# Patient Record
Sex: FEMALE | Race: WHITE | NOT HISPANIC OR LATINO | Employment: OTHER | URBAN - METROPOLITAN AREA
[De-identification: names, ages, dates, MRNs, and addresses within clinical notes are randomized per-mention and may not be internally consistent; named-entity substitution may affect disease eponyms.]

---

## 2017-06-30 ENCOUNTER — APPOINTMENT (OUTPATIENT)
Dept: WOMENS IMAGING | Facility: HOSPITAL | Age: 67
End: 2017-06-30

## 2017-06-30 PROCEDURE — 77063 BREAST TOMOSYNTHESIS BI: CPT | Performed by: RADIOLOGY

## 2017-06-30 PROCEDURE — G0202 SCR MAMMO BI INCL CAD: HCPCS | Performed by: RADIOLOGY

## 2018-08-08 ENCOUNTER — OFFICE VISIT CONVERTED (OUTPATIENT)
Dept: ORTHOPEDIC SURGERY | Facility: CLINIC | Age: 68
End: 2018-08-08
Attending: PHYSICIAN ASSISTANT

## 2018-09-05 ENCOUNTER — CONVERSION ENCOUNTER (OUTPATIENT)
Dept: ORTHOPEDIC SURGERY | Facility: CLINIC | Age: 68
End: 2018-09-05

## 2018-09-05 ENCOUNTER — OFFICE VISIT CONVERTED (OUTPATIENT)
Dept: ORTHOPEDIC SURGERY | Facility: CLINIC | Age: 68
End: 2018-09-05
Attending: PHYSICIAN ASSISTANT

## 2018-09-25 ENCOUNTER — CONVERSION ENCOUNTER (OUTPATIENT)
Dept: INTERNAL MEDICINE | Facility: CLINIC | Age: 68
End: 2018-09-25

## 2018-09-25 ENCOUNTER — OFFICE VISIT CONVERTED (OUTPATIENT)
Dept: INTERNAL MEDICINE | Facility: CLINIC | Age: 68
End: 2018-09-25
Attending: INTERNAL MEDICINE

## 2019-01-08 ENCOUNTER — OFFICE VISIT CONVERTED (OUTPATIENT)
Dept: INTERNAL MEDICINE | Facility: CLINIC | Age: 69
End: 2019-01-08
Attending: INTERNAL MEDICINE

## 2019-01-09 ENCOUNTER — APPOINTMENT (OUTPATIENT)
Dept: WOMENS IMAGING | Facility: HOSPITAL | Age: 69
End: 2019-01-09

## 2019-01-09 PROCEDURE — 77067 SCR MAMMO BI INCL CAD: CPT | Performed by: RADIOLOGY

## 2019-01-09 PROCEDURE — 77063 BREAST TOMOSYNTHESIS BI: CPT | Performed by: RADIOLOGY

## 2019-07-17 ENCOUNTER — OFFICE VISIT CONVERTED (OUTPATIENT)
Dept: INTERNAL MEDICINE | Facility: CLINIC | Age: 69
End: 2019-07-17
Attending: INTERNAL MEDICINE

## 2019-07-17 ENCOUNTER — HOSPITAL ENCOUNTER (OUTPATIENT)
Dept: OTHER | Facility: HOSPITAL | Age: 69
Discharge: HOME | End: 2019-07-17
Attending: INTERNAL MEDICINE

## 2019-07-17 LAB
25(OH)D3 SERPL-MCNC: 35.5 NG/ML (ref 30–100)
ALBUMIN SERPL-MCNC: 4.3 G/DL (ref 3.5–5)
ALBUMIN/GLOB SERPL: 1.2 {RATIO} (ref 1.4–2.6)
ALP SERPL-CCNC: 81 U/L (ref 43–160)
ALT SERPL-CCNC: 19 U/L (ref 10–40)
ANION GAP SERPL CALC-SCNC: 21 MMOL/L (ref 8–19)
AST SERPL-CCNC: 23 U/L (ref 15–50)
BASOPHILS # BLD AUTO: 0.02 10*3/UL (ref 0–0.2)
BASOPHILS NFR BLD AUTO: 0.3 % (ref 0–3)
BILIRUB SERPL-MCNC: 0.48 MG/DL (ref 0.2–1.3)
BUN SERPL-MCNC: 12 MG/DL (ref 5–25)
BUN/CREAT SERPL: 21 {RATIO} (ref 6–20)
CALCIUM SERPL-MCNC: 9.6 MG/DL (ref 8.7–10.4)
CHLORIDE SERPL-SCNC: 101 MMOL/L (ref 99–111)
CHOLEST SERPL-MCNC: 183 MG/DL (ref 107–200)
CHOLEST/HDLC SERPL: 4.1 {RATIO} (ref 3–6)
CONV ABS IMM GRAN: 0.02 10*3/UL (ref 0–0.2)
CONV CO2: 25 MMOL/L (ref 22–32)
CONV IMMATURE GRAN: 0.3 % (ref 0–1.8)
CONV TOTAL PROTEIN: 7.9 G/DL (ref 6.3–8.2)
CREAT UR-MCNC: 0.58 MG/DL (ref 0.5–0.9)
DEPRECATED RDW RBC AUTO: 43.9 FL (ref 36.4–46.3)
EOSINOPHIL # BLD AUTO: 0.27 10*3/UL (ref 0–0.7)
EOSINOPHIL # BLD AUTO: 4.5 % (ref 0–7)
ERYTHROCYTE [DISTWIDTH] IN BLOOD BY AUTOMATED COUNT: 13.6 % (ref 11.7–14.4)
GFR SERPLBLD BASED ON 1.73 SQ M-ARVRAT: >60 ML/MIN/{1.73_M2}
GLOBULIN UR ELPH-MCNC: 3.6 G/DL (ref 2–3.5)
GLUCOSE SERPL-MCNC: 102 MG/DL (ref 65–99)
HBA1C MFR BLD: 12.1 G/DL (ref 12–16)
HCT VFR BLD AUTO: 36.9 % (ref 37–47)
HDLC SERPL-MCNC: 45 MG/DL (ref 40–60)
LDLC SERPL CALC-MCNC: 98 MG/DL (ref 70–100)
LYMPHOCYTES # BLD AUTO: 1.97 10*3/UL (ref 1–5)
MCH RBC QN AUTO: 29 PG (ref 27–31)
MCHC RBC AUTO-ENTMCNC: 32.8 G/DL (ref 33–37)
MCV RBC AUTO: 88.5 FL (ref 81–99)
MONOCYTES # BLD AUTO: 0.42 10*3/UL (ref 0.2–1.2)
MONOCYTES NFR BLD AUTO: 7 % (ref 3–10)
NEUTROPHILS # BLD AUTO: 3.29 10*3/UL (ref 2–8)
NEUTROPHILS NFR BLD AUTO: 55 % (ref 30–85)
NRBC CBCN: 0 % (ref 0–0.7)
OSMOLALITY SERPL CALC.SUM OF ELEC: 296 MOSM/KG (ref 273–304)
PLATELET # BLD AUTO: 210 10*3/UL (ref 130–400)
PMV BLD AUTO: 10.2 FL (ref 9.4–12.3)
POTASSIUM SERPL-SCNC: 4.4 MMOL/L (ref 3.5–5.3)
RBC # BLD AUTO: 4.17 10*6/UL (ref 4.2–5.4)
SODIUM SERPL-SCNC: 143 MMOL/L (ref 135–147)
TRIGL SERPL-MCNC: 200 MG/DL (ref 40–150)
TSH SERPL-ACNC: 1.31 M[IU]/L (ref 0.27–4.2)
VARIANT LYMPHS NFR BLD MANUAL: 32.9 % (ref 20–45)
VLDLC SERPL-MCNC: 40 MG/DL (ref 5–37)
WBC # BLD AUTO: 5.99 10*3/UL (ref 4.8–10.8)

## 2019-07-18 LAB
CONV HEPATITIS C AB WITH REFLEX TO CONFIRMATION: <0.1 S/CO RATIO (ref 0–0.9)
CONV HEPATITIS COMMENT: NORMAL

## 2019-08-02 ENCOUNTER — CONVERSION ENCOUNTER (OUTPATIENT)
Dept: GASTROENTEROLOGY | Facility: CLINIC | Age: 69
End: 2019-08-02
Attending: INTERNAL MEDICINE

## 2019-12-20 ENCOUNTER — OFFICE VISIT CONVERTED (OUTPATIENT)
Dept: INTERNAL MEDICINE | Facility: CLINIC | Age: 69
End: 2019-12-20
Attending: INTERNAL MEDICINE

## 2020-01-17 ENCOUNTER — CONVERSION ENCOUNTER (OUTPATIENT)
Dept: INTERNAL MEDICINE | Facility: CLINIC | Age: 70
End: 2020-01-17

## 2020-01-17 ENCOUNTER — OFFICE VISIT CONVERTED (OUTPATIENT)
Dept: INTERNAL MEDICINE | Facility: CLINIC | Age: 70
End: 2020-01-17
Attending: INTERNAL MEDICINE

## 2020-02-13 ENCOUNTER — HOSPITAL ENCOUNTER (OUTPATIENT)
Dept: SLEEP MEDICINE | Facility: HOSPITAL | Age: 70
Discharge: HOME | End: 2020-02-13
Attending: INTERNAL MEDICINE

## 2020-04-21 ENCOUNTER — TELEMEDICINE CONVERTED (OUTPATIENT)
Dept: INTERNAL MEDICINE | Facility: CLINIC | Age: 70
End: 2020-04-21
Attending: INTERNAL MEDICINE

## 2020-06-02 ENCOUNTER — OFFICE VISIT CONVERTED (OUTPATIENT)
Dept: CARDIOLOGY | Facility: CLINIC | Age: 70
End: 2020-06-02
Attending: INTERNAL MEDICINE

## 2020-06-03 ENCOUNTER — APPOINTMENT (OUTPATIENT)
Dept: WOMENS IMAGING | Facility: HOSPITAL | Age: 70
End: 2020-06-03

## 2020-06-03 PROCEDURE — 77063 BREAST TOMOSYNTHESIS BI: CPT | Performed by: RADIOLOGY

## 2020-06-03 PROCEDURE — 77067 SCR MAMMO BI INCL CAD: CPT | Performed by: RADIOLOGY

## 2020-06-22 ENCOUNTER — CONVERSION ENCOUNTER (OUTPATIENT)
Dept: CARDIOLOGY | Facility: CLINIC | Age: 70
End: 2020-06-22
Attending: INTERNAL MEDICINE

## 2020-07-21 ENCOUNTER — OFFICE VISIT CONVERTED (OUTPATIENT)
Dept: INTERNAL MEDICINE | Facility: CLINIC | Age: 70
End: 2020-07-21
Attending: INTERNAL MEDICINE

## 2021-01-25 ENCOUNTER — OFFICE VISIT CONVERTED (OUTPATIENT)
Dept: CARDIOLOGY | Facility: CLINIC | Age: 71
End: 2021-01-25
Attending: INTERNAL MEDICINE

## 2021-02-02 ENCOUNTER — HOSPITAL ENCOUNTER (OUTPATIENT)
Dept: OTHER | Facility: HOSPITAL | Age: 71
Discharge: HOME | End: 2021-02-02
Attending: INTERNAL MEDICINE

## 2021-02-02 ENCOUNTER — OFFICE VISIT CONVERTED (OUTPATIENT)
Dept: INTERNAL MEDICINE | Facility: CLINIC | Age: 71
End: 2021-02-02
Attending: INTERNAL MEDICINE

## 2021-02-02 ENCOUNTER — CONVERSION ENCOUNTER (OUTPATIENT)
Dept: INTERNAL MEDICINE | Facility: CLINIC | Age: 71
End: 2021-02-02

## 2021-02-02 LAB
ALBUMIN SERPL-MCNC: 4.3 G/DL (ref 3.5–5)
ALBUMIN/GLOB SERPL: 1.2 {RATIO} (ref 1.4–2.6)
ALP SERPL-CCNC: 81 U/L (ref 43–160)
ALT SERPL-CCNC: 20 U/L (ref 10–40)
ANION GAP SERPL CALC-SCNC: 13 MMOL/L (ref 8–19)
AST SERPL-CCNC: 24 U/L (ref 15–50)
BASOPHILS # BLD AUTO: 0.03 10*3/UL (ref 0–0.2)
BASOPHILS NFR BLD AUTO: 0.4 % (ref 0–3)
BILIRUB SERPL-MCNC: 0.24 MG/DL (ref 0.2–1.3)
BUN SERPL-MCNC: 10 MG/DL (ref 5–25)
BUN/CREAT SERPL: 17 {RATIO} (ref 6–20)
CALCIUM SERPL-MCNC: 9.5 MG/DL (ref 8.7–10.4)
CHLORIDE SERPL-SCNC: 100 MMOL/L (ref 99–111)
CHOLEST SERPL-MCNC: 199 MG/DL (ref 107–200)
CHOLEST/HDLC SERPL: 4.6 {RATIO} (ref 3–6)
CONV ABS IMM GRAN: 0.02 10*3/UL (ref 0–0.2)
CONV CO2: 30 MMOL/L (ref 22–32)
CONV IMMATURE GRAN: 0.3 % (ref 0–1.8)
CONV TOTAL PROTEIN: 7.9 G/DL (ref 6.3–8.2)
CREAT UR-MCNC: 0.58 MG/DL (ref 0.5–0.9)
DEPRECATED RDW RBC AUTO: 44.2 FL (ref 36.4–46.3)
EOSINOPHIL # BLD AUTO: 0.19 10*3/UL (ref 0–0.7)
EOSINOPHIL # BLD AUTO: 2.8 % (ref 0–7)
ERYTHROCYTE [DISTWIDTH] IN BLOOD BY AUTOMATED COUNT: 13.7 % (ref 11.7–14.4)
GFR SERPLBLD BASED ON 1.73 SQ M-ARVRAT: >60 ML/MIN/{1.73_M2}
GLOBULIN UR ELPH-MCNC: 3.6 G/DL (ref 2–3.5)
GLUCOSE SERPL-MCNC: 79 MG/DL (ref 65–99)
HCT VFR BLD AUTO: 38.2 % (ref 37–47)
HDLC SERPL-MCNC: 43 MG/DL (ref 40–60)
HGB BLD-MCNC: 12.1 G/DL (ref 12–16)
LDLC SERPL CALC-MCNC: 111 MG/DL (ref 70–100)
LYMPHOCYTES # BLD AUTO: 2.12 10*3/UL (ref 1–5)
LYMPHOCYTES NFR BLD AUTO: 30.9 % (ref 20–45)
MCH RBC QN AUTO: 27.9 PG (ref 27–31)
MCHC RBC AUTO-ENTMCNC: 31.7 G/DL (ref 33–37)
MCV RBC AUTO: 88.2 FL (ref 81–99)
MONOCYTES # BLD AUTO: 0.54 10*3/UL (ref 0.2–1.2)
MONOCYTES NFR BLD AUTO: 7.9 % (ref 3–10)
NEUTROPHILS # BLD AUTO: 3.96 10*3/UL (ref 2–8)
NEUTROPHILS NFR BLD AUTO: 57.7 % (ref 30–85)
NRBC CBCN: 0 % (ref 0–0.7)
OSMOLALITY SERPL CALC.SUM OF ELEC: 286 MOSM/KG (ref 273–304)
PLATELET # BLD AUTO: 213 10*3/UL (ref 130–400)
PMV BLD AUTO: 10.5 FL (ref 9.4–12.3)
POTASSIUM SERPL-SCNC: 4.2 MMOL/L (ref 3.5–5.3)
RBC # BLD AUTO: 4.33 10*6/UL (ref 4.2–5.4)
SODIUM SERPL-SCNC: 139 MMOL/L (ref 135–147)
TRIGL SERPL-MCNC: 223 MG/DL (ref 40–150)
TSH SERPL-ACNC: 1.28 M[IU]/L (ref 0.27–4.2)
VLDLC SERPL-MCNC: 45 MG/DL (ref 5–37)
WBC # BLD AUTO: 6.86 10*3/UL (ref 4.8–10.8)

## 2021-03-02 ENCOUNTER — OFFICE VISIT CONVERTED (OUTPATIENT)
Dept: INTERNAL MEDICINE | Facility: CLINIC | Age: 71
End: 2021-03-02
Attending: INTERNAL MEDICINE

## 2021-03-02 ENCOUNTER — CONVERSION ENCOUNTER (OUTPATIENT)
Dept: INTERNAL MEDICINE | Facility: CLINIC | Age: 71
End: 2021-03-02

## 2021-03-18 ENCOUNTER — HOSPITAL ENCOUNTER (OUTPATIENT)
Dept: SLEEP MEDICINE | Facility: HOSPITAL | Age: 71
Discharge: HOME | End: 2021-03-18
Attending: INTERNAL MEDICINE

## 2021-04-15 ENCOUNTER — OFFICE VISIT CONVERTED (OUTPATIENT)
Dept: INTERNAL MEDICINE | Facility: CLINIC | Age: 71
End: 2021-04-15
Attending: INTERNAL MEDICINE

## 2021-05-05 ENCOUNTER — HOSPITAL ENCOUNTER (OUTPATIENT)
Dept: GASTROENTEROLOGY | Facility: HOSPITAL | Age: 71
Setting detail: HOSPITAL OUTPATIENT SURGERY
Discharge: HOME | End: 2021-05-05
Attending: INTERNAL MEDICINE

## 2021-05-10 NOTE — PROCEDURES
"   Procedure Note      Patient Name: Kimberly Dennis   Patient ID: [id number]   Sex: Female   YOB: 1950    Primary Care Provider: Milan Coppola MD   Referring Provider: Milan Coppola MD    Visit Date: June 22, 2020    Provider: Chip Mendoza MD   Location: [facility]   Location Address: [address] [place] [address], [address]   [place]  [id number]   Location Phone: [phone]          FINAL REPORT   TRANSTHORACIC ECHOCARDIOGRAM REPORT    Diagnosis: Atrial Fibrillation   Height: 5'6\" Weight: 268 B/P: 158/64 BSA: 2.3   Tech: BNS   MEASUREMENTS:  RVID (Diastole) : RVID. (NORMAL: 0.7 to 2.4 cm max)   LVID (Systole): 2.4 cm (Diastole): 4.9 cm . (NORMAL: 3.7 - 5.4 cm)   Posterior Wall Thickness (Diastole): 1.3 cm. (NORMAL: 0.8 - 1.1 cm)   Septal Thickness (Diastole): 1.4 cm. (NORMAL: 0.7 - 1.2 cm)   LAID (Systole): 4.8 cm. (NORMAL: 1.9 - 3.8 cm)   Aortic Root Diameter (Diastole): 3.3 cm. (NORMAL: 2.0 - 3.7 cm)   COMMENTS:  The patient underwent 2-D, M-Mode, and Doppler examination, including pulse-wave, continuous-wave, and color-flow Doppler analysis; the study is technically adequate. The following findings were noted:   FINDINGS:  MITRAL VALVE: Normal in appearance, opens well. No evidence of mitral valve prolapse. No mitral stenosis. Mild mitral regurgitation.   AORTIC VALVE: Normal trileaflet aortic valve, opens well. No evidence of aortic stenosis. There is mild aortic insufficiency.   TRICUSPID VALVE: Normal in appearance, opens well. Trace tricuspid regurgitation. Pulmonary artery systolic pressure within normal limits.   PULMONIC VALVE: Grossly normal. There is mild pulmonic insufficiency noted.   AORTIC ROOT: Normal in size with adequate motion.   LEFT ATRIUM: Mild left atrial enlargement. No intracavitary masses or clots seen. LA volume index is 40 mL/m2.   LEFT VENTRICLE: The left ventricular chamber size is normal. There is mild concentric left ventricular " hypertrophy. Left ventricular systolic function is normal. Estimated LV ejection fraction is 55%. There are no regional wall motion abnormalities. There is diastolic dysfunction of the left ventricle.   RIGHT VENTRICLE: Normal size and function.   RIGHT ATRIUM: Normal in size.   PERICARDIUM: No evidence of pericardial effusion.   INFERIOR VENA CAVA: Measures 1.4 cm in diameter and there is more than 50% collapse during inspiration.   DOPPLER: E/A ratio is 1.2. DT= 227 msec. IVRT is 90 msec. E/E' is 16.   Faxed: 06/29/2020      CONCLUSION:  1.  Preserved left ventricular systolic function with estimated LV ejection fraction of 55%.   2.  Mild concentric left ventricular hypertrophy.   3.  Mild left atrial enlargement.   4.  Mild mitral regurgitation.   5.  Diastolic dysfunction of the left ventricle.   6.  Mild aortic insufficiency.       MD MARLA Bryant/shahriar    This note was transcribed by Vale Contreras.  shahriar/marla  The above service was transcribed by Vale Contreras, and I attest to the accuracy of the note.  MARLA                 Electronically Signed by: Kaylan Contreras-, Other -Author on June 29, 2020 08:24:14 AM  Electronically Co-signed by: Chip Mendoza MD -Reviewer on June 29, 2020 08:45:26 AM

## 2021-05-10 NOTE — H&P
History and Physical      Patient Name: Kimberly Dennis   Patient ID: [id number]   Sex: Female   YOB: 1950    Primary Care Provider: Milan Coppola MD   Referring Provider: Milan Coppola MD    Visit Date: June 2, 2020    Provider: Chip Mendoza MD   Location: [facility]   Location Address: [address] [place] [address], [facility]   [place], [place]  [id number]   Location Phone: [phone]          Chief Complaint  · REASON FOR CONSULTATION: Atrial fibrillation       History Of Present Illness  Consult requested by: Milan Coppola MD   Kimberly Dennis is a 70-year-old female with hypertension and hypothyroidism, who was referred because of paroxysmal atrial fibrillation. She had a visit to [facility] emergency room on 12/20/2019 because of palpitations and fast heartbeat. She also had some dizziness. Initially seen in Dr. Coppola's office. An EKG was done, which showed atrial fibrillation with RVR and, hence, sent to the emergency room. In the emergency room, initial workup was unremarkable, and EKG again showed atrial fibrillation with RVR. Per ER records, patient underwent electrical cardioversion and was discharged home on the same day. She was never admitted. It was unclear why anti-coagulants were not prescribed after anti-coagulation. She had no symptoms in the past 6 months. She denies having any shortness of breath, dizziness or chest pain. She reports that she had another episode of atrial fibrillation 15 years back as well.   PAST MEDICAL HISTORY: (1) Paroxysmal atrial fibrillation, recent cardioversion in the emergency room. (2) Hypertension. (3) Hypothyroidism.   PSYCHOSOCIAL HISTORY: Denies tobacco or alcohol use. No recreational drug usage. No mood changes or depression.   FAMILY HISTORY: was reviewed. Negative for premature coronary artery disease.   CURRENT MEDICATIONS: include Metoprolol ER 50 mg daily; HCTZ 12.5 mg daily; aspirin 81 mg daily; Levothyroxine 75  "mcg daily; Vitamin D3; Cetirizine 10 mg daily. The dosage and frequency of the medications were reviewed with the patient.   ALLERGIES: Hydrocodone.       Review of Systems  · Constitutional  o Admits  o : fatigue  o Denies  o : good general health lately, recent weight changes   · Eyes  o Denies  o : double vision  · HENT  o Admits  o : chronic sinus problem  o Denies  o : hearing loss or ringing, swollen glands in neck  · Cardiovascular  o Admits  o : palpitations (fast, fluttering, or skipping beats), shortness of breath while walking or lying flat  o Denies  o : chest pain, swelling (feet, ankles, hands)  · Respiratory  o Admits  o : asthma or wheezing  o Denies  o : chronic or frequent cough, COPD  · Gastrointestinal  o Denies  o : ulcers, nausea or vomiting  · Neurologic  o Admits  o : headaches  o Denies  o : lightheaded or dizzy, stroke  · Musculoskeletal  o Admits  o : joint pain, back pain  · Endocrine  o Admits  o : thyroid disease  o Denies  o : diabetes, heat or cold intolerance, excessive thirst or urination  · Heme-Lymph  o Denies  o : bleeding or bruising tendency, anemia      Vitals  Date Time BP Position Site L\R Cuff Size HR RR TEMP (F) WT  HT  BMI kg/m2 BSA m2 O2 Sat HC       06/02/2020 10:17 /64 Sitting    68 - R   268lbs 0oz 5'  6\" 43.26 2.38     06/02/2020 10:17 /64 Sitting                     Physical Examination  · Constitutional  o Appearance  o : Awake, alert, in no acute distress.  · Head and Face  o HEENT  o : No pallor, anicteric. Eyes normal. Moist mucous membranes.  · Neck  o Inspection/Palpation  o : Supple. No hepatosplenomegaly.  o Jugular Veins  o : No JVD. No carotid bruits.  · Respiratory  o Auscultation of Lungs  o : Clear to auscultation bilaterally. No crackles or wheezing.  · Cardiovascular  o Heart  o : S1, S2 is normally heard. No S3. No murmur, rubs, or gallops.  · Gastrointestinal  o Abdominal Examination  o : Soft, non-distended. No palpable " hepatosplenomegaly. Bowel sounds heard in all four quadrants.  · Musculoskeletal  o General  o : Normal muscle tone and strength.  · Skin and Subcutaneous Tissue  o General Inspection  o : No skin rashes.  · Extremities  o Extremities  o : Warm and well perfused. Distal pulses present. No pitting pedal edema.     EKG was performed in the office today.  Indication:       atrial fibrillation.  Results:          normal sinus rhythm, normal axis, no significant ST changes.  Normal EKG.  Comparison:   When compared to previous EKG on December 20, 2019, the rhythm has changed to atrial                       fibrillation.                 Assessment     ASSESSMENT AND PLAN:    1.  Paroxysmal atrial fibrillation:  Had a cardioversion in the emergency room.  No symptoms since then.  Her       JOSIAS-VASc score is 2.  Will need anti-coagulation.  Explained the risks, benefits and alternatives in detail.       Will start Eliquis 5 mg po twice daily.  Continue Metoprolol.  Will check an echocardiogram to rule out any       structural cardiac abnormalities, specifically valvular disorders.  2.  Hypertension:  Systolic blood pressure mildly elevated.  Continue same medicines.  She will keep a log of        her home blood pressure.  3.  Follow up with echocardiogram report.    MD MARLA Bryant/lilliam           This note was transcribed by Delores Starkey.  lilliam/MARLA  The above service was transcribed by Delores Starkey, and I attest to the accuracy of the note.  MARLA               Electronically Signed by: Delores Starkey-, -Author on Rhianna 10, 2020 04:32:40 AM  Electronically Co-signed by: Chip Mendoza MD -Reviewer on June 18, 2020 03:23:19 PM

## 2021-05-12 NOTE — PROGRESS NOTES
Progress Note      Patient Name: Kimberly Dennis   Patient ID: [id number]   Sex: Female   YOB: 1950    Primary Care Provider: Milan Coppola MD   Referring Provider: Milan Coppola MD    Visit Date: April 21, 2020    Provider: Milan Coppola MD   Location: [facility]   Location Address: [address] [place] [address]  [place], [place]  [id number]   Location Phone: [phone]          Chief Complaint  · concern for low BP      History Of Present Illness  Video Conferencing Visit  Kimberly Dennis is a 70 year old /White female who is presenting for evaluation via video conferencing with Zoom. Verbal consent obtained before beginning visit.   The following staff were present during this visit: Dr. Coppola and pt   Kimberly Dennis is a 70 year old /White female who presents for evaluation and treatment of:      pt thinks BP may be low because of slower times right now. pt denies further palpitations since starting metoprolol. pt reports feeling lightheaded. pt reports home readings of BPs 90/50s with HR 40s-50s.       Review of Systems  · Constitutional  o Denies  o : fever, fatigue, weight loss, weight gain  · HENT  o Admits  o : lightheadedness  o Denies  o : headaches  · Cardiovascular  o Denies  o : lower extremity edema, chest pressure, palpitations  · Respiratory  o Denies  o : shortness of breath, wheezing, frequent cough, dyspnea on exertion  · Gastrointestinal  o Denies  o : nausea, vomiting, diarrhea, constipation, abdominal pain      Physical Examination     Gen: well-nourished, no acute distress  HENT: atraumatic, normocephalic  Eyes: extraocular movements intact, no scleral icterus  Lung: breathing comfortably, no cough  Neuro: grossly oriented to person, place, and time. no facial droop   Psych: normal mood and affect             Assessment  · Hypertension     401.9/I10  concern for low BPs given current symptoms. reduce metoprolol to 50mg daily  and monitor.     Problems Reconciled  Plan  · Orders  o ACO-39: Current medications updated and reviewed () - - 04/21/2020  · Medications  o metoprolol succinate 50 mg oral tablet extended release 24 hr   SIG: take 1 tablet (50 mg) by oral route once daily for 90 days   DISP: (90) tablets with 3 refills  Adjusted on 04/21/2020     o Medications have been Reconciled  o Transition of Care or Provider Policy  · Instructions  o Patient was educated/instructed on their diagnosis, treatment and medications prior to discharge from the clinic today.  · Disposition  o Call or Return if symptoms worsen or persist.            Electronically Signed by: Milan Coppola MD -Author on April 21, 2020 02:10:16 PM

## 2021-05-13 NOTE — PROGRESS NOTES
"   Progress Note      Patient Name: Kimberly Dennis   Patient ID: [id number]   Sex: Female   YOB: 1950    Primary Care Provider: Milan Coppola MD   Referring Provider: Milan Coppola MD    Visit Date: July 21, 2020    Provider: Milan Coppola MD   Location: [facility]   Location Address: [address] [place] [address] [address]  [place], [place]  [id number]   Location Phone: [phone]          Chief Complaint  · HTN  · \"I have been retaining fluid little over a week now\"  · \"I have a sinus problem going on\"      History Of Present Illness  Kimberly Dennis is a 70 year old /White female who presents for evaluation and treatment of:      HTN- pt reports inc swelling in BLE edema. pt home readings avg 140s/80s.   pt reports inc season allergies  mammo completed  pt is due for colonoscopy       Past Medical History  Disease Name Date Onset Notes   Aftercare following left knee arthroscopy 10/12/2017 --    Anemia, Unspecified --  --    Arthritis --  --    Bladder Disorder --  --    History of total knee arthroplasty, right 09/08/2016 --    Hypertension --  --    Left medial meniscus tear 09/12/2017 --    Limb Swelling --  --    Mitral valve prolapse --  --    Primary osteoarthritis of left knee 09/12/2017 --    Reflux --  --    Seasonal allergies --  --    Shortness of Breath --  --    Thoracic outlet syndrome --  --    Thyroid disorder --  --          Past Surgical History  Procedure Name Date Notes   Artificial Joints/Limbs --  --    Colonoscopy 2014 [place]   Eye Implant --  yes   Gallbladder --  --    Hysterectomy --  --    Joint Surgery --  --          Medication List  Name Date Started Instructions   Aspir-81 81 mg oral tablet,delayed release (DR/EC)  take 1 tablet (81 mg) by oral route once daily   cetirizine 10 mg oral tablet 01/17/2020 take 1 tablet (10 mg) by oral route once daily for 90 days   Eliquis 5 mg oral tablet  take 1 tablet (5 mg) by oral route 2 times per day "   hydrochlorothiazide 25 mg oral tablet 07/22/2020 take 1 tablet (25 mg) by oral route once daily for 90 days   levothyroxine 75 mcg oral tablet 05/05/2020 TAKE 1 TABLET BY MOUTH ONCE DAILY   metoprolol succinate 50 mg oral tablet extended release 24 hr 04/21/2020 take 1 tablet (50 mg) by oral route once daily for 90 days   Vitamin D3 5,000 unit oral tablet  take 1 tablet by oral route daily         Allergy List  Allergen Name Date Reaction Notes   iodine --  --  --    Latex Exam Gloves --  --  --    Norco --  --  --        Allergies Reconciled  Family Medical History  Disease Name Relative/Age Notes   Heart Disease Brother/  Mother/   Mother; Brother  Mother   Diabetes, unspecified type Brother/   Brother   Family history of certain chronic disabling diseases; arthritis Mother/   Mother  Sister   No family history of colorectal cancer  --          Social History  Finding Status Start/Stop Quantity Notes   Alcohol Use Never --/-- --  does not drink   Homemaker. --  --/-- --  --    lives with spouse --  --/-- --  --    . --  --/-- --  --    Pregnant Unknown --/-- --  yes   Recreational Drug Use Never --/-- --  no   Tobacco Never --/-- --  never smoker  never smoker  08/07/2017 - never smoker         Immunizations  NameDate Admin Mfg Trade Name Lot Number Route Inj VIS Given VIS Publication   Nrpvhpnwu25/22/2019 [facility] FLUZONE-HIGH DOSE [id number]  RD 10/22/2019    Comments: pt tolerated well   Otyroxjrd26/25/2018 [facility] FLUZONE-HIGH DOSE [id number]  RD 09/25/2018 08/19/2014   Comments: Pt tolerated well left office in stable condition. LEONARDO RN   Tzzorwyje4708/16/2020 [facility] PNEUMOVAX 23 [id number] IM RD 03/16/2020    Comments: Pt tolerated well, left office in stable condition   Prevnar 1309/25/2018 [facility] PREVNAR 13 [id number] IM LD 09/25/2018 11/05/2015   Comments: Pt tolerated well left office in stable condition. LEONARDO RN         Review of Systems  · Constitutional  o Denies  o : fever, fatigue, weight loss, weight  "gain  · Cardiovascular  o Admits  o : lower extremity edema  o Denies  o : chest pressure, palpitations  · Respiratory  o Denies  o : shortness of breath, wheezing, cough, dyspnea on exertion  · Gastrointestinal  o Denies  o : nausea, vomiting, diarrhea, constipation, abdominal pain      Vitals  Date Time BP Position Site L\R Cuff Size HR RR TEMP (F) WT  HT  BMI kg/m2 BSA m2 O2 Sat HC       06/02/2020 10:17 /64 Sitting    70 - R   268lbs 0oz 5'  6\" 43.26 2.38     06/02/2020 10:17 /64 Sitting               07/21/2020 10:23 /82 Sitting    63 - R 16 95.6 274lbs 6oz 5'  6\" 44.28 2.41 96 %          Physical Examination  · Constitutional  o Appearance  o : no acute distress, well-nourished  · Head and Face  o Head  o :   § Inspection  § : atraumatic, normocephalic  · Eyes  o Eyes  o : extraocular movements intact, no scleral icterus, no conjunctival injection  · Ears, Nose, Mouth and Throat  o Ears  o :   § External Ears  § : normal  o Nose  o :   § Intranasal Exam  § : nares patent  o Oral Cavity  o :   § Oral Mucosa  § : moist mucous membranes  · Respiratory  o Respiratory Effort  o : breathing comfortably, symmetric chest rise  o Auscultation of Lungs  o : clear to asculatation bilaterally, no wheezes, rales, or rhonchii  · Cardiovascular  o Heart  o :   § Auscultation of Heart  § : regular rate and rhythm, no murmurs, rubs, or gallops  · Neurologic  o Mental Status Examination  o :   § Orientation  § : grossly oriented to person, place and time  o Gait and Station  o :   § Gait Screening  § : normal gait  · Psychiatric  o General  o : normal mood and affect          Assessment  · Hypertension     401.9/I10  given edema, will inc HCTZ to 25mg daily. cont monitoring edema and BPs.     Problems Reconciled  Plan  · Orders  o ACO-39: Current medications updated and reviewed () - - 07/21/2020  · Medications  o hydrochlorothiazide 25 mg oral tablet   SIG: take 1 tablet (25 mg) by oral route once daily " for 90 days   DISP: (90) tablets with 3 refills  Adjusted on 07/21/2020     o Medications have been Reconciled  o Transition of Care or Provider Policy  · Instructions  o Patient was educated/instructed on their diagnosis, treatment and medications prior to discharge from the clinic today.  · Disposition  o f/u in 3 months            Electronically Signed by: Milan Coppola MD -Author on July 31, 2020 01:46:31 PM

## 2021-05-13 NOTE — PROGRESS NOTES
Progress Note      Patient Name: Kimberly Dennis   Patient ID: [id number]   Sex: Female   YOB: 1950    Primary Care Provider: Milan Coppola MD   Referring Provider: Milan Coppola MD    Visit Date: July 21, 2020    Provider: Milan Coppola MD   Location: [facility]   Location Address: [address] [place] [address]  [id number]   Location Phone: [phone]          Chief Complaint  · Annual Wellness Exam      History Of Present Illness  The patient is a 70 year old /White female who has come to this office for her Annual Wellness Visit.   Her Primary Care Provider is Milan Coppola MD. Her comprehensive Care Team list, including suppliers, has been updated on the Facesheet. Her medical/family history, height, weight, BMI, and blood pressure have been reviewed and are in the chart. The Health Risk Assessment has been completed and scanned in the chart.   Medications are listed in the medication list.   The active problem list includes: Arthritis, Hypertension, Left medial meniscus tear, Mitral valve prolapse, Seasonal allergies, and Thyroid disorder   The patient does not have a history of substance use.   Patient reports her diet is adequate.   The Mini-Cog has been administered and is scanned in chart. The results are negative. Her cognitive function is without limitation.   A hearing loss screen was completed today and the result is negative.   Patient does not have any risk factors for depression. Patient completed the PHQ-9 today and it has been scanned in the chart. The total score is 0.   The Timed Up and Go screen was administered today and the result is negative.   The Shell Index of [place] in ADLs indicated full function (score of 6).   A Falls Risk Assessment has been completed, including a review of home fall hazards and medication review.   Overall, the patient's functional ability is noted by this provider to be within normal  limits. Her level of safety is noted to be within normal limits. Her balance/gait is within normal limits. There have been no falls in the past year. Patient-specific home safety recommendations have been reviewed and a copy has been given to patient.   She denies issues with leaking urine.   There are no additional risk factors identified.   Living Will/Advanced Directive previously executed but not in chart.   Personalized health advice was given to the patient and a written health screening schedule was established; see Plan for details.       Past Medical History  Disease Name Date Onset Notes   Aftercare following left knee arthroscopy 10/12/2017 --    Anemia, Unspecified --  --    Arthritis --  --    Bladder Disorder --  --    History of total knee arthroplasty, right 09/08/2016 --    Hypertension --  --    Left medial meniscus tear 09/12/2017 --    Limb Swelling --  --    Mitral valve prolapse --  --    Primary osteoarthritis of left knee 09/12/2017 --    Reflux --  --    Seasonal allergies --  --    Shortness of Breath --  --    Thoracic outlet syndrome --  --    Thyroid disorder --  --          Past Surgical History  Procedure Name Date Notes   Artificial Joints/Limbs --  --    Colonoscopy 2014 [place]   Eye Implant --  yes   Gallbladder --  --    Hysterectomy --  --    Joint Surgery --  --          Medication List  Name Date Started Instructions   Aspir-81 81 mg oral tablet,delayed release (/EC)  take 1 tablet (81 mg) by oral route once daily   cetirizine 10 mg oral tablet 01/17/2020 take 1 tablet (10 mg) by oral route once daily for 90 days   Eliquis 5 mg oral tablet  take 1 tablet (5 mg) by oral route 2 times per day   hydrochlorothiazide 25 mg oral tablet 07/22/2020 take 1 tablet (25 mg) by oral route once daily for 90 days   levothyroxine 75 mcg oral tablet 05/05/2020 TAKE 1 TABLET BY MOUTH ONCE DAILY   metoprolol succinate 50 mg oral tablet extended release 24 hr 04/21/2020 take 1 tablet (50 mg) by  "oral route once daily for 90 days   Vitamin D3 5,000 unit oral tablet  take 1 tablet by oral route daily         Allergy List  Allergen Name Date Reaction Notes   iodine --  --  --    Latex Exam Gloves --  --  --    Norco --  --  --          Family Medical History  Disease Name Relative/Age Notes   Heart Disease Brother/  Mother/   Mother; Brother  Mother   Diabetes, unspecified type Brother/   Brother   Family history of certain chronic disabling diseases; arthritis Mother/   Mother  Sister   No family history of colorectal cancer  --          Social History  Finding Status Start/Stop Quantity Notes   Alcohol Use Never --/-- --  does not drink   Homemaker. --  --/-- --  --    lives with spouse --  --/-- --  --    . --  --/-- --  --    Pregnant Unknown --/-- --  yes   Recreational Drug Use Never --/-- --  no   Tobacco Never --/-- --  never smoker  never smoker  08/07/2017 - never smoker         Immunizations  NameDate Admin Mfg Trade Name Lot Number Route Inj VIS Given VIS Publication   Wwblnjdlw39/22/2019 [facility] FLUZONE-HIGH DOSE [id number] IM RD 10/22/2019    Comments: pt tolerated well   Ubsgxxeto65/25/2018 [facility] FLUZONE-HIGH DOSE [id number] IM RD 09/25/2018 08/19/2014   Comments: Pt tolerated well left office in stable condition. LEONARDO RN   Ceoxribhv5309/16/2020 [facility] PNEUMOVAX 23 [id number] IM RD 03/16/2020    Comments: Pt tolerated well, left office in stable condition   Prevnar 1309/25/2018 [facility] PREVNAR 13 [id number]  LD 09/25/2018 11/05/2015   Comments: Pt tolerated well left office in stable condition. LEONARDO RN         Vitals  Date Time BP Position Site L\R Cuff Size HR RR TEMP (F) WT  HT  BMI kg/m2 BSA m2 O2 Sat        07/21/2020 10:23 /82 Sitting    63 - R 16 95.6 274lbs 6oz 5'  6\" 44.28 2.41 96 %              Assessment  · Arthritis     716.90/M19.90  · Hypertension     401.9/I10  · Seasonal allergies     477.9/J30.2  · Thyroid disorder     246.9/E07.9  · Mitral valve prolapse     424.0/I34.1  · Encounter for " Medicare annual wellness exam     V70.0/Z00.00  · Screening for depression     V79.0/Z13.89  · Screening for alcoholism     V79.1/Z13.39  · Advanced care planning/counseling discussion     V65.49/Z71.89    Problems Reconciled  Plan  · Orders  o Falls Risk Assessment Completed (3288F) - V70.0/Z00.00 - 07/21/2020  o Brief hearing screening (written) [facility] () - V70.0/Z00.00 - 07/21/2020  o Annual Wellness Visit-includes a Personalized Prevention Plan of Service (PPS), SUBSEQUENT VISIT (Medicare) () - V70.0/Z00.00 - 07/21/2020  o ACO-13: Fall Risk Screening with no falls in past year or only one fall without injury in the past year (1101F) - V70.0/Z00.00 - 07/21/2020  o Presence or absence of urinary incontinence assessed (EDER) (1090F) - V70.0/Z00.00 - 07/21/2020  o ACO-18: Negative screen for clinical depression using a standardized tool () - V79.0/Z13.89 - 07/21/2020  o Negative alcohol screening () - V79.1/Z13.39 - 07/21/2020  o ACO - Advance Care Plan or Surrogate Decision Maker documented in EMR (1123F) - V65.49/Z71.89 - 07/21/2020  o ACO-39: Current medications updated and reviewed () - - 07/21/2020  o ACO-19: Colorectal cancer screening results documented and reviewed (3017F) - - 07/31/2020  o Influenza immunization was ordered or administered () - - 07/21/2020  o ACO-15: Pneumococcal Vaccine Administered or Previously Received (4040F) - - 07/21/2020  o ACO-16: BMI above normal range and follow-up plan is documented () - - 07/21/2020  o ACO-17: Current tobacco non-user (1036F) - - 07/21/2020  · Medications  o Medications have been Reconciled  o Transition of Care or Provider Policy  · Instructions  o Health Risk Assessment has been reviewed with the patient.  o Written health screening schedule for next 5-10 years was established with patient; information scanned in chart and given/mailed to patient.  o Fall prevention methods discussed and a copy of recommendations given/mailed  to patient.  o Today's PHQ-9 score is: _0__            Electronically Signed by: Milan Coppola MD -Author on July 31, 2020 01:43:12 PM

## 2021-05-14 VITALS
BODY MASS INDEX: 41.62 KG/M2 | SYSTOLIC BLOOD PRESSURE: 150 MMHG | HEIGHT: 66 IN | DIASTOLIC BLOOD PRESSURE: 60 MMHG | WEIGHT: 259 LBS | HEART RATE: 64 BPM

## 2021-05-14 VITALS
DIASTOLIC BLOOD PRESSURE: 70 MMHG | HEART RATE: 63 BPM | WEIGHT: 241 LBS | OXYGEN SATURATION: 98 % | TEMPERATURE: 97.8 F | BODY MASS INDEX: 38.73 KG/M2 | HEIGHT: 66 IN | SYSTOLIC BLOOD PRESSURE: 128 MMHG

## 2021-05-14 VITALS
WEIGHT: 263 LBS | TEMPERATURE: 97.8 F | HEART RATE: 70 BPM | OXYGEN SATURATION: 94 % | DIASTOLIC BLOOD PRESSURE: 88 MMHG | SYSTOLIC BLOOD PRESSURE: 152 MMHG

## 2021-05-14 VITALS
OXYGEN SATURATION: 98 % | DIASTOLIC BLOOD PRESSURE: 70 MMHG | BODY MASS INDEX: 42.27 KG/M2 | WEIGHT: 263 LBS | TEMPERATURE: 96.6 F | HEART RATE: 73 BPM | HEIGHT: 66 IN | SYSTOLIC BLOOD PRESSURE: 154 MMHG

## 2021-05-14 NOTE — PROGRESS NOTES
"   Progress Note      Patient Name: Kimberly Dennis   Patient ID: [id number]   Sex: Female   YOB: 1950    Primary Care Provider: Milan Coppola MD   Referring Provider: Milan Coppola MD    Visit Date: April 15, 2021    Provider: Milan Coppola MD   Location: [facility] [place]   Location Address: [address] [place] [address]; [address] [place] [address]  [id number]   Location Phone: [phone]          Chief Complaint  · HTN      History Of Present Illness  Kimberly Dennis is a 71 year old /White female who presents for evaluation and treatment of:      HTN- pt reports more cramps than usual with higher dose of Aldactone.   fatigue- pt is on CPAP at night with sleep follow-up within last 1 month and setting ok. pt does report feeling tired. pt thinks could be related to stress or allergies.   Cscope on 5/5  and mammo on 6/8         Allergy List    Allergies Reconciled  Vitals  Date Time BP Position Site L\R Cuff Size HR RR TEMP (F) WT  HT  BMI kg/m2 BSA m2 O2 Sat FR L/min FiO2 HC       03/02/2021 02:26 /88 Sitting    70 - R  97.8 263lbs 0oz    94 %  21%    04/15/2021 01:09 /70 Sitting    63 - R  97.8 241lbs 0oz 5'  6\" 38.9 2.26 98 %  21%          Physical Examination  · Constitutional  o Appearance  o : no acute distress, well-nourished  · Head and Face  o Head  o :   § Inspection  § : atraumatic, normocephalic  · Eyes  o Eyes  o : extraocular movements intact, no scleral icterus, no conjunctival injection  · Ears, Nose, Mouth and Throat  o Ears  o :   § External Ears  § : normal  o Nose  o :   § Intranasal Exam  § : nares patent  o Oral Cavity  o :   § Oral Mucosa  § : moist mucous membranes  · Respiratory  o Respiratory Effort  o : breathing comfortably, symmetric chest rise  · Cardiovascular  o Heart  o :   § Auscultation of Heart  § : regular rate  o Peripheral Vascular System  o :   § Extremities  § : no edema  · Neurologic  o Mental Status " Examination  o :   § Orientation  § : grossly oriented to person, place and time  o Gait and Station  o :   § Gait Screening  § : normal gait  · Psychiatric  o General  o : normal mood and affect          Assessment  · Essential hypertension     401.9/I10  given cramps with Aldactone, will switch to losartan as pt tolerated previously. f/u in 4-6 weeks for repeat eval.     Problems Reconciled  Plan  · Orders  o ACO-39: Current medications updated and reviewed (, 1159F) - - 04/15/2021  · Medications  o losartan 25 mg oral tablet   SIG: take 1 tablet (25 mg) by oral route once daily for 90 days   DISP: (90) Tablet with 1 refills  Adjusted on 04/15/2021     o Medications have been Reconciled  o Transition of Care or Provider Policy  · Instructions  o Patient was educated/instructed on their diagnosis, treatment and medications prior to discharge from the clinic today.  · Disposition  o f/u in 1 month            Electronically Signed by: Milan Coppola MD -Author on April 15, 2021 03:08:41 PM

## 2021-05-14 NOTE — PROGRESS NOTES
Progress Note      Patient Name: Kimberly Dennis   Patient ID: [id number]   Sex: Female   YOB: 1950    Primary Care Provider: Milan Coppola MD   Referring Provider: Milan Coppola MD    Visit Date: January 25, 2021    Provider: Chip Mendoza MD   Location: [facility]   Location Address: [address] [place] [address], [facility]   [place], [place]  [id number]   Location Phone: [phone]          Chief Complaint     Followup visit for atrial fibrillation.       History Of Present Illness  REFERRING CARE PROVIDER: Milan Coppola MD   Kimberly Dennis is a 70 year old /White female with hypertension and hypothyroidism that was previously seen and evaluated for paroxysmal atrial fibrillation. She had electrical cardioversion in the ER in December 2019 for atrial fibrillation with RVR. Since then she has been on anticoagulation, which she is tolerating well. Currently denies any major palpitations but occasionally gets some fluttering, lasting for a few seconds. Denies any chest pain. No major shortness of breath. No dizziness. No bleeding manifestations. She is taking all the medicines as prescribed.   PAST MEDICAL HISTORY: (1) Paroxysmal atrial fibrillation, recent cardioversion in the emergency room. (2) Hypertension. (3) Hypothyroidism.   PSYCHOSOCIAL HISTORY: Denies alcohol use. Denies tobacco use.   CURRENT MEDICATIONS: Medications have been reviewed and are as stated.      ALLERGIES: Hydrocodone; Iodine; Latex Exam Gloves; Norco.       Review of Systems  · Cardiovascular  o Admits  o : palpitations (fast, fluttering, or skipping beats), swelling (feet, ankles, hands), shortness of breath while walking or lying flat  o Denies  o : chest pain or angina pectoris   · Respiratory  o Denies  o : chronic or frequent cough      Vitals  Date Time BP Position Site L\R Cuff Size HR RR TEMP (F) WT  HT  BMI kg/m2 BSA m2 O2 Sat FR L/min FiO2 HC       01/25/2021 11:29 /60 Sitting    64 - R    "259lbs 0oz 5'  6\" 41.8 2.34       01/25/2021 11:29 /64 Sitting                       Physical Examination  · Respiratory  o Auscultation of Lungs  o : Clear to auscultation bilaterally. No crackles or rhonchi.  · Cardiovascular  o Heart  o : S1, S2 is normally heard. No S3. No murmur, rubs, or gallops.  · Gastrointestinal  o Abdominal Examination  o : Soft, nontender, nondistended. No free fluid. Bowel sounds heard in all four quadrants.  · Extremities  o Extremities  o : Warm and well perfused. No pitting pedal edema. Distal pulses present.          Assessment     ASSESSMENT & PLAN:     1.  Paroxysmal atrial fibrillation, currently in sinus rhythm, no major symptoms.  Continue metoprolol along        with Eliquis for anticoagulation.   2.  Hypertension.  Blood pressure again noted to be on the high side in the office today; however, she had a        home blood pressure log done in mid 2020 and hydrochlorothiazide was increased at that time.  Blood        pressure readings at home are reasonably well controlled at this time.  Continue the same medicines        including metoprolol and hydrochlorothiazide.   3.  Follow up in 9 months or earlier if needed.    Chip Mendoza MD   JV/rt                Electronically Signed by: Sulema Yoo-, Other -Author on February 15, 2021 01:50:30 PM  Electronically Co-signed by: Chip Mendoza MD -Reviewer on February 17, 2021 09:16:34 AM  "

## 2021-05-14 NOTE — PROGRESS NOTES
"   Progress Note      Patient Name: Kimberly Dennis   Patient ID: [id number]   Sex: Female   YOB: 1950    Primary Care Provider: Milan Coppola MD   Referring Provider: Milan Coppola MD    Visit Date: March 2, 2021    Provider: Milan Coppola MD   Location: [facility] [place]   Location Address: [address] [place] [address]; [address] [place] [address]  [id number]   Location Phone: [phone]          Chief Complaint  · follow up for fluid retention      History Of Present Illness  Kimberly Dennis is a 70 year old /White female who presents for evaluation and treatment of:      HTN- pt has noted that BP is slightly elevated. pt reports mom recently broke humerus and is stressed.  pt denies HAs, dizziness, CP.   pt also noticed hyperkeratotic, itchy patch on her left wrist. pt has not put anything on it. pt reports frequent hand washing.   Cscope in 5/2021  mammo in 6/2021       Allergy List    Allergies Reconciled  Vitals  Date Time BP Position Site L\R Cuff Size HR RR TEMP (F) WT  HT  BMI kg/m2 BSA m2 O2 Sat FR L/min FiO2 HC       01/25/2021 11:29 /60 Sitting    64 - R   259lbs 0oz 5'  6\" 41.8 2.34       02/02/2021 01:07 /70 Sitting    73 - R  96.6 263lbs 0oz 5'  6\" 42.45 2.36 98 %      03/02/2021 02:26 /88 Sitting    70 - R  97.8 263lbs 0oz    94 %  21%          Physical Examination  · Constitutional  o Appearance  o : no acute distress, well-nourished  · Head and Face  o Head  o :   § Inspection  § : atraumatic, normocephalic  · Eyes  o Eyes  o : extraocular movements intact, no scleral icterus, no conjunctival injection  · Ears, Nose, Mouth and Throat  o Ears  o :   § External Ears  § : normal  o Nose  o :   § Intranasal Exam  § : nares patent  o Oral Cavity  o :   § Oral Mucosa  § : moist mucous membranes  · Respiratory  o Respiratory Effort  o : breathing comfortably, symmetric chest rise  · Cardiovascular  o Heart  o : "   § Auscultation of Heart  § : regular rate  o Peripheral Vascular System  o :   § Extremities  § : no edema  · Neurologic  o Mental Status Examination  o :   § Orientation  § : grossly oriented to person, place and time  o Gait and Station  o :   § Gait Screening  § : normal gait  · Psychiatric  o General  o : normal mood and affect          Assessment  · Hypertension     401.9/I10  inc Aldactone to 50mg daily. cont HCTZ. f/u in approx. 1 month for repeat eval.   · Dyshidrotic eczema     705.81/L30.1  will Rx triamcinolone ointment. call or RTC with any concerns.     Problems Reconciled  Plan  · Orders  o ACO-20: Screening Mammography documented and reviewed [facility] () - - 03/02/2021  o ACO-19: Colorectal cancer screening results documented and reviewed (3017F) - - 03/02/2021  o ACO-39: Current medications updated and reviewed (1159F, ) - - 03/02/2021  · Medications  o triamcinolone acetonide 0.5 % topical ointment   SIG: apply a thin layer to the affected area(s) by topical route 2 times per day   DISP: (1) Tube with 0 refills  Prescribed on 03/02/2021     o spironolactone 50 mg oral tablet   SIG: take 1 tablet (50 mg) by oral route once daily for 30 days   DISP: (30) Tablet with 1 refills  Adjusted on 03/02/2021     o Medications have been Reconciled  o Transition of Care or Provider Policy  · Instructions  o Patient was educated/instructed on their diagnosis, treatment and medications prior to discharge from the clinic today.  · Disposition  o f/u in 1 month            Electronically Signed by: Milan Coppola MD -Author on March 2, 2021 04:49:08 PM

## 2021-05-14 NOTE — PROGRESS NOTES
"   Progress Note      Patient Name: Kimberly Dennis   Patient ID: [id number]   Sex: Female   YOB: 1950    Primary Care Provider: Milan Coppola MD   Referring Provider: Milan Coppola MD    Visit Date: February 2, 2021    Provider: Milan Coppola MD   Location: [facility]   Location Address: [address] [place] [address], [address]  [place], [place]  [id number]   Location Phone: [phone]          Chief Complaint  · Follow Up HTN  · \" need to be set up for colonoscopy \"      History Of Present Illness  Kimberly Dennis is a 70 year old /White female who presents for evaluation and treatment of:      Mammo- is scheduled  Cscope- due    HTN- pt is checking BPs at home more recently. pt has been compliant with HCTZ. pt reports some stress over 's medical issues.   afib- doing well on Eliquis without bleeding episodes.   depression- pt reports intermittent lack of motivation. pt is sleeping well. pt reports good appetite.       Allergy List    Allergies Reconciled  Vitals  Date Time BP Position Site L\R Cuff Size HR RR TEMP (F) WT  HT  BMI kg/m2 BSA m2 O2 Sat FR L/min FiO2 HC       01/25/2021 11:29 /60 Sitting    64 - R   259lbs 0oz 5'  6\" 41.8 2.34       01/25/2021 11:29 /64 Sitting                 02/02/2021 01:07 /70 Sitting    73 - R  96.6 263lbs 0oz 5'  6\" 42.45 2.36 98 %            Physical Examination  · Constitutional  o Appearance  o : no acute distress, well-nourished  · Head and Face  o Head  o :   § Inspection  § : atraumatic, normocephalic  · Eyes  o Eyes  o : extraocular movements intact, no scleral icterus, no conjunctival injection  · Ears, Nose, Mouth and Throat  o Ears  o :   § External Ears  § : normal  o Nose  o :   § Intranasal Exam  § : nares patent  o Oral Cavity  o :   § Oral Mucosa  § : moist mucous membranes  · Respiratory  o Respiratory Effort  o : breathing comfortably, symmetric chest rise  o Auscultation of Lungs  o : clear to " asculatation bilaterally, no wheezes, rales, or rhonchii  · Cardiovascular  o Heart  o :   § Auscultation of Heart  § : regular rate and rhythm, no murmurs, rubs, or gallops  o Peripheral Vascular System  o :   § Extremities  § : no edema  · Neurologic  o Mental Status Examination  o :   § Orientation  § : grossly oriented to person, place and time  o Gait and Station  o :   § Gait Screening  § : normal gait  · Psychiatric  o General  o : normal mood and affect          Assessment  · Hypertension     401.9/I10  elevated today. encouraged home BP monitoring. pt understands goal <130/80. will add Aldactone to HCTZ.   · Thyroid disorder     246.9/E07.9  check thyroid profile.   · Screening for colon cancer     V76.51/Z12.11  · Atrial fibrillation     427.31/I48.91  rate controlled. cont Eliquis.   · Depressive episode     311/F32.9  doing well functionally. cont off meds for now. pt to call or RTC with any concerns.     Problems Reconciled  Plan  · Orders  o COLONOSCOPY REFERRAL (COLON) - V76.51/Z12.11 - 02/02/2021  o Physical, Primary Care Panel (CBC, CMP, Lipid, TSH) [facility] (02248, 37206, 08145, 42089) - 401.9/I10, 246.9/E07.9, 427.31/I48.91 - 02/02/2021  o ACO-14: Influenza immunization administered or previously received [facility] () - - 02/02/2021  o ACO-20: Screening Mammography documented and reviewed [facility] () - - 02/02/2021  o ACO-19: Colorectal cancer screening results documented and reviewed (3017F) - - 02/02/2021  o ACO-39: Current medications updated and reviewed (, 1159F) - - 02/02/2021  · Medications  o spironolacton-hydrochlorothiaz 25-25 mg oral tablet   SIG: take 1 tablet by oral route once daily for 90 days   DISP: (90) Tablet with 3 refills  Adjusted on 02/02/2021     o levothyroxine 75 mcg oral tablet   SIG: TAKE 1 TABLET BY MOUTH ONCE DAILY   DISP: (90) Tablet with 1 refills  Refilled on 02/02/2021     o Medications have been Reconciled  o Transition of Care or Provider  Policy  · Instructions  o Patient was educated/instructed on their diagnosis, treatment and medications prior to discharge from the clinic today.  · Disposition  o f/u in 1 month  o labs done in clinic  o Care Transition  o EVANGELINA Sent            Electronically Signed by: Milan Coppola MD -Author on February 2, 2021 09:39:08 PM

## 2021-05-15 VITALS
TEMPERATURE: 97.2 F | HEART RATE: 57 BPM | DIASTOLIC BLOOD PRESSURE: 98 MMHG | WEIGHT: 262.25 LBS | HEIGHT: 66 IN | OXYGEN SATURATION: 98 % | BODY MASS INDEX: 42.14 KG/M2 | SYSTOLIC BLOOD PRESSURE: 144 MMHG | RESPIRATION RATE: 16 BRPM

## 2021-05-15 VITALS
SYSTOLIC BLOOD PRESSURE: 134 MMHG | DIASTOLIC BLOOD PRESSURE: 82 MMHG | HEIGHT: 66 IN | OXYGEN SATURATION: 97 % | HEART RATE: 126 BPM | RESPIRATION RATE: 14 BRPM

## 2021-05-15 VITALS
TEMPERATURE: 97 F | WEIGHT: 269.37 LBS | DIASTOLIC BLOOD PRESSURE: 78 MMHG | BODY MASS INDEX: 43.29 KG/M2 | SYSTOLIC BLOOD PRESSURE: 136 MMHG | HEART RATE: 70 BPM | HEIGHT: 66 IN | OXYGEN SATURATION: 95 %

## 2021-05-15 VITALS
RESPIRATION RATE: 16 BRPM | HEIGHT: 66 IN | TEMPERATURE: 95.6 F | BODY MASS INDEX: 44.09 KG/M2 | WEIGHT: 274.37 LBS | HEART RATE: 63 BPM | SYSTOLIC BLOOD PRESSURE: 126 MMHG | DIASTOLIC BLOOD PRESSURE: 82 MMHG | OXYGEN SATURATION: 96 %

## 2021-05-15 VITALS
WEIGHT: 268 LBS | HEART RATE: 68 BPM | DIASTOLIC BLOOD PRESSURE: 64 MMHG | SYSTOLIC BLOOD PRESSURE: 158 MMHG | BODY MASS INDEX: 43.07 KG/M2 | HEIGHT: 66 IN

## 2021-05-16 VITALS — HEIGHT: 66 IN | HEART RATE: 77 BPM | WEIGHT: 270 LBS | BODY MASS INDEX: 43.39 KG/M2 | OXYGEN SATURATION: 99 %

## 2021-05-16 VITALS
DIASTOLIC BLOOD PRESSURE: 54 MMHG | HEIGHT: 66 IN | BODY MASS INDEX: 42.59 KG/M2 | TEMPERATURE: 97.2 F | SYSTOLIC BLOOD PRESSURE: 138 MMHG | OXYGEN SATURATION: 95 % | WEIGHT: 265 LBS | HEART RATE: 81 BPM

## 2021-05-16 VITALS — BODY MASS INDEX: 43.87 KG/M2 | OXYGEN SATURATION: 96 % | HEART RATE: 77 BPM | HEIGHT: 66 IN | WEIGHT: 273 LBS

## 2021-05-16 VITALS
HEIGHT: 66 IN | DIASTOLIC BLOOD PRESSURE: 70 MMHG | SYSTOLIC BLOOD PRESSURE: 138 MMHG | TEMPERATURE: 98.6 F | HEART RATE: 76 BPM | OXYGEN SATURATION: 95 % | WEIGHT: 267 LBS | BODY MASS INDEX: 42.91 KG/M2 | RESPIRATION RATE: 12 BRPM

## 2021-05-18 ENCOUNTER — OFFICE VISIT CONVERTED (OUTPATIENT)
Dept: INTERNAL MEDICINE | Facility: CLINIC | Age: 71
End: 2021-05-18
Attending: INTERNAL MEDICINE

## 2021-05-18 ENCOUNTER — CONVERSION ENCOUNTER (OUTPATIENT)
Dept: INTERNAL MEDICINE | Facility: CLINIC | Age: 71
End: 2021-05-18

## 2021-06-05 NOTE — PROGRESS NOTES
Progress Note      Patient Name: Kimberly Dennis   Patient ID: [id number]   Sex: Female   YOB: 1950    Primary Care Provider: Milan Coppola MD   Referring Provider: Milan Coppola MD    Visit Date: May 18, 2021    Provider: Milan Coppola MD   Location: [facility]   Location Address: [address] [place] [address]; [address] [place] [address]  [id number]   Location Phone: [phone]          Chief Complaint  · Follow up   · Hypertension   · discuss bp medication   · weakness, lightheaded  · Follow up office visit within 7 calendar days of discharge from inpatient status (high complexity).      History Of Present Illness  Kimberly Dennis is a 71 year old /White female who presents for evaluation and treatment of:   FOLLOW UP OFFICE VISIT WITHIN 7 CALENDAR DAYS OF INPATIENT STATUS (SEVERE COMPLEXITY)  Kimberly Dennis presents to office for follow up post discharge from inpatient status within 7 calendar days. Patient was contacted within 2 business days via phone conversation. Documentation of that phone contact is present in the patient's electronic chart. Patient was admitted to an inpatient faciliity on 05/12/2021 and discharged on 05/13/2021 due to: gi bleed   Admitting MD: hector de la vega   Her discharge summary has been reviewed and placed in the patient's electronic chart.   Patient's problem list is: Arthritis, Hypertension, Left medial meniscus tear, Mitral valve prolapse, Seasonal allergies, and Thyroid disorder   Patient's outpatient medication list has been reconciled with the medication list from the discharge summary and has been reviewed with the patient. Current medication list is: cetirizine 10 mg oral tablet, Eliquis 5 mg oral tablet, hydrochlorothiazide 25 mg oral tablet, levothyroxine 75 mcg oral tablet, losartan 25 mg oral tablet, metoprolol succinate 50 mg oral tablet extended release 24 hr, simethicone 80 mg oral tablet,chewable,  and Vitamin D3 5,000 unit oral tablet      pt had Cscope approx. 1 week prior to admission. pt admitted for GIB and found to have several bleeding ulcers thought to be secondary polypectomy during Cscope. pt had Cscope during admission requiring clips to stabilize bleeding. pt reports continued weakness and lightheadedness. pt has restarted Eliquis 2 days ago. pt did notice more palpitations with anemia.   HTN- pt with home readings showing some SBP 140s-150s.       Past Medical History  Disease Name Date Onset Notes   Aftercare following left knee arthroscopy 10/12/2017 --    Anemia, Unspecified --  --    Arthritis --  --    Atrial Fibrillation --  --    Bladder disorder --  --    History of total knee arthroplasty, right 09/08/2016 --    Hypertension --  --    Left medial meniscus tear 09/12/2017 --    Limb Swelling --  --    Mitral valve prolapse --  --    Primary osteoarthritis of left knee 09/12/2017 --    Reflux --  --    Seasonal allergies --  --    Shortness of Breath --  --    Thoracic outlet syndrome --  --    Thyroid disorder --  --          Past Surgical History  Procedure Name Date Notes   Artificial Joints/Limbs --  --    Bladder Repair --  --    Cholecystectomy --  --    Colonoscopy 2014 [place]   Eye Implant --  both   Hysterectomy --  --    Joint Surgery --  --          Medication List  Name Date Started Instructions   cetirizine 10 mg oral tablet 01/17/2020 take 1 tablet (10 mg) by oral route once daily for 90 days   Eliquis 5 mg oral tablet 12/18/2020 Take 1 tablet by mouth twice daily   hydrochlorothiazide 25 mg oral tablet 02/04/2021 take 1 tablet (25 mg) by oral route once daily   levothyroxine 75 mcg oral tablet 02/02/2021 TAKE 1 TABLET BY MOUTH ONCE DAILY   losartan 25 mg oral tablet 04/15/2021 take 1 tablet (25 mg) by oral route once daily for 90 days   metoprolol succinate 50 mg oral tablet extended release 24 hr 04/21/2020 take 1 tablet (50 mg) by oral route once daily for 90 days    simethicone 80 mg oral tablet,chewable  chew 1 tablet by oral route Q4H as needed for gas   Vitamin D3 5,000 unit oral tablet  take 1 tablet by oral route daily         Allergy List  Allergen Name Date Reaction Notes   iodine --  --  --    Latex Exam Gloves --  --  --    Norco --  --  --    Shellfish allergy --  --  --        Allergies Reconciled  Family Medical History  Disease Name Relative/Age Notes   Heart Disease Brother/  Mother/   Mother; Brother  Mother   Diabetes, unspecified type Brother/   Brother   Family history of certain chronic disabling diseases; arthritis Mother/   Mother  Sister   No family history of colorectal cancer  --          Social History  Finding Status Start/Stop Quantity Notes   Alcohol Use Never --/-- --  does not drink   Homemaker. --  --/-- --  --    lives with spouse --  --/-- --  --    . --  --/-- --  --    Pregnant Unknown --/-- --  yes   Recreational Drug Use Never --/-- --  no   Tobacco Never --/-- --  never smoker  never smoker  08/07/2017 - never smoker         Immunizations  NameDate Admin Mfg Trade Name Lot Number Route Inj VIS Given VIS Publication   Qrfjrsayi51/05/2020 PMC FLUZONE-HIGH DOSE [id number] IM RD 10/05/2020 08/15/2019   Comments: Patient tolerated well left office in stable condition. CH RN   Jqnprasmr9024/16/2020 MSD PNEUMOVAX 23 [id number]  RD 03/16/2020    Comments: Pt tolerated well, left office in stable condition   Prevnar 1309/25/2018 WAL PREVNAR 13 [id number] IM LD 09/25/2018 11/05/2015   Comments: Pt tolerated well left office in stable condition. LEONARDO RN         Review of Systems  · Constitutional  o Denies  o : fever, fatigue, weight loss, weight gain  · Cardiovascular  o Admits  o : palpitations  o Denies  o : lower extremity edema, chest pressure  · Respiratory  o Denies  o : shortness of breath, wheezing, cough, dyspnea on exertion  · Gastrointestinal  o Denies  o : nausea, vomiting, diarrhea, constipation, abdominal pain      Vitals  Date Time  "BP Position Site L\R Cuff Size HR RR TEMP (F) WT  HT  BMI kg/m2 BSA m2 O2 Sat FR L/min FiO2 HC       05/18/2021 11:50 /65 Sitting    75 - R 18 98.7 263lbs 0oz 5'  6\" 42.45 2.36 96 %  21%          Physical Examination  · Constitutional  o Appearance  o : no acute distress, well-nourished  · Head and Face  o Head  o :   § Inspection  § : atraumatic, normocephalic  · Eyes  o Eyes  o : extraocular movements intact, no scleral icterus, no conjunctival injection  · Ears, Nose, Mouth and Throat  o Ears  o :   § External Ears  § : normal  o Nose  o :   § Intranasal Exam  § : nares patent  o Oral Cavity  o :   § Oral Mucosa  § : moist mucous membranes  · Respiratory  o Respiratory Effort  o : breathing comfortably, symmetric chest rise  · Cardiovascular  o Heart  o :   § Auscultation of Heart  § : regular rate  o Peripheral Vascular System  o :   § Extremities  § : no edema  · Neurologic  o Mental Status Examination  o :   § Orientation  § : grossly oriented to person, place and time  o Gait and Station  o :   § Gait Screening  § : normal gait  · Psychiatric  o General  o : normal mood and affect              Assessment  · Anemia     285.9/D64.9  due to acute blood loss. recheck CBC in 2-3 weeks. will start iron supplement to help recover.   · Hypertension     401.9/I10  relax BP goals for now given recent acute blood loss episode. f/u in approx. 3 weeks for repeat eval. cont home monitoring of BP.   · Dizziness     780.4/R42  · Lightheadedness     780.4/R42  likely related to anemia. baseline Hgb=12 and Hgb on DC was 10. will Rx meclizine to help.   · Weak     780.79/R53.1  related to anemia. pt instructed to rest and take things slowly while healing.     Problems Reconciled  Plan  · Orders  o Discharge medications reconciled with the current medication list (1111F) - - 05/18/2021  o CBC with Auto Diff [facility] (81202) - 780.4/R42, 780.79/R53.1 - 06/04/2021  o ACO-13: Fall Risk Screening with no falls in past year or only " one fall without injury in the past year (1101F) - - 05/18/2021  o ACO-39: Current medications updated and reviewed (, 1159F) - - 05/18/2021  · Medications  o ferrous sulfate 325 mg (65 mg iron) oral tablet   SIG: take 1 tablet (325 mg) by oral route 2 times per day for 60 days   DISP: (120) Tablet with 0 refills  Prescribed on 05/18/2021     o meclizine 25 mg oral tablet   SIG: take 1 tablet by oral route 4 times a day prn dizziness   DISP: (60) Tablet with 0 refills  Prescribed on 05/18/2021     o Medications have been Reconciled  o Transition of Care or Provider Policy  · Instructions  o Patient was educated/instructed on their diagnosis, treatment and medications prior to discharge from the clinic today.  o Patient discharge summary has been reviewed and placed in patient's electronic medical record.  o Patient received a phone call from my office within 2 business days of discharge from inpatient status, and documented within the patient's chart.  o Also patient was seen (face to face) for follow up evaluation within 7 calendar days of discharge from inpatient status for a high complexity issue.  o Patient was educated on their diagnosis, treatment and any medication changes while being evaluated today.  · Disposition  o f/u 2 weeks  o labs/x-ray ordered, but not done in clinic            Electronically Signed by: Milan Coppola MD -Author on May 18, 2021 01:05:57 PM

## 2021-06-08 ENCOUNTER — APPOINTMENT (OUTPATIENT)
Dept: WOMENS IMAGING | Facility: HOSPITAL | Age: 71
End: 2021-06-08

## 2021-06-08 PROCEDURE — 77063 BREAST TOMOSYNTHESIS BI: CPT | Performed by: RADIOLOGY

## 2021-06-08 PROCEDURE — 77067 SCR MAMMO BI INCL CAD: CPT | Performed by: RADIOLOGY

## 2021-06-09 ENCOUNTER — TRANSCRIBE ORDERS (OUTPATIENT)
Dept: INTERNAL MEDICINE | Facility: CLINIC | Age: 71
End: 2021-06-09

## 2021-06-09 ENCOUNTER — LAB (OUTPATIENT)
Dept: LAB | Facility: HOSPITAL | Age: 71
End: 2021-06-09

## 2021-06-09 DIAGNOSIS — R53.1 WEAK: ICD-10-CM

## 2021-06-09 DIAGNOSIS — R42 DIZZINESS: Primary | ICD-10-CM

## 2021-06-09 DIAGNOSIS — R42 DIZZINESS: ICD-10-CM

## 2021-06-09 LAB
BASOPHILS # BLD AUTO: 0.04 10*3/MM3 (ref 0–0.2)
BASOPHILS NFR BLD AUTO: 0.6 % (ref 0–1.5)
DEPRECATED RDW RBC AUTO: 47.2 FL (ref 37–54)
EOSINOPHIL # BLD AUTO: 0.27 10*3/MM3 (ref 0–0.4)
EOSINOPHIL NFR BLD AUTO: 4.4 % (ref 0.3–6.2)
ERYTHROCYTE [DISTWIDTH] IN BLOOD BY AUTOMATED COUNT: 13.9 % (ref 12.3–15.4)
HCT VFR BLD AUTO: 36.7 % (ref 34–46.6)
HGB BLD-MCNC: 11.8 G/DL (ref 12–15.9)
IMM GRANULOCYTES # BLD AUTO: 0.02 10*3/MM3 (ref 0–0.05)
IMM GRANULOCYTES NFR BLD AUTO: 0.3 % (ref 0–0.5)
LYMPHOCYTES # BLD AUTO: 2.18 10*3/MM3 (ref 0.7–3.1)
LYMPHOCYTES NFR BLD AUTO: 35.4 % (ref 19.6–45.3)
MCH RBC QN AUTO: 29.4 PG (ref 26.6–33)
MCHC RBC AUTO-ENTMCNC: 32.2 G/DL (ref 31.5–35.7)
MCV RBC AUTO: 91.3 FL (ref 79–97)
MONOCYTES # BLD AUTO: 0.42 10*3/MM3 (ref 0.1–0.9)
MONOCYTES NFR BLD AUTO: 6.8 % (ref 5–12)
NEUTROPHILS NFR BLD AUTO: 3.23 10*3/MM3 (ref 1.7–7)
NEUTROPHILS NFR BLD AUTO: 52.5 % (ref 42.7–76)
NRBC BLD AUTO-RTO: 0 /100 WBC (ref 0–0.2)
PLATELET # BLD AUTO: 185 10*3/MM3 (ref 140–450)
PMV BLD AUTO: 10.4 FL (ref 6–12)
RBC # BLD AUTO: 4.02 10*6/MM3 (ref 3.77–5.28)
WBC # BLD AUTO: 6.16 10*3/MM3 (ref 3.4–10.8)

## 2021-06-09 PROCEDURE — 36415 COLL VENOUS BLD VENIPUNCTURE: CPT

## 2021-06-09 PROCEDURE — 85025 COMPLETE CBC W/AUTO DIFF WBC: CPT

## 2021-06-18 RX ORDER — METOPROLOL SUCCINATE 50 MG/1
50 TABLET, EXTENDED RELEASE ORAL DAILY
COMMUNITY
End: 2021-06-18 | Stop reason: SDUPTHER

## 2021-06-18 RX ORDER — METOPROLOL SUCCINATE 50 MG/1
50 TABLET, EXTENDED RELEASE ORAL DAILY
Qty: 30 TABLET | Refills: 3 | Status: SHIPPED | OUTPATIENT
Start: 2021-06-18 | End: 2021-07-01 | Stop reason: SDUPTHER

## 2021-06-18 NOTE — TELEPHONE ENCOUNTER
Caller: Kimberly Dennis    Relationship: Self    Best call back number: [phone]    Medication needed:   Requested Prescriptions      No prescriptions requested or ordered in this encounter       When do you need the refill by: ASAP    What additional details did the patient provide when requesting the medication: PATIENT IS NEEDING HER METOPROLOL 50MG    Does the patient have less than a 3 day supply:  [x] Yes  [] No    What is the patient's preferred pharmacy:        [address] [place]  [phone]

## 2021-07-01 ENCOUNTER — OFFICE VISIT (OUTPATIENT)
Dept: INTERNAL MEDICINE | Facility: CLINIC | Age: 71
End: 2021-07-01

## 2021-07-01 VITALS
SYSTOLIC BLOOD PRESSURE: 131 MMHG | TEMPERATURE: 98.8 F | HEIGHT: 65 IN | WEIGHT: 262.4 LBS | HEART RATE: 59 BPM | BODY MASS INDEX: 43.72 KG/M2 | OXYGEN SATURATION: 96 % | DIASTOLIC BLOOD PRESSURE: 62 MMHG | RESPIRATION RATE: 18 BRPM

## 2021-07-01 DIAGNOSIS — I10 ESSENTIAL HYPERTENSION: ICD-10-CM

## 2021-07-01 DIAGNOSIS — J30.2 SEASONAL ALLERGIC RHINITIS, UNSPECIFIED TRIGGER: Primary | ICD-10-CM

## 2021-07-01 PROBLEM — D64.9 ANEMIA: Status: ACTIVE | Noted: 2021-07-01

## 2021-07-01 PROBLEM — I48.91 ATRIAL FIBRILLATION (HCC): Status: ACTIVE | Noted: 2021-07-01

## 2021-07-01 PROBLEM — IMO0002 TEAR OF MEDIAL CARTILAGE OR MENISCUS OF KNEE, CURRENT: Status: ACTIVE | Noted: 2017-09-12

## 2021-07-01 PROBLEM — M19.90 ARTHRITIS: Status: ACTIVE | Noted: 2021-07-01

## 2021-07-01 PROBLEM — G54.0 THORACIC OUTLET SYNDROME: Status: ACTIVE | Noted: 2021-07-01

## 2021-07-01 PROBLEM — I34.1 MITRAL VALVE PROLAPSE: Status: ACTIVE | Noted: 2021-07-01

## 2021-07-01 PROCEDURE — 99213 OFFICE O/P EST LOW 20 MIN: CPT | Performed by: INTERNAL MEDICINE

## 2021-07-01 RX ORDER — LOSARTAN POTASSIUM 25 MG/1
TABLET ORAL
COMMUNITY
Start: 2021-04-15 | End: 2021-10-04

## 2021-07-01 RX ORDER — CETIRIZINE HYDROCHLORIDE 10 MG/1
10 TABLET ORAL DAILY
COMMUNITY

## 2021-07-01 RX ORDER — FERROUS SULFATE TAB EC 324 MG (65 MG FE EQUIVALENT) 324 (65 FE) MG
65 TABLET DELAYED RESPONSE ORAL DAILY
COMMUNITY
End: 2021-07-01

## 2021-07-01 RX ORDER — METOPROLOL SUCCINATE 50 MG/1
50 TABLET, EXTENDED RELEASE ORAL DAILY
Qty: 90 TABLET | Refills: 3 | Status: SHIPPED | OUTPATIENT
Start: 2021-07-01 | End: 2022-09-26

## 2021-07-01 RX ORDER — HYDROCHLOROTHIAZIDE 25 MG/1
25 TABLET ORAL DAILY
COMMUNITY
Start: 2021-04-25 | End: 2021-07-27 | Stop reason: SDUPTHER

## 2021-07-01 RX ORDER — SPIRONOLACTONE 50 MG/1
50 TABLET, FILM COATED ORAL DAILY
COMMUNITY
Start: 2021-04-01 | End: 2021-07-01

## 2021-07-01 RX ORDER — LORATADINE 10 MG/1
TABLET ORAL
COMMUNITY
End: 2021-07-01

## 2021-07-01 RX ORDER — ASPIRIN 81 MG/1
81 TABLET ORAL DAILY
COMMUNITY
End: 2021-07-01

## 2021-07-01 RX ORDER — ERGOCALCIFEROL 1.25 MG/1
CAPSULE ORAL
COMMUNITY
End: 2021-07-01

## 2021-07-01 RX ORDER — MONTELUKAST SODIUM 10 MG/1
10 TABLET ORAL NIGHTLY
Qty: 90 TABLET | Refills: 3 | Status: SHIPPED | OUTPATIENT
Start: 2021-07-01 | End: 2022-06-28

## 2021-07-01 RX ORDER — LEVOTHYROXINE SODIUM 0.07 MG/1
75 TABLET ORAL DAILY
COMMUNITY
Start: 2021-04-25 | End: 2021-07-27 | Stop reason: SDUPTHER

## 2021-07-01 RX ORDER — FLUTICASONE PROPIONATE 50 MCG
2 SPRAY, SUSPENSION (ML) NASAL DAILY
Qty: 16 G | Refills: 3 | Status: SHIPPED | OUTPATIENT
Start: 2021-07-01 | End: 2022-02-15

## 2021-07-01 NOTE — PROGRESS NOTES
"Chief Complaint  Hypertension and Follow-up (follow up on labs/[facility] D/C)    Subjective          Kimberly Dennis presents to [facility]  History of Present Illness  Allergies worsening recently. Pt has been taking antihistamine. Pt has tried flonase before and intermittently. Pt has not previously been on singulair.   HTN- doing well with home BP readings 130s/70s. Pt denies Has, dizziness, CP    Objective   Vital Signs:   /62 (BP Location: Right arm, Patient Position: Sitting, Cuff Size: Adult)   Pulse 59   Temp 98.8 °F (37.1 °C) (Oral)   Resp 18   Ht 165.1 cm (65\")   Wt 119 kg (262 lb 6.4 oz)   SpO2 96% Comment: room air  BMI 43.67 kg/m²     Physical Exam   Appearance: No acute distress, well-nourished  Head: normocephalic, atraumatic  Eyes: extraocular movements intact, no scleral icterus, no conjunctival injection  Ears, Nose, and Throat: external ears normal, nares patent, moist mucous membranes  Cardiovascular: regular rate and rhythm. no murmurs, rales, or rhonchi. no edema  Respiratory: breathing comfortably, symmetric chest rise, clear to auscultation bilaterally. No wheezes, rales, or rhonchi.  Neuro: alert and oriented to time, place, and person. Normal gait  Psych: normal mood and affect     Result Review :   The following data was reviewed by: Milan Coppola Jr, MD on 07/01/2021:  Common labs    Common Labsle 5/12/21 5/12/21 5/12/21 5/13/21 5/13/21 6/9/21    0739 0739 1713 0543 0543    Glucose  106 (A)   101 (A)    BUN  16   8    Creatinine  0.54   0.62    Sodium  140   142    Potassium  3.9   3.9    Chloride  101   103    Calcium  8.6 (A)   8.6 (A)    WBC 6.91   4.95  6.16   Hemoglobin 11.3 (A)  10.1 (A) 10.1 (A)  11.8 (A)   Hematocrit 33.3 (A)  30.7 (A) 32.2 (A)  36.7   Platelets 169   155  185   (A) Abnormal value            Assessment and Plan    Diagnoses and all orders for this visit:    1. Seasonal allergic rhinitis, unspecified " trigger (Primary)  -     montelukast (Singulair) 10 MG tablet; Take 1 tablet by mouth Every Night.  Dispense: 90 tablet; Refill: 3  -     fluticasone (Flonase) 50 MCG/ACT nasal spray; 2 sprays into the nostril(s) as directed by provider Daily.  Dispense: 16 g; Refill: 3    2. Essential hypertension  -     metoprolol succinate XL (TOPROL-XL) 50 MG 24 hr tablet; Take 1 tablet by mouth Daily.  Dispense: 90 tablet; Refill: 3        Follow Up   Return in about 3 months (around 10/1/2021).  Patient was given instructions and counseling regarding her condition or for health maintenance advice. Please see specific information pulled into the AVS if appropriate.

## 2021-07-15 VITALS
DIASTOLIC BLOOD PRESSURE: 65 MMHG | OXYGEN SATURATION: 96 % | HEART RATE: 75 BPM | TEMPERATURE: 98.7 F | HEIGHT: 66 IN | SYSTOLIC BLOOD PRESSURE: 153 MMHG | WEIGHT: 263 LBS | RESPIRATION RATE: 18 BRPM | BODY MASS INDEX: 42.27 KG/M2

## 2021-07-27 NOTE — TELEPHONE ENCOUNTER
Caller: Kimberly Dennis    Relationship: Self    Best call back number: [phone]    Medication needed:   Requested Prescriptions     Pending Prescriptions Disp Refills   • hydroCHLOROthiazide (HYDRODIURIL) 25 MG tablet       Sig: Take 1 tablet by mouth Daily.   levothyroxine (SYNTHROID, LEVOTHROID) 75 MCG tablet    When do you need the refill by: ASAP    What additional details did the patient provide when requesting the medication: PATIENT STATES THAT SHE CALLED THE PHARMACY AND PHARMACY TOLD HER THAT THEY ARE STILL WAITING TO HEAR BACK FROM THE DOCTORS OFFICES.     Does the patient have less than a 3 day supply:  [] Yes  [x] No    What is the patient's preferred pharmacy: [address] [facility] [phone] [facility] [phone] FX

## 2021-07-28 RX ORDER — LEVOTHYROXINE SODIUM 0.07 MG/1
75 TABLET ORAL DAILY
Qty: 90 TABLET | Refills: 2 | Status: SHIPPED | OUTPATIENT
Start: 2021-07-28 | End: 2022-04-25

## 2021-07-28 RX ORDER — HYDROCHLOROTHIAZIDE 25 MG/1
25 TABLET ORAL DAILY
Qty: 90 TABLET | Refills: 2 | Status: SHIPPED | OUTPATIENT
Start: 2021-07-28 | End: 2022-04-25

## 2021-10-04 RX ORDER — LOSARTAN POTASSIUM 25 MG/1
TABLET ORAL
Qty: 90 TABLET | Refills: 2 | Status: SHIPPED | OUTPATIENT
Start: 2021-10-04 | End: 2022-06-28

## 2021-10-15 ENCOUNTER — OFFICE VISIT (OUTPATIENT)
Dept: INTERNAL MEDICINE | Facility: CLINIC | Age: 71
End: 2021-10-15

## 2021-10-15 VITALS
DIASTOLIC BLOOD PRESSURE: 77 MMHG | HEART RATE: 76 BPM | BODY MASS INDEX: 42.91 KG/M2 | WEIGHT: 267 LBS | HEIGHT: 66 IN | OXYGEN SATURATION: 94 % | SYSTOLIC BLOOD PRESSURE: 130 MMHG | TEMPERATURE: 97.7 F

## 2021-10-15 DIAGNOSIS — Z78.0 POSTMENOPAUSAL: ICD-10-CM

## 2021-10-15 DIAGNOSIS — M79.10 MYALGIA: ICD-10-CM

## 2021-10-15 DIAGNOSIS — Z23 NEED FOR INFLUENZA VACCINATION: ICD-10-CM

## 2021-10-15 DIAGNOSIS — L91.8 ACROCHORDON: ICD-10-CM

## 2021-10-15 DIAGNOSIS — L98.9 SKIN LESION: ICD-10-CM

## 2021-10-15 DIAGNOSIS — I48.91 ATRIAL FIBRILLATION, UNSPECIFIED TYPE (HCC): Primary | ICD-10-CM

## 2021-10-15 DIAGNOSIS — I10 PRIMARY HYPERTENSION: ICD-10-CM

## 2021-10-15 DIAGNOSIS — E78.5 HYPERLIPIDEMIA, UNSPECIFIED HYPERLIPIDEMIA TYPE: ICD-10-CM

## 2021-10-15 PROCEDURE — 99214 OFFICE O/P EST MOD 30 MIN: CPT | Performed by: INTERNAL MEDICINE

## 2021-10-15 PROCEDURE — G0008 ADMIN INFLUENZA VIRUS VAC: HCPCS | Performed by: INTERNAL MEDICINE

## 2021-10-15 PROCEDURE — 11200 RMVL SKIN TAGS UP TO&INC 15: CPT | Performed by: INTERNAL MEDICINE

## 2021-10-15 PROCEDURE — 90686 IIV4 VACC NO PRSV 0.5 ML IM: CPT | Performed by: INTERNAL MEDICINE

## 2021-10-18 ENCOUNTER — TELEPHONE (OUTPATIENT)
Dept: INTERNAL MEDICINE | Facility: CLINIC | Age: 71
End: 2021-10-18

## 2021-10-18 NOTE — TELEPHONE ENCOUNTER
Caller: Kimberly Dennis    Relationship to patient: Self    Best call back number: [phone]     Patient is needing: PATIENT IS NEEDING A CALL BACK FROM  OR HIS NURSE. SHE IS WANTING TO KNOW WHERE DO SHE GO FOR  THE BONE DENSITY TEST.       PATIENT IS ALSO WANT TO KNOW ABOUT THE  DERMATOLOGY REFERRAL.      PLEASE CALL AND ADVISE.

## 2021-10-19 ENCOUNTER — LAB (OUTPATIENT)
Dept: LAB | Facility: HOSPITAL | Age: 71
End: 2021-10-19

## 2021-10-19 LAB
ALBUMIN SERPL-MCNC: 4.6 G/DL (ref 3.5–5.2)
ALBUMIN/GLOB SERPL: 1.5 G/DL
ALP SERPL-CCNC: 73 U/L (ref 39–117)
ALT SERPL W P-5'-P-CCNC: 18 U/L (ref 1–33)
ANION GAP SERPL CALCULATED.3IONS-SCNC: 12.3 MMOL/L (ref 5–15)
AST SERPL-CCNC: 20 U/L (ref 1–32)
BASOPHILS # BLD AUTO: 0.03 10*3/MM3 (ref 0–0.2)
BASOPHILS NFR BLD AUTO: 0.5 % (ref 0–1.5)
BILIRUB SERPL-MCNC: 0.3 MG/DL (ref 0–1.2)
BUN SERPL-MCNC: 12 MG/DL (ref 8–23)
BUN/CREAT SERPL: 21.1 (ref 7–25)
CALCIUM SPEC-SCNC: 9.3 MG/DL (ref 8.6–10.5)
CHLORIDE SERPL-SCNC: 102 MMOL/L (ref 98–107)
CHOLEST SERPL-MCNC: 192 MG/DL (ref 0–200)
CO2 SERPL-SCNC: 26.7 MMOL/L (ref 22–29)
CREAT SERPL-MCNC: 0.57 MG/DL (ref 0.57–1)
DEPRECATED RDW RBC AUTO: 46.7 FL (ref 37–54)
EOSINOPHIL # BLD AUTO: 0.21 10*3/MM3 (ref 0–0.4)
EOSINOPHIL NFR BLD AUTO: 3.2 % (ref 0.3–6.2)
ERYTHROCYTE [DISTWIDTH] IN BLOOD BY AUTOMATED COUNT: 14.5 % (ref 12.3–15.4)
GFR SERPL CREATININE-BSD FRML MDRD: 105 ML/MIN/1.73
GLOBULIN UR ELPH-MCNC: 3 GM/DL
GLUCOSE SERPL-MCNC: 97 MG/DL (ref 65–99)
HCT VFR BLD AUTO: 37.2 % (ref 34–46.6)
HDLC SERPL-MCNC: 49 MG/DL (ref 40–60)
HGB BLD-MCNC: 11.8 G/DL (ref 12–15.9)
IMM GRANULOCYTES # BLD AUTO: 0.02 10*3/MM3 (ref 0–0.05)
IMM GRANULOCYTES NFR BLD AUTO: 0.3 % (ref 0–0.5)
LDLC SERPL CALC-MCNC: 113 MG/DL (ref 0–100)
LDLC/HDLC SERPL: 2.22 {RATIO}
LYMPHOCYTES # BLD AUTO: 2 10*3/MM3 (ref 0.7–3.1)
LYMPHOCYTES NFR BLD AUTO: 30.4 % (ref 19.6–45.3)
MCH RBC QN AUTO: 28.1 PG (ref 26.6–33)
MCHC RBC AUTO-ENTMCNC: 31.7 G/DL (ref 31.5–35.7)
MCV RBC AUTO: 88.6 FL (ref 79–97)
MONOCYTES # BLD AUTO: 0.44 10*3/MM3 (ref 0.1–0.9)
MONOCYTES NFR BLD AUTO: 6.7 % (ref 5–12)
NEUTROPHILS NFR BLD AUTO: 3.87 10*3/MM3 (ref 1.7–7)
NEUTROPHILS NFR BLD AUTO: 58.9 % (ref 42.7–76)
NRBC BLD AUTO-RTO: 0 /100 WBC (ref 0–0.2)
PLATELET # BLD AUTO: 198 10*3/MM3 (ref 140–450)
PMV BLD AUTO: 10.4 FL (ref 6–12)
POTASSIUM SERPL-SCNC: 4 MMOL/L (ref 3.5–5.2)
PROT SERPL-MCNC: 7.6 G/DL (ref 6–8.5)
RBC # BLD AUTO: 4.2 10*6/MM3 (ref 3.77–5.28)
SODIUM SERPL-SCNC: 141 MMOL/L (ref 136–145)
TRIGL SERPL-MCNC: 172 MG/DL (ref 0–150)
TSH SERPL DL<=0.05 MIU/L-ACNC: 1.2 UIU/ML (ref 0.27–4.2)
VLDLC SERPL-MCNC: 30 MG/DL (ref 5–40)
WBC # BLD AUTO: 6.57 10*3/MM3 (ref 3.4–10.8)

## 2021-10-19 PROCEDURE — 80053 COMPREHEN METABOLIC PANEL: CPT | Performed by: INTERNAL MEDICINE

## 2021-10-19 PROCEDURE — 84443 ASSAY THYROID STIM HORMONE: CPT | Performed by: INTERNAL MEDICINE

## 2021-10-19 PROCEDURE — 85025 COMPLETE CBC W/AUTO DIFF WBC: CPT | Performed by: INTERNAL MEDICINE

## 2021-10-19 PROCEDURE — 80061 LIPID PANEL: CPT | Performed by: INTERNAL MEDICINE

## 2021-10-21 ENCOUNTER — TELEPHONE (OUTPATIENT)
Dept: INTERNAL MEDICINE | Facility: CLINIC | Age: 71
End: 2021-10-21

## 2021-10-21 NOTE — TELEPHONE ENCOUNTER
MS. MENJIVAR IS REQUESTING FOR A REFERRAL TO DERMATOLOGY . HER CONTACT # [phone].  MANY THANKS

## 2021-10-21 NOTE — TELEPHONE ENCOUNTER
----- Message from Milan Coppola Jr., MD sent at 10/21/2021  7:15 AM EDT -----  LDL is elevated - this has been associated with increased risk of cardiovascular disease including heart attacks and strokes. Would recommend low cholesterol diet to reduce.     Elevated triglycerides, which are the storage form of sugar - recommend lower carbohydrate/sugar intake.

## 2021-10-21 NOTE — TELEPHONE ENCOUNTER
PLEASE REFER TO THE FOLLOWING LATER ENCOUNTERS   Telephone with Milan Coppola Jr., MD (10/21/2021)  Telephone with Milan Coppola Jr., MD (10/21/2021)

## 2021-10-25 ENCOUNTER — OFFICE VISIT (OUTPATIENT)
Dept: CARDIOLOGY | Facility: CLINIC | Age: 71
End: 2021-10-25

## 2021-10-25 VITALS
SYSTOLIC BLOOD PRESSURE: 137 MMHG | HEIGHT: 66 IN | BODY MASS INDEX: 41.78 KG/M2 | HEART RATE: 71 BPM | WEIGHT: 260 LBS | DIASTOLIC BLOOD PRESSURE: 75 MMHG

## 2021-10-25 DIAGNOSIS — I10 ESSENTIAL HYPERTENSION: ICD-10-CM

## 2021-10-25 DIAGNOSIS — I48.0 PAROXYSMAL ATRIAL FIBRILLATION (HCC): Primary | ICD-10-CM

## 2021-10-25 PROCEDURE — 99214 OFFICE O/P EST MOD 30 MIN: CPT | Performed by: INTERNAL MEDICINE

## 2021-10-25 NOTE — ASSESSMENT & PLAN NOTE
Previous cardioversion 2019, since then she remains in sinus rhythm.  LV function is preserved.  She has mitral valve prolapse but no hemodynamically significant valvular abnormalities.  Continue Eliquis for anticoagulation.  Her hemoglobin is stable and is slowly rising since GI bleed in May of this year.   20

## 2021-10-25 NOTE — PROGRESS NOTES
CARDIOLOGY FOLLOW-UP PROGRESS NOTE        Chief Complaint  Follow-up (9 month), Mitral Valve Prolapse, and Atrial Fibrillation    Subjective            Kimberly Dennis presents to [facility] CARDIOLOGY  History of Present Illness     This is a 71-year-old female with hypertension, hypothyroidism, paroxysmal atrial fibrillation who is here for follow-up visit.  She had electrical cardioversion in the ER in December 2019 for A. fib with RVR.  Since then she has been anticoagulation.  Last seen in the office in January 2021.  She had a colonoscopy in May of this year.  Next day she had acute GI bleed requiring hospitalization and blood transfusions.  Eliquis was held for another 3 days after which she started taking it again.  Hemoglobin is stable but lower than her baseline.  It is slowly improving.  She denies having any palpitations.  She does have some shortness of breath on moderate exertion.  No dizziness or syncopal episodes.  Denies having any chest pain.       Past History:    (1) Paroxysmal atrial fibrillation, recent cardioversion in the emergency room. (2) Hypertension. (3) Hypothyroidism.     Medical History:  Past Medical History:   Diagnosis Date   • Acid reflux    • Anemia    • Arthritis    • Atrial fibrillation (HCC)    • Bladder disorder    • History of total knee arthroplasty, right 09/08/2016   • HTN (hypertension)    • Limb swelling    • Medial meniscus tear 09/12/2017    LEFT   • Mitral valve prolapse    • Primary osteoarthritis of left knee 09/12/2017   • Seasonal allergies    • SOB (shortness of breath)    • Thoracic outlet syndrome    • Thyroid disorder        Surgical History: has a past surgical history that includes Other surgical history; Bladder repair; Cholecystectomy; Colonoscopy (2014); Intraocular lens insertion; Hysterectomy; and Other surgical history.     Family History: family history includes Arthritis in her mother and sister; Diabetes in her brother; Heart  "disease in her brother and mother.     Social History: reports that she has never smoked. She has never used smokeless tobacco. She reports that she does not drink alcohol and does not use drugs.    Allergies: Amoxicillin-pot clavulanate, Hydrocodone-acetaminophen, Hydrocodone, Iodine, Latex, and Shellfish allergy    Current Outpatient Medications on File Prior to Visit   Medication Sig   • apixaban (Eliquis) 5 MG tablet tablet Take 1 tablet by mouth 2 (two) times a day.   • cetirizine (zyrTEC) 10 MG tablet Take 10 mg by mouth Daily.   • Cholecalciferol 125 MCG (5000 UT) tablet Vitamin D3 5,000 unit oral tablet take 1 tablet by oral route daily   Active   • diphenhydrAMINE-APAP, sleep, (TYLENOL PM EXTRA STRENGTH PO) Take  by mouth As Needed.   • fluticasone (Flonase) 50 MCG/ACT nasal spray 2 sprays into the nostril(s) as directed by provider Daily.   • hydroCHLOROthiazide (HYDRODIURIL) 25 MG tablet Take 1 tablet by mouth Daily.   • levothyroxine (SYNTHROID, LEVOTHROID) 75 MCG tablet Take 1 tablet by mouth Daily.   • losartan (COZAAR) 25 MG tablet Take 1 tablet by mouth once daily for 90 days   • metoprolol succinate XL (TOPROL-XL) 50 MG 24 hr tablet Take 1 tablet by mouth Daily.   • montelukast (Singulair) 10 MG tablet Take 1 tablet by mouth Every Night.     No current facility-administered medications on file prior to visit.          Review of Systems   Respiratory: Positive for shortness of breath. Negative for cough and wheezing.    Cardiovascular: Negative for chest pain, palpitations and leg swelling.   Gastrointestinal: Negative for nausea and vomiting.   Neurological: Negative for dizziness and syncope.        Objective     /75   Pulse 71   Ht 166.4 cm (65.5\")   Wt 118 kg (260 lb)   BMI 42.61 kg/m²       Physical Exam    General : Alert, awake, no acute distress  Neck : Supple, no carotid bruit, no jugular venous distention  CVS : Regular rate and rhythm, no murmur, rubs or gallops  Lungs: Clear to " auscultation bilaterally, no crackles or rhonchi  Abdomen: Soft, nontender, bowel sounds heard in all 4 quadrants  Extremities: Warm, well-perfused, no pedal edema    Result Review :     The following data was reviewed by: Chip Mendoza MD on 10/25/2021:    CMP    CMP 5/12/21 5/13/21 10/19/21   Glucose   97   Glucose 106 (A) 101 (A)    BUN 16 8 12   Creatinine 0.54 0.62 0.57   eGFR Non African Am   105   Sodium 140 142 141   Potassium 3.9 3.9 4.0   Chloride 101 103 102   Calcium 8.6 (A) 8.6 (A) 9.3   Albumin   4.60   Total Bilirubin   0.3   Alkaline Phosphatase   73   AST (SGOT)   20   ALT (SGPT)   18   (A) Abnormal value            CBC    CBC 5/13/21 6/9/21 10/19/21   WBC 4.95 6.16 6.57   RBC 3.42 (A) 4.02 4.20   Hemoglobin 10.1 (A) 11.8 (A) 11.8 (A)   Hematocrit 32.2 (A) 36.7 37.2   MCV 94.2 91.3 88.6   MCH 29.5 29.4 28.1   MCHC 31.4 (A) 32.2 31.7   RDW 14.1 13.9 14.5   Platelets 155 185 198   (A) Abnormal value            TSH    TSH 2/2/21 10/19/21   TSH 1.280 1.200           Lipid Panel    Lipid Panel 2/2/21 10/19/21   Total Cholesterol  192   Total Cholesterol 199    Triglycerides 223 (A) 172 (A)   HDL Cholesterol 43 49   VLDL Cholesterol 45 (A) 30   LDL Cholesterol  111 (A) 113 (A)   LDL/HDL Ratio  2.22   (A) Abnormal value       Comments are available for some flowsheets but are not being displayed.                Data reviewed: Cardiology studies          Echocardiogram done on 6/22/2020 showed    1.  Preserved left ventricular systolic function with estimated LV ejection fraction of 55%.   2.  Mild concentric left ventricular hypertrophy.   3.  Mild left atrial enlargement.   4.  Mild mitral regurgitation.   5.  Diastolic dysfunction of the left ventricle.   6.  Mild aortic insufficiency.                Assessment and Plan        Diagnoses and all orders for this visit:    1. Paroxysmal atrial fibrillation (HCC) (Primary)  Assessment & Plan:  Previous cardioversion 2019, since then she remains in sinus  rhythm.  LV function is preserved.  She has mitral valve prolapse but no hemodynamically significant valvular abnormalities.  Continue Eliquis for anticoagulation.  Her hemoglobin is stable and is slowly rising since GI bleed in May of this year.    Orders:  -     apixaban (ELIQUIS) 5 MG tablet tablet; Take 1 tablet by mouth 2 (Two) Times a Day. [id number] X 3  Dispense: 42 tablet; Refill: 0    2. Essential hypertension  Assessment & Plan:  Reasonably well controlled.  Continue metoprolol and losartan at the current dose.              Follow Up     Return in about 9 years (around 10/25/2030) for Recheck, Next scheduled follow up.    Patient was given instructions and counseling regarding her condition or for health maintenance advice. Please see specific information pulled into the AVS if appropriate.

## 2021-11-12 ENCOUNTER — TELEPHONE (OUTPATIENT)
Dept: INTERNAL MEDICINE | Facility: CLINIC | Age: 71
End: 2021-11-12

## 2021-11-16 ENCOUNTER — TELEPHONE (OUTPATIENT)
Dept: CARDIOLOGY | Facility: CLINIC | Age: 71
End: 2021-11-16

## 2021-11-16 NOTE — TELEPHONE ENCOUNTER
PATIENT CALLED FOR ELIQUIS SAMPLES. INFORMED THE PATIENT THAT WE COULD NOT SUPPLY SAMPLES LONG TERM SINCE SHE WAS IN THE DONUT HOLE. SHE VOICED UNDERSTANDING.

## 2022-01-06 ENCOUNTER — TELEPHONE (OUTPATIENT)
Dept: INTERNAL MEDICINE | Facility: CLINIC | Age: 72
End: 2022-01-06

## 2022-01-06 NOTE — TELEPHONE ENCOUNTER
Caller: Kimberly Dennis    Relationship: Self    Best call back number: [phone]    What is the best time to reach you: ANY TIME AFTER 9:00 AM     Who are you requesting to speak with (clinical staff, provider,  specific staff member): CLINICAL         What was the call regarding: PATIENT RECEIVED A LETTER IN THE MAIL REGARDING OVERDUE LABS TODAY 01/06/2022 THAT WAS DATED 12/20/2021 PATIENT STATES THAT SHE HAS NO IDEA WHAT THAT IS PERTAINING TO. THEREFORE, SHE IS REQUESTING FOR SOMEONE TO CALL YOU ABOUT THE LETTER.  PATIENT HAS ALL LABS DONE AT [facility].     Do you require a callback:YES

## 2022-01-11 NOTE — TELEPHONE ENCOUNTER
PT(PATIENT) HAS AN OVERDUE ORDER FOR  DEXA Bone Density Axial (11/17/2021 11:52)    PT(PATIENT) HAS AN UPCOMING APPT FOR  Appointment (02/02/2022)

## 2022-02-02 ENCOUNTER — HOSPITAL ENCOUNTER (OUTPATIENT)
Dept: BONE DENSITY | Facility: HOSPITAL | Age: 72
Discharge: HOME | End: 2022-02-02
Admitting: INTERNAL MEDICINE

## 2022-02-02 DIAGNOSIS — Z78.0 POSTMENOPAUSAL: ICD-10-CM

## 2022-02-02 PROCEDURE — 77080 DXA BONE DENSITY AXIAL: CPT

## 2022-02-15 ENCOUNTER — OFFICE VISIT (OUTPATIENT)
Dept: INTERNAL MEDICINE | Facility: CLINIC | Age: 72
End: 2022-02-15

## 2022-02-15 VITALS
OXYGEN SATURATION: 96 % | HEIGHT: 65 IN | BODY MASS INDEX: 43.58 KG/M2 | SYSTOLIC BLOOD PRESSURE: 131 MMHG | WEIGHT: 261.6 LBS | TEMPERATURE: 97.6 F | DIASTOLIC BLOOD PRESSURE: 76 MMHG | HEART RATE: 61 BPM

## 2022-02-15 DIAGNOSIS — M25.562 CHRONIC PAIN OF LEFT KNEE: ICD-10-CM

## 2022-02-15 DIAGNOSIS — I10 ESSENTIAL HYPERTENSION: ICD-10-CM

## 2022-02-15 DIAGNOSIS — G89.29 CHRONIC PAIN OF LEFT KNEE: ICD-10-CM

## 2022-02-15 DIAGNOSIS — I48.0 PAROXYSMAL ATRIAL FIBRILLATION: ICD-10-CM

## 2022-02-15 DIAGNOSIS — Z00.00 ANNUAL PHYSICAL EXAM: Primary | ICD-10-CM

## 2022-02-15 PROCEDURE — 1170F FXNL STATUS ASSESSED: CPT | Performed by: INTERNAL MEDICINE

## 2022-02-15 PROCEDURE — 1159F MED LIST DOCD IN RCRD: CPT | Performed by: INTERNAL MEDICINE

## 2022-02-15 PROCEDURE — G0439 PPPS, SUBSEQ VISIT: HCPCS | Performed by: INTERNAL MEDICINE

## 2022-03-02 NOTE — TELEPHONE ENCOUNTER
PT VERIFIED     CONTACTED PT PER PROVIDER'S INSTRUCTIONS    PT REQUESTED LETTER WITH MOST RECENT LAB RESULTS    LETTER PRINTED AND SENT PER PT REQUEST   Patient scheduled for 3 mri's wife asking if something could be called in to pharmacy to help with anxiety during mri's. He is claustrophobic.

## 2022-03-29 RX ORDER — APIXABAN 5 MG/1
TABLET, FILM COATED ORAL
Qty: 180 TABLET | Refills: 3 | Status: SHIPPED | OUTPATIENT
Start: 2022-03-29 | End: 2022-09-26

## 2022-04-11 ENCOUNTER — OFFICE VISIT (OUTPATIENT)
Dept: ORTHOPEDIC SURGERY | Facility: CLINIC | Age: 72
End: 2022-04-11

## 2022-04-11 VITALS — WEIGHT: 267.2 LBS | BODY MASS INDEX: 42.94 KG/M2 | HEIGHT: 66 IN | HEART RATE: 77 BPM | OXYGEN SATURATION: 97 %

## 2022-04-11 DIAGNOSIS — M17.12 PRIMARY OSTEOARTHRITIS OF LEFT KNEE: Primary | ICD-10-CM

## 2022-04-11 DIAGNOSIS — M25.562 LEFT KNEE PAIN, UNSPECIFIED CHRONICITY: ICD-10-CM

## 2022-04-11 PROCEDURE — 99214 OFFICE O/P EST MOD 30 MIN: CPT | Performed by: ORTHOPAEDIC SURGERY

## 2022-04-11 RX ORDER — CHLORHEXIDINE GLUCONATE 500 MG/1
CLOTH TOPICAL 2 TIMES DAILY
Status: CANCELLED | OUTPATIENT
Start: 2022-04-11

## 2022-04-11 NOTE — PROGRESS NOTES
Chief Complaint  Follow-up of the Left Knee     Subjective      Kimberly Dennis presents to [facility] for a follow-up of left knee. Patient last seen in 2017 for the left knee, at that time she was diagnosed with a left knee MMT. She had a previous injection in the knee that has given her relief for 1 ½ years. She states she wanted to come in sooner, but she had to care for her . She states pain progressively worsened. She has no new injury to the knee. She gets swelling and pain on the medial joint line.     Allergies   Allergen Reactions   • Amoxicillin-Pot Clavulanate Itching   • Hydrocodone-Acetaminophen Itching   • Hydrocodone Itching and Rash   • Iodine Rash   • Latex Rash   • Shellfish Allergy Rash        Social History     Socioeconomic History   • Marital status:    Tobacco Use   • Smoking status: Never Smoker   • Smokeless tobacco: Never Used   Vaping Use   • Vaping Use: Never used   Substance and Sexual Activity   • Alcohol use: Never   • Drug use: Never   • Sexual activity: Defer        Review of Systems     Objective   Vital Signs:   There were no vitals taken for this visit.      Physical Exam  Constitutional:       Appearance: Normal appearance. Patient is well-developed and normal weight.   HENT:      Head: Normocephalic.      Right Ear: Hearing and external ear normal.      Left Ear: Hearing and external ear normal.      Nose: Nose normal.   Eyes:      Conjunctiva/sclera: Conjunctivae normal.   Cardiovascular:      Rate and Rhythm: Normal rate.   Pulmonary:      Effort: Pulmonary effort is normal.      Breath sounds: No wheezing or rales.   Abdominal:      Palpations: Abdomen is soft.      Tenderness: There is no abdominal tenderness.   Musculoskeletal:      Cervical back: Normal range of motion.   Skin:     Findings: No rash.   Neurological:      Mental Status: Patient  is alert and oriented to person, place, and time.   Psychiatric:         Mood and  Affect: Mood and affect normal.         Judgment: Judgment normal.       Ortho Exam      LEFT KNEE: Crepitus with motion. Tender medial and lateral joint line. Calf supple, non-tender, no signs of DVT. Dorsal Pedal Pulse 2+, posterior tibialis pulse 2+. Sensation grossly intact. Neurovascular intact.  Limping gait. Sensation grossly intact. Neurovascular intact.  Skin intact. Mild swelling. Stable to varus/valgus stress.       Procedures        Imaging Results (Most Recent)     None           Result Review :     X-Ray Report:  Left knee(s) X-Ray  Indication: Evaluation of left knee pain   AP, Lateral and Standing view(s)  Findings: Joint space narrowing of the medial compartment and patellofemoral compartment, consistent with osteoarthritis. No fracture or dislocation.   Prior studies available for comparison: no          Assessment and Plan     DX: Left knee osteoarthritis     She is a candidate of a left total knee replacement. Risks, benefits of left total knee arthroplasty. She understands and wishes to proceed.     Discussed surgery., Risks/benefits discussed with patient including, but not limited to: infection, bleeding, neurovascular damage, malunion, nonunion, aesthetic deformity, need for further surgery, and death., Discussed with patient the implant type being used during surgery and patient understands and desires to proceed. and Surgery pamphlet given.    Follow Up     Post-operatively.       Patient was given instructions and counseling regarding her condition or for health maintenance advice. Please see specific information pulled into the AVS if appropriate.     Scribed for Abdoulaye Paz MD by Lexie Wright.  04/11/22   12:55 EDT    I have personally performed the services described in this document as scribed by the above individual and it is both accurate and complete. Abdoulaye Paz MD 04/11/22

## 2022-04-14 ENCOUNTER — TELEPHONE (OUTPATIENT)
Dept: CARDIOLOGY | Facility: CLINIC | Age: 72
End: 2022-04-14

## 2022-04-14 NOTE — TELEPHONE ENCOUNTER
Ms. Dennis may proceed with knee replacement as planned.  No further preoperative cardiac work-up required.  She will be at moderate risk for perioperative cardiac events.    Eliquis may be held for 2 days before the procedure.      Electronically signed by Chip Mendoza MD, 04/14/22, 4:21 PM EDT.

## 2022-04-14 NOTE — TELEPHONE ENCOUNTER
Procedure: (L) Total Knee Replacement    Med Directive: Eliquis    PMH: Afib; HTN    Last Seen: 10/25/2021    Labs (10/19/2021): cr 0.57

## 2022-04-15 ENCOUNTER — TELEPHONE (OUTPATIENT)
Dept: ORTHOPEDIC SURGERY | Facility: CLINIC | Age: 72
End: 2022-04-15

## 2022-04-15 NOTE — TELEPHONE ENCOUNTER
CALLED AND TALKED WITH PATIENT AND ADVISED HER PER DR SANCHEZ SHE COULD HOLD/STOP HER ELIQIUS 2 DAYS PRIOR TO SURGERY.  PATIENT VOICES UNDERSTANDING.

## 2022-04-25 RX ORDER — LEVOTHYROXINE SODIUM 0.07 MG/1
TABLET ORAL
Qty: 90 TABLET | Refills: 0 | Status: SHIPPED | OUTPATIENT
Start: 2022-04-25 | End: 2022-07-26

## 2022-04-25 RX ORDER — HYDROCHLOROTHIAZIDE 25 MG/1
TABLET ORAL
Qty: 90 TABLET | Refills: 0 | Status: SHIPPED | OUTPATIENT
Start: 2022-04-25 | End: 2022-07-26

## 2022-04-25 NOTE — TELEPHONE ENCOUNTER
Thyroid Hormones Protocol Passed 04/25/2022 10:45 AM   Protocol Details  Normal TSH in past 12 months    No active pregnancy on record    No positive pregnancy test in past year    Recent or future visit with authorizing provider        Diuretics Protocol Passed 04/25/2022 10:45 AM   Protocol Details  No active pregnancy on record    Normal serum potassium in past 12 months    Normal serum sodium in past 12 months    No positive pregnancy test in past 12 months    Recent or future visit with authorizing provider    Blood pressure on record    GFR> 30 ml/min in past year

## 2022-04-28 ENCOUNTER — OFFICE VISIT (OUTPATIENT)
Dept: SLEEP MEDICINE | Facility: HOSPITAL | Age: 72
End: 2022-04-28

## 2022-04-28 VITALS
TEMPERATURE: 95.7 F | DIASTOLIC BLOOD PRESSURE: 69 MMHG | HEIGHT: 65 IN | WEIGHT: 230 LBS | OXYGEN SATURATION: 97 % | SYSTOLIC BLOOD PRESSURE: 167 MMHG | HEART RATE: 67 BPM | BODY MASS INDEX: 38.32 KG/M2

## 2022-04-28 DIAGNOSIS — G47.33 OSA (OBSTRUCTIVE SLEEP APNEA): Primary | ICD-10-CM

## 2022-04-28 PROCEDURE — G0463 HOSPITAL OUTPT CLINIC VISIT: HCPCS | Performed by: INTERNAL MEDICINE

## 2022-04-28 NOTE — PROGRESS NOTES
"[facility]         Dr Marce Knight  [unfilled]  Patient Care Team:  Milan Coppola Jr., MD as PCP - General (Internal Medicine)  Mae Layne MD as Consulting Physician (Obstetrics and Gynecology)  Mae Layne MD as Consulting Physician (Obstetrics and Gynecology)  Mae Layne MD as Consulting Physician (Obstetrics and Gynecology)    Chief Complaint: RUBY with A. fib obesity    Interval History: Patient here for annual compliance visit accompanied by her .  She is currently set up of CPAP 10 cm.  Compliance today is 100% average daily use 8 hours 22 minutes.  AHI and leak look excellent.  Goes to bed 1030 gets up 730 gets about 7+ hours of sleep and feels rested.  No tobacco no alcohol no caffeine abuse.  Currently has a nasal pillow that fits well.  Supplies been adequate.    REVIEW OF SYSTEMS:   CARDIOVASCULAR: No chest pain, chest pressure or chest discomfort. No palpitations or edema.   RESPIRATORY: Shortness of breath and wheezing  GASTROINTESTINAL: No anorexia, nausea, vomiting or diarrhea. No abdominal pain or blood.   HEMATOLOGIC: No bleeding or bruising.  Positive shortness of breath and wheezing  Wallace 4 out of 24 within normal limits    Ventilator/Non-Invasive Ventilation Settings (From admission, onward)            None            Vital Signs  Temp:  [95.7 °F (35.4 °C)] 95.7 °F (35.4 °C)  Heart Rate:  [67] 67  BP: (167)/(69) 167/69  [unfilled]  Flowsheet Rows    Flowsheet Row First Filed Value   Admission Height 165.1 cm (65\") Documented at 04/28/2022 0900   Admission Weight 104 kg (230 lb) Documented at 04/28/2022 0900          Physical Exam:  Patient is examined using the personal protective equipment as per guidelines from infection control for this particular patient as enacted.  Hand hygiene was performed before and after patient interaction.   General Appearance:    Alert, cooperative, in no acute distress.  Following simple commands  ENT Mallampati " between 3 and 4 no nasal congestion  Neck midline trachea, no thyromegaly   Lungs:     Clear to auscultation, respirations regular, even and                  unlabored    Heart:    Regular rhythm and normal rate, normal S1 and S2, no            murmur, no gallop, no rub, no click   Chest Wall:    No abnormalities observed   Abdomen:     Normal bowel sounds, no masses, no organomegaly, soft        nontender, nondistended, no guarding, no rebound                tenderness   Extremities:   Moves all extremities well, no edema, no cyanosis, no             redness  CNS no focal neurological deficits normal sensory exam  Skin no rashes no nodules  Musculoskeletal no cyanosis no clubbing normal range of motion     Results Review:                                          I reviewed the patient's new clinical results.  I personally viewed and interpreted the patient's chest x-ray.        Medication Review:       No current facility-administered medications for this visit.      ASSESSMENT:   1.  Obstructive sleep apnea (RUBY).    2.  Obesity.    3.  Hypothyroidism.    4.  Atrial fibrillation.      PLAN:  Reviewed compliance download with patient  Excellent compliance AHI and leak  Average daily use of 8 hours 22 minutes  Sleep hygiene measures  Weight loss encouraged  Treatment of underlying comorbidities  Follow-up in 1 year      Marce Knight MD  04/28/22  10:47 EDT

## 2022-05-16 DIAGNOSIS — Z96.652 AFTERCARE FOLLOWING LEFT KNEE JOINT REPLACEMENT SURGERY: Primary | ICD-10-CM

## 2022-05-16 DIAGNOSIS — M17.12 PRIMARY OSTEOARTHRITIS OF LEFT KNEE: Primary | ICD-10-CM

## 2022-05-16 DIAGNOSIS — Z47.1 AFTERCARE FOLLOWING LEFT KNEE JOINT REPLACEMENT SURGERY: Primary | ICD-10-CM

## 2022-05-23 ENCOUNTER — PRE-ADMISSION TESTING (OUTPATIENT)
Dept: PREADMISSION TESTING | Facility: HOSPITAL | Age: 72
End: 2022-05-23

## 2022-05-23 VITALS
SYSTOLIC BLOOD PRESSURE: 128 MMHG | HEART RATE: 55 BPM | BODY MASS INDEX: 42.38 KG/M2 | WEIGHT: 263.67 LBS | HEIGHT: 66 IN | DIASTOLIC BLOOD PRESSURE: 60 MMHG | OXYGEN SATURATION: 97 % | TEMPERATURE: 97.4 F

## 2022-05-23 DIAGNOSIS — M17.12 PRIMARY OSTEOARTHRITIS OF LEFT KNEE: ICD-10-CM

## 2022-05-23 LAB
ALBUMIN SERPL-MCNC: 4.4 G/DL (ref 3.5–5.2)
ALBUMIN/GLOB SERPL: 1.3 G/DL
ALP SERPL-CCNC: 84 U/L (ref 39–117)
ALT SERPL W P-5'-P-CCNC: 15 U/L (ref 1–33)
ANION GAP SERPL CALCULATED.3IONS-SCNC: 9.8 MMOL/L (ref 5–15)
AST SERPL-CCNC: 18 U/L (ref 1–32)
BASOPHILS # BLD AUTO: 0.03 10*3/MM3 (ref 0–0.2)
BASOPHILS NFR BLD AUTO: 0.4 % (ref 0–1.5)
BILIRUB SERPL-MCNC: 0.4 MG/DL (ref 0–1.2)
BUN SERPL-MCNC: 13 MG/DL (ref 8–23)
BUN/CREAT SERPL: 21.7 (ref 7–25)
CALCIUM SPEC-SCNC: 9.9 MG/DL (ref 8.6–10.5)
CHLORIDE SERPL-SCNC: 100 MMOL/L (ref 98–107)
CO2 SERPL-SCNC: 28.2 MMOL/L (ref 22–29)
CREAT SERPL-MCNC: 0.6 MG/DL (ref 0.57–1)
DEPRECATED RDW RBC AUTO: 43.7 FL (ref 37–54)
EGFRCR SERPLBLD CKD-EPI 2021: 96.7 ML/MIN/1.73
EOSINOPHIL # BLD AUTO: 0.21 10*3/MM3 (ref 0–0.4)
EOSINOPHIL NFR BLD AUTO: 3.1 % (ref 0.3–6.2)
ERYTHROCYTE [DISTWIDTH] IN BLOOD BY AUTOMATED COUNT: 13.5 % (ref 12.3–15.4)
GLOBULIN UR ELPH-MCNC: 3.4 GM/DL
GLUCOSE SERPL-MCNC: 99 MG/DL (ref 65–99)
HBA1C MFR BLD: 5.3 % (ref 4.8–5.6)
HCT VFR BLD AUTO: 37.2 % (ref 34–46.6)
HGB BLD-MCNC: 12.2 G/DL (ref 12–15.9)
IMM GRANULOCYTES # BLD AUTO: 0.02 10*3/MM3 (ref 0–0.05)
IMM GRANULOCYTES NFR BLD AUTO: 0.3 % (ref 0–0.5)
INR PPP: 1.23 (ref 0.86–1.15)
LYMPHOCYTES # BLD AUTO: 1.75 10*3/MM3 (ref 0.7–3.1)
LYMPHOCYTES NFR BLD AUTO: 26 % (ref 19.6–45.3)
MCH RBC QN AUTO: 29 PG (ref 26.6–33)
MCHC RBC AUTO-ENTMCNC: 32.8 G/DL (ref 31.5–35.7)
MCV RBC AUTO: 88.6 FL (ref 79–97)
MONOCYTES # BLD AUTO: 0.5 10*3/MM3 (ref 0.1–0.9)
MONOCYTES NFR BLD AUTO: 7.4 % (ref 5–12)
NEUTROPHILS NFR BLD AUTO: 4.22 10*3/MM3 (ref 1.7–7)
NEUTROPHILS NFR BLD AUTO: 62.8 % (ref 42.7–76)
NRBC BLD AUTO-RTO: 0 /100 WBC (ref 0–0.2)
PLATELET # BLD AUTO: 199 10*3/MM3 (ref 140–450)
PMV BLD AUTO: 9.7 FL (ref 6–12)
POTASSIUM SERPL-SCNC: 4.1 MMOL/L (ref 3.5–5.2)
PROT SERPL-MCNC: 7.8 G/DL (ref 6–8.5)
PROTHROMBIN TIME: 15.7 SECONDS (ref 11.8–14.9)
RBC # BLD AUTO: 4.2 10*6/MM3 (ref 3.77–5.28)
SODIUM SERPL-SCNC: 138 MMOL/L (ref 136–145)
WBC NRBC COR # BLD: 6.73 10*3/MM3 (ref 3.4–10.8)

## 2022-05-23 PROCEDURE — 83036 HEMOGLOBIN GLYCOSYLATED A1C: CPT

## 2022-05-23 PROCEDURE — 85025 COMPLETE CBC W/AUTO DIFF WBC: CPT

## 2022-05-23 PROCEDURE — 80053 COMPREHEN METABOLIC PANEL: CPT

## 2022-05-23 PROCEDURE — 93005 ELECTROCARDIOGRAM TRACING: CPT

## 2022-05-23 PROCEDURE — 36415 COLL VENOUS BLD VENIPUNCTURE: CPT

## 2022-05-23 PROCEDURE — 85610 PROTHROMBIN TIME: CPT

## 2022-05-23 RX ORDER — ACETAMINOPHEN 500 MG
500 TABLET ORAL EVERY 6 HOURS PRN
COMMUNITY
End: 2022-06-04 | Stop reason: HOSPADM

## 2022-05-23 ASSESSMENT — KOOS JR
KOOS JR SCORE: 44.905
KOOS JR SCORE: 17

## 2022-05-24 ENCOUNTER — OFFICE VISIT (OUTPATIENT)
Dept: INTERNAL MEDICINE | Facility: CLINIC | Age: 72
End: 2022-05-24

## 2022-05-24 VITALS
BODY MASS INDEX: 42.19 KG/M2 | HEIGHT: 66 IN | SYSTOLIC BLOOD PRESSURE: 136 MMHG | HEART RATE: 70 BPM | DIASTOLIC BLOOD PRESSURE: 74 MMHG | OXYGEN SATURATION: 97 % | WEIGHT: 262.5 LBS | TEMPERATURE: 97.5 F

## 2022-05-24 DIAGNOSIS — H66.002 NON-RECURRENT ACUTE SUPPURATIVE OTITIS MEDIA OF LEFT EAR WITHOUT SPONTANEOUS RUPTURE OF TYMPANIC MEMBRANE: Primary | ICD-10-CM

## 2022-05-24 LAB
EXPIRATION DATE: NORMAL
EXPIRATION DATE: NORMAL
FLUAV AG NPH QL: NEGATIVE
FLUBV AG NPH QL: NEGATIVE
INTERNAL CONTROL: NORMAL
INTERNAL CONTROL: NORMAL
Lab: NORMAL
Lab: NORMAL
SARS-COV-2 AG UPPER RESP QL IA.RAPID: NOT DETECTED

## 2022-05-24 PROCEDURE — 99214 OFFICE O/P EST MOD 30 MIN: CPT | Performed by: NURSE PRACTITIONER

## 2022-05-24 PROCEDURE — 96372 THER/PROPH/DIAG INJ SC/IM: CPT | Performed by: NURSE PRACTITIONER

## 2022-05-24 PROCEDURE — 87426 SARSCOV CORONAVIRUS AG IA: CPT | Performed by: NURSE PRACTITIONER

## 2022-05-24 PROCEDURE — 87804 INFLUENZA ASSAY W/OPTIC: CPT | Performed by: NURSE PRACTITIONER

## 2022-05-24 RX ORDER — CEFTRIAXONE 1 G/1
1 INJECTION, POWDER, FOR SOLUTION INTRAMUSCULAR; INTRAVENOUS ONCE
Status: COMPLETED | OUTPATIENT
Start: 2022-05-24 | End: 2022-05-24

## 2022-05-24 RX ORDER — DOXYCYCLINE 100 MG/1
100 CAPSULE ORAL 2 TIMES DAILY
Qty: 20 CAPSULE | Refills: 0 | Status: ON HOLD | OUTPATIENT
Start: 2022-05-24 | End: 2022-06-03

## 2022-05-24 RX ORDER — FLUTICASONE PROPIONATE 50 MCG
2 SPRAY, SUSPENSION (ML) NASAL DAILY
Qty: 16 G | Refills: 0 | Status: SHIPPED | OUTPATIENT
Start: 2022-05-24 | End: 2023-02-13 | Stop reason: ALTCHOICE

## 2022-05-24 RX ADMIN — CEFTRIAXONE 1 G: 1 INJECTION, POWDER, FOR SOLUTION INTRAMUSCULAR; INTRAVENOUS at 14:48

## 2022-05-24 NOTE — PROGRESS NOTES
Chief Complaint  Earache     Subjective          Kimberly Dennis presents to [facility]  Earache   There is pain in the left ear. This is a new problem. The current episode started in the past 7 days. The problem occurs constantly. The problem has been gradually worsening. There has been no fever. Associated symptoms include ear discharge. Pertinent negatives include no abdominal pain, coughing, diarrhea, headaches, hearing loss, neck pain, rash, rhinorrhea, sore throat or vomiting. Associated symptoms comments: Radiating to eustachian tube . She has tried nothing for the symptoms. The treatment provided no relief.     Patient is concerned that she has knee surgery in 10 days.     Current Outpatient Medications   Medication Instructions   • acetaminophen (TYLENOL) 500 mg, Oral, Every 6 Hours PRN   • cetirizine (ZYRTEC) 10 mg, Oral, Daily   • Cholecalciferol 125 MCG (5000 UT) tablet Vitamin D3 5,000 unit oral tablet take 1 tablet by oral route daily   Active   • diphenhydrAMINE-APAP, sleep, (TYLENOL PM EXTRA STRENGTH PO) Oral, Nightly PRN   • doxycycline (MONODOX) 100 mg, Oral, 2 Times Daily   • Eliquis 5 MG tablet tablet Take 1 tablet by mouth twice daily   • fluticasone (Flonase) 50 MCG/ACT nasal spray 2 sprays, Nasal, Daily   • hydroCHLOROthiazide (HYDRODIURIL) 25 MG tablet Take 1 tablet by mouth once daily   • levothyroxine (SYNTHROID, LEVOTHROID) 75 MCG tablet Take 1 tablet by mouth once daily   • losartan (COZAAR) 25 MG tablet Take 1 tablet by mouth once daily for 90 days   • metoprolol succinate XL (TOPROL-XL) 50 mg, Oral, Daily   • montelukast (SINGULAIR) 10 mg, Oral, Nightly       The following portions of the patient's history were reviewed and updated as appropriate: allergies, current medications, past family history, past medical history, past social history, past surgical history, and problem list.    Objective   Vital Signs:   /74   Pulse 70   Temp  "97.5 °F (36.4 °C) (Temporal)   Ht 167.6 cm (66\")   Wt 119 kg (262 lb 8 oz)   SpO2 97%   BMI 42.37 kg/m²     Wt Readings from Last 3 Encounters:   05/24/22 119 kg (262 lb 8 oz)   05/23/22 120 kg (263 lb 10.7 oz)   04/28/22 104 kg (230 lb)     BP Readings from Last 3 Encounters:   05/24/22 136/74   05/23/22 128/60   04/28/22 167/69     Physical Exam  Vitals and nursing note reviewed.   Constitutional:       General: She is not in acute distress.     Appearance: Normal appearance. She is not ill-appearing.   HENT:      Right Ear: Tympanic membrane, ear canal and external ear normal.      Left Ear: Ear canal and external ear normal.      Ears:      Comments: Erythematous left TM      Nose: Nose normal.      Mouth/Throat:      Mouth: Mucous membranes are moist.   Eyes:      Extraocular Movements: Extraocular movements intact.      Conjunctiva/sclera: Conjunctivae normal.   Cardiovascular:      Rate and Rhythm: Normal rate.   Pulmonary:      Effort: Pulmonary effort is normal.      Breath sounds: Normal breath sounds.   Skin:     General: Skin is warm and dry.      Capillary Refill: Capillary refill takes less than 2 seconds.   Neurological:      General: No focal deficit present.      Mental Status: She is alert and oriented to person, place, and time. Mental status is at baseline.   Psychiatric:         Mood and Affect: Mood normal.         Behavior: Behavior normal.         Thought Content: Thought content normal.         Judgment: Judgment normal.              Result Review :   The following data was reviewed by: ROME Fulton on 05/24/2022:  Common labs    Common Labsle 6/9/21 10/19/21 10/19/21 10/19/21 5/23/22 5/23/22 5/23/22     1111 1111 1111 1122 1122 1122   Glucose    97  99    BUN    12  13    Creatinine    0.57  0.60    eGFR Non African Am    105      Sodium    141  138    Potassium    4.0  4.1    Chloride    102  100    Calcium    9.3  9.9    Albumin    4.60  4.40    Total Bilirubin    0.3  " 0.4    Alkaline Phosphatase    73  84    AST (SGOT)    20  18    ALT (SGPT)    18  15    WBC 6.16 6.57   6.73     Hemoglobin 11.8 (A) 11.8 (A)   12.2     Hematocrit 36.7 37.2   37.2     Platelets 185 198   199     Total Cholesterol   192       Triglycerides   172 (A)       HDL Cholesterol   49       LDL Cholesterol    113 (A)       Hemoglobin A1C       5.30   (A) Abnormal value                   Lab Results   Component Value Date    SARSANTIGEN Not Detected 05/24/2022    RAPFLUA Negative 05/24/2022    RAPFLUB Negative 05/24/2022    INR 1.23 (H) 05/23/2022       Procedures        Assessment and Plan    Diagnoses and all orders for this visit:    1. Non-recurrent acute suppurative otitis media of left ear without spontaneous rupture of tympanic membrane (Primary)  -     doxycycline (MONODOX) 100 MG capsule; Take 1 capsule by mouth 2 (Two) Times a Day for 10 days.  Dispense: 20 capsule; Refill: 0  -     cefTRIAXone (ROCEPHIN) injection 1 g  -     fluticasone (Flonase) 50 MCG/ACT nasal spray; 2 sprays into the nostril(s) as directed by provider Daily.  Dispense: 16 g; Refill: 0  -     POCT SARS-CoV-2 Antigen NARCISO  -     POC Influenza A / B    - pt requesting IM abx along with PO abx to help with healing, discussed poor efficacy of one time dose of rocephin for AOM. Pt verbalized understanding but would like to try  - tylenol/motrin if not contraindicated for pain control       There are no discontinued medications.       Follow Up   Return if symptoms worsen or fail to improve.  Patient was given instructions and counseling regarding her condition or for health maintenance advice. Please see specific information pulled into the AVS if appropriate.       Olivia Brasher, ROME  05/25/22  08:26 EDT

## 2022-05-25 ENCOUNTER — ANESTHESIA EVENT (OUTPATIENT)
Dept: PERIOP | Facility: HOSPITAL | Age: 72
End: 2022-05-25

## 2022-05-31 LAB — QT INTERVAL: 412 MS

## 2022-06-03 ENCOUNTER — ANESTHESIA (OUTPATIENT)
Dept: PERIOP | Facility: HOSPITAL | Age: 72
End: 2022-06-03

## 2022-06-03 ENCOUNTER — APPOINTMENT (OUTPATIENT)
Dept: GENERAL RADIOLOGY | Facility: HOSPITAL | Age: 72
End: 2022-06-03

## 2022-06-03 ENCOUNTER — HOSPITAL ENCOUNTER (OUTPATIENT)
Facility: HOSPITAL | Age: 72
Discharge: HOME | End: 2022-06-04
Attending: ORTHOPAEDIC SURGERY | Admitting: ORTHOPAEDIC SURGERY

## 2022-06-03 DIAGNOSIS — R26.2 DIFFICULTY IN WALKING: ICD-10-CM

## 2022-06-03 DIAGNOSIS — Z78.9 DECREASED ACTIVITIES OF DAILY LIVING (ADL): ICD-10-CM

## 2022-06-03 DIAGNOSIS — M17.12 PRIMARY OSTEOARTHRITIS OF LEFT KNEE: Primary | ICD-10-CM

## 2022-06-03 PROCEDURE — C1776 JOINT DEVICE (IMPLANTABLE): HCPCS | Performed by: ORTHOPAEDIC SURGERY

## 2022-06-03 PROCEDURE — 63710000001 ACETAMINOPHEN 500 MG TABLET: Performed by: ORTHOPAEDIC SURGERY

## 2022-06-03 PROCEDURE — 25010000002 PROPOFOL 10 MG/ML EMULSION: Performed by: NURSE ANESTHETIST, CERTIFIED REGISTERED

## 2022-06-03 PROCEDURE — A9270 NON-COVERED ITEM OR SERVICE: HCPCS | Performed by: NURSE ANESTHETIST, CERTIFIED REGISTERED

## 2022-06-03 PROCEDURE — 63710000001 CELECOXIB 100 MG CAPSULE: Performed by: STUDENT IN AN ORGANIZED HEALTH CARE EDUCATION/TRAINING PROGRAM

## 2022-06-03 PROCEDURE — 73560 X-RAY EXAM OF KNEE 1 OR 2: CPT

## 2022-06-03 PROCEDURE — 94799 UNLISTED PULMONARY SVC/PX: CPT

## 2022-06-03 PROCEDURE — 99214 OFFICE O/P EST MOD 30 MIN: CPT | Performed by: INTERNAL MEDICINE

## 2022-06-03 PROCEDURE — A9270 NON-COVERED ITEM OR SERVICE: HCPCS | Performed by: STUDENT IN AN ORGANIZED HEALTH CARE EDUCATION/TRAINING PROGRAM

## 2022-06-03 PROCEDURE — 63710000001 OXYCODONE 5 MG TABLET: Performed by: NURSE ANESTHETIST, CERTIFIED REGISTERED

## 2022-06-03 PROCEDURE — 63710000001 MONTELUKAST 10 MG TABLET: Performed by: PHYSICIAN ASSISTANT

## 2022-06-03 PROCEDURE — 25010000002 DEXAMETHASONE PER 1 MG: Performed by: NURSE ANESTHETIST, CERTIFIED REGISTERED

## 2022-06-03 PROCEDURE — A9270 NON-COVERED ITEM OR SERVICE: HCPCS | Performed by: ORTHOPAEDIC SURGERY

## 2022-06-03 PROCEDURE — 25010000002 FENTANYL CITRATE (PF) 50 MCG/ML SOLUTION: Performed by: NURSE ANESTHETIST, CERTIFIED REGISTERED

## 2022-06-03 PROCEDURE — 25010000002 KETOROLAC TROMETHAMINE PER 15 MG: Performed by: ORTHOPAEDIC SURGERY

## 2022-06-03 PROCEDURE — A9270 NON-COVERED ITEM OR SERVICE: HCPCS | Performed by: PHYSICIAN ASSISTANT

## 2022-06-03 PROCEDURE — 20985 CPTR-ASST DIR MS PX: CPT | Performed by: ORTHOPAEDIC SURGERY

## 2022-06-03 PROCEDURE — 25010000002 MIDAZOLAM PER 1 MG: Performed by: STUDENT IN AN ORGANIZED HEALTH CARE EDUCATION/TRAINING PROGRAM

## 2022-06-03 PROCEDURE — 25010000002 HYDROMORPHONE 1 MG/ML SOLUTION: Performed by: NURSE ANESTHETIST, CERTIFIED REGISTERED

## 2022-06-03 PROCEDURE — 27447 TOTAL KNEE ARTHROPLASTY: CPT | Performed by: ORTHOPAEDIC SURGERY

## 2022-06-03 PROCEDURE — 25010000002 ROPIVACAINE PER 1 MG: Performed by: ORTHOPAEDIC SURGERY

## 2022-06-03 PROCEDURE — 25010000002 EPINEPHRINE 1 MG/ML SOLUTION: Performed by: ORTHOPAEDIC SURGERY

## 2022-06-03 PROCEDURE — C1713 ANCHOR/SCREW BN/BN,TIS/BN: HCPCS | Performed by: ORTHOPAEDIC SURGERY

## 2022-06-03 PROCEDURE — 25010000002 KETOROLAC TROMETHAMINE PER 15 MG: Performed by: NURSE ANESTHETIST, CERTIFIED REGISTERED

## 2022-06-03 PROCEDURE — 25010000002 CEFAZOLIN PER 500 MG: Performed by: ORTHOPAEDIC SURGERY

## 2022-06-03 PROCEDURE — 76942 ECHO GUIDE FOR BIOPSY: CPT | Performed by: ORTHOPAEDIC SURGERY

## 2022-06-03 PROCEDURE — 63710000001 ACETAMINOPHEN 500 MG TABLET: Performed by: STUDENT IN AN ORGANIZED HEALTH CARE EDUCATION/TRAINING PROGRAM

## 2022-06-03 PROCEDURE — 63710000001 FAMOTIDINE 20 MG TABLET: Performed by: PHYSICIAN ASSISTANT

## 2022-06-03 PROCEDURE — 25010000002 ONDANSETRON PER 1 MG: Performed by: NURSE ANESTHETIST, CERTIFIED REGISTERED

## 2022-06-03 PROCEDURE — 25010000002 CEFAZOLIN IN DEXTROSE 2-4 GM/100ML-% SOLUTION: Performed by: ORTHOPAEDIC SURGERY

## 2022-06-03 PROCEDURE — 25010000002 MORPHINE (PF) 10 MG/ML SOLUTION: Performed by: ORTHOPAEDIC SURGERY

## 2022-06-03 DEVICE — LEGION CRUCIATE RETAINING OXINIUM                                    FEMORAL SIZE 5 LEFT
Type: IMPLANTABLE DEVICE | Site: KNEE | Status: FUNCTIONAL
Brand: LEGION

## 2022-06-03 DEVICE — GENESIS II RESURFACING PATELLAR                                    PROSTHESIS  29MM
Type: IMPLANTABLE DEVICE | Site: KNEE | Status: FUNCTIONAL
Brand: GENESIS II

## 2022-06-03 DEVICE — CMT BONE PALACOS R HI/VISC 1X40: Type: IMPLANTABLE DEVICE | Site: KNEE | Status: FUNCTIONAL

## 2022-06-03 DEVICE — IMPLANTABLE DEVICE: Type: IMPLANTABLE DEVICE | Site: KNEE | Status: FUNCTIONAL

## 2022-06-03 DEVICE — LEGION HIGHLY CROSS LINKED                                    POLYETHYLENE DISHED INSERT SIZE 3-4 11MM
Type: IMPLANTABLE DEVICE | Site: KNEE | Status: FUNCTIONAL
Brand: LEGION

## 2022-06-03 DEVICE — GENESIS II NON-POROUS TIBIAL                                    BASEPLATE SIZE 4 LEFT
Type: IMPLANTABLE DEVICE | Site: KNEE | Status: FUNCTIONAL
Brand: GENESIS II

## 2022-06-03 RX ORDER — CHLORHEXIDINE GLUCONATE 500 MG/1
CLOTH TOPICAL 2 TIMES DAILY
Status: DISCONTINUED | OUTPATIENT
Start: 2022-06-03 | End: 2022-06-03 | Stop reason: HOSPADM

## 2022-06-03 RX ORDER — KETAMINE HYDROCHLORIDE 50 MG/ML
INJECTION, SOLUTION, CONCENTRATE INTRAMUSCULAR; INTRAVENOUS AS NEEDED
Status: DISCONTINUED | OUTPATIENT
Start: 2022-06-03 | End: 2022-06-03 | Stop reason: SURG

## 2022-06-03 RX ORDER — FENTANYL CITRATE 50 UG/ML
INJECTION, SOLUTION INTRAMUSCULAR; INTRAVENOUS AS NEEDED
Status: DISCONTINUED | OUTPATIENT
Start: 2022-06-03 | End: 2022-06-03 | Stop reason: SURG

## 2022-06-03 RX ORDER — SODIUM CHLORIDE 0.9 % (FLUSH) 0.9 %
10 SYRINGE (ML) INJECTION EVERY 12 HOURS SCHEDULED
Status: DISCONTINUED | OUTPATIENT
Start: 2022-06-03 | End: 2022-06-04 | Stop reason: HOSPADM

## 2022-06-03 RX ORDER — ALUMINA, MAGNESIA, AND SIMETHICONE 2400; 2400; 240 MG/30ML; MG/30ML; MG/30ML
15 SUSPENSION ORAL EVERY 6 HOURS PRN
Status: DISCONTINUED | OUTPATIENT
Start: 2022-06-03 | End: 2022-06-04 | Stop reason: HOSPADM

## 2022-06-03 RX ORDER — HYDROCODONE BITARTRATE AND ACETAMINOPHEN 7.5; 325 MG/1; MG/1
1 TABLET ORAL EVERY 4 HOURS PRN
Status: DISCONTINUED | OUTPATIENT
Start: 2022-06-03 | End: 2022-06-03

## 2022-06-03 RX ORDER — MONTELUKAST SODIUM 10 MG/1
10 TABLET ORAL NIGHTLY
Status: DISCONTINUED | OUTPATIENT
Start: 2022-06-03 | End: 2022-06-04 | Stop reason: HOSPADM

## 2022-06-03 RX ORDER — CELECOXIB 100 MG/1
200 CAPSULE ORAL ONCE
Status: COMPLETED | OUTPATIENT
Start: 2022-06-03 | End: 2022-06-03

## 2022-06-03 RX ORDER — OXYCODONE AND ACETAMINOPHEN 7.5; 325 MG/1; MG/1
2 TABLET ORAL ONCE AS NEEDED
Status: DISCONTINUED | OUTPATIENT
Start: 2022-06-03 | End: 2022-06-04 | Stop reason: HOSPADM

## 2022-06-03 RX ORDER — NALOXONE HCL 0.4 MG/ML
0.4 VIAL (ML) INJECTION
Status: DISCONTINUED | OUTPATIENT
Start: 2022-06-03 | End: 2022-06-04 | Stop reason: HOSPADM

## 2022-06-03 RX ORDER — KETOROLAC TROMETHAMINE 30 MG/ML
INJECTION, SOLUTION INTRAMUSCULAR; INTRAVENOUS AS NEEDED
Status: DISCONTINUED | OUTPATIENT
Start: 2022-06-03 | End: 2022-06-03 | Stop reason: SURG

## 2022-06-03 RX ORDER — ACETAMINOPHEN 500 MG
1000 TABLET ORAL ONCE
Status: COMPLETED | OUTPATIENT
Start: 2022-06-03 | End: 2022-06-03

## 2022-06-03 RX ORDER — ONDANSETRON 2 MG/ML
INJECTION INTRAMUSCULAR; INTRAVENOUS AS NEEDED
Status: DISCONTINUED | OUTPATIENT
Start: 2022-06-03 | End: 2022-06-03 | Stop reason: SURG

## 2022-06-03 RX ORDER — BISACODYL 5 MG/1
10 TABLET, DELAYED RELEASE ORAL DAILY PRN
Status: DISCONTINUED | OUTPATIENT
Start: 2022-06-03 | End: 2022-06-04 | Stop reason: HOSPADM

## 2022-06-03 RX ORDER — CETIRIZINE HYDROCHLORIDE 10 MG/1
10 TABLET ORAL DAILY
Status: DISCONTINUED | OUTPATIENT
Start: 2022-06-03 | End: 2022-06-04 | Stop reason: HOSPADM

## 2022-06-03 RX ORDER — PROMETHAZINE HYDROCHLORIDE 12.5 MG/1
25 TABLET ORAL ONCE AS NEEDED
Status: DISCONTINUED | OUTPATIENT
Start: 2022-06-03 | End: 2022-06-03 | Stop reason: HOSPADM

## 2022-06-03 RX ORDER — METOPROLOL SUCCINATE 50 MG/1
50 TABLET, EXTENDED RELEASE ORAL DAILY
Status: DISCONTINUED | OUTPATIENT
Start: 2022-06-03 | End: 2022-06-04 | Stop reason: HOSPADM

## 2022-06-03 RX ORDER — SODIUM CHLORIDE, SODIUM LACTATE, POTASSIUM CHLORIDE, CALCIUM CHLORIDE 600; 310; 30; 20 MG/100ML; MG/100ML; MG/100ML; MG/100ML
80 INJECTION, SOLUTION INTRAVENOUS CONTINUOUS
Status: DISCONTINUED | OUTPATIENT
Start: 2022-06-03 | End: 2022-06-04 | Stop reason: HOSPADM

## 2022-06-03 RX ORDER — FAMOTIDINE 20 MG/1
20 TABLET, FILM COATED ORAL NIGHTLY
Status: DISCONTINUED | OUTPATIENT
Start: 2022-06-03 | End: 2022-06-04 | Stop reason: HOSPADM

## 2022-06-03 RX ORDER — SODIUM CHLORIDE 0.9 % (FLUSH) 0.9 %
10 SYRINGE (ML) INJECTION AS NEEDED
Status: DISCONTINUED | OUTPATIENT
Start: 2022-06-03 | End: 2022-06-03 | Stop reason: HOSPADM

## 2022-06-03 RX ORDER — ONDANSETRON 2 MG/ML
4 INJECTION INTRAMUSCULAR; INTRAVENOUS ONCE AS NEEDED
Status: DISCONTINUED | OUTPATIENT
Start: 2022-06-03 | End: 2022-06-03 | Stop reason: HOSPADM

## 2022-06-03 RX ORDER — OXYCODONE HYDROCHLORIDE 5 MG/1
5 TABLET ORAL
Status: DISCONTINUED | OUTPATIENT
Start: 2022-06-03 | End: 2022-06-03 | Stop reason: HOSPADM

## 2022-06-03 RX ORDER — HYDROCODONE BITARTRATE AND ACETAMINOPHEN 7.5; 325 MG/1; MG/1
2 TABLET ORAL EVERY 4 HOURS PRN
Status: DISCONTINUED | OUTPATIENT
Start: 2022-06-03 | End: 2022-06-03

## 2022-06-03 RX ORDER — AMOXICILLIN 250 MG
2 CAPSULE ORAL 2 TIMES DAILY PRN
Status: DISCONTINUED | OUTPATIENT
Start: 2022-06-03 | End: 2022-06-04 | Stop reason: HOSPADM

## 2022-06-03 RX ORDER — KETOROLAC TROMETHAMINE 15 MG/ML
15 INJECTION, SOLUTION INTRAMUSCULAR; INTRAVENOUS EVERY 6 HOURS
Status: COMPLETED | OUTPATIENT
Start: 2022-06-03 | End: 2022-06-04

## 2022-06-03 RX ORDER — CEFAZOLIN SODIUM 2 G/100ML
2 INJECTION, SOLUTION INTRAVENOUS EVERY 8 HOURS
Status: COMPLETED | OUTPATIENT
Start: 2022-06-03 | End: 2022-06-03

## 2022-06-03 RX ORDER — BUPIVACAINE HYDROCHLORIDE AND EPINEPHRINE 5; 5 MG/ML; UG/ML
INJECTION, SOLUTION EPIDURAL; INTRACAUDAL; PERINEURAL
Status: COMPLETED | OUTPATIENT
Start: 2022-06-03 | End: 2022-06-03

## 2022-06-03 RX ORDER — SODIUM CHLORIDE, SODIUM LACTATE, POTASSIUM CHLORIDE, CALCIUM CHLORIDE 600; 310; 30; 20 MG/100ML; MG/100ML; MG/100ML; MG/100ML
9 INJECTION, SOLUTION INTRAVENOUS CONTINUOUS PRN
Status: DISCONTINUED | OUTPATIENT
Start: 2022-06-03 | End: 2022-06-04 | Stop reason: HOSPADM

## 2022-06-03 RX ORDER — BISMUTH SUBSALICYLATE 262 MG/1
524 TABLET, CHEWABLE ORAL EVERY 6 HOURS PRN
Status: DISCONTINUED | OUTPATIENT
Start: 2022-06-03 | End: 2022-06-04 | Stop reason: HOSPADM

## 2022-06-03 RX ORDER — DEXAMETHASONE SODIUM PHOSPHATE 4 MG/ML
INJECTION, SOLUTION INTRA-ARTICULAR; INTRALESIONAL; INTRAMUSCULAR; INTRAVENOUS; SOFT TISSUE AS NEEDED
Status: DISCONTINUED | OUTPATIENT
Start: 2022-06-03 | End: 2022-06-03 | Stop reason: SURG

## 2022-06-03 RX ORDER — LIDOCAINE HYDROCHLORIDE 20 MG/ML
INJECTION, SOLUTION EPIDURAL; INFILTRATION; INTRACAUDAL; PERINEURAL AS NEEDED
Status: DISCONTINUED | OUTPATIENT
Start: 2022-06-03 | End: 2022-06-03 | Stop reason: SURG

## 2022-06-03 RX ORDER — MAGNESIUM HYDROXIDE 1200 MG/15ML
LIQUID ORAL AS NEEDED
Status: DISCONTINUED | OUTPATIENT
Start: 2022-06-03 | End: 2022-06-03 | Stop reason: HOSPADM

## 2022-06-03 RX ORDER — CEFAZOLIN SODIUM IN 0.9 % NACL 3 G/100 ML
3 INTRAVENOUS SOLUTION, PIGGYBACK (ML) INTRAVENOUS ONCE
Status: COMPLETED | OUTPATIENT
Start: 2022-06-03 | End: 2022-06-03

## 2022-06-03 RX ORDER — ONDANSETRON 2 MG/ML
4 INJECTION INTRAMUSCULAR; INTRAVENOUS EVERY 6 HOURS PRN
Status: DISCONTINUED | OUTPATIENT
Start: 2022-06-03 | End: 2022-06-04 | Stop reason: HOSPADM

## 2022-06-03 RX ORDER — PROPOFOL 10 MG/ML
VIAL (ML) INTRAVENOUS AS NEEDED
Status: DISCONTINUED | OUTPATIENT
Start: 2022-06-03 | End: 2022-06-03 | Stop reason: SURG

## 2022-06-03 RX ORDER — OXYCODONE AND ACETAMINOPHEN 7.5; 325 MG/1; MG/1
1 TABLET ORAL EVERY 4 HOURS PRN
Status: DISCONTINUED | OUTPATIENT
Start: 2022-06-03 | End: 2022-06-04 | Stop reason: HOSPADM

## 2022-06-03 RX ORDER — PROMETHAZINE HYDROCHLORIDE 25 MG/1
25 SUPPOSITORY RECTAL ONCE AS NEEDED
Status: DISCONTINUED | OUTPATIENT
Start: 2022-06-03 | End: 2022-06-03 | Stop reason: HOSPADM

## 2022-06-03 RX ORDER — SODIUM CHLORIDE 0.9 % (FLUSH) 0.9 %
10 SYRINGE (ML) INJECTION AS NEEDED
Status: DISCONTINUED | OUTPATIENT
Start: 2022-06-03 | End: 2022-06-04 | Stop reason: HOSPADM

## 2022-06-03 RX ORDER — ONDANSETRON 4 MG/1
4 TABLET, FILM COATED ORAL EVERY 6 HOURS PRN
Status: DISCONTINUED | OUTPATIENT
Start: 2022-06-03 | End: 2022-06-04 | Stop reason: HOSPADM

## 2022-06-03 RX ORDER — ACETAMINOPHEN 500 MG
1000 TABLET ORAL EVERY 6 HOURS
Status: DISCONTINUED | OUTPATIENT
Start: 2022-06-03 | End: 2022-06-04 | Stop reason: HOSPADM

## 2022-06-03 RX ORDER — CALCIUM CARBONATE 200(500)MG
2 TABLET,CHEWABLE ORAL 3 TIMES DAILY PRN
Status: DISCONTINUED | OUTPATIENT
Start: 2022-06-03 | End: 2022-06-04 | Stop reason: HOSPADM

## 2022-06-03 RX ORDER — CEFAZOLIN SODIUM 2 G/100ML
2 INJECTION, SOLUTION INTRAVENOUS ONCE
Status: DISCONTINUED | OUTPATIENT
Start: 2022-06-03 | End: 2022-06-03

## 2022-06-03 RX ORDER — BISACODYL 10 MG
10 SUPPOSITORY, RECTAL RECTAL DAILY PRN
Status: DISCONTINUED | OUTPATIENT
Start: 2022-06-03 | End: 2022-06-04 | Stop reason: HOSPADM

## 2022-06-03 RX ORDER — MIDAZOLAM HYDROCHLORIDE 1 MG/ML
2 INJECTION INTRAMUSCULAR; INTRAVENOUS ONCE
Status: COMPLETED | OUTPATIENT
Start: 2022-06-03 | End: 2022-06-03

## 2022-06-03 RX ORDER — ROCURONIUM BROMIDE 10 MG/ML
INJECTION, SOLUTION INTRAVENOUS AS NEEDED
Status: DISCONTINUED | OUTPATIENT
Start: 2022-06-03 | End: 2022-06-03 | Stop reason: SURG

## 2022-06-03 RX ORDER — LEVOTHYROXINE SODIUM 0.07 MG/1
75 TABLET ORAL
Status: DISCONTINUED | OUTPATIENT
Start: 2022-06-04 | End: 2022-06-04 | Stop reason: HOSPADM

## 2022-06-03 RX ORDER — ASPIRIN 325 MG
325 TABLET, DELAYED RELEASE (ENTERIC COATED) ORAL DAILY
Status: DISCONTINUED | OUTPATIENT
Start: 2022-06-04 | End: 2022-06-04 | Stop reason: HOSPADM

## 2022-06-03 RX ADMIN — ACETAMINOPHEN 1000 MG: 500 TABLET ORAL at 12:55

## 2022-06-03 RX ADMIN — DEXAMETHASONE SODIUM PHOSPHATE 4 MG: 4 INJECTION, SOLUTION INTRA-ARTICULAR; INTRALESIONAL; INTRAMUSCULAR; INTRAVENOUS; SOFT TISSUE at 07:31

## 2022-06-03 RX ADMIN — HYDROMORPHONE HYDROCHLORIDE 0.5 MG: 1 INJECTION, SOLUTION INTRAMUSCULAR; INTRAVENOUS; SUBCUTANEOUS at 09:00

## 2022-06-03 RX ADMIN — CEFAZOLIN SODIUM 2 G: 2 INJECTION, SOLUTION INTRAVENOUS at 14:44

## 2022-06-03 RX ADMIN — ACETAMINOPHEN 1000 MG: 500 TABLET ORAL at 06:40

## 2022-06-03 RX ADMIN — OXYCODONE HYDROCHLORIDE 5 MG: 5 TABLET ORAL at 08:47

## 2022-06-03 RX ADMIN — SODIUM CHLORIDE, POTASSIUM CHLORIDE, SODIUM LACTATE AND CALCIUM CHLORIDE 9 ML/HR: 600; 310; 30; 20 INJECTION, SOLUTION INTRAVENOUS at 06:36

## 2022-06-03 RX ADMIN — ONDANSETRON 4 MG: 2 INJECTION INTRAMUSCULAR; INTRAVENOUS at 07:31

## 2022-06-03 RX ADMIN — BUPIVACAINE HYDROCHLORIDE AND EPINEPHRINE BITARTRATE 30 ML: 5; .005 INJECTION, SOLUTION EPIDURAL; INTRACAUDAL; PERINEURAL at 06:54

## 2022-06-03 RX ADMIN — SODIUM CHLORIDE, POTASSIUM CHLORIDE, SODIUM LACTATE AND CALCIUM CHLORIDE 80 ML/HR: 600; 310; 30; 20 INJECTION, SOLUTION INTRAVENOUS at 12:17

## 2022-06-03 RX ADMIN — LIDOCAINE HYDROCHLORIDE 100 MG: 20 INJECTION, SOLUTION EPIDURAL; INFILTRATION; INTRACAUDAL; PERINEURAL at 07:08

## 2022-06-03 RX ADMIN — SUGAMMADEX 200 MG: 100 INJECTION, SOLUTION INTRAVENOUS at 08:22

## 2022-06-03 RX ADMIN — ACETAMINOPHEN 1000 MG: 500 TABLET ORAL at 18:44

## 2022-06-03 RX ADMIN — FENTANYL CITRATE 50 MCG: 50 INJECTION, SOLUTION INTRAMUSCULAR; INTRAVENOUS at 07:27

## 2022-06-03 RX ADMIN — Medication 3 G: at 07:03

## 2022-06-03 RX ADMIN — ROCURONIUM BROMIDE 50 MG: 10 INJECTION INTRAVENOUS at 07:09

## 2022-06-03 RX ADMIN — CEFAZOLIN SODIUM 2 G: 2 INJECTION, SOLUTION INTRAVENOUS at 22:42

## 2022-06-03 RX ADMIN — MONTELUKAST 10 MG: 10 TABLET, FILM COATED ORAL at 20:51

## 2022-06-03 RX ADMIN — PROPOFOL 120 MG: 10 INJECTION, EMULSION INTRAVENOUS at 07:08

## 2022-06-03 RX ADMIN — FENTANYL CITRATE 50 MCG: 50 INJECTION, SOLUTION INTRAMUSCULAR; INTRAVENOUS at 07:08

## 2022-06-03 RX ADMIN — KETAMINE HYDROCHLORIDE 25 MG: 50 INJECTION, SOLUTION INTRAMUSCULAR; INTRAVENOUS at 07:27

## 2022-06-03 RX ADMIN — KETOROLAC TROMETHAMINE 30 MG: 30 INJECTION, SOLUTION INTRAMUSCULAR; INTRAVENOUS at 07:26

## 2022-06-03 RX ADMIN — KETOROLAC TROMETHAMINE 15 MG: 15 INJECTION, SOLUTION INTRAMUSCULAR; INTRAVENOUS at 14:44

## 2022-06-03 RX ADMIN — MIDAZOLAM HYDROCHLORIDE 2 MG: 1 INJECTION, SOLUTION INTRAMUSCULAR; INTRAVENOUS at 06:40

## 2022-06-03 RX ADMIN — FAMOTIDINE 20 MG: 20 TABLET ORAL at 14:53

## 2022-06-03 RX ADMIN — CELECOXIB 200 MG: 100 CAPSULE ORAL at 06:40

## 2022-06-03 RX ADMIN — KETOROLAC TROMETHAMINE 15 MG: 15 INJECTION, SOLUTION INTRAMUSCULAR; INTRAVENOUS at 20:51

## 2022-06-03 RX ADMIN — KETAMINE HYDROCHLORIDE 25 MG: 50 INJECTION, SOLUTION INTRAMUSCULAR; INTRAVENOUS at 07:08

## 2022-06-03 RX ADMIN — SODIUM CHLORIDE, POTASSIUM CHLORIDE, SODIUM LACTATE AND CALCIUM CHLORIDE: 600; 310; 30; 20 INJECTION, SOLUTION INTRAVENOUS at 08:16

## 2022-06-03 NOTE — H&P
[facility]   HOSPITALIST HISTORY AND PHYSICAL  Date: 6/3/2022   Patient Name: Kimberly Dennis  : 1950  MRN: [id number]  Primary Care Physician:  Milan Coppola Jr., MD  Date of admission: 6/3/2022    Subjective   Subjective   Chief Complaint: Medical management    HPI:   Kimberly Dennis is a 72 y.o. female seen today by hospitalist for review/medical management of chronic illnesses in the setting of post op state after her L knee arthroplasty earlier today.  She has a history of HTN, RUBY, PAF, Chronic antiocoagulation, Obesity, OA,.    Currently, she is awake and answering all questions after surgery earlier today.  Daughter present today..  Discussed using home CPAP machine tonight and resuming home meds post operatively.  She is having some recent GERD symptoms.  May be related to recent doxycycline Rx which is finished.  Will give famotidine and prn antiemetics/antiacids.   Her L knee pain is adequately controlled.  She has already ambulated down the hallway with steady gait.  No obvious post op issues.  Her Eliquis has been on hold for several days.  Pre op labs and ECG reviewed.  She has outpatient physical therapy set up.  Plan to resume anticoagulation on POD#1 after discussion with orthopedic surgeon.    Pertinent History   Past Medical History:    Active Ambulatory Problems     Diagnosis Date Noted   • Anemia 2021   • Arthritis 2021   • Paroxysmal atrial fibrillation (HCC) 2021   • History of total knee arthroplasty, right 2016   • Essential hypertension 2021   • Mitral valve prolapse 2021   • Seasonal allergic rhinitis 2021   • Tear of medial cartilage or meniscus of knee, current 2017   • Thoracic outlet syndrome 2021   • Primary osteoarthritis of left knee 2022     Resolved Ambulatory Problems     Diagnosis Date Noted   • No Resolved Ambulatory Problems     Past Medical History:   Diagnosis Date   • Acid reflux    •  Anesthesia complication    • Atrial fibrillation (HCC)    • Bladder disorder    • Colon polyp    • GI bleed    • HTN (hypertension)    • Limb swelling    • Medial meniscus tear 09/12/2017   • Seasonal allergies    • Sleep apnea    • SOB (shortness of breath)    • Thyroid disorder        Past Surgical History:    Past Surgical History:   Procedure Laterality Date   • BLADDER REPAIR     • CHOLECYSTECTOMY     • COLONOSCOPY  2014 MOORE   • COLONOSCOPY      DR. EDGE   • HYSTERECTOMY     • INTRAOCULAR LENS INSERTION      BOTH   • JOINT REPLACEMENT Right     TKR   • KNEE ARTHROSCOPY Left        Social History:     Social History     Socioeconomic History   • Marital status:    Tobacco Use   • Smoking status: Never Smoker   • Smokeless tobacco: Never Used   Vaping Use   • Vaping Use: Never used   Substance and Sexual Activity   • Alcohol use: Never   • Drug use: Never   • Sexual activity: Defer       Family History:     Family History   Problem Relation Age of Onset   • Heart disease Mother    • Arthritis Mother    • Arthritis Sister    • Heart disease Brother    • Diabetes Brother    • Malig Hyperthermia Neg Hx        Home Medications (reported)  Current Outpatient Medications   Medication Instructions   • acetaminophen (TYLENOL) 500 mg, Oral, Every 6 Hours PRN   • cetirizine (ZYRTEC) 10 mg, Oral, Daily   • Cholecalciferol 125 MCG (5000 UT) tablet Vitamin D3 5,000 unit oral tablet take 1 tablet by oral route daily   Active   • diphenhydrAMINE-APAP, sleep, (TYLENOL PM EXTRA STRENGTH PO) Oral, Nightly PRN   • doxycycline (MONODOX) 100 mg, Oral, 2 Times Daily   • Eliquis 5 MG tablet tablet Take 1 tablet by mouth twice daily   • fluticasone (Flonase) 50 MCG/ACT nasal spray 2 sprays, Nasal, Daily   • hydroCHLOROthiazide (HYDRODIURIL) 25 MG tablet Take 1 tablet by mouth once daily   • levothyroxine (SYNTHROID, LEVOTHROID) 75 MCG tablet Take 1 tablet by mouth once daily   • losartan (COZAAR) 25 MG tablet Take 1  tablet by mouth once daily for 90 days   • metoprolol succinate XL (TOPROL-XL) 50 mg, Oral, Daily   • montelukast (SINGULAIR) 10 mg, Oral, Nightly       Allergies:  Allergies   Allergen Reactions   • Amoxicillin-Pot Clavulanate Itching   • Hydrocodone-Acetaminophen Itching   • Hydrocodone Itching and Rash   • Iodine Rash   • Latex Rash   • Metal Rash     YELLOW GOLD CAUSES RASH    • Shellfish Allergy Rash       REVIEW OF SYSTEMS: All other systems reviewed and are negative.   • GEN: No fevers. No chills. No weight gain. No weight loss.     • HEENT:   No dysphagia/odynophagia. No visual disturbance.    • GI:    No N/V.  No abd pain. No diarrhea. No constipation.  No bloody/black tarry stools. No hematemesis.   • CV: No chest discomfort.  No palps. No lightheadedness. No syncope. No orthopnea/PND. No edema.   • RESP:    No cough. No wheeze.  No increased sputum. No hemoptysis. No MARR.  • :   No dysuria or suprapubic discomfort. No frequency.No urgency. No hesitancy. No incontinence. No hematuria. No flank pain.    • MS:   + L joint stiffness or arthralgias. No myalgias. No muscle weakness.    • SKIN:   No painful or pruritic rashes.  No skin discoloration.  • NEURO:  No focal numbness or weakness.  No headaches.  No ataxia. No slurred speech. No receptive/expressive aphasia.      • PSYCH:   No anxiety. No depression.  • ENDO:  No tremor, hair loss, heat or cold intolerance.    Objective   Objective   Vitals:   Temp:  [96.6 °F (35.9 °C)-97.8 °F (36.6 °C)] 97.5 °F (36.4 °C)  Heart Rate:  [62-83] 64  Resp:  [12-20] 15  BP: (133-199)/(52-79) 135/63  Flow (L/min):  [2-4] 2  PHYSICAL EXAM   • CON: WN. WD. NAD.   • EYES:  Sclera anicteric. EOMI. Normal conjunctiva.   • ENT:  Oral mucosa normal without ulcers or thrush.    • NECK:  No thyromegaly. No stridor. Trachea midline.  • RESP:  CTA. No wheezes. No crackles.  No work of breathing or tachypnea.   • CV:  Rhythm regular. Rate WNL. No murmur noted.  No  edema.  • GI:  Soft and nontender. Nondistended.  Bowel sounds present.   • EXT: L knee dressing intact.  Distally, lower ext intact neurovascularly.  • LYMPH:  No lymphedema noted.  No cervical lymphadenopathy.  • PSYCH:  Alert. Oriented. Normal affect and mood.  • NEURO:  CNII-XII grossly intact. No dysarthria or aphasia. No unilateral weakness or paresthesia.  • SKIN: No chronic venous stasis changes or varicosities.  No cellulitis    Result Review    Result Review:  I have personally reviewed the results from the time of this admission to 6/3/2022 13:33 EDT and agree with these findings:  []  Laboratory  []  Microbiology  []  Radiology  []  EKG/Telemetry   []  Cardiology/Vascular   []  Pathology  []  Old records  []  Other:    Assessment & Plan   Assessment / Plan   Assessment:    • Paroxysmal Atrial Fibrillation (Valayam; on Eliquis)  • OA L knee with L knee pain and joint instability  • S/P L TKR 6/3/22 by Dr. Paz  • Hypothyroidism  • RUBY  • Obesity with BMI > 42  • Recent L otitis media infection  • HTN  • MV prolapse  • Seasonal allergic rhinitis  • Thoracic outlet syndrome  • GERD     Plan:  • Resume home Eliquis when OK with surgeon  • Continue home B blocker / metoprolol  • Continue levothyroxine  • Hold home HCTZ/Losartan today ... resume in the a.m.  • OK to use home CPAP  • Preop ECG noted:  SB rate 58.  NSIVCD.  LAE suggested.  • Bowel regimen  • Pepcid and prn antiacids/antiemetics  • Oral and IV analgesics  • PT and OT  • Discussed plan with RN    DVT prophylaxis:  Medical and mechanical DVT prophylaxis orders are present.  CODE STATUS:         Electronically signed by CAITLIN Ames, 06/03/22, 1:33 PM EDT.       Patient independently seen and evaluated, agree with assessment and plan, above documentation reflects plan put forth during bedside rounds.  More than 51% of the time of this patient encounter was performed by me.    Patient is a 72-year-old female past medical history significant for  osteoarthritis who presents for scheduled left total knee arthroplasty. Patient states that prior to the procedure the pain had gradually been worsening over the past several years. The patient reports pain and functional impairment including activities of daily living, they have moderate to severe resting pain, chronic inflammation, and swelling. The patient states that this pain is no longer relieved with conservative therapies including NSAIDs, injections, and physical therapy. The pain is dull and achy in nature, nonradiating, worse with activity. Because of the above the patient is admitted for postoperative pain control, symptom management and rehab evaluation.    ROS:  Denies nausea vomiting or diarrhea  Chest pain or palpitations or shortness of breath  No fever no chills    Exam:  General appearance: NAD, conversant   Eyes: anicteric sclerae, moist conjunctivae; no lid-lag; PERRLA  HENT: Atraumatic; oropharynx clear with moist mucous membranes and no mucosal ulcerations; normal hard and soft palate  Neck: Trachea midline; FROM, supple, no thyromegaly or lymphadenopathy  Lungs: CTA, with normal respiratory effort and no intercostal retractions  CV: Regular Rate and Rhythm, no Murmurs, Rubs, or Gallops   Abdomen: Soft, non-tender; no masses or Heptosplenomegally  Extremities: No peripheral edema or extremity lymphadenopathy  Skin: Normal temperature, turgor and texture; no rash, ulcers or subcutaneous nodules, blood on bandage, from surgical site  Psych: Appropriate affect, alert and oriented to person, place and time  Neuro: CN II - XII intact no motor deficits, no sensory defecits    Plan:  Agree with assessment and plan as above  Continue patient's home Synthroid  Continue beta-blocker  Continue to hold Eliquis  Continue Pepcid  PT/OT  Clinical course will dictate further management    Reviewed patients labs and imaging, and discussed with patient and nurse at bedside.      Electronically signed by Abdoulaye  ALISTAIR Gonzalez MD, 06/03/22, 4:03 PM EDT.

## 2022-06-03 NOTE — PLAN OF CARE
Goal Outcome Evaluation:  Plan of Care Reviewed With: patient, daughter           Outcome Evaluation: Pt presents with tightness in LLE due to surgery. Pt has noted deficits in stride length and gait speed. Pt will benefit from skilled physical therapy in order to address impairments. Recommend pt attend outpatient physical therapy.

## 2022-06-03 NOTE — H&P
h and p      Chief Complaint  Follow-up of the Left Knee        Subjective          Kimberly Dennis presents to [facility] for a follow-up of left knee. Patient last seen in 2017 for the left knee, at that time she was diagnosed with a left knee MMT. She had a previous injection in the knee that has given her relief for 1 ½ years. She states she wanted to come in sooner, but she had to care for her . She states pain progressively worsened. She has no new injury to the knee. She gets swelling and pain on the medial joint line.           Allergies   Allergen Reactions   • Amoxicillin-Pot Clavulanate Itching   • Hydrocodone-Acetaminophen Itching   • Hydrocodone Itching and Rash   • Iodine Rash   • Latex Rash   • Shellfish Allergy Rash         Social History           Socioeconomic History   • Marital status:    Tobacco Use   • Smoking status: Never Smoker   • Smokeless tobacco: Never Used   Vaping Use   • Vaping Use: Never used   Substance and Sexual Activity   • Alcohol use: Never   • Drug use: Never   • Sexual activity: Defer         Review of Systems            Objective      Vital Signs:   There were no vitals taken for this visit.       Physical Exam  Constitutional:       Appearance: Normal appearance. Patient is well-developed and normal weight.   HENT:      Head: Normocephalic.      Right Ear: Hearing and external ear normal.      Left Ear: Hearing and external ear normal.      Nose: Nose normal.   Eyes:      Conjunctiva/sclera: Conjunctivae normal.   Cardiovascular:      Rate and Rhythm: Normal rate.   Pulmonary:      Effort: Pulmonary effort is normal.      Breath sounds: No wheezing or rales.   Abdominal:      Palpations: Abdomen is soft.      Tenderness: There is no abdominal tenderness.   Musculoskeletal:      Cervical back: Normal range of motion.   Skin:     Findings: No rash.   Neurological:      Mental Status: Patient  is alert and oriented to person, place,  and time.   Psychiatric:         Mood and Affect: Mood and affect normal.         Judgment: Judgment normal.         Ortho Exam       LEFT KNEE: Crepitus with motion. Tender medial and lateral joint line. Calf supple, non-tender, no signs of DVT. Dorsal Pedal Pulse 2+, posterior tibialis pulse 2+. Sensation grossly intact. Neurovascular intact.  Limping gait. Sensation grossly intact. Neurovascular intact.  Skin intact. Mild swelling. Stable to varus/valgus stress.         Procedures               Imaging Results (Most Recent)      None                   Result Review    :      X-Ray Report:  Left knee(s) X-Ray  Indication: Evaluation of left knee pain   AP, Lateral and Standing view(s)  Findings: Joint space narrowing of the medial compartment and patellofemoral compartment, consistent with osteoarthritis. No fracture or dislocation.   Prior studies available for comparison: no               Assessment      Assessment and Plan      DX: Left knee osteoarthritis      She is a candidate of a left total knee replacement. Risks, benefits of left total knee arthroplasty. She understands and wishes to proceed.      Discussed surgery., Risks/benefits discussed with patient including, but not limited to: infection, bleeding, neurovascular damage, malunion, nonunion, aesthetic deformity, need for further surgery, and death., Discussed with patient the implant type being used during surgery and patient understands and desires to proceed. and Surgery pamphlet given.     Follow Up      Post-operatively.      Abdoulaye Paz MD  06/03/22

## 2022-06-03 NOTE — SIGNIFICANT NOTE
06/03/22 1509   Plan   Final Discharge Disposition Code 01 - home or self-care   Final Note Home with outpatient therapy at [facility] in [place]. Appt 6/6/22 at 11am

## 2022-06-03 NOTE — OP NOTE
TOTAL KNEE ARTHROPLASTY WITH DONAL NAVIGATION  Procedure Report    Patient Name:  Kimberly Dennis  YOB: 1950    Date of Surgery:  6/3/2022       Pre-op Diagnosis:   Primary osteoarthritis of left knee [M17.12]       Post-Op Diagnosis Codes:     * Primary osteoarthritis of left knee [M17.12]    Procedure/CPT® Codes:      Procedure(s):  LEFT TOTAL KNEE ARTHROPLASTY WITH COMPUTER NAVIGATION    Staff:  Surgeon(s):  Abdoulaye Paz MD    Assistant: Alireza Kearns RN; Inessa Monzon    Anesthesia: General with Block    Estimated Blood Loss: 50ml    Implants:    Implant Name Type Inv. Item Serial No.  Lot No. LRB No. Used Action   CMT BONE PALACOS R HI/VISC 1X40 - [id number] Implant CMT BONE PALACOS R HI/VISC 1X40  [facility] [id number] Left 2 Implanted   BASE TIB/KN GEN2 NONPOR TI SZ4 LT - [id number] Implant BASE TIB/KN GEN2 NONPOR TI SZ4 LT  [employer] [id number] Left 1 Implanted   COMP FEM LEGION OXINIUM CR SZ5 LT - [id number] Implant COMP FEM LEGION OXINIUM CR SZ5 LT  [employer] [id number] Left 1 Implanted   INSRT ART LEGION CR DEEP DISH XLPE SZ3TO4 11MM - [id number] Implant INSRT ART LEGION CR DEEP DISH XLPE SZ3TO4 11MM  [employer] [id number] Left 1 Implanted   PAT GEN2 RESRF 29MM - [id number] Implant PAT GEN2 RESRF 29MM  [employer] [id number] Left 1 Implanted       Specimen:          None      Complications: None    Description of Procedure: See H&P for risks and benefits.The patient was taken to the operating room and placed supine on the operating table after adductor canal block was done in preoperative holding. After general endotracheal anesthesia was established, the left knee was examined.  The patient had full range of motion and no instability.  The left lower extremity was prepped and draped in the standard usual fashion using alcohol and ChloraPrep.  A standard incision was made one to two fingerbreadths superior to the superior pole of the  patella down to the medial aspect of the tibial tubercle with a knife.  Dissection was carried down raising full thickness skin flaps laterally and medially.  A medial rectus parapatellar approach was undertaken for a.  Appropriate soft tissue release was done in a standard fashion with a knife and a curved osteotome.  The patient had significant signs of osteoarthritis and the knee was brought up into flexion.  The collateral ligaments were protected with retractors and the tracking device for the computer navigation software was placed in the distal femur and distal femoral points were mapped out according to the computer navigation software and the distal femoral cut was made.  The distal femoral cut was in 0 degrees of varus and valgus and 2 degrees of flexion.  It was accepted.  The posterior condylar referencing guide was then pinned.  The femur was sized.  The cutting block was placed in the distal femur and all of the femoral cuts were made, anterior posterior, anterior chamfer, and posterior chamfer cuts.  The bone, from the cuts, was removed.  The PCL retractor was placed and attention was focused on the tibia.  The tracking device was mounted on the medial tibial plateau in standard fashion.  All the femoral points were mapped out according to the computer navigation software and the proximal tibial cut was made.  The resulting cut was in 0 degrees of varus and valgus with a 5 degree posterior slope.  It was accepted.  The posterior condylar spurs were removed, along with the medial and lateral meniscus.  Trials were placed and the extramedullary guide and the trials were pointing to the base of the second metatarsal in the center of the ankle.  The patella was calipered and cut to accommodate a medialized patellar button trial.  The knee was taken through range of motion, had equal flexion-extension gaps, was stable to varus and valgus stress and had full passive motion.  The trials were removed and the  tibia was punched in the standard fashion and the bone ends were copiously irrigated with Bacitracin Simpulse irrigation as the cement was mixed. The tibial implant was cemented in position, followed by the size the femur.  They were malleted and cemented in position and the trial poly was placed.  The knee was held in extension and the patella was cemented into place and held with a clamp.  The knee was held in extension until the cement had hardened.  Excess cement was then removed from the implant edges.  Trials were undertaken and the correct polyethylene insert was chosen and was implanted.  The knee had the same range of motion and stability as the trials.  The wound was copiously irrigated with Bacitracin Simpulse irrigation and the arthrotomy was closed with Ethibond.  More copious irrigation followed and the deep fat was closed with 0 Vicryl.  The subcutaneous tissue was closed with 2-0 Vicryl and the skin was closed with staples.  The closure was done in 45 degrees of knee flexion.  The incision was washed and dried and Aquacel dressing and long TITO hose was applied to the lower extremity. The patient tolerated the procedure well, was extubated, and taken to the recovery room.       Abdoulaye Paz MD     Date: 6/3/2022  Time: 08:33 EDT

## 2022-06-03 NOTE — ANESTHESIA PREPROCEDURE EVALUATION
Anesthesia Evaluation     Patient summary reviewed and Nursing notes reviewed   history of anesthetic complications: prolonged sedation  NPO Solid Status: > 8 hours  NPO Liquid Status: > 2 hours           Airway   Mallampati: III  TM distance: >3 FB  Possible difficult intubation  Dental    (+) poor dentition    Comment: Multiple missing and damaged teeth      Pulmonary - normal exam   (+) shortness of breath, sleep apnea,   Cardiovascular - normal exam  Exercise tolerance: good (4-7 METS)    ECG reviewed    (+) hypertension, valvular problems/murmurs MVP, dysrhythmias Atrial Fib, MARR,     ROS comment: Thoracic outlet syndrome     Neuro/Psych- negative ROS  GI/Hepatic/Renal/Endo    (+) obesity, morbid obesity, GERD, GI bleeding , thyroid problem     Musculoskeletal     Abdominal   (+) obese,    Substance History - negative use     OB/GYN negative ob/gyn ROS         Other   arthritis, blood dyscrasia anemia,     ROS/Med Hx Other:                               Anesthesia Plan    ASA 3     general and regional   (Patient understands anesthesia not responsible for dental damage. Regional anesthesia options discussed with patient. Pt accepts regional block.)  intravenous induction     Anesthetic plan, all risks, benefits, and alternatives have been provided, discussed and informed consent has been obtained with: patient.    Plan discussed with CRNA.        CODE STATUS:

## 2022-06-03 NOTE — ANESTHESIA POSTPROCEDURE EVALUATION
Patient: Kimberly Dennis    Procedure Summary     Date: 06/03/22 Room / Location: [facility] OR 03 / [facility] MAIN OR    Anesthesia Start: 0703 Anesthesia Stop: 0837    Procedure: LEFT TOTAL KNEE ARTHROPLASTY WITH COMPUTER NAVIGATION (Left Knee) Diagnosis:       Primary osteoarthritis of left knee      (Primary osteoarthritis of left knee [M17.12])    Surgeons: Abdoulaye Paz MD Provider: Erickson Jimenez MD    Anesthesia Type: general, regional ASA Status: 3          Anesthesia Type: general, regional    Vitals  Vitals Value Taken Time   /69 06/03/22 0837   Temp     Pulse 77 06/03/22 0838   Resp     SpO2 93 % 06/03/22 0838   Vitals shown include unvalidated device data.        Post Anesthesia Care and Evaluation    Patient location during evaluation: bedside  Patient participation: complete - patient participated  Level of consciousness: awake  Pain management: adequate  Airway patency: patent  Anesthetic complications: No anesthetic complications  PONV Status: none  Cardiovascular status: acceptable and stable  Respiratory status: acceptable  Hydration status: acceptable    Comments: An Anesthesiologist personally participated in the most demanding procedures (including induction and emergence if applicable) in the anesthesia plan, monitored the course of anesthesia administration at frequent intervals and remained physically present and available for immediate diagnosis and treatment of emergencies.

## 2022-06-03 NOTE — ANESTHESIA PROCEDURE NOTES
Peripheral Block      Patient reassessed immediately prior to procedure    Patient location during procedure: pre-op  Start time: 6/3/2022 6:50 AM  Stop time: 6/3/2022 6:53 AM  Reason for block: at surgeon's request and post-op pain management  Performed by  Anesthesiologist: Erickson Jimenez MD  Preanesthetic Checklist  Completed: patient identified, IV checked, site marked, risks and benefits discussed, surgical consent, monitors and equipment checked, pre-op evaluation and timeout performed  Prep:  Pt Position: supine  Sterile barriers:alcohol skin prep, partial drape, cap, washed/disinfected hands, mask and gloves  Prep: ChloraPrep  Patient monitoring: blood pressure monitoring, continuous pulse oximetry and EKG  Procedure    Sedation: yes  Performed under: local infiltration  Guidance:ultrasound guided    ULTRASOUND INTERPRETATION. Using ultrasound guidance a 20 G gauge needle was placed in close proximity to the nerve, at which point, under ultrasound guidance anesthetic was injected in the area of the nerve and spread of the anesthesia was seen on ultrasound in close proximity thereto.  There were no abnormalities seen on ultrasound; a digital image was taken; and the patient tolerated the procedure with no complications. Images:still images obtained, printed/placed on chart    Laterality:left  Block Type:adductor canal block  Injection Technique:single-shot  Needle Type:echogenic  Needle Gauge:20 G (4in)  Resistance on Injection: none    Medications Used: bupivacaine-EPINEPHrine PF (MARCAINE w/EPI) 0.5% -[id number] injection, 30 mL      Post Assessment  Injection Assessment: negative aspiration for heme, no paresthesia on injection and incremental injection  Patient Tolerance:comfortable throughout block  Complications:no  Additional Notes  The block or continuous infusion is requested by the referring physician for management of postoperative pain, or pain related to a procedure. Ultrasound guidance (deemed  medically necessary). Painless injection, pt was awake and conversant during the procedure without complications. Needle and surrounding structures visualized throughout procedure. No adverse reactions or complications seen during this period. Post-procedure image showed no signs of complication, and anatomy was consistent with an uncomplicated nerve blockade.

## 2022-06-03 NOTE — THERAPY EVALUATION
Acute Care - Physical Therapy Initial Evaluation  ZACH Jones     Patient Name: Kimberly Dennis  : 1950  MRN: [id number]  Today's Date: 6/3/2022   Admit date: 6/3/2022     Referring Physician: Abdoulaye Gonzalez MD     Surgery Date:6/3/2022   Procedure(s) (LRB):  LEFT TOTAL KNEE ARTHROPLASTY WITH COMPUTER NAVIGATION (Left)         Visit Dx:     ICD-10-CM ICD-9-CM   1. Primary osteoarthritis of left knee  M17.12 715.16   2. Difficulty in walking  R26.2 719.7     Patient Active Problem List   Diagnosis   • Anemia   • Arthritis   • Paroxysmal atrial fibrillation (HCC)   • History of total knee arthroplasty, right   • Essential hypertension   • Mitral valve prolapse   • Seasonal allergic rhinitis   • Tear of medial cartilage or meniscus of knee, current   • Thoracic outlet syndrome   • Primary osteoarthritis of left knee     Past Medical History:   Diagnosis Date   • Acid reflux    • Anemia    • Anesthesia complication     TROUBLE WAKING UP   • Arthritis    • Atrial fibrillation (HCC)     PAROXYSMAL   • Bladder disorder    • Colon polyp    • GI bleed    • History of total knee arthroplasty, right 2016   • HTN (hypertension)    • Limb swelling    • Medial meniscus tear 2017    LEFT   • Mitral valve prolapse    • Primary osteoarthritis of left knee 2017   • Seasonal allergies    • Sleep apnea    • SOB (shortness of breath)    • Thoracic outlet syndrome    • Thyroid disorder      Past Surgical History:   Procedure Laterality Date   • BLADDER REPAIR     • CHOLECYSTECTOMY     • COLONOSCOPY      CAUSEY   • COLONOSCOPY      DR. EDGE   • HYSTERECTOMY     • INTRAOCULAR LENS INSERTION      BOTH   • JOINT REPLACEMENT Right     TKR   • KNEE ARTHROSCOPY Left      PT Assessment (last 12 hours)     PT Evaluation and Treatment     Row Name 22 1505          Physical Therapy Time and Intention    Subjective Information no complaints  -ALEYDA     Document Type evaluation  -ALEYDA     Mode of Treatment  individual therapy;physical therapy  -ALEYDA     Patient Effort excellent  -ALEYDA     Symptoms Noted During/After Treatment none  -ALEYDA     Row Name 06/03/22 1505          General Information    Patient Profile Reviewed yes  -ALEYDA     Patient Observations alert;cooperative;agree to therapy  -ALEYDA     Prior Level of Function independent:;all household mobility;community mobility  -ALEYDA     Equipment Currently Used at Home none  -ALEYDA     Existing Precautions/Restrictions weight bearing  -ALEYDA     Barriers to Rehab none identified  -ALEYDA     Row Name 06/03/22 1505          Range of Motion (ROM)    Range of Motion left lower extremity  L knee PROM flexion 100 deg  -ALEYDA     Row Name 06/03/22 1505          Strength (Manual Muscle Testing)    Strength (Manual Muscle Testing) bilateral lower extremities  5/5  -ALEYDA     Row Name 06/03/22 1505          Mobility    Extremity Weight-bearing Status left lower extremity  -ALEYDA     Left Lower Extremity (Weight-bearing Status) weight-bearing as tolerated (WBAT)  -ALEYDA     Row Name 06/03/22 1505          Transfers    Transfers sit-stand transfer  -ALEYDA     Maintains Weight-bearing Status (Transfers) able to maintain  -ALEYDA     Sit-Stand [place] (Transfers) contact guard;verbal cues  -ALEYDA     Row Name 06/03/22 1505          Sit-Stand Transfer    Assistive Device (Sit-Stand Transfers) walker, front-wheeled  -ALEYDA     Row Name 06/03/22 1505          Gait/Stairs (Locomotion)    Gait/Stairs Locomotion gait/ambulation assistive device  -ALEYDA     [place] Level (Gait) contact guard;verbal cues  -ALEYDA     Assistive Device (Gait) walker, front-wheeled  -ALEYDA     Ambulated day of surgery or within 4 hours of PACU discharge yes  -ALEYDA     Distance in Feet (Gait) 130  -ALEYDA     Pattern (Gait) step-to  -ALEYDA     Deviations/Abnormal Patterns (Gait) stride length decreased;gait speed decreased  -ALEYDA     Negotiation (Stairs) stairs independence  -ALEYDA     [place] Level (Stairs) contact guard;verbal cues;nonverbal cues (demo/gesture)   -ALEYDA     Handrail Location (Stairs) both sides  -ALEYDA     Number of Steps (Stairs) 10  -ALEYDA     Ascending Technique (Stairs) step-to-step  -ALEYDA     Descending Technique (Stairs) step-to-step  -ALEYDA     Row Name 06/03/22 1505          Safety Issues, Functional Mobility    Impairments Affecting Function (Mobility) balance;coordination;endurance/activity tolerance;pain;strength  -ALEYDA     Row Name 06/03/22 1505          Balance    Balance Assessment standing dynamic balance  -ALEYDA     Dynamic Standing Balance contact guard;verbal cues;non-verbal cues (demo/gesture)  -ALEYDA     Position/Device Used, Standing Balance walker, front-wheeled  -ALEYDA     Row Name             Wound 06/03/22 0736 Left anterior knee Incision    Wound - Properties Group Placement Date: 06/03/22  -SC Placement Time: 0736  -SC Present on Hospital Admission: N  -SC Side: Left  -SC Orientation: anterior  -SC Location: knee  -SC Primary Wound Type: Incision  -SC     Retired Wound - Properties Group Placement Date: 06/03/22  -SC Placement Time: 0736  -SC Present on Hospital Admission: N  -SC Side: Left  -SC Orientation: anterior  -SC Location: knee  -SC Primary Wound Type: Incision  -SC     Retired Wound - Properties Group Date first assessed: 06/03/22  -SC Time first assessed: 0736  -SC Present on Hospital Admission: N  -SC Side: Left  -SC Location: knee  -SC Primary Wound Type: Incision  -SC     Row Name 06/03/22 1505          Plan of Care Review    Plan of Care Reviewed With patient;daughter  -ALEYDA     Outcome Evaluation Pt presents with tightness in LLE due to surgery. Pt has noted deficits in stride length and gait speed. Pt will benefit from skilled physical therapy in order to address impairments. Recommend pt attend outpatient physical therapy.  -ALEYDA     Row Name 06/03/22 1505          Positioning and Restraints    Pre-Treatment Position sitting in chair/recliner  -ALEYDA     Post Treatment Position chair  -LAEYDA     Row Name 06/03/22 1505          Therapy  Assessment/Plan (PT)    Patient/Family Therapy Goals Statement (PT) Return home without pain  -ALEYDA     Rehab Potential (PT) good, to achieve stated therapy goals  -ALEYDA     Criteria for Skilled Interventions Met (PT) yes;meets criteria;skilled treatment is necessary  -ALEYDA     Therapy Frequency (PT) daily  -ALEYDA     Predicted Duration of Therapy Intervention (PT) 10 days  -ALEYDA     Problem List (PT) problems related to;balance;coordination;mobility;range of motion (ROM);strength;pain  -ALEYDA     Activity Limitations Related to Problem List (PT) unable to ambulate safely;unable to transfer safely  -ALEYDA     Row Name 06/03/22 1536          Therapy Plan Review/Discharge Plan (PT)    Therapy Plan Review (PT) evaluation/treatment results reviewed;participants voiced agreement with care plan;patient;daughter  -ALEYDA     Row Name 06/03/22 1536          Physical Therapy Goals    Bed Mobility Goal Selection (PT) bed mobility, PT goal 1  -ALEYDA     Transfer Goal Selection (PT) transfer, PT goal 1  -ALEYDA     Gait Training Goal Selection (PT) gait training, PT goal 1  -ALEYDA     Row Name 06/03/22 1536          Bed Mobility Goal 1 (PT)    Activity/Assistive Device (Bed Mobility Goal 1, PT) bed mobility activities, all  -ALEYDA     [place] Level/Cues Needed (Bed Mobility Goal 1, PT) independent  -ALEYDA     Time Frame (Bed Mobility Goal 1, PT) long term goal (LTG);10 days  -ALEYDA     Row Name 06/03/22 1536          Transfer Goal 1 (PT)    Activity/Assistive Device (Transfer Goal 1, PT) transfers, all  -ALEYDA     [place] Level/Cues Needed (Transfer Goal 1, PT) independent  -ALEYDA     Time Frame (Transfer Goal 1, PT) long term goal (LTG);10 days  -ALEYDA     Row Name 06/03/22 1536          Gait Training Goal 1 (PT)    Activity/Assistive Device (Gait Training Goal 1, PT) gait (walking locomotion);assistive device use  -ALEYDA     [place] Level (Gait Training Goal 1, PT) modified independence  -ALEYDA     Distance (Gait Training Goal 1, PT) 300  -ALEYDA     Time Frame (Gait  Training Goal 1, PT) long term goal (LTG);10 days  -ALEYDA           User Key  (r) = Recorded By, (t) = Taken By, (c) = Cosigned By    Initials Name Provider Type    Manju Lopez, RN Registered Nurse    Clay Adame PT Physical Therapist                Physical Therapy Education                 Title: PT OT SLP Therapies (Done)     Topic: Physical Therapy (Done)     Point: Mobility training (Done)     Learning Progress Summary           Patient Eager, E, VU by ALEYDA at 6/3/2022 1538                   Point: Precautions (Done)     Learning Progress Summary           Patient Eager, E, VU by ALEYDA at 6/3/2022 1538                               User Key     Initials Effective Dates Name Provider Type Discipline    ALEYDA 06/03/21 -  Clay Gross PT Physical Therapist PT              PT Recommendation and Plan  Anticipated Discharge Disposition (PT): home with outpatient therapy services  Planned Therapy Interventions (PT): balance training, gait training, home exercise program, stair training, strengthening, transfer training  Therapy Frequency (PT): daily  Plan of Care Reviewed With: patient, daughter  Outcome Evaluation: Pt presents with tightness in LLE due to surgery. Pt has noted deficits in stride length and gait speed. Pt will benefit from skilled physical therapy in order to address impairments. Recommend pt attend outpatient physical therapy.       Time Calculation:         PT G-Codes  AM-PAC 6 Clicks Score (PT): 15    Clay Gross PT  6/3/2022

## 2022-06-03 NOTE — PLAN OF CARE
Goal Outcome Evaluation:               Pt S/P Left total knee replacement today. Tolerated procedure well. Pain has been well controlled. Pt voided this evening without difficulty. Remains on continuous pulse ox. Surgical site has some bleeding earlier today, MD notified and rounded on pt at bedside. Staples added and glue, new Aquacel placed. No other changes this shift.

## 2022-06-04 VITALS
BODY MASS INDEX: 42.45 KG/M2 | DIASTOLIC BLOOD PRESSURE: 54 MMHG | RESPIRATION RATE: 18 BRPM | TEMPERATURE: 98.6 F | HEIGHT: 66 IN | OXYGEN SATURATION: 96 % | HEART RATE: 71 BPM | WEIGHT: 264.11 LBS | SYSTOLIC BLOOD PRESSURE: 124 MMHG

## 2022-06-04 LAB
DEPRECATED RDW RBC AUTO: 44.6 FL (ref 37–54)
ERYTHROCYTE [DISTWIDTH] IN BLOOD BY AUTOMATED COUNT: 13.6 % (ref 12.3–15.4)
HCT VFR BLD AUTO: 27.5 % (ref 34–46.6)
HCT VFR BLD AUTO: 29.5 % (ref 34–46.6)
HGB BLD-MCNC: 9.1 G/DL (ref 12–15.9)
HGB BLD-MCNC: 9.5 G/DL (ref 12–15.9)
HOLD SPECIMEN: NORMAL
MCH RBC QN AUTO: 28.9 PG (ref 26.6–33)
MCHC RBC AUTO-ENTMCNC: 32.2 G/DL (ref 31.5–35.7)
MCV RBC AUTO: 89.7 FL (ref 79–97)
PLATELET # BLD AUTO: 145 10*3/MM3 (ref 140–450)
PMV BLD AUTO: 9.6 FL (ref 6–12)
RBC # BLD AUTO: 3.29 10*6/MM3 (ref 3.77–5.28)
WBC NRBC COR # BLD: 8.66 10*3/MM3 (ref 3.4–10.8)

## 2022-06-04 PROCEDURE — A9270 NON-COVERED ITEM OR SERVICE: HCPCS | Performed by: PHYSICIAN ASSISTANT

## 2022-06-04 PROCEDURE — A9270 NON-COVERED ITEM OR SERVICE: HCPCS | Performed by: ORTHOPAEDIC SURGERY

## 2022-06-04 PROCEDURE — 63710000001 CETIRIZINE 10 MG TABLET: Performed by: PHYSICIAN ASSISTANT

## 2022-06-04 PROCEDURE — 97535 SELF CARE MNGMENT TRAINING: CPT

## 2022-06-04 PROCEDURE — 85027 COMPLETE CBC AUTOMATED: CPT | Performed by: ORTHOPAEDIC SURGERY

## 2022-06-04 PROCEDURE — 97116 GAIT TRAINING THERAPY: CPT

## 2022-06-04 PROCEDURE — 63710000001 LEVOTHYROXINE 75 MCG TABLET: Performed by: PHYSICIAN ASSISTANT

## 2022-06-04 PROCEDURE — 94761 N-INVAS EAR/PLS OXIMETRY MLT: CPT

## 2022-06-04 PROCEDURE — 63710000001 METOPROLOL SUCCINATE XL 50 MG TABLET SUSTAINED-RELEASE 24 HOUR: Performed by: PHYSICIAN ASSISTANT

## 2022-06-04 PROCEDURE — 25010000002 KETOROLAC TROMETHAMINE PER 15 MG: Performed by: ORTHOPAEDIC SURGERY

## 2022-06-04 PROCEDURE — 94799 UNLISTED PULMONARY SVC/PX: CPT

## 2022-06-04 PROCEDURE — 97110 THERAPEUTIC EXERCISES: CPT

## 2022-06-04 PROCEDURE — 63710000001 OXYCODONE-ACETAMINOPHEN 7.5-325 MG TABLET: Performed by: ORTHOPAEDIC SURGERY

## 2022-06-04 PROCEDURE — 85014 HEMATOCRIT: CPT | Performed by: ORTHOPAEDIC SURGERY

## 2022-06-04 PROCEDURE — 97165 OT EVAL LOW COMPLEX 30 MIN: CPT

## 2022-06-04 PROCEDURE — 97530 THERAPEUTIC ACTIVITIES: CPT

## 2022-06-04 PROCEDURE — 85018 HEMOGLOBIN: CPT | Performed by: ORTHOPAEDIC SURGERY

## 2022-06-04 PROCEDURE — 63710000001 ASPIRIN EC 325 MG TABLET DELAYED-RELEASE: Performed by: ORTHOPAEDIC SURGERY

## 2022-06-04 RX ORDER — OXYCODONE AND ACETAMINOPHEN 7.5; 325 MG/1; MG/1
1 TABLET ORAL EVERY 4 HOURS PRN
Qty: 45 TABLET | Refills: 0 | Status: SHIPPED | OUTPATIENT
Start: 2022-06-04 | End: 2022-06-13 | Stop reason: SDUPTHER

## 2022-06-04 RX ADMIN — OXYCODONE HYDROCHLORIDE AND ACETAMINOPHEN 1 TABLET: 7.5; 325 TABLET ORAL at 07:51

## 2022-06-04 RX ADMIN — CETIRIZINE HYDROCHLORIDE 10 MG: 10 TABLET, FILM COATED ORAL at 09:37

## 2022-06-04 RX ADMIN — KETOROLAC TROMETHAMINE 15 MG: 15 INJECTION, SOLUTION INTRAMUSCULAR; INTRAVENOUS at 07:52

## 2022-06-04 RX ADMIN — METOPROLOL SUCCINATE 50 MG: 50 TABLET, EXTENDED RELEASE ORAL at 09:37

## 2022-06-04 RX ADMIN — Medication 10 ML: at 09:37

## 2022-06-04 RX ADMIN — KETOROLAC TROMETHAMINE 15 MG: 15 INJECTION, SOLUTION INTRAMUSCULAR; INTRAVENOUS at 01:21

## 2022-06-04 RX ADMIN — LEVOTHYROXINE SODIUM 75 MCG: 75 TABLET ORAL at 06:16

## 2022-06-04 RX ADMIN — ASPIRIN 325 MG: 325 TABLET, COATED ORAL at 09:37

## 2022-06-04 RX ADMIN — OXYCODONE HYDROCHLORIDE AND ACETAMINOPHEN 1 TABLET: 7.5; 325 TABLET ORAL at 12:01

## 2022-06-04 NOTE — DISCHARGE INSTRUCTIONS
Please see handout provided at discharge for additional information and dressing change instructions.

## 2022-06-04 NOTE — PROGRESS NOTES
[facility]   Hospitalist Progress Note       Patient Name: Kimberly Dennis  : 1950  MRN: [id number]  Primary Care Physician: Milan Coppola Jr., MD  Date of admission: 6/3/2022  Today's Date: 2022  Room / Bed:   235/1  Subjective   Chief Complaint: Medical management     HPI:   Kimberly Dennis is a 72 y.o. female seen today by hospitalist for review/medical management of chronic illnesses in the setting of post op state after her L knee arthroplasty earlier today.  She has a history of HTN, RUBY, PAF, Chronic antiocoagulation, Obesity, OA,.     • 2022  • Feels good  • L knee required additional staples placed last p.m.  • L knee pain well controlled  • Doing well with therapy  • Medically stable. No CP or palpitations.  • Eager to return home        REVIEW OF SYSTEMS: All other systems reviewed and are negative.   · GEN:   No fevers. No chills. No weight gain. No weight loss.     · HEENT:           No dysphagia/odynophagia. No visual disturbance.    · GI:                   No N/V.  No abd pain. No diarrhea. No constipation.  No bloody/black tarry stools. No hematemesis.   · CV:      No chest discomfort.  No palps. No lightheadedness. No syncope. No orthopnea/PND. No edema.   · RESP:             No cough. No wheeze.  No increased sputum. No hemoptysis. No MARR.  · :      No dysuria or suprapubic discomfort. No frequency.No urgency. No hesitancy. No incontinence. No hematuria. No flank pain.    · MS:      + L joint stiffness or arthralgias. No myalgias. No muscle weakness.    · SKIN:   No painful or pruritic rashes.  No skin discoloration.  · NEURO:          No focal numbness or weakness.  No headaches.  No ataxia. No slurred speech. No receptive/expressive aphasia.      · PSYCH:           No anxiety. No depression.  · ENDO:             No tremor, hair loss, heat or cold intolerance.     Objective   Temp:  [97.4 °F (36.3 °C)-98.5 °F (36.9 °C)] 97.8 °F (36.6 °C)  Heart Rate:  [71-75]  71  Resp:  [16-18] 18  BP: (123-161)/(49-61) 124/54  Flow (L/min):  [2] 2  · PHYSICAL EXAM  · CON:   WN. WD. NAD.   · EYES:  Sclera anicteric. EOMI. Normal conjunctiva.   · ENT:    Oral mucosa normal without ulcers or thrush.    · NECK:             No thyromegaly. No stridor. Trachea midline.  · RESP:             CTA. No wheezes. No crackles.  No work of breathing or tachypnea.   · CV:      Rhythm regular. Rate WNL. No murmur noted.  No edema.  · GI:                   Soft and nontender. Nondistended.  Bowel sounds present.   · EXT:    L knee dressing intact.  Distally, lower ext intact neurovascularly.  · LYMPH:           No lymphedema noted.  No cervical lymphadenopathy.  · PSYCH:           Alert. Oriented. Normal affect and mood.  · NEURO:          CNII-XII grossly intact. No dysarthria or aphasia. No unilateral weakness or paresthesia.  · SKIN:   No chronic venous stasis changes or varicosities.  No cellulitis  •      Results from last 7 days   Lab Units 06/04/22  0459 06/04/22  0006   WBC 10*3/mm3  --  8.66   HEMOGLOBIN g/dL 9.1* 9.5*   HEMATOCRIT % 27.5* 29.5*   PLATELETS 10*3/mm3  --  145               RESULTS REVIEWED:  I have personally reviewed the results from the time of this admission to 6/4/2022 13:50 EDT and agree with these findings:  []  Laboratory  []  Microbiology  []  Radiology  []  EKG/Telemetry   []  Cardiology/Vascular   []  Pathology  []  Old records  []  Other:  Assessment / Plan   Assessment:     · Paroxysmal Atrial Fibrillation (Valayam; on Eliquis)  · OA L knee with L knee pain and joint instability  · S/P L TKR 6/3/22 by Dr. Paz  · Hypothyroidism  · RUBY  · Obesity with BMI > 42  · Recent L otitis media infection  · HTN  · MV prolapse  · Seasonal allergic rhinitis  · Thoracic outlet syndrome  · GERD     Plan:  · Resume home Eliquis tonight .... as per surgeon  · Outpt therapy  · Ortho clinic in 2 weeks    Discussed plan with RN.  DVT prophylaxis:  Medical and mechanical DVT prophylaxis  orders are present.  CODE STATUS:            Electronically signed by CAITLIN Ames, 06/04/22, 1:50 PM EDT.

## 2022-06-04 NOTE — THERAPY EVALUATION
Patient Name: Kimberly Dennis  : 1950    MRN: [id number]                              Today's Date: 2022       Admit Date: 6/3/2022    Visit Dx:     ICD-10-CM ICD-9-CM   1. Primary osteoarthritis of left knee  M17.12 715.16   2. Difficulty in walking  R26.2 719.7   3. Decreased activities of daily living (ADL)  Z78.9 V49.89     Patient Active Problem List   Diagnosis   • Anemia   • Arthritis   • Paroxysmal atrial fibrillation (HCC)   • History of total knee arthroplasty, right   • Essential hypertension   • Mitral valve prolapse   • Seasonal allergic rhinitis   • Tear of medial cartilage or meniscus of knee, current   • Thoracic outlet syndrome   • Primary osteoarthritis of left knee     Past Medical History:   Diagnosis Date   • Acid reflux    • Anemia    • Anesthesia complication     TROUBLE WAKING UP   • Arthritis    • Atrial fibrillation (HCC)     PAROXYSMAL   • Bladder disorder    • Colon polyp    • GI bleed    • History of total knee arthroplasty, right 2016   • HTN (hypertension)    • Limb swelling    • Medial meniscus tear 2017    LEFT   • Mitral valve prolapse    • Primary osteoarthritis of left knee 2017   • Seasonal allergies    • Sleep apnea    • SOB (shortness of breath)    • Thoracic outlet syndrome    • Thyroid disorder      Past Surgical History:   Procedure Laterality Date   • BLADDER REPAIR     • CHOLECYSTECTOMY     • COLONOSCOPY      PADILLA   • COLONOSCOPY      DR. EDGE   • HYSTERECTOMY     • INTRAOCULAR LENS INSERTION      BOTH   • JOINT REPLACEMENT Right     TKR   • KNEE ARTHROSCOPY Left    • TOTAL KNEE ARTHROPLASTY Left 6/3/2022    Procedure: LEFT TOTAL KNEE ARTHROPLASTY WITH COMPUTER NAVIGATION;  Surgeon: Abdoulaye Paz MD;  Location: [facility];  Service: Orthopedics;  Laterality: Left;      General Information     Row Name 22 0914 22 0904       OT Time and Intention    Document Type therapy note (daily note)  -AC evaluation  -AC    Mode  of Treatment individual therapy;occupational therapy  -AC individual therapy;occupational therapy  -AC    Row Name 06/04/22 0914 06/04/22 0904       General Information    Patient Profile Reviewed yes  -AC yes  -AC    Prior Level of Function -- independent:  patient reports (I) with adls with use of shoe horn/reacher, patient used a cane, has shower bench in tub/shower, BSC.  -AC    Existing Precautions/Restrictions weight bearing;fall  -AC weight bearing;fall  -AC    Barriers to Rehab -- none identified  -AC    Row Name 06/04/22 0904          Occupational Profile    Reason for Services/Referral (Occupational Profile) Pt. is a 72year old female admitted for the above diagnosis status post left total knee replacement on 6/3/2022. Pt. referred to OT services to assess independence with ADLs and adl transfers/fx'l mobility. No previous OT services for current condition.  -AC     Row Name 06/04/22 0904          Living Environment    People in Home spouse  -AC     Row Name 06/04/22 0914 06/04/22 0904       Cognition    Orientation Status (Cognition) oriented x 3  -AC oriented x 3  -AC    Row Name 06/04/22 0914 06/04/22 0904       Safety Issues, Functional Mobility    Safety Issues Affecting Function (Mobility) --  none identified  -AC --  none identified  -AC    Impairments Affecting Function (Mobility) balance;endurance/activity tolerance;pain  -AC balance;endurance/activity tolerance;pain  -AC          User Key  (r) = Recorded By, (t) = Taken By, (c) = Cosigned By    Initials Name Provider Type    AC Camelia Kaur OT Occupational Therapist                 Mobility/ADL's     Row Name 06/04/22 0914 06/04/22 0907       Bed Mobility    Comment, (Bed Mobility) Patient in recliner upon OT arrival in the room.  -AC Patient in recliner upon OT arrival in the room.  -AC    Row Name 06/04/22 0914 06/04/22 0907       Transfers    Transfers sit-stand transfer  -AC sit-stand transfer  -AC    Comment, (Transfers) Patient performed  sit to stand x5 from recliner with contact-guard assist and use of rolling walker with cues for safety  -AC --    Sit-Stand [place] (Transfers) contact guard;verbal cues;1 person assist  -AC contact guard;verbal cues  -    Row Name 06/04/22 0914 06/04/22 0907       Sit-Stand Transfer    Assistive Device (Sit-Stand Transfers) walker, front-wheeled  -AC walker, front-wheeled  -AC    Row Name 06/04/22 0914 06/04/22 0907       Functional Mobility    Functional Mobility- Ind. Level contact guard assist;1 person;verbal cues required  -AC contact guard assist;1 person;verbal cues required  -AC    Functional Mobility- Device walker, front-wheeled  -AC walker, front-wheeled  -AC    Functional Mobility- Comment Patient was contact-guard assist for recliner to bathroom to sink to recliner with use of rolling walker and cues for safety  -AC --    Row Name 06/04/22 0914 06/04/22 0907       Activities of Daily Living    BADL Assessment/Intervention bathing;upper body dressing;lower body dressing;grooming;toileting  - --  Patient is set up for upper body bathing/dressing, set up for grooming, min assist for lower body dressing/bathing, contact-guard assist for toileting  -    Row Name 06/04/22 0914 06/04/22 0907       Mobility    Extremity Weight-bearing Status left lower extremity  - left lower extremity  -    Left Lower Extremity (Weight-bearing Status) weight-bearing as tolerated (WBAT)  - weight-bearing as tolerated (WBAT)  -    Row Name 06/04/22 0914          Bathing Assessment/Intervention    [place] Level (Bathing) bathing skills;lower body;upper body;minimum assist (75% patient effort)  -     Position (Bathing) unsupported sitting;supported standing  -     Row Name 06/04/22 0914          Upper Body Dressing Assessment/Training    [place] Level (Upper Body Dressing) upper body dressing skills;doff;don;bra/undergarment;pull-over garment;set up  -     Position (Upper Body Dressing)  unsupported sitting  -[facility] Name 06/04/22 0914          Lower Body Dressing Assessment/Training    [place] Level (Lower Body Dressing) lower body dressing skills;doff;pants/bottoms;don;shoes/slippers;socks;verbal cues;minimum assist (75% patient effort)  -     Position (Lower Body Dressing) supported standing;unsupported sitting  -     Row Name 06/04/22 0914          Grooming Assessment/Training    [place] Level (Grooming) grooming skills;wash face, hands;verbal cues;contact guard assist  -     Position (Grooming) supported standing  -[facility] Name 06/04/22 0914          Toileting Assessment/Training    [place] Level (Toileting) toileting skills;adjust/manage clothing;perform perineal hygiene;verbal cues;contact guard assist  -     Assistive Devices (Toileting) commode, 3-in-1;grab bar/safety frame  -     Position (Toileting) unsupported sitting;supported standing  -           User Key  (r) = Recorded By, (t) = Taken By, (c) = Cosigned By    Initials Name Provider Type     Camelia Kaur OT Occupational Therapist               Obj/Interventions     [facility] Name 06/04/22 0917 06/04/22 0908       Sensory Assessment (Somatosensory)    Sensory Assessment (Somatosensory) sensation intact  - sensation intact  -[facility] Name 06/04/22 0917 06/04/22 0908       Vision Assessment/Intervention    Visual Impairment/Limitations WFL;corrective lenses full-time  - WFL;corrective lenses full-time  -[facility] Name 06/04/22 0917 06/04/22 0908       Range of Motion Comprehensive    General Range of Motion no range of motion deficits identified  - no range of motion deficits identified  -[facility] Name 06/04/22 0917 06/04/22 0908       Strength Comprehensive (MMT)    General Manual Muscle Testing (MMT) Assessment no strength deficits identified  - no strength deficits identified  -[facility] Name 06/04/22 0917 06/04/22 0908       Motor Skills    Motor Skills coordination;functional endurance  -  coordination;functional endurance  -AC    Coordination WFL  -AC WFL  -AC    Functional Endurance fair  -AC fair  -AC    Row Name 06/04/22 0917 06/04/22 0908       Balance    Balance Assessment standing dynamic balance  -AC standing static balance  -AC    Static Standing Balance -- contact guard;verbal cues;1-person assist  -AC    Dynamic Standing Balance contact guard;verbal cues;1-person assist  -AC --    Position/Device Used, Standing Balance supported;walker, front-wheeled  -AC supported;walker, front-wheeled  -AC    Balance Interventions standing;sit to stand;supported;dynamic;minimal challenge;occupation based/functional task  -AC --          User Key  (r) = Recorded By, (t) = Taken By, (c) = Cosigned By    Initials Name Provider Type    AC Camelia Kaur OT Occupational Therapist               Goals/Plan     Row Name 06/04/22 0910          Transfer Goal 1 (OT)    Activity/Assistive Device (Transfer Goal 1, OT) transfers, all  -AC     [place] Level/Cues Needed (Transfer Goal 1, OT) modified independence  -AC     Time Frame (Transfer Goal 1, OT) long term goal (LTG);10 days  -     Row Name 06/04/22 0910          Bathing Goal 1 (OT)    Activity/Device (Bathing Goal 1, OT) bathing skills, all  -AC     [place] Level/Cues Needed (Bathing Goal 1, OT) modified independence  -AC     Time Frame (Bathing Goal 1, OT) long term goal (LTG);10 days  -     Row Name 06/04/22 0910          Dressing Goal 1 (OT)    Activity/Device (Dressing Goal 1, OT) dressing skills, all  -AC     [place]/Cues Needed (Dressing Goal 1, OT) modified independence  -AC     Time Frame (Dressing Goal 1, OT) long term goal (LTG);10 days  -     Row Name 06/04/22 0910          Toileting Goal 1 (OT)    Activity/Device (Toileting Goal 1, OT) toileting skills, all  -AC     [place] Level/Cues Needed (Toileting Goal 1, OT) modified independence  -AC     Time Frame (Toileting Goal 1, OT) long term goal (LTG);10 days  -     Row Name  06/04/22 0910          Grooming Goal 1 (OT)    Activity/Device (Grooming Goal 1, OT) grooming skills, all  -AC     [place] (Grooming Goal 1, OT) modified independence  -AC     Time Frame (Grooming Goal 1, OT) long term goal (LTG);10 days  -AC     Row Name 06/04/22 0910          Problem Specific Goal 1 (OT)    Problem Specific Goal 1 (OT) Patient will improve endurance to good for ADLs.  -AC     Time Frame (Problem Specific Goal 1, OT) long term goal (LTG);10 days  -     Row Name 06/04/22 0910          Therapy Assessment/Plan (OT)    Planned Therapy Interventions (OT) activity tolerance training;functional balance retraining;occupation/activity based interventions;patient/caregiver education/training;transfer/mobility retraining;ROM/therapeutic exercise;BADL retraining  -           User Key  (r) = Recorded By, (t) = Taken By, (c) = Cosigned By    Initials Name Provider Type    AC Camelia Kaur OT Occupational Therapist               Clinical Impression     Row Name 06/04/22 0909          Pain Assessment    Additional Documentation Pain Scale: FACES Pre/Post-Treatment (Group)  -     Row Name 06/04/22 0917 06/04/22 0909       Pain Scale: FACES Pre/Post-Treatment    Pain: FACES Scale, Pretreatment 4-->hurts little more  nursing notified  -AC 4-->hurts little more  nursing notified  -AC    Posttreatment Pain Rating 4-->hurts little more  -AC 4-->hurts little more  -AC    Pain Location - Side/Orientation Left  -AC Left  -AC    Pain Location lower  -AC lower  -AC    Pain Location - extremity  -AC extremity  -AC    Row Name 06/04/22 0917 06/04/22 0909       Plan of Care Review    Plan of Care Reviewed With patient  -AC patient  -AC    Progress improving  -AC no change  -AC    Outcome Evaluation Patient instructed in lower body adaptive dressing technique, weightbearing status, joint protection and safety with ADL transfers.  Patient to continue with therapy services in order to maximize independence with ADLs.   - Patient presents with limitations that impede his/her ability to perform ADLS. The skills of a therapist are necessary to maximize independence with ADLs.  -    Row Name 06/04/22 0909          Therapy Assessment/Plan (OT)    Patient/Family Therapy Goal Statement (OT) Patient would like to return home without pain.  -     Rehab Potential (OT) good, to achieve stated therapy goals  -     Criteria for Skilled Therapeutic Interventions Met (OT) yes;meets criteria;skilled treatment is necessary  -     Therapy Frequency (OT) 5 times/wk  -     Row Name 06/04/22 0909          Therapy Plan Review/Discharge Plan (OT)    Equipment Needs Upon Discharge (OT) walker, rolling;tub bench;commode chair  -AC     Anticipated Discharge Disposition (OT) home with assist;home with outpatient therapy services  -     Row Name 06/04/22 0909          Positioning and Restraints    Pre-Treatment Position sitting in chair/recliner  -     Post Treatment Position chair  -AC     In Chair sitting;call light within reach;encouraged to call for assist;exit alarm on;with other staff  -           User Key  (r) = Recorded By, (t) = Taken By, (c) = Cosigned By    Initials Name Provider Type    AC Camelia Kaur, JASEN Occupational Therapist               Outcome Measures     Row Name 06/04/22 0911          How much help from another is currently needed...    Putting on and taking off regular lower body clothing? 3  -AC     Bathing (including washing, rinsing, and drying) 3  -AC     Toileting (which includes using toilet bed pan or urinal) 3  -AC     Putting on and taking off regular upper body clothing 4  -AC     Taking care of personal grooming (such as brushing teeth) 4  -AC     Eating meals 4  -AC     AM-PAC 6 Clicks Score (OT) 21  -AC     Row Name 06/04/22 0742 06/03/22 0778       How much help from another person do you currently need...    Turning from your back to your side while in flat bed without using bedrails? 3  -[facility] 3  -HR     Moving from lying on back to sitting on the side of a flat bed without bedrails? 3  -[facility] 3  -HR    Moving to and from a bed to a chair (including a wheelchair)? 3  -[facility] 3  -HR    Standing up from a chair using your arms (e.g., wheelchair, bedside chair)? 3  -[facility] 2  -HR    Climbing 3-5 steps with a railing? 3  -[facility] 2  -HR    To walk in hospital room? 3  -[facility] 2  -HR    AM-PAC 6 Clicks Score (PT) 18  -[facility] 15  -HR    Highest level of mobility 6 --> Walked 10 steps or more  -[facility] 4 --> Transferred to chair/commode  -HR    Row Name 06/04/22 0911          Functional Assessment    Outcome Measure Options AM-PAC 6 Clicks Daily Activity (OT);Optimal Instrument  -AC     Row Name 06/04/22 0911          Optimal Instrument    Optimal Instrument Optimal - 3  -AC     Bending/Stooping 2  -AC     Standing 2  -AC     Reaching 1  -AC     From the list, choose the 3 activities you would most like to be able to do without any difficulty Bending/stooping;Standing;Reaching  -AC     Total Score Optimal - 3 5  -AC           User Key  (r) = Recorded By, (t) = Taken By, (c) = Cosigned By    Initials Name Provider Type    HR Elvia Warren, RN Registered Nurse    Ayesha Ortiz, RN Registered Nurse    Camelia Kaur OT Occupational Therapist                Occupational Therapy Education                 Title: PT OT SLP Therapies (Done)     Topic: Occupational Therapy (Done)     Point: ADL training (Done)     Description:   Instruct learner(s) on proper safety adaptation and remediation techniques during self care or transfers.   Instruct in proper use of assistive devices.              Learning Progress Summary           Patient Acceptance, E,TB,D, VU,DU by  at 6/4/2022 0912                   Point: Home exercise program (Done)     Description:   Instruct learner(s) on appropriate technique for monitoring, assisting and/or progressing therapeutic exercises/activities.              Learning Progress Summary           Patient Acceptance, E,TB,D,  VU,DU by  at 6/4/2022 0912                   Point: Precautions (Done)     Description:   Instruct learner(s) on prescribed precautions during self-care and functional transfers.              Learning Progress Summary           Patient Acceptance, E,TB,D, VU,DU by  at 6/4/2022 0912                   Point: Body mechanics (Done)     Description:   Instruct learner(s) on proper positioning and spine alignment during self-care, functional mobility activities and/or exercises.              Learning Progress Summary           Patient Acceptance, E,TB,D, VU,DU by  at 6/4/2022 0912                               User Key     Initials Effective Dates Name Provider Type Discipline     06/16/21 -  Camelia Kaur OT Occupational Therapist OT              OT Recommendation and Plan  Planned Therapy Interventions (OT): activity tolerance training, functional balance retraining, occupation/activity based interventions, patient/caregiver education/training, transfer/mobility retraining, ROM/therapeutic exercise, BADL retraining  Therapy Frequency (OT): 5 times/wk  Plan of Care Review  Plan of Care Reviewed With: patient  Progress: improving  Outcome Evaluation: Patient instructed in lower body adaptive dressing technique, weightbearing status, joint protection and safety with ADL transfers.  Patient to continue with therapy services in order to maximize independence with ADLs.     Time Calculation:    Time Calculation- OT     Row Name 06/04/22 0913             Time Calculation- OT    OT Received On 06/04/22  -      OT Goal Re-Cert Due Date 06/13/22  -AC              Timed Charges    89032 - OT Therapeutic Activity Minutes 10  -AC      73272 - OT Self Care/Mgmt Minutes 15  -AC              Untimed Charges    OT Eval/Re-eval Minutes 32  -AC              Total Minutes    Timed Charges Total Minutes 25  -AC      Untimed Charges Total Minutes 32  -AC       Total Minutes 57  -AC            User Key  (r) = Recorded By, (t) =  Taken By, (c) = Cosigned By    Initials Name Provider Type    AC Camelia Kaur OT Occupational Therapist              Therapy Charges for Today     Code Description Service Date Service Provider Modifiers Qty    57528855004 HC OT SELF CARE/MGMT/TRAIN EA 15 MIN 6/4/2022 Camelia Kaur OT GO 1    66829088009 HC OT THERAPEUTIC ACT EA 15 MIN 6/4/2022 Camelia Kaur OT GO 1    [id number]  OT EVAL LOW COMPLEXITY 3 6/4/2022 Camelia Kaur OT GO 1               Camelia Kaur OT  6/4/2022

## 2022-06-04 NOTE — DISCHARGE SUMMARY
[facility]  HOSPITALIST  DISCHARGE SUMMARY       Patient Name: Kimberly Dennis  : 1950  MRN: [id number]  Primary Care Physician: Milan Coppola Jr., MD    Date of Admission: 6/3/2022  Date of Discharge: 2022    Discharge Diagnoses     · Paroxysmal Atrial Fibrillation (Valayam; on Eliquis)  · OA L knee with L knee pain and joint instability  · S/P L TKR 6/3/22 by Dr. Paz  · Hypothyroidism  · RUBY  · Obesity with BMI > 42  · Recent L otitis media infection  · HTN  · MV prolapse  · Seasonal allergic rhinitis  · Thoracic outlet syndrome  · GERD    Hospital Course   Hospital Course:    Kimberly Dennis is a 72 y.o. female seen today by hospitalist for review/medical management of chronic illnesses in the setting of post op state after her L knee arthroplasty earlier today.  She has a history of HTN, RUBY, PAF, Chronic antiocoagulation, Obesity, OA,.     · 2022  · Feels good  · L knee required additional staples placed last p.m.  · L knee pain well controlled  · Doing well with therapy  · Medically stable. No CP or palpitations.  · Eager to return home       Discharge Follow Up / Recommendations (labs, diagnostics, meds, etc):   • Ortho clinic 2 weeks.  Keep reg sched PCP appt.  Future Appointments   Date Time Provider Department Center   2022 11:00 AM Becki Olvera PT [facility]   6/15/2022  9:15 AM Tila Rueda PA [facility] [place] [place] ORS RING [facility]   2022  2:00 PM Milan Coppola Jr., MD [facility] [place] [place] IMP ETWN [facility]   2022 11:30 AM Chip Mendoza MD [facility] [place] [place] CD ETOWN [facility]   •   Consultants     Consults     Date and Time Order Name Status Description    6/3/2022  9:31 AM Inpatient Hospitalist Consult        •   On Day of Discharge   VS: Temp:  [97.4 °F (36.3 °C)-98.5 °F (36.9 °C)] 97.8 °F (36.6 °C)  Heart Rate:  [71-75] 71  Resp:  [16-18] 18  BP: (123-161)/(49-61) 124/54  Flow (L/min):  [2] 2  EXAM:  (refer to progress note from 2022)     Discharge Medications      New  Medications      Instructions Start Date   oxyCODONE-acetaminophen 7.5-325 MG per tablet  Commonly known as: PERCOCET   1 tablet, Oral, Every 4 Hours PRN         Changes to Medications      Instructions Start Date   Eliquis 5 MG tablet tablet  Generic drug: apixaban  What changed:   · how much to take  · when to take this  · additional instructions   Take 1 tablet by mouth twice daily      losartan 25 MG tablet  Commonly known as: COZAAR  What changed: when to take this   Take 1 tablet by mouth once daily for 90 days         Continue These Medications      Instructions Start Date   cetirizine 10 MG tablet  Commonly known as: zyrTEC   10 mg, Oral, Daily      Cholecalciferol 125 MCG (5000 UT) tablet   1 tablet, Oral, Daily      fluticasone 50 MCG/ACT nasal spray  Commonly known as: Flonase   2 sprays, Nasal, Daily      hydroCHLOROthiazide 25 MG tablet  Commonly known as: HYDRODIURIL   Take 1 tablet by mouth once daily      levothyroxine 75 MCG tablet  Commonly known as: SYNTHROID, LEVOTHROID   Take 1 tablet by mouth once daily      metoprolol succinate XL 50 MG 24 hr tablet  Commonly known as: TOPROL-XL   50 mg, Oral, Daily      montelukast 10 MG tablet  Commonly known as: Singulair   10 mg, Oral, Nightly         Stop These Medications    acetaminophen 500 MG tablet  Commonly known as: TYLENOL          Procedures   L TKR  Imaging     XR Knee 1 or 2 View Left    Result Date: 6/3/2022  PROCEDURE: XR KNEE 1 OR 2 VW LEFT  [facility]  , XR KNEE 3 VW LEFT, 4/11/2022, 13:08.  INDICATIONS: Post-Op left Knee Arthoplasty  FINDINGS:  Patient is status post total knee arthroplasty with patellar resurfacing.  Orthopedic hardware appears intact.  Complex air and fluid collections within the joint space and subcutaneous tissues noted.  Overlying skin staples and wound VAC noted.       Expected postoperative changes from total knee arthroplasty with patellar resurfacing      ASHLEY LATHAM MD        Electronically Signed and Approved By: ASHLEY LATHAM MD on 6/03/2022 at 9:26             Peripheral Block    Result Date: 6/3/2022  Erickson Jimenez MD     6/3/2022  6:55 AM Peripheral Block Patient reassessed immediately prior to procedure Patient location during procedure: pre-op Start time: 6/3/2022 6:50 AM Stop time: 6/3/2022 6:53 AM Reason for block: at surgeon's request and post-op pain management Performed by Anesthesiologist: Erickson Jimenez MD Preanesthetic Checklist Completed: patient identified, IV checked, site marked, risks and benefits discussed, surgical consent, monitors and equipment checked, pre-op evaluation and timeout performed Prep: Pt Position: supine Sterile barriers:alcohol skin prep, partial drape, cap, washed/disinfected hands, mask and gloves Prep: ChloraPrep Patient monitoring: blood pressure monitoring, continuous pulse oximetry and EKG Procedure Sedation: yes Performed under: local infiltration Guidance:ultrasound guided ULTRASOUND INTERPRETATION. Using ultrasound guidance a 20 G gauge needle was placed in close proximity to the nerve, at which point, under ultrasound guidance anesthetic was injected in the area of the nerve and spread of the anesthesia was seen on ultrasound in close proximity thereto.  There were no abnormalities seen on ultrasound; a digital image was taken; and the patient tolerated the procedure with no complications. Images:still images obtained, printed/placed on chart Laterality:left Block Type:adductor canal block Injection Technique:single-shot Needle Type:echogenic Needle Gauge:20 G (4in) Resistance on Injection: none Medications Used: bupivacaine-EPINEPHrine PF (MARCAINE w/EPI) 0.5% -1:379341 injection, 30 mL Post Assessment Injection Assessment: negative aspiration for heme, no paresthesia on injection and incremental injection Patient Tolerance:comfortable throughout block Complications:no Additional Notes The block or continuous infusion is  requested by the referring physician for management of postoperative pain, or pain related to a procedure. Ultrasound guidance (deemed medically necessary). Painless injection, pt was awake and conversant during the procedure without complications. Needle and surrounding structures visualized throughout procedure. No adverse reactions or complications seen during this period. Post-procedure image showed no signs of complication, and anatomy was consistent with an uncomplicated nerve blockade.     Discharge Details   Hospital Diet:     Diet Order   Procedures   • Diet Regular     CODE STATUS:    There are no questions and answers to display.       Discharge Disposition: Home or Self Care  Pertinent  Labs   LAB RESULTS:      Lab 06/04/22  0459 06/04/22  0006   WBC  --  8.66   HEMOGLOBIN 9.1* 9.5*   HEMATOCRIT 27.5* 29.5*   PLATELETS  --  145   MCV  --  89.7                             Brief Urine Lab Results     None        Microbiology Results (last 10 days)     ** No results found for the last 240 hours. **        Labs Pending at Discharge:   Time spent on Discharge including face to face service: > 30 minutes  Electronically signed by CAITLIN Ames, 06/04/22, 1:53 PM EDT.

## 2022-06-04 NOTE — THERAPY TREATMENT NOTE
Acute Care - Physical Therapy Treatment Note   Robert     Patient Name: Kimberly Dennis  : 1950  MRN: [id number]  Today's Date: 2022      Visit Dx:     ICD-10-CM ICD-9-CM   1. Primary osteoarthritis of left knee  M17.12 715.16   2. Difficulty in walking  R26.2 719.7   3. Decreased activities of daily living (ADL)  Z78.9 V49.89     Patient Active Problem List   Diagnosis   • Anemia   • Arthritis   • Paroxysmal atrial fibrillation (HCC)   • History of total knee arthroplasty, right   • Essential hypertension   • Mitral valve prolapse   • Seasonal allergic rhinitis   • Tear of medial cartilage or meniscus of knee, current   • Thoracic outlet syndrome   • Primary osteoarthritis of left knee     Past Medical History:   Diagnosis Date   • Acid reflux    • Anemia    • Anesthesia complication     TROUBLE WAKING UP   • Arthritis    • Atrial fibrillation (HCC)     PAROXYSMAL   • Bladder disorder    • Colon polyp    • GI bleed    • History of total knee arthroplasty, right 2016   • HTN (hypertension)    • Limb swelling    • Medial meniscus tear 2017    LEFT   • Mitral valve prolapse    • Primary osteoarthritis of left knee 2017   • Seasonal allergies    • Sleep apnea    • SOB (shortness of breath)    • Thoracic outlet syndrome    • Thyroid disorder      Past Surgical History:   Procedure Laterality Date   • BLADDER REPAIR     • CHOLECYSTECTOMY     • COLONOSCOPY      CAUSEY   • COLONOSCOPY      DR. EDGE   • HYSTERECTOMY     • INTRAOCULAR LENS INSERTION      BOTH   • JOINT REPLACEMENT Right     TKR   • KNEE ARTHROSCOPY Left    • TOTAL KNEE ARTHROPLASTY Left 6/3/2022    Procedure: LEFT TOTAL KNEE ARTHROPLASTY WITH COMPUTER NAVIGATION;  Surgeon: Abdoulaye Paz MD;  Location: [facility] MAIN OR;  Service: Orthopedics;  Laterality: Left;     PT Assessment (last 12 hours)     PT Evaluation and Treatment     Row Name 22 1106          Physical Therapy Time and Intention    Subjective  Information complains of;pain  -TS     Document Type therapy note (daily note)  -TS     Mode of Treatment individual therapy;physical therapy  -TS     Row Name 06/04/22 1106          Pain    Pretreatment Pain Rating 2/10  -TS     Pain Location - Side/Orientation Left  -TS     Pain Location - knee  -TS     Row Name 06/04/22 1106          Cognition    Affect/Mental Status (Cognition) WFL  -TS     Row Name 06/04/22 1106          Mobility    Extremity Weight-bearing Status left lower extremity  -TS     Left Lower Extremity (Weight-bearing Status) weight-bearing as tolerated (WBAT)  -TS     Row Name 06/04/22 1106          Transfers    Transfers sit-stand transfer;stand-sit transfer  -TS     Sit-Stand [place] (Transfers) contact guard;verbal cues;1 person assist  -TS     Stand-Sit [place] (Transfers) standby assist;1 person assist;verbal cues  -TS     Row Name 06/04/22 1106          Sit-Stand Transfer    Assistive Device (Sit-Stand Transfers) walker, front-wheeled  -TS     Row Name 06/04/22 1106          Stand-Sit Transfer    Assistive Device (Stand-Sit Transfers) walker, front-wheeled  -TS     Row Name 06/04/22 1106          Gait/Stairs (Locomotion)    Gait/Stairs Locomotion gait/ambulation assistive device  -TS     [place] Level (Gait) contact guard;verbal cues  -TS     Assistive Device (Gait) walker, front-wheeled  -TS     Distance in Feet (Gait) 160  -TS     Pattern (Gait) step-to  -TS     Deviations/Abnormal Patterns (Gait) stride length decreased;gait speed decreased  -TS     Negotiation (Stairs) stairs independence  -TS     [place] Level (Stairs) contact guard;verbal cues  -TS     Handrail Location (Stairs) both sides  -TS     Number of Steps (Stairs) 8  -TS     Ascending Technique (Stairs) step-to-step  -TS     Descending Technique (Stairs) step-to-step  -TS     Stairs, Impairments ROM decreased;strength decreased;impaired balance;pain  -TS     Row Name 06/04/22 1106          Safety Issues,  Functional Mobility    Impairments Affecting Function (Mobility) balance;endurance/activity tolerance;pain  -TS     Row Name 06/04/22 1106          Motor Skills    Therapeutic Exercise hip;knee;ankle  AAROM x20 reps, recumbant position  -TS     Row Name             Wound 06/03/22 0736 Left anterior knee Incision    Wound - Properties Group Placement Date: 06/03/22  -SC Placement Time: 0736  -SC Present on Hospital Admission: N  -SC Side: Left  -SC Orientation: anterior  -SC Location: knee  -SC Primary Wound Type: Incision  -SC     Retired Wound - Properties Group Placement Date: 06/03/22  -SC Placement Time: 0736  -SC Present on Hospital Admission: N  -SC Side: Left  -SC Orientation: anterior  -SC Location: knee  -SC Primary Wound Type: Incision  -SC     Retired Wound - Properties Group Date first assessed: 06/03/22  -SC Time first assessed: 0736  -SC Present on Hospital Admission: N  -SC Side: Left  -SC Location: knee  -SC Primary Wound Type: Incision  -SC     Row Name 06/04/22 1106          Positioning and Restraints    Pre-Treatment Position sitting in chair/recliner  -TS     Post Treatment Position chair  -TS     In Chair reclined;call light within reach;exit alarm on;heels elevated  -TS     Row Name 06/04/22 1106          Progress Summary (PT)    Progress Toward Functional Goals (PT) progress toward functional goals is good  -TS           User Key  (r) = Recorded By, (t) = Taken By, (c) = Cosigned By    Initials Name Provider Type    Arnaldo Hood PTA Physical Therapist Assistant    Manju Lopez RN Registered Nurse            Bilateral Knee Ther-ex   Exercise  Reps  Sets    Long arc Quads   10 2   Short arc Quads  10 2   Heel Slides  10 2   Ankle Pumps  10 2   Quad sets  10 2   Glut sets  10 2   Straight leg raise  10 2            Physical Therapy Education                 Title: PT OT SLP Therapies (Done)     Topic: Physical Therapy (Done)     Point: Mobility training (Done)     Learning  Progress Summary           Patient Acceptance, E,TB,D, VU,DU by  at 6/4/2022 0912    Eager, E, VU by ALEYDA at 6/3/2022 1538                   Point: Precautions (Done)     Learning Progress Summary           Patient Acceptance, E,TB,D, VU,DU by  at 6/4/2022 0912    Eager, E, VU by ALEYDA at 6/3/2022 1538                               User Key     Initials Effective Dates Name Provider Type Discipline     06/16/21 -  Camelia Kaur OT Occupational Therapist OT    ALEYDA 06/03/21 -  Clay Gross PT Physical Therapist PT              PT Recommendation and Plan     Progress Summary (PT)  Progress Toward Functional Goals (PT): progress toward functional goals is good   Outcome Measures     Row Name 06/04/22 1110             How much help from another person do you currently need...    Turning from your back to your side while in flat bed without using bedrails? 3  -TS      Moving from lying on back to sitting on the side of a flat bed without bedrails? 3  -TS      Moving to and from a bed to a chair (including a wheelchair)? 3  -TS      Standing up from a chair using your arms (e.g., wheelchair, bedside chair)? 4  -TS      Climbing 3-5 steps with a railing? 3  -TS      To walk in hospital room? 4  -TS      AM-PAC 6 Clicks Score (PT) 20  -TS            User Key  (r) = Recorded By, (t) = Taken By, (c) = Cosigned By    Initials Name Provider Type    TS Arnaldo Flores, SANJEEV Physical Therapist Assistant                 Time Calculation:    PT Charges     Row Name 06/04/22 1104             Time Calculation    PT Received On 06/04/22  -TS      PT Goal Re-Cert Due Date 06/16/22  -TS              Timed Charges    62814 - PT Therapeutic Exercise Minutes 15  -TS      45896 - Gait Training Minutes  9  -TS      60429 - PT Therapeutic Activity Minutes 2  -TS              Total Minutes    Timed Charges Total Minutes 26  -TS       Total Minutes 26  -TS            User Key  (r) = Recorded By, (t) = Taken By, (c) = Cosigned By    Initials  Name Provider Type    TS Arnaldo Flores PTA Physical Therapist Assistant              Therapy Charges for Today     Code Description Service Date Service Provider Modifiers Qty    19546339491 HC PT THER PROC EA 15 MIN 6/4/2022 Arnaldo Flores, SANJEEV GP 1    23959541521 HC GAIT TRAINING EA 15 MIN 6/4/2022 Arnaldo Flores PTA GP 1          PT G-Codes  Outcome Measure Options: AM-PAC 6 Clicks Daily Activity (OT), Optimal Instrument  AM-PAC 6 Clicks Score (PT): 20  AM-PAC 6 Clicks Score (OT): 21    Arnaldo Flores PTA  6/4/2022

## 2022-06-04 NOTE — THERAPY TREATMENT NOTE
Acute Care - Physical Therapy Treatment Note   Robert     Patient Name: Kimberly Dennis  : 1950  MRN: [id number]  Today's Date: 2022      Visit Dx:     ICD-10-CM ICD-9-CM   1. Primary osteoarthritis of left knee  M17.12 715.16   2. Difficulty in walking  R26.2 719.7   3. Decreased activities of daily living (ADL)  Z78.9 V49.89     Patient Active Problem List   Diagnosis   • Anemia   • Arthritis   • Paroxysmal atrial fibrillation (HCC)   • History of total knee arthroplasty, right   • Essential hypertension   • Mitral valve prolapse   • Seasonal allergic rhinitis   • Tear of medial cartilage or meniscus of knee, current   • Thoracic outlet syndrome   • Primary osteoarthritis of left knee     Past Medical History:   Diagnosis Date   • Acid reflux    • Anemia    • Anesthesia complication     TROUBLE WAKING UP   • Arthritis    • Atrial fibrillation (HCC)     PAROXYSMAL   • Bladder disorder    • Colon polyp    • GI bleed    • History of total knee arthroplasty, right 2016   • HTN (hypertension)    • Limb swelling    • Medial meniscus tear 2017    LEFT   • Mitral valve prolapse    • Primary osteoarthritis of left knee 2017   • Seasonal allergies    • Sleep apnea    • SOB (shortness of breath)    • Thoracic outlet syndrome    • Thyroid disorder      Past Surgical History:   Procedure Laterality Date   • BLADDER REPAIR     • CHOLECYSTECTOMY     • COLONOSCOPY      CAUSEY   • COLONOSCOPY      DR. EDGE   • HYSTERECTOMY     • INTRAOCULAR LENS INSERTION      BOTH   • JOINT REPLACEMENT Right     TKR   • KNEE ARTHROSCOPY Left    • TOTAL KNEE ARTHROPLASTY Left 6/3/2022    Procedure: LEFT TOTAL KNEE ARTHROPLASTY WITH COMPUTER NAVIGATION;  Surgeon: Abdoulaye Paz MD;  Location: [facility] MAIN OR;  Service: Orthopedics;  Laterality: Left;     PT Assessment (last 12 hours)     PT Evaluation and Treatment     Row Name 22 1347 22 1106       Physical Therapy Time and Intention     Subjective Information complains of;pain  -TS complains of;pain  -TS    Document Type therapy note (daily note)  -TS therapy note (daily note)  -TS    Mode of Treatment individual therapy;physical therapy  -TS individual therapy;physical therapy  -TS    Row Name 06/04/22 1347 06/04/22 1106       Pain    Pretreatment Pain Rating 2/10  -TS 2/10  -TS    Pain Location - Side/Orientation Left  -TS Left  -TS    Pain Location - knee  -TS knee  -TS    Row Name 06/04/22 1347 06/04/22 1106       Cognition    Affect/Mental Status (Cognition) WFL  -TS WFL  -TS    Row Name 06/04/22 1347 06/04/22 1106       Mobility    Extremity Weight-bearing Status left lower extremity  -TS left lower extremity  -TS    Left Lower Extremity (Weight-bearing Status) weight-bearing as tolerated (WBAT)  -TS weight-bearing as tolerated (WBAT)  -TS    Row Name 06/04/22 1347 06/04/22 1106       Transfers    Transfers sit-stand transfer;stand-sit transfer  -TS sit-stand transfer;stand-sit transfer  -TS    Sit-Stand [place] (Transfers) standby assist;1 person assist;verbal cues  -TS contact guard;verbal cues;1 person assist  -TS    Stand-Sit [place] (Transfers) standby assist;1 person assist;verbal cues  -TS standby assist;1 person assist;verbal cues  -TS    Row Name 06/04/22 1347 06/04/22 1106       Sit-Stand Transfer    Assistive Device (Sit-Stand Transfers) walker, front-wheeled  -TS walker, front-wheeled  -TS    Row Name 06/04/22 1347 06/04/22 1106       Stand-Sit Transfer    Assistive Device (Stand-Sit Transfers) walker, front-wheeled  -TS walker, front-wheeled  -TS    Row Name 06/04/22 1347 06/04/22 1106       Gait/Stairs (Locomotion)    Gait/Stairs Locomotion gait/ambulation assistive device  -TS gait/ambulation assistive device  -TS    [place] Level (Gait) standby assist;contact guard;verbal cues;1 person assist  -TS contact guard;verbal cues  -TS    Assistive Device (Gait) walker, front-wheeled  -TS walker, front-wheeled  -TS     Distance in Feet (Gait) 180  -  -TS    Pattern (Gait) step-to  -TS step-to  -TS    Deviations/Abnormal Patterns (Gait) stride length decreased;gait speed decreased  -TS stride length decreased;gait speed decreased  -TS    Negotiation (Stairs) stairs independence  -TS stairs independence  -TS    [place] Level (Stairs) contact guard;verbal cues  -TS contact guard;verbal cues  -TS    Handrail Location (Stairs) both sides  -TS both sides  -TS    Number of Steps (Stairs) 4  -TS 8  -TS    Ascending Technique (Stairs) step-to-step  -TS step-to-step  -TS    Descending Technique (Stairs) step-to-step  -TS step-to-step  -TS    Stairs, Impairments ROM decreased;strength decreased;impaired balance;pain  -TS ROM decreased;strength decreased;impaired balance;pain  -TS    Row Name 06/04/22 1347 06/04/22 1106       Safety Issues, Functional Mobility    Impairments Affecting Function (Mobility) balance;endurance/activity tolerance;pain  -TS balance;endurance/activity tolerance;pain  -TS    Row Name 06/04/22 1347 06/04/22 1106       Motor Skills    Therapeutic Exercise hip;knee;ankle  AAROM x20 reps, recumbant position  -TS hip;knee;ankle  AAROM x20 reps, recumbant position  -TS    Row Name             Wound 06/03/22 0736 Left anterior knee Incision    Wound - Properties Group Placement Date: 06/03/22  -SC Placement Time: 0736  -SC Present on Hospital Admission: N  -SC Side: Left  -SC Orientation: anterior  -SC Location: knee  -SC Primary Wound Type: Incision  -SC     Retired Wound - Properties Group Placement Date: 06/03/22  -SC Placement Time: 0736  -SC Present on Hospital Admission: N  -SC Side: Left  -SC Orientation: anterior  -SC Location: knee  -SC Primary Wound Type: Incision  -SC     Retired Wound - Properties Group Date first assessed: 06/03/22  -SC Time first assessed: 0736  -SC Present on Hospital Admission: N  -SC Side: Left  -SC Location: knee  -SC Primary Wound Type: Incision  -SC     Row Name 06/04/22 1347  06/04/22 1106       Positioning and Restraints    Pre-Treatment Position sitting in chair/recliner  -TS sitting in chair/recliner  -TS    Post Treatment Position chair  -TS chair  -TS    In Chair reclined;call light within reach;with family/caregiver;heels elevated  -TS reclined;call light within reach;exit alarm on;heels elevated  -TS    Row Name 06/04/22 1347 06/04/22 1106       Progress Summary (PT)    Progress Toward Functional Goals (PT) progress toward functional goals is good  -TS progress toward functional goals is good  -TS          User Key  (r) = Recorded By, (t) = Taken By, (c) = Cosigned By    Initials Name Provider Type    TS Arnaldo Flores PTA Physical Therapist Assistant    Manju Lopez, RN Registered Nurse            Bilateral Knee Ther-ex   Exercise  Reps  Sets    Long arc Quads   10 2   Short arc Quads  10 2   Heel Slides  10 2   Ankle Pumps  10 2   Quad sets  10 2   Glut sets  10 2   Straight leg raise  10 2            Physical Therapy Education                 Title: PT OT SLP Therapies (Done)     Topic: Physical Therapy (Done)     Point: Mobility training (Done)     Learning Progress Summary           Patient Acceptance, E,TB,D, VU,DU by  at 6/4/2022 0912    Eager, E, VU by ALEYDA at 6/3/2022 1538                   Point: Precautions (Done)     Learning Progress Summary           Patient Acceptance, E,TB,D, VU,DU by  at 6/4/2022 0912    Eager, E, VU by ALEYDA at 6/3/2022 1538                               User Key     Initials Effective Dates Name Provider Type Discipline     06/16/21 -  Camelia Kaur OT Occupational Therapist OT    ALEYDA 06/03/21 -  Clay Gross, MAX Physical Therapist PT              PT Recommendation and Plan     Progress Summary (PT)  Progress Toward Functional Goals (PT): progress toward functional goals is good   Outcome Measures     Row Name 06/04/22 1110             How much help from another person do you currently need...    Turning from your back to your  side while in flat bed without using bedrails? 3  -TS      Moving from lying on back to sitting on the side of a flat bed without bedrails? 3  -TS      Moving to and from a bed to a chair (including a wheelchair)? 3  -TS      Standing up from a chair using your arms (e.g., wheelchair, bedside chair)? 4  -TS      Climbing 3-5 steps with a railing? 3  -TS      To walk in hospital room? 4  -TS      AM-PAC 6 Clicks Score (PT) 20  -TS            User Key  (r) = Recorded By, (t) = Taken By, (c) = Cosigned By    Initials Name Provider Type    RUTH Arnaldo Flores PTA Physical Therapist Assistant                 Time Calculation:    PT Charges     Row Name 06/04/22 1346 06/04/22 1104          Time Calculation    PT Received On -- 06/04/22  -TS     PT Goal Re-Cert Due Date -- 06/16/22  -TS            Timed Charges    17250 - PT Therapeutic Exercise Minutes 16  -TS 15  -TS     94313 - Gait Training Minutes  9  -TS 9  -TS     70983 - PT Therapeutic Activity Minutes 2  -TS 2  -TS            Total Minutes    Timed Charges Total Minutes 27  -TS 26  -TS      Total Minutes 27  -TS 26  -TS           User Key  (r) = Recorded By, (t) = Taken By, (c) = Cosigned By    Initials Name Provider Type    RUTH Arnaldo Flores PTA Physical Therapist Assistant              Therapy Charges for Today     Code Description Service Date Service Provider Modifiers Qty    05779544108 HC PT THER PROC EA 15 MIN 6/4/2022 Swords, Arnaldo, PTA GP 1    10233330980 HC GAIT TRAINING EA 15 MIN 6/4/2022 Swkamla Arnaldo, PTA GP 1    63566488824 HC PT THER PROC EA 15 MIN 6/4/2022 Swords, Arnaldo, PTA GP 1    76994885974 HC GAIT TRAINING EA 15 MIN 6/4/2022 Swkamla, Arnaldo, PTA GP 1          PT G-Codes  Outcome Measure Options: AM-PAC 6 Clicks Daily Activity (OT), Optimal Instrument  AM-PAC 6 Clicks Score (PT): 20  AM-PAC 6 Clicks Score (OT): 21    Arnaldo Flores PTA  6/4/2022

## 2022-06-04 NOTE — PLAN OF CARE
Goal Outcome Evaluation:      Pt stable throughout the shift, vitals have been WNL. Pt has been ambulating well with 1x assist and walker. Has worked with and been cleared by PT/OT. Voiding without difficulty. Pain has been well controlled with oral pain medications. Walker delivered to pt at bedside. Outpatient therapy set up at  PT in [place]. Appt 6/6/22 at 11am

## 2022-06-06 ENCOUNTER — TREATMENT (OUTPATIENT)
Dept: PHYSICAL THERAPY | Facility: CLINIC | Age: 72
End: 2022-06-06

## 2022-06-06 DIAGNOSIS — M25.562 LEFT KNEE PAIN, UNSPECIFIED CHRONICITY: ICD-10-CM

## 2022-06-06 DIAGNOSIS — R26.9 GAIT DISTURBANCE: ICD-10-CM

## 2022-06-06 DIAGNOSIS — Z96.652 AFTERCARE FOLLOWING LEFT KNEE JOINT REPLACEMENT SURGERY: Primary | ICD-10-CM

## 2022-06-06 DIAGNOSIS — Z47.1 AFTERCARE FOLLOWING LEFT KNEE JOINT REPLACEMENT SURGERY: Primary | ICD-10-CM

## 2022-06-06 PROCEDURE — 97161 PT EVAL LOW COMPLEX 20 MIN: CPT | Performed by: PHYSICAL THERAPIST

## 2022-06-06 PROCEDURE — 97110 THERAPEUTIC EXERCISES: CPT | Performed by: PHYSICAL THERAPIST

## 2022-06-06 NOTE — PROGRESS NOTES
Physical Therapy Initial Evaluation and Plan of Care      Patient: Kimberly Dennis   : 1950  Diagnosis/ICD-10 Code:  Aftercare following left knee joint replacement surgery [Z47.1, Z96.652]  Referring practitioner: Abdoulaye Paz MD  Date of Initial Visit: 2022  Today's Date: 2022  Patient seen for 1 sessions           Subjective Questionnaire: LEFS: =91% limitation      Subjective Evaluation    History of Present Illness  Mechanism of injury: Patient is 72 yr old female referred to physical therapy s/p L TKA on 6/3/22.  Patient reports had R knee replacement . Patient states doing well s/p surgery and pain controlled with medication/cold pack.     Pain  Current pain ratin  At worst pain ratin  Location: Left knee  Quality: dull ache  Relieving factors: medications, change in position, ice and rest    Patient Goals  Patient goals for therapy: decreased pain, increased strength, independence with ADLs/IADLs, improved balance and increased motion             Objective          Active Range of Motion   Left Knee   Flexion: 90 degrees   Extension: 8 degrees     Additional Active Range of Motion Details  AAROM  Patient unable to achieve neutral extension    Strength/Myotome Testing     Left Knee   Flexion: 3  Extension: 3  Quadriceps contraction: fair    Left Knee Flexibility Comments:   Noted left hamstring tightness    Ambulation     Comments   Patient ambulates with front wheeled walker. Noted decrease left heel strike/toe off.           Assessment & Plan     Assessment  Impairments: abnormal gait, abnormal or restricted ROM, activity intolerance, impaired balance, impaired physical strength, lacks appropriate home exercise program, pain with function and weight-bearing intolerance  Functional Limitations: walking, pushing, uncomfortable because of pain, moving in bed, sitting and standing  Assessment details: Pt presents with limitations, noted by evaluation that impede patient's  ability to ambulate/functional mobility.  The skills of a therapist will be required to safely and effectively implement the following treatment plan to restore maximal level of function.    Prognosis: good    Goals  Plan Goals: 1. The patient has limited ROM of the left knee.   LTG 1: 8 weeks:  The patient will demonstrate 0 to 120 degrees of ROM for the left knee in order to allow patient to normalize gait/negotiate stairs.   STATUS:  New   STG 1a: 4 weeks:  The patient will demonstrate 5 to 110 degrees of ROM for the left knee.   STATUS:  New      2. The patient has limited strength of the left knee.   LTG 2: 12 weeks: The patient will demonstrate 4/5 strength for left knee flexion and extension in order to allow patient improved joint stability.   STATUS:  New   STG 2a: 6 weeks: The patient will demonstrate 3+/5 strength for left knee flexion and extension   STATUS:  New       3. The patient has gait dysfunction.   LTG 3: 8 weeks:  The patient will ambulate without assistive device, independently, for community distances with minimal limp to the left lower extremity in order to improve mobility and allow patient to perform activities such as grocery shopping with greater ease.   STATUS:  New   STG 3a: The patient will be independent in HEP.   STATUS:  New      4. The patient complains of left knee pain.   LTG 4: 12 weeks:  The patient will report a pain rating of 2/10 or better in order to improve   tolerance to activities of daily living and improve sleep quality.   STATUS:  New   STG 4a: 6 weeks:  The patient will report a pain rating of 4/10 or better.   STATUS:  New      5. Mobility: Walking/Moving Around Functional Limitation     LTG 5: 12weeks:  The patient will demonstrate 37% limitation by achieving a score of 50/80 on the Lower Extremity Functional Scale.   STATUS:  New   STG 5a: 6 weeks:  The patient will demonstrate 50% limitation by achieving a score of 40/80 on the Lower Extremity Functional Scale.      STATUS:  New       Plan  Therapy options: will be seen for skilled therapy services  Planned modality interventions: cryotherapy and TENS  Planned therapy interventions: manual therapy, soft tissue mobilization, spinal/joint mobilization, strengthening, stretching, therapeutic activities, transfer training, home exercise program, gait training, functional ROM exercises and flexibility  Frequency: 3x week  Duration in weeks: 12  Treatment plan discussed with: patient  Plan details: 3xwk x4wks then 2xwk x8 wks      History # of Personal Factors and/or Comorbidities: LOW (0)  Examination of Body System(s): # of elements: LOW (1-2)  Clinical Presentation: STABLE   Clinical Decision Making: LOW       Visit Diagnoses:    ICD-10-CM ICD-9-CM   1. Aftercare following left knee joint replacement surgery  Z47.1 V54.81    Z96.652 V43.65   2. Gait disturbance  R26.9 781.2   3. Left knee pain, unspecified chronicity  M25.562 719.46       Timed:  Manual Therapy:         mins  13793;  Therapeutic Exercise:    10     mins  42200;     Neuromuscular Mona:        mins  21793;    Therapeutic Activity:          mins  69629;     Gait Training:           mins  75622;     Ultrasound:          mins  09478;    Electrical Stimulation:         mins  66861 ( );    Untimed:  Electrical Stimulation:         mins  61085 ( );  Mechanical Traction:         mins  52343;    PT evaluation     Low Eval                        30   Mins  00369  Mod Eval                             Mins  78831  High Eval                           Mins  82523    Timed Treatment:   10   mins   Total Treatment:     40   mins    PT SIGNATURE: Becki Olvera, PT     Electronically singed 6/6/2022    [place] PT license: [id number]        Initial Certification  Certification Period: 6/6/2022 thru 9/3/2022  I certify that the therapy services are furnished while this patient is under my care.  The services outlined above are required by this patient, and will be  reviewed every 90 days.    PHYSICIAN: Abdoulaye Paz MD  NPI: [id number]                                      DATE:     Please sign and return via fax to [phone]  Thank you, [facility].

## 2022-06-07 ENCOUNTER — APPOINTMENT (OUTPATIENT)
Dept: CARDIOLOGY | Facility: HOSPITAL | Age: 72
End: 2022-06-07

## 2022-06-07 ENCOUNTER — APPOINTMENT (OUTPATIENT)
Dept: GENERAL RADIOLOGY | Facility: HOSPITAL | Age: 72
End: 2022-06-07

## 2022-06-07 ENCOUNTER — HOSPITAL ENCOUNTER (EMERGENCY)
Facility: HOSPITAL | Age: 72
Discharge: HOME | End: 2022-06-07
Attending: STUDENT IN AN ORGANIZED HEALTH CARE EDUCATION/TRAINING PROGRAM | Admitting: STUDENT IN AN ORGANIZED HEALTH CARE EDUCATION/TRAINING PROGRAM

## 2022-06-07 VITALS
TEMPERATURE: 98.4 F | BODY MASS INDEX: 43.51 KG/M2 | HEART RATE: 68 BPM | HEIGHT: 66 IN | DIASTOLIC BLOOD PRESSURE: 64 MMHG | RESPIRATION RATE: 18 BRPM | WEIGHT: 270.73 LBS | SYSTOLIC BLOOD PRESSURE: 144 MMHG | OXYGEN SATURATION: 92 %

## 2022-06-07 DIAGNOSIS — I82.4Y2 ACUTE DEEP VEIN THROMBOSIS (DVT) OF PROXIMAL VEIN OF LEFT LOWER EXTREMITY: Primary | ICD-10-CM

## 2022-06-07 LAB
ALBUMIN SERPL-MCNC: 3.6 G/DL (ref 3.5–5.2)
ALBUMIN/GLOB SERPL: 1.1 G/DL
ALP SERPL-CCNC: 70 U/L (ref 39–117)
ALT SERPL W P-5'-P-CCNC: 11 U/L (ref 1–33)
ANION GAP SERPL CALCULATED.3IONS-SCNC: 10 MMOL/L (ref 5–15)
AST SERPL-CCNC: 15 U/L (ref 1–32)
BASOPHILS # BLD AUTO: 0.01 10*3/MM3 (ref 0–0.2)
BASOPHILS NFR BLD AUTO: 0.1 % (ref 0–1.5)
BILIRUB SERPL-MCNC: 0.6 MG/DL (ref 0–1.2)
BUN SERPL-MCNC: 11 MG/DL (ref 8–23)
BUN/CREAT SERPL: 20.8 (ref 7–25)
CALCIUM SPEC-SCNC: 9.3 MG/DL (ref 8.6–10.5)
CHLORIDE SERPL-SCNC: 98 MMOL/L (ref 98–107)
CO2 SERPL-SCNC: 29 MMOL/L (ref 22–29)
CREAT SERPL-MCNC: 0.53 MG/DL (ref 0.57–1)
DEPRECATED RDW RBC AUTO: 43.6 FL (ref 37–54)
EGFRCR SERPLBLD CKD-EPI 2021: 99.6 ML/MIN/1.73
EOSINOPHIL # BLD AUTO: 0.37 10*3/MM3 (ref 0–0.4)
EOSINOPHIL NFR BLD AUTO: 5.4 % (ref 0.3–6.2)
ERYTHROCYTE [DISTWIDTH] IN BLOOD BY AUTOMATED COUNT: 13.6 % (ref 12.3–15.4)
GLOBULIN UR ELPH-MCNC: 3.4 GM/DL
GLUCOSE SERPL-MCNC: 109 MG/DL (ref 65–99)
HCT VFR BLD AUTO: 27.4 % (ref 34–46.6)
HGB BLD-MCNC: 9.1 G/DL (ref 12–15.9)
IMM GRANULOCYTES # BLD AUTO: 0.04 10*3/MM3 (ref 0–0.05)
IMM GRANULOCYTES NFR BLD AUTO: 0.6 % (ref 0–0.5)
LYMPHOCYTES # BLD AUTO: 1.45 10*3/MM3 (ref 0.7–3.1)
LYMPHOCYTES NFR BLD AUTO: 21 % (ref 19.6–45.3)
MCH RBC QN AUTO: 29.3 PG (ref 26.6–33)
MCHC RBC AUTO-ENTMCNC: 33.2 G/DL (ref 31.5–35.7)
MCV RBC AUTO: 88.1 FL (ref 79–97)
MONOCYTES # BLD AUTO: 0.61 10*3/MM3 (ref 0.1–0.9)
MONOCYTES NFR BLD AUTO: 8.8 % (ref 5–12)
NEUTROPHILS NFR BLD AUTO: 4.42 10*3/MM3 (ref 1.7–7)
NEUTROPHILS NFR BLD AUTO: 64.1 % (ref 42.7–76)
NRBC BLD AUTO-RTO: 0 /100 WBC (ref 0–0.2)
PLATELET # BLD AUTO: 218 10*3/MM3 (ref 140–450)
PMV BLD AUTO: 9.8 FL (ref 6–12)
POTASSIUM SERPL-SCNC: 4 MMOL/L (ref 3.5–5.2)
PROT SERPL-MCNC: 7 G/DL (ref 6–8.5)
RBC # BLD AUTO: 3.11 10*6/MM3 (ref 3.77–5.28)
SODIUM SERPL-SCNC: 137 MMOL/L (ref 136–145)
WBC NRBC COR # BLD: 6.9 10*3/MM3 (ref 3.4–10.8)

## 2022-06-07 PROCEDURE — 73562 X-RAY EXAM OF KNEE 3: CPT

## 2022-06-07 PROCEDURE — 93971 EXTREMITY STUDY: CPT | Performed by: SURGERY

## 2022-06-07 PROCEDURE — 99283 EMERGENCY DEPT VISIT LOW MDM: CPT

## 2022-06-07 PROCEDURE — 93971 EXTREMITY STUDY: CPT

## 2022-06-07 PROCEDURE — 80053 COMPREHEN METABOLIC PANEL: CPT | Performed by: STUDENT IN AN ORGANIZED HEALTH CARE EDUCATION/TRAINING PROGRAM

## 2022-06-07 PROCEDURE — 85025 COMPLETE CBC W/AUTO DIFF WBC: CPT | Performed by: STUDENT IN AN ORGANIZED HEALTH CARE EDUCATION/TRAINING PROGRAM

## 2022-06-07 RX ORDER — OXYCODONE AND ACETAMINOPHEN 7.5; 325 MG/1; MG/1
1 TABLET ORAL ONCE
Status: COMPLETED | OUTPATIENT
Start: 2022-06-07 | End: 2022-06-07

## 2022-06-07 RX ADMIN — OXYCODONE HYDROCHLORIDE AND ACETAMINOPHEN 1 TABLET: 7.5; 325 TABLET ORAL at 19:32

## 2022-06-07 RX ADMIN — APIXABAN 10 MG: 5 TABLET, FILM COATED ORAL at 22:25

## 2022-06-07 NOTE — ED PROVIDER NOTES
Time: 7:03 PM EDT  Arrived by: private car  Chief Complaint: Post-op problem  History provided by: Pt  History is limited by: N/A     History of Present Illness:  Patient is a 72 y.o. female that presents to the emergency department with left knee pain and bruising without relief. The pain significantly worsened today. Dr. Paz did a total knee replacement on the left knee on 6/3/22. She reports taking her pain medications as prescribed. She denies fever, vomiting, SOA, CP. She has had a cough since being intubated.  Patient denies fever vomiting or chills.  She is also on Eliquis for Afib.   History provided by:  Patient   used: No        Similar Symptoms Previously: N/A  Recently seen: Yes      Patient Care Team  Primary Care Provider: Milan Coppola Jr., MD    Past Medical History:     Allergies   Allergen Reactions   • Amoxicillin-Pot Clavulanate Itching   • Hydrocodone-Acetaminophen Itching   • Hydrocodone Itching and Rash   • Iodine Rash   • Latex Rash   • Metal Rash     YELLOW GOLD CAUSES RASH    • Shellfish Allergy Rash     Past Medical History:   Diagnosis Date   • Acid reflux    • Anemia    • Anesthesia complication     TROUBLE WAKING UP   • Arthritis    • Atrial fibrillation (HCC)     PAROXYSMAL   • Bladder disorder    • Colon polyp    • GI bleed    • History of total knee arthroplasty, right 09/08/2016   • HTN (hypertension)    • Limb swelling    • Medial meniscus tear 09/12/2017    LEFT   • Mitral valve prolapse    • Primary osteoarthritis of left knee 09/12/2017   • Seasonal allergies    • Sleep apnea    • SOB (shortness of breath)    • Thoracic outlet syndrome    • Thyroid disorder      Past Surgical History:   Procedure Laterality Date   • BLADDER REPAIR     • CHOLECYSTECTOMY     • COLONOSCOPY  2014    [facility]   • COLONOSCOPY      DR. EDGE   • HYSTERECTOMY     • INTRAOCULAR LENS INSERTION      BOTH   • JOINT REPLACEMENT Right     TKR   • KNEE ARTHROSCOPY Left    • TOTAL  KNEE ARTHROPLASTY Left 6/3/2022    Procedure: LEFT TOTAL KNEE ARTHROPLASTY WITH COMPUTER NAVIGATION;  Surgeon: Abdoulaye Paz MD;  Location: [facility] MAIN OR;  Service: Orthopedics;  Laterality: Left;     Family History   Problem Relation Age of Onset   • Heart disease Mother    • Arthritis Mother    • Arthritis Sister    • Heart disease Brother    • Diabetes Brother    • Malig Hyperthermia Neg Hx        Home Medications:  Prior to Admission medications    Medication Sig Start Date End Date Taking? Authorizing Provider   cetirizine (zyrTEC) 10 MG tablet Take 10 mg by mouth Daily.    Provider, MD Kathleen   Cholecalciferol 125 MCG (5000 UT) tablet Take 1 tablet by mouth Daily.    ProviderKathleen MD   Eliquis 5 MG tablet tablet Take 1 tablet by mouth twice daily  Patient taking differently: Take 5 mg by mouth Every 12 (Twelve) Hours. LAST DOSE TO BE 05/31/22 PER DR. SANCHEZ 3/29/22   Cici Jon APRN   fluticasone (Flonase) 50 MCG/ACT nasal spray 2 sprays into the nostril(s) as directed by provider Daily. 5/24/22   Olivia Brasher APRN   hydroCHLOROthiazide (HYDRODIURIL) 25 MG tablet Take 1 tablet by mouth once daily 4/25/22   Milan Coppola Jr., MD   levothyroxine (SYNTHROID, LEVOTHROID) 75 MCG tablet Take 1 tablet by mouth once daily 4/25/22   Milan Coppola Jr., MD   losartan (COZAAR) 25 MG tablet Take 1 tablet by mouth once daily for 90 days  Patient taking differently: Take 25 mg by mouth Every Night. 10/4/21   Ian Rabago MD   metoprolol succinate XL (TOPROL-XL) 50 MG 24 hr tablet Take 1 tablet by mouth Daily. 7/1/21   Milan Coppola Jr., MD   montelukast (Singulair) 10 MG tablet Take 1 tablet by mouth Every Night. 7/1/21   Milan Coppola Jr., MD   oxyCODONE-acetaminophen (PERCOCET) 7.5-325 MG per tablet Take 1 tablet by mouth Every 4 (Four) Hours As Needed for Moderate Pain . 6/4/22   Abdoulaye Paz MD        Social History:   Social History  "    Tobacco Use   • Smoking status: Never Smoker   • Smokeless tobacco: Never Used   Vaping Use   • Vaping Use: Never used   Substance Use Topics   • Alcohol use: Never   • Drug use: Never     Recent travel: not applicable     Review of Systems:  Review of Systems   Constitutional: Negative for chills and fever.   HENT: Negative for congestion, rhinorrhea and sore throat.    Eyes: Negative for pain and visual disturbance.   Respiratory: Positive for cough. Negative for apnea, chest tightness and shortness of breath.    Cardiovascular: Negative for chest pain and palpitations.   Gastrointestinal: Negative for abdominal pain, diarrhea, nausea and vomiting.   Genitourinary: Negative for difficulty urinating and dysuria.   Musculoskeletal: Positive for myalgias (left knee). Negative for joint swelling.   Skin: Positive for color change (bruising of left knee).   Neurological: Negative for seizures and headaches.   Psychiatric/Behavioral: Negative.    All other systems reviewed and are negative.       Physical Exam:  /64   Pulse 68   Temp 98.4 °F (36.9 °C) (Oral)   Resp 18   Ht 167.6 cm (66\")   Wt 123 kg (270 lb 11.6 oz)   SpO2 92%   BMI 43.70 kg/m²     Physical Exam  Vitals and nursing note reviewed.   Constitutional:       General: She is not in acute distress.     Appearance: Normal appearance. She is not toxic-appearing.   HENT:      Head: Normocephalic and atraumatic.      Jaw: There is normal jaw occlusion.   Eyes:      General: Lids are normal.      Extraocular Movements: Extraocular movements intact.      Conjunctiva/sclera: Conjunctivae normal.      Pupils: Pupils are equal, round, and reactive to light.   Cardiovascular:      Rate and Rhythm: Normal rate and regular rhythm.      Pulses: Normal pulses.      Heart sounds: Normal heart sounds.   Pulmonary:      Effort: Pulmonary effort is normal. No respiratory distress.      Breath sounds: Normal breath sounds. No wheezing or rhonchi.   Abdominal:    "   General: Abdomen is flat.      Palpations: Abdomen is soft.      Tenderness: There is no abdominal tenderness. There is no guarding or rebound.   Musculoskeletal:         General: Normal range of motion.      Cervical back: Normal range of motion and neck supple.      Right lower leg: No edema.      Left lower leg: No edema.      Comments: There is a bangage over the anterior surface of the left knee. There is swelling and bruising medial aspect and interior portion. The pain is localized in the calf and up the thigh.   Skin:     General: Skin is warm and dry.      Comments: Incision appears clean dry and intact   Neurological:      Mental Status: She is alert and oriented to person, place, and time. Mental status is at baseline.   Psychiatric:         Mood and Affect: Mood normal.                Medications in the Emergency Department:  Medications   oxyCODONE-acetaminophen (PERCOCET) 7.5-325 MG per tablet 1 tablet (1 tablet Oral Given 6/7/22 1932)   apixaban (ELIQUIS) tablet 10 mg (10 mg Oral Given 6/7/22 2225)        Labs  Lab Results (last 24 hours)     Procedure Component Value Units Date/Time    Comprehensive Metabolic Panel [[id number]]  (Abnormal) Collected: 06/07/22 1933    Specimen: Blood Updated: 06/07/22 2003     Glucose 109 mg/dL      BUN 11 mg/dL      Creatinine 0.53 mg/dL      Sodium 137 mmol/L      Potassium 4.0 mmol/L      Chloride 98 mmol/L      CO2 29.0 mmol/L      Calcium 9.3 mg/dL      Total Protein 7.0 g/dL      Albumin 3.60 g/dL      ALT (SGPT) 11 U/L      AST (SGOT) 15 U/L      Alkaline Phosphatase 70 U/L      Total Bilirubin 0.6 mg/dL      Globulin 3.4 gm/dL      A/G Ratio 1.1 g/dL      BUN/Creatinine Ratio 20.8     Anion Gap 10.0 mmol/L      eGFR 99.6 mL/min/1.73      Comment: National Kidney Foundation and American Society of Nephrology (ASN) Task Force recommended calculation based on the Chronic Kidney Disease Epidemiology Collaboration (CKD-EPI) equation refit without adjustment for  race.       Narrative:      GFR Normal >60  Chronic Kidney Disease <60  Kidney Failure <15      CBC & Differential [[id number]]  (Abnormal) Collected: 06/07/22 1933    Specimen: Blood Updated: 06/07/22 1939    Narrative:      The following orders were created for panel order CBC & Differential.  Procedure                               Abnormality         Status                     ---------                               -----------         ------                     CBC Auto Differential[[id number]]        Abnormal            Final result                 Please view results for these tests on the individual orders.    CBC Auto Differential [[id number]]  (Abnormal) Collected: 06/07/22 1933    Specimen: Blood Updated: 06/07/22 1939     WBC 6.90 10*3/mm3      RBC 3.11 10*6/mm3      Hemoglobin 9.1 g/dL      Hematocrit 27.4 %      MCV 88.1 fL      MCH 29.3 pg      MCHC 33.2 g/dL      RDW 13.6 %      RDW-SD 43.6 fl      MPV 9.8 fL      Platelets 218 10*3/mm3      Neutrophil % 64.1 %      Lymphocyte % 21.0 %      Monocyte % 8.8 %      Eosinophil % 5.4 %      Basophil % 0.1 %      Immature Grans % 0.6 %      Neutrophils, Absolute 4.42 10*3/mm3      Lymphocytes, Absolute 1.45 10*3/mm3      Monocytes, Absolute 0.61 10*3/mm3      Eosinophils, Absolute 0.37 10*3/mm3      Basophils, Absolute 0.01 10*3/mm3      Immature Grans, Absolute 0.04 10*3/mm3      nRBC 0.0 /100 WBC            Imaging:  XR Knee 3 View Left    Result Date: 6/7/2022  PROCEDURE: XR KNEE 3 VW LEFT  COMPARISON: [facility], CR, XR KNEE 1 OR 2 VW LEFT, 6/03/2022, 8:36.  INDICATIONS: left knee pain; 4 days post op've  FINDINGS:  3 views were obtained.  No acute fracture or acute malalignment is identified.  Specifically, no periprosthetic fractures are seen.  There are postoperative changes, which are most suggestive of recent left knee total arthroplasty.  Cutaneous surgical clips remain in place anteriorly.  Soft tissue swelling is present, likely  related to the recent surgery.  However, an infectious cellulitis cannot be excluded.  A moderate-to-large suprapatellar recess joint effusion is identified, similar to slightly smaller than on the prior study.  No subcutaneous emphysema is identified on the current study.  The previously seen subcutaneous emphysema has resolved.  No unintended retained foreign body is appreciated.  If symptoms or clinical concerns persist, consider imaging follow-up.        No acute fracture or acute malalignment is identified.  Little interval change is appreciated radiographically since the prior exam from 6/3/2022.     COMMENT:  Part of this note is an electronic transcription of spoken language to printed text. The electronic translation/transcription may permit erroneous, or at times, nonsensical (or even sensical) words or phrases to be inadvertently transcribed or omitted; this  has reviewed the note for such errors (as well as additional errors); however, some may still exist.  GHASSAN SALAS JR, MD       Electronically Signed and Approved By: GHASSAN SALAS JR, MD on 6/07/2022 at 22:10                Procedures:  Procedures    Progress                            Medical Decision Making:  MDM  Number of Diagnoses or Management Options  Acute deep vein thrombosis (DVT) of proximal vein of left lower extremity (HCC)  Diagnosis management comments: Ddx: DVT, infection, septic joint, hematoma    Patient was found to have a DVT in the left lower extremity.  X-ray of the knee was unremarkable.  Patient has no signs of sepsis.  Patient's Eliquis is increased to 10 mg twice daily over the next week.  We discussed follow-up with the PCP and Dr. Pena.  I spoke with Dr. Camilo from orthopedics who is on-call and informed him of the DVT.  Noncompressibility noted to the left gastrocnemius vein.       Amount and/or Complexity of Data Reviewed  Discussion of test results with the performing providers: yes  Decide to  obtain previous medical records or to obtain history from someone other than the patient: yes  Obtain history from someone other than the patient: yes  Discuss the patient with other providers: yes         Final diagnoses:   Acute deep vein thrombosis (DVT) of proximal vein of left lower extremity (HCC)        Disposition:  ED Disposition     ED Disposition   Discharge    Condition   Stable    Comment   --             Documentation assistance provided by Tc Jose acting as scribe for Dr. Greta Herrera MD Information recorded by the scribe was done at my direction and has been verified and validated by me.          Tc Jose  06/07/22 1910       Greta Herrera MD  06/07/22 2271

## 2022-06-08 ENCOUNTER — TELEPHONE (OUTPATIENT)
Dept: ORTHOPEDIC SURGERY | Facility: CLINIC | Age: 72
End: 2022-06-08

## 2022-06-08 LAB
BH CV LOW VAS LEFT GASTRONEMIUS VESSEL: 1
BH CV LOWER VASCULAR LEFT COMMON FEMORAL AUGMENT: NORMAL
BH CV LOWER VASCULAR LEFT COMMON FEMORAL COMPRESS: NORMAL
BH CV LOWER VASCULAR LEFT COMMON FEMORAL PHASIC: NORMAL
BH CV LOWER VASCULAR LEFT COMMON FEMORAL SPONT: NORMAL
BH CV LOWER VASCULAR LEFT DISTAL FEMORAL AUGMENT: NORMAL
BH CV LOWER VASCULAR LEFT DISTAL FEMORAL COMPETENT: NORMAL
BH CV LOWER VASCULAR LEFT DISTAL FEMORAL COMPRESS: NORMAL
BH CV LOWER VASCULAR LEFT DISTAL FEMORAL PHASIC: NORMAL
BH CV LOWER VASCULAR LEFT DISTAL FEMORAL SPONT: NORMAL
BH CV LOWER VASCULAR LEFT GASTRONEMIUS COMPRESS: NORMAL
BH CV LOWER VASCULAR LEFT GASTRONEMIUS THROMBUS: NORMAL
BH CV LOWER VASCULAR LEFT GREATER SAPH AK COMPRESS: NORMAL
BH CV LOWER VASCULAR LEFT MID FEMORAL COMPRESS: NORMAL
BH CV LOWER VASCULAR LEFT PERONEAL COMPRESS: NORMAL
BH CV LOWER VASCULAR LEFT POPLITEAL AUGMENT: NORMAL
BH CV LOWER VASCULAR LEFT POPLITEAL COMPETENT: NORMAL
BH CV LOWER VASCULAR LEFT POPLITEAL COMPRESS: NORMAL
BH CV LOWER VASCULAR LEFT POPLITEAL PHASIC: NORMAL
BH CV LOWER VASCULAR LEFT POPLITEAL SPONT: NORMAL
BH CV LOWER VASCULAR LEFT POSTERIOR TIBIAL AUGMENT: NORMAL
BH CV LOWER VASCULAR LEFT POSTERIOR TIBIAL COMPRESS: NORMAL
BH CV LOWER VASCULAR LEFT PROXIMAL FEMORAL COMPRESS: NORMAL
BH CV LOWER VASCULAR RIGHT COMMON FEMORAL AUGMENT: NORMAL
BH CV LOWER VASCULAR RIGHT COMMON FEMORAL COMPRESS: NORMAL
BH CV LOWER VASCULAR RIGHT COMMON FEMORAL PHASIC: NORMAL
BH CV LOWER VASCULAR RIGHT COMMON FEMORAL SPONT: NORMAL
MAXIMAL PREDICTED HEART RATE: 150 BPM
STRESS TARGET HR: 128 BPM

## 2022-06-08 NOTE — DISCHARGE INSTRUCTIONS
Return to the emergency department if you have chest pain, difficulty breathing, worsening pain, or fever.

## 2022-06-08 NOTE — TELEPHONE ENCOUNTER
PATIENT CALLED  AND STATES HE DEVELOPED PAIN IN THE BACK OF HER KNEE LAST NIGHT AND WENT TO  THE ER AND THE ULTRASOUND SHOWS A DVT.  THE ER CALLED DR BACA, THE DOCTOR ON CALL AND ALSO PLACED HER ON ELIQUIS 10MG ONE BID FOR 10 DAYS AND ASKED HER TO FOLLOW UP WITH HER PCP.  PATIENT STATES SHE HAS AN APPOINTMENT ON 6-9-2022 WITH PCP.  PHYSICAL THERAPY WAS CANCELED FOR TODAY AND PATIENT WILL FOLLOW DR JOLLY'S DIRECTION ON WHEN SHE CAN START PHYSICAL THERAPY AGAIN.  PATIENT WAS ADVISED THAT SHE CAN WALK IN THE HOUSE BUT NO HOME EXERCISES.  SHE VOICES UNDERSTANDING TO CONTINUE TEDS, ICE AND ELEVATE AND TO THE LEG.  SHE WAS ADVISED IF SHE WERE TO HAVE AN INCREASE IN PAIN, SOA, OR CHEST PAIN TO CALL EMS AND GO TO [facility] ER.  PATIENT VOICES UNDERSTANDING.  DR COY, SURGEON WAS INFORMED.

## 2022-06-09 ENCOUNTER — TELEPHONE (OUTPATIENT)
Dept: ORTHOPEDIC SURGERY | Facility: CLINIC | Age: 72
End: 2022-06-09

## 2022-06-09 ENCOUNTER — OFFICE VISIT (OUTPATIENT)
Dept: INTERNAL MEDICINE | Facility: CLINIC | Age: 72
End: 2022-06-09

## 2022-06-09 VITALS
SYSTOLIC BLOOD PRESSURE: 126 MMHG | BODY MASS INDEX: 41.91 KG/M2 | OXYGEN SATURATION: 95 % | HEIGHT: 66 IN | WEIGHT: 260.8 LBS | DIASTOLIC BLOOD PRESSURE: 56 MMHG | TEMPERATURE: 98.6 F | HEART RATE: 75 BPM

## 2022-06-09 DIAGNOSIS — I82.462 ACUTE DEEP VEIN THROMBOSIS (DVT) OF CALF MUSCLE VEIN OF LEFT LOWER EXTREMITY: ICD-10-CM

## 2022-06-09 DIAGNOSIS — K59.03 DRUG-INDUCED CONSTIPATION: ICD-10-CM

## 2022-06-09 DIAGNOSIS — Z96.652 HISTORY OF TOTAL LEFT KNEE REPLACEMENT: Primary | ICD-10-CM

## 2022-06-09 DIAGNOSIS — Z79.01 CHRONIC ANTICOAGULATION: ICD-10-CM

## 2022-06-09 DIAGNOSIS — I48.91 ATRIAL FIBRILLATION, UNSPECIFIED TYPE: ICD-10-CM

## 2022-06-09 PROCEDURE — 99214 OFFICE O/P EST MOD 30 MIN: CPT | Performed by: STUDENT IN AN ORGANIZED HEALTH CARE EDUCATION/TRAINING PROGRAM

## 2022-06-09 RX ORDER — POLYETHYLENE GLYCOL 3350 17 G/17G
17 POWDER, FOR SOLUTION ORAL DAILY
Qty: 850 G | Refills: 1 | Status: SHIPPED | OUTPATIENT
Start: 2022-06-09 | End: 2023-01-12

## 2022-06-09 NOTE — TELEPHONE ENCOUNTER
PATIENT SAW DR JONES TODAY AND HE ADVISED THAT SHE HOLD OFF ON PT UNTIL SHE FOLLOWS UP WITH DR COY ON 06/15/22 DUE TO THE LOCATION OF THE BLOOD CLOT IN HER LOWER EXTREMITY. CALLED  PT [facility] TO LET THEM KNOW. PATIENTS NEXT PT APPT 06/16/22

## 2022-06-09 NOTE — PROGRESS NOTES
Chief Complaint  Knee Pain (Patient states she had left knee replaced Friday states she is having knee pain also states she had a blood clot but went to ER. Patient states she did have some constipation she would like to discuss)    Meghan          Kimberly Dennis presents to [facility]  History of Present Illness    Here with granddaughter.    History of A fib on apixaban as well as left knee arthroplasty on 6/3/22.  Presented to ED on 6/7/22 with left leg pain, and found to have clot in left gastrocnemius.  Increased apixaban to 10 mg Bid for 7 days, with plans to de-escalate to her usual 5 mg BID.  Seeing Dr. Paz next week.    Leg pain still present, but improved.  Taking percocet for pain, and having constipation.  Using colace and prunes, and tried miralax earlier today.  Gave self enema this morning.    Current Outpatient Medications   Medication Instructions   • apixaban (ELIQUIS) 10 mg, Oral, 2 Times Daily   • cetirizine (ZYRTEC) 10 mg, Oral, Daily   • Cholecalciferol 125 MCG (5000 UT) tablet 1 tablet, Oral, Daily   • Eliquis 5 MG tablet tablet Take 1 tablet by mouth twice daily   • fluticasone (Flonase) 50 MCG/ACT nasal spray 2 sprays, Nasal, Daily   • hydroCHLOROthiazide (HYDRODIURIL) 25 MG tablet Take 1 tablet by mouth once daily   • levothyroxine (SYNTHROID, LEVOTHROID) 75 MCG tablet Take 1 tablet by mouth once daily   • losartan (COZAAR) 25 MG tablet Take 1 tablet by mouth once daily for 90 days   • metoprolol succinate XL (TOPROL-XL) 50 mg, Oral, Daily   • montelukast (SINGULAIR) 10 mg, Oral, Nightly   • oxyCODONE-acetaminophen (PERCOCET) 7.5-325 MG per tablet 1 tablet, Oral, Every 4 Hours PRN   • polyethylene glycol (GLYCOLAX) 17 g, Oral, Daily       The following portions of the patient's history were reviewed and updated as appropriate: allergies, current medications, past family history, past medical history, past social history, past surgical  "history, and problem list.    Objective   Vital Signs:   /56   Pulse 75   Temp 98.6 °F (37 °C) (Temporal)   Ht 167.6 cm (66\")   Wt 118 kg (260 lb 12.8 oz)   SpO2 95%   BMI 42.09 kg/m²     Wt Readings from Last 3 Encounters:   06/09/22 118 kg (260 lb 12.8 oz)   06/07/22 123 kg (270 lb 11.6 oz)   06/03/22 120 kg (264 lb 1.8 oz)     BP Readings from Last 3 Encounters:   06/09/22 126/56   06/07/22 144/64   06/04/22 124/54     Physical Exam  Vitals reviewed.   Constitutional:       Appearance: Normal appearance.   HENT:      Head: Normocephalic and atraumatic.   Eyes:      Conjunctiva/sclera: Conjunctivae normal.   Cardiovascular:      Rate and Rhythm: Normal rate and regular rhythm.      Pulses: Normal pulses.      Heart sounds: Normal heart sounds.   Pulmonary:      Effort: Pulmonary effort is normal.      Breath sounds: Normal breath sounds. No wheezing.   Abdominal:      General: Abdomen is flat.      Palpations: There is no mass.      Tenderness: There is no abdominal tenderness.   Musculoskeletal:      Right lower leg: No edema.      Left lower leg: No edema.      Comments: Central area of left gastrocnemius is mildly TTP.  No palpable cord.  Dressing over left knee is clean, dry and intact.   Skin:     General: Skin is warm and dry.   Neurological:      General: No focal deficit present.      Mental Status: She is alert. Mental status is at baseline.   Psychiatric:         Mood and Affect: Mood normal.         Behavior: Behavior normal.         Thought Content: Thought content normal.         Judgment: Judgment normal.        [unfilled]    Result Review :   The following data was reviewed by: Ian Rabago MD on 06/09/2022:  Common labs    Common Labsle 5/23/22 5/23/22 5/23/22 6/4/22 6/4/22 6/7/22 6/7/22    1122 1122 1122 0006 [id number] 1933 1933   Glucose  99     109 (A)   BUN  13     11   Creatinine  0.60     0.53 (A)   Sodium  138     137   Potassium  4.1     4.0   Chloride  100     98   Calcium  9.9     " 9.3   Albumin  4.40     3.60   Total Bilirubin  0.4     0.6   Alkaline Phosphatase  84     70   AST (SGOT)  18     15   ALT (SGPT)  15     11   WBC 6.73   8.66  6.90    Hemoglobin 12.2   9.5 (A) 9.1 (A) 9.1 (A)    Hematocrit 37.2   29.5 (A) 27.5 (A) 27.4 (A)    Platelets 199   145  218    Hemoglobin A1C   5.30       (A) Abnormal value                   Lab Results   Component Value Date    SARSANTIGEN Not Detected 05/24/2022    RAPFLUA Negative 05/24/2022    RAPFLUB Negative 05/24/2022    INR 1.23 (H) 05/23/2022       Procedures        Assessment and Plan    Diagnoses and all orders for this visit:    1. History of total left knee replacement (Primary)    2. Acute deep vein thrombosis (DVT) of calf muscle vein of left lower extremity (HCC)  -     Duplex Venous Lower Extremity - Left CAR; Future    3. Chronic anticoagulation    4. Atrial fibrillation, unspecified type (HCC)    5. Drug-induced constipation  -     polyethylene glycol (GlycoLax) 17 GM/SCOOP powder; Take 17 g by mouth Daily.  Dispense: 850 g; Refill: 1      DVT:  -I asked Mrs. Dennis and her granddaugther to cancel Friday and Monday PT appointments, as will be following up with ortho next week  -if okay from ortho's standpoint, can re-start physical therapy        There are no discontinued medications.     I spent 30 minutes caring for Kimberly on this date of service. This time includes time spent by me in the following activities:preparing for the visit, reviewing tests, obtaining and/or reviewing a separately obtained history, performing a medically appropriate examination and/or evaluation , counseling and educating the patient/family/caregiver, ordering medications, tests, or procedures and documenting information in the medical record  Follow Up   Return if symptoms worsen or fail to improve.  Patient was given instructions and counseling regarding her condition or for health maintenance advice. Please see specific information pulled into the AVS  if appropriate.       Ian Rabago MD  06/09/22  17:37 EDT

## 2022-06-13 ENCOUNTER — TELEPHONE (OUTPATIENT)
Dept: ORTHOPEDIC SURGERY | Facility: CLINIC | Age: 72
End: 2022-06-13

## 2022-06-13 DIAGNOSIS — M17.12 PRIMARY OSTEOARTHRITIS OF LEFT KNEE: ICD-10-CM

## 2022-06-13 RX ORDER — OXYCODONE AND ACETAMINOPHEN 7.5; 325 MG/1; MG/1
1 TABLET ORAL EVERY 4 HOURS PRN
Qty: 42 TABLET | Refills: 0 | Status: SHIPPED | OUTPATIENT
Start: 2022-06-13 | End: 2022-06-20 | Stop reason: SDUPTHER

## 2022-06-13 NOTE — TELEPHONE ENCOUNTER
Hub staff attempted to follow warm transfer process and was unsuccessful     Caller: Kimberly Dennis    Relationship to patient: Self    Best call back number: [phone]    Patient is needing: PATIENT CALLED WANTING TO SPEAK WITH POLO OR LUCILLE REGARDING HER RECENT KNEE SURGERY 6.3.22 - UNABLE TO COMPLETE TRANSFER.     REGARDING PAIN MEDICATION ONLY HAS TWO PILLS LEFT     ____________________________________________    Caller: Kimberly Dennis    Relationship: Self    Best call back number: [phone]    Requested Prescriptions:   Requested Prescriptions     Pending Prescriptions Disp Refills   • oxyCODONE-acetaminophen (PERCOCET) 7.5-325 MG per tablet 45 tablet 0     Sig: Take 1 tablet by mouth Every 4 (Four) Hours As Needed for Moderate Pain .        Pharmacy where request should be sent:  [facility]     Additional details provided by patient: ONLY HAS TWO PILLS LEFT    Does the patient have less than a 3 day supply:  [x] Yes  [] No    Camelia Cardenas   06/13/22 08:13 EDT

## 2022-06-15 ENCOUNTER — OFFICE VISIT (OUTPATIENT)
Dept: ORTHOPEDIC SURGERY | Facility: CLINIC | Age: 72
End: 2022-06-15

## 2022-06-15 VITALS — HEIGHT: 66 IN | OXYGEN SATURATION: 95 % | HEART RATE: 72 BPM | BODY MASS INDEX: 41.3 KG/M2 | WEIGHT: 257 LBS

## 2022-06-15 DIAGNOSIS — Z47.1 AFTERCARE FOLLOWING LEFT KNEE JOINT REPLACEMENT SURGERY: ICD-10-CM

## 2022-06-15 DIAGNOSIS — M25.562 LEFT KNEE PAIN, UNSPECIFIED CHRONICITY: Primary | ICD-10-CM

## 2022-06-15 DIAGNOSIS — Z96.652 AFTERCARE FOLLOWING LEFT KNEE JOINT REPLACEMENT SURGERY: ICD-10-CM

## 2022-06-15 PROCEDURE — 99024 POSTOP FOLLOW-UP VISIT: CPT | Performed by: PHYSICIAN ASSISTANT

## 2022-06-15 NOTE — PATIENT INSTRUCTIONS
Staples removed - Keep the incision clean and dry. Leave open to air. Remove steri-strips after 7 to 10 days. Continue use of ice and elevation for associated swelling. Dr. Paz said it is fine for her to return to PT. Progress weightbearing status with PT. Recommend continuing with compression hose.  Call with any changes or concerns.        Follow up in 4 weeks. No imaging.

## 2022-06-15 NOTE — PROGRESS NOTES
"Chief Complaint  Pain and Follow-up of the Left Knee and Suture / Staple Removal    Subjective          Kimberly Dennis presents to [facility] for s/p left total knee arthroplasty with computer navigation performed on 06/03/22 by Dr. Paz.  Patient was diagnosed with a LLE DVT in the gastrocnemius while still in the hospital on 0607/22.  She states she has been unable to get back into PT since this has occurred.  She was using 10 mg Eliquis and begin back on the 5 mg Eliquis this morning.  She is here today using walker for ambulation assistance.  She states therapy was going very well prior to discovering she had DVT.  She states pain is been well managed with prescription narcotic.  She has no other additional complaints today.    Objective   Allergies   Allergen Reactions   • Amoxicillin-Pot Clavulanate Itching   • Hydrocodone-Acetaminophen Itching   • Hydrocodone Itching and Rash   • Iodine Rash   • Latex Rash   • Metal Rash     YELLOW GOLD CAUSES RASH    • Shellfish Allergy Rash       Vital Signs:   Pulse 72   Ht 167.6 cm (66\")   Wt 117 kg (257 lb)   SpO2 95%   BMI 41.48 kg/m²       Physical Exam  Constitutional:       Appearance: Normal appearance. Patient is well-developed and normal weight.   HENT:      Head: Normocephalic.      Right Ear: Hearing and external ear normal.      Left Ear: Hearing and external ear normal.      Nose: Nose normal.   Eyes:      Conjunctiva/sclera: Conjunctivae normal.   Cardiovascular:      Rate and Rhythm: Normal rate.   Pulmonary:      Effort: Pulmonary effort is normal.      Breath sounds: No wheezing or rales.   Abdominal:      Palpations: Abdomen is soft.      Tenderness: There is no abdominal tenderness.   Musculoskeletal:      Cervical back: Normal range of motion.   Skin:     Findings: No rash.   Neurological:      Mental Status: Patient is alert and oriented to person, place, and time.   Psychiatric:         Mood and Affect: Mood and " affect normal.         Judgment: Judgment normal.     Ortho Exam  Left knee: Staples removed-well-healing surgical incision without surrounding erythema, dehiscence or active drainage.  Moderate swelling.  No discoloration.  Near full passive extension, flexion to 90 degrees.  Stable to varus/valgus stress.  Good plantarflexion and dorsiflexion of the ankle. Neurovascular intact distally. Sensation to light touch intact. Patient ambulates with wheelchair.       Result Review :   The following data was reviewed by: CAITLIN Singer on 06/15/2022:         Imaging Results (Most Recent)     Procedure Component Value Units Date/Time    XR Knee 3 View Left [[id number]] Resulted: 06/15/22 1000     Updated: 06/15/22 1001    Narrative:      X-Ray Report:  Study: X-rays ordered, taken in the office, and reviewed today  Site: left knee Xray  Indication: TKA   View: AP, Lateral and Sunrise view(s)  Findings: Hardware of left total knee arthroplasty is intact, no evidence   of hardware failure or loosening.   Prior studies available for comparison: yes              XR Knee 1 or 2 View Left    Result Date: 6/3/2022  Narrative: PROCEDURE: XR KNEE 1 OR 2 VW LEFT  COMPARISON: [facility], XR KNEE 3 VW LEFT, 4/11/2022, 13:08.  INDICATIONS: Post-Op left Knee Arthoplasty  FINDINGS:  Patient is status post total knee arthroplasty with patellar resurfacing.  Orthopedic hardware appears intact.  Complex air and fluid collections within the joint space and subcutaneous tissues noted.  Overlying skin staples and wound VAC noted.      Impression:  Expected postoperative changes from total knee arthroplasty with patellar resurfacing      ASHLEY LATHAM MD       Electronically Signed and Approved By: ASHLEY LATHAM MD on 6/03/2022 at 9:26             XR Knee 3 View Left    Result Date: 6/15/2022  Narrative: X-Ray Report: Study: X-rays ordered, taken in the office, and reviewed today Site: left knee Xray Indication: TKA View:  AP, Lateral and Sunrise view(s) Findings: Hardware of left total knee arthroplasty is intact, no evidence of hardware failure or loosening. Prior studies available for comparison: yes     XR Knee 3 View Left    Result Date: 6/7/2022  Narrative: PROCEDURE: XR KNEE 3 VW LEFT  COMPARISON: [facility], [facility], XR KNEE 1 OR 2 VW LEFT, 6/03/2022, 8:36.  INDICATIONS: left knee pain; 4 days post op've  FINDINGS:  3 views were obtained.  No acute fracture or acute malalignment is identified.  Specifically, no periprosthetic fractures are seen.  There are postoperative changes, which are most suggestive of recent left knee total arthroplasty.  Cutaneous surgical clips remain in place anteriorly.  Soft tissue swelling is present, likely related to the recent surgery.  However, an infectious cellulitis cannot be excluded.  A moderate-to-large suprapatellar recess joint effusion is identified, similar to slightly smaller than on the prior study.  No subcutaneous emphysema is identified on the current study.  The previously seen subcutaneous emphysema has resolved.  No unintended retained foreign body is appreciated.  If symptoms or clinical concerns persist, consider imaging follow-up.      Impression:   No acute fracture or acute malalignment is identified.  Little interval change is appreciated radiographically since the prior exam from 6/3/2022.     COMMENT:  Part of this note is an electronic transcription of spoken language to printed text. The electronic translation/transcription may permit erroneous, or at times, nonsensical (or even sensical) words or phrases to be inadvertently transcribed or omitted; this  has reviewed the note for such errors (as well as additional errors); however, some may still exist.  GHASSAN SALAS JR, MD       Electronically Signed and Approved By: GHASSAN SALAS JR, MD on 6/07/2022 at 22:10              Duplex Venous Lower Extremity - Left CAR    Result Date:  6/8/2022  Narrative: · Acute left lower extremity deep vein thrombosis noted in the gastrocnemius. · All other left sided veins appeared normal.      Peripheral Block    Result Date: 6/3/2022  Narrative: Erickson Jimenez MD     6/3/2022  6:55 AM Peripheral Block Patient reassessed immediately prior to procedure Patient location during procedure: pre-op Start time: 6/3/2022 6:50 AM Stop time: 6/3/2022 6:53 AM Reason for block: at surgeon's request and post-op pain management Performed by Anesthesiologist: Erickson Jimenez MD Preanesthetic Checklist Completed: patient identified, IV checked, site marked, risks and benefits discussed, surgical consent, monitors and equipment checked, pre-op evaluation and timeout performed Prep: Pt Position: supine Sterile barriers:alcohol skin prep, partial drape, cap, washed/disinfected hands, mask and gloves Prep: ChloraPrep Patient monitoring: blood pressure monitoring, continuous pulse oximetry and EKG Procedure Sedation: yes Performed under: local infiltration Guidance:ultrasound guided ULTRASOUND INTERPRETATION. Using ultrasound guidance a 20 G gauge needle was placed in close proximity to the nerve, at which point, under ultrasound guidance anesthetic was injected in the area of the nerve and spread of the anesthesia was seen on ultrasound in close proximity thereto.  There were no abnormalities seen on ultrasound; a digital image was taken; and the patient tolerated the procedure with no complications. Images:still images obtained, printed/placed on chart Laterality:left Block Type:adductor canal block Injection Technique:single-shot Needle Type:echogenic Needle Gauge:20 G (4in) Resistance on Injection: none Medications Used: bupivacaine-EPINEPHrine PF (MARCAINE w/EPI) 0.5% -1:[id number] injection, 30 mL Post Assessment Injection Assessment: negative aspiration for heme, no paresthesia on injection and incremental injection Patient Tolerance:comfortable throughout block  Complications:no Additional Notes The block or continuous infusion is requested by the referring physician for management of postoperative pain, or pain related to a procedure. Ultrasound guidance (deemed medically necessary). Painless injection, pt was awake and conversant during the procedure without complications. Needle and surrounding structures visualized throughout procedure. No adverse reactions or complications seen during this period. Post-procedure image showed no signs of complication, and anatomy was consistent with an uncomplicated nerve blockade.          Assessment and Plan    Problem List Items Addressed This Visit        Musculoskeletal and Injuries    Aftercare following left knee joint replacement surgery      Other Visit Diagnoses     Left knee pain, unspecified chronicity    -  Primary    Relevant Orders    XR Knee 3 View Left (Completed)          Follow Up   Return in about 4 weeks (around 7/13/2022).  Patient Instructions   Staples removed - Keep the incision clean and dry. Leave open to air. Remove steri-strips after 7 to 10 days. Continue use of ice and elevation for associated swelling. Dr. Paz said it is fine for her to return to PT. Progress weightbearing status with PT. Recommend continuing with compression hose.  Call with any changes or concerns.        Follow up in 4 weeks. No imaging.       Patient was given instructions and counseling regarding her condition or for health maintenance advice. Please see specific information pulled into the AVS if appropriate.

## 2022-06-16 ENCOUNTER — TREATMENT (OUTPATIENT)
Dept: PHYSICAL THERAPY | Facility: CLINIC | Age: 72
End: 2022-06-16

## 2022-06-16 DIAGNOSIS — R26.9 GAIT DISTURBANCE: ICD-10-CM

## 2022-06-16 DIAGNOSIS — Z47.1 AFTERCARE FOLLOWING LEFT KNEE JOINT REPLACEMENT SURGERY: Primary | ICD-10-CM

## 2022-06-16 DIAGNOSIS — Z96.652 AFTERCARE FOLLOWING LEFT KNEE JOINT REPLACEMENT SURGERY: Primary | ICD-10-CM

## 2022-06-16 DIAGNOSIS — M25.562 LEFT KNEE PAIN, UNSPECIFIED CHRONICITY: ICD-10-CM

## 2022-06-16 PROCEDURE — 97110 THERAPEUTIC EXERCISES: CPT | Performed by: PHYSICAL THERAPIST

## 2022-06-16 PROCEDURE — 97140 MANUAL THERAPY 1/> REGIONS: CPT | Performed by: PHYSICAL THERAPIST

## 2022-06-16 NOTE — PROGRESS NOTES
Physical Therapy Daily Treatment Note      Patient: Kimberly Dennis   : 1950  Referring practitioner: Abdoulaye Paz MD  Date of Initial Visit: Type: THERAPY  Noted: 2022  Today's Date: 2022  Patient seen for 2 sessions           Subjective   Kimberly Dennis reports: pain in left knee 3/10.  Patient has been on hold for therapy secondary to DVT and was given ok to resume therapy yesterday.      Objective   See Exercise, Manual, and Modality Logs for complete treatment.       Assessment & Plan     Assessment  Prognosis details: Patient able to tolerate re-start of therapy today without increase symptoms or pain.  Patient using front wheeled walker for gait. Left Knee AAROM flexion to 90 in sitting and extension to 5 degrees (not at neutral) in supine.         Visit Diagnoses:    ICD-10-CM ICD-9-CM   1. Aftercare following left knee joint replacement surgery  Z47.1 V54.81    Z96.652 V43.65   2. Gait disturbance  R26.9 781.2   3. Left knee pain, unspecified chronicity  M25.562 719.46       Progress per Plan of Care           Timed:  Manual Therapy:    10    mins  67111;  Therapeutic Exercise:    28     mins  91852;     Neuromuscular Mona:        mins  60870;    Therapeutic Activity:          mins  86457;     Gait Training:           mins  71535;     Ultrasound:          mins  74430;    Electrical Stimulation:         mins  49493 ( );    Untimed:  Electrical Stimulation:         mins  48564 ( );  Mechanical Traction:         mins  94360;     Timed Treatment:   38   mins   Total Treatment:     38   mins  Becki Olvera PT    Electronically singed 2022      [place] PT license: [id number]  Physical Therapist

## 2022-06-17 ENCOUNTER — TREATMENT (OUTPATIENT)
Dept: PHYSICAL THERAPY | Facility: CLINIC | Age: 72
End: 2022-06-17

## 2022-06-17 DIAGNOSIS — R26.9 GAIT DISTURBANCE: ICD-10-CM

## 2022-06-17 DIAGNOSIS — Z96.652 AFTERCARE FOLLOWING LEFT KNEE JOINT REPLACEMENT SURGERY: Primary | ICD-10-CM

## 2022-06-17 DIAGNOSIS — M25.562 LEFT KNEE PAIN, UNSPECIFIED CHRONICITY: ICD-10-CM

## 2022-06-17 DIAGNOSIS — Z47.1 AFTERCARE FOLLOWING LEFT KNEE JOINT REPLACEMENT SURGERY: Primary | ICD-10-CM

## 2022-06-17 PROCEDURE — 97110 THERAPEUTIC EXERCISES: CPT | Performed by: PHYSICAL THERAPIST

## 2022-06-17 PROCEDURE — 97140 MANUAL THERAPY 1/> REGIONS: CPT | Performed by: PHYSICAL THERAPIST

## 2022-06-17 PROCEDURE — G0283 ELEC STIM OTHER THAN WOUND: HCPCS | Performed by: PHYSICAL THERAPIST

## 2022-06-17 NOTE — PROGRESS NOTES
"Physical Therapy Daily Progress Note        Patient: Kimberly Dennis   : 1950  Diagnosis/ICD-10 Code:  Aftercare following left knee joint replacement surgery [Z47.1, Z96.652]  Referring practitioner: Abdoulaye Paz MD  Date of Initial Visit: Type: THERAPY  Noted: 2022  Today's Date: 2022  Patient seen for 3 sessions             Subjective   Kimberly Dennis rates her left knee pain at 3/10.  She reports having general soreness and tightness after therapy session yesterday.    Objective     Active Range of Motion / Passive Range of Motion   Left Knee   Flexion: 88 degrees  /92 degrees  Extension: 10 degrees /  8 degrees (Lacking full extension)       See Exercise, Manual, and Modality Logs for complete treatment.       Assessment/Plan    Pt tolerated therapy session well - with progression of therapeutic exercises, CKC-Functional activities, and Manual therapy. She has improved, but continues to have deficits in her Left knee ROM,  Strength, and Stability; limiting function and ability to perform ADLs at this time.  She continues to exhibit moderate hip/Knee Muscular tightness with frequent muscle cramping and \"Spasms\" reported by pt during mat exercises and Manual stretching.  Pt responded well to trial of IFC/Ice - reporting decrease in pain and tightness pos session today.    Progress per Plan of Care           Timed:  Manual Therapy:    15     mins  78569;  Therapeutic Exercise:    23     mins  40624;     Neuromuscular Mona:    0    mins  17349;    Therapeutic Activity:     0     mins  02249;     Gait Trainin     mins  63155;     Ultrasound:     0     mins  51929;    Electrical Stimulation:    00     mins  04877;  Iontophoresis     0     mins  91763    Untimed:  Electrical Stimulation:    15     mins  16683 ( );  Mechanical Traction:    0     mins  08152;   Fluidotherapy     0     mins  72554      Timed Treatment:   38   mins   Total Treatment:     53   mins        Louise Caraballo" PTA  Physical Therapist Assistant

## 2022-06-20 ENCOUNTER — TREATMENT (OUTPATIENT)
Dept: PHYSICAL THERAPY | Facility: CLINIC | Age: 72
End: 2022-06-20

## 2022-06-20 DIAGNOSIS — Z47.1 AFTERCARE FOLLOWING LEFT KNEE JOINT REPLACEMENT SURGERY: Primary | ICD-10-CM

## 2022-06-20 DIAGNOSIS — R26.9 GAIT DISTURBANCE: ICD-10-CM

## 2022-06-20 DIAGNOSIS — Z96.652 AFTERCARE FOLLOWING LEFT KNEE JOINT REPLACEMENT SURGERY: Primary | ICD-10-CM

## 2022-06-20 DIAGNOSIS — M25.562 LEFT KNEE PAIN, UNSPECIFIED CHRONICITY: ICD-10-CM

## 2022-06-20 DIAGNOSIS — M17.12 PRIMARY OSTEOARTHRITIS OF LEFT KNEE: ICD-10-CM

## 2022-06-20 PROCEDURE — G0283 ELEC STIM OTHER THAN WOUND: HCPCS | Performed by: PHYSICAL THERAPIST

## 2022-06-20 PROCEDURE — 97140 MANUAL THERAPY 1/> REGIONS: CPT | Performed by: PHYSICAL THERAPIST

## 2022-06-20 PROCEDURE — 97110 THERAPEUTIC EXERCISES: CPT | Performed by: PHYSICAL THERAPIST

## 2022-06-20 RX ORDER — OXYCODONE AND ACETAMINOPHEN 7.5; 325 MG/1; MG/1
1 TABLET ORAL EVERY 4 HOURS PRN
Qty: 42 TABLET | Refills: 0 | Status: SHIPPED | OUTPATIENT
Start: 2022-06-20 | End: 2022-07-12 | Stop reason: ALTCHOICE

## 2022-06-20 NOTE — TELEPHONE ENCOUNTER
Caller: Kimberly Dennis    Relationship: Self    Best call back number: [phone]    Requested Prescriptions:   Requested Prescriptions     Pending Prescriptions Disp Refills   • oxyCODONE-acetaminophen (PERCOCET) 7.5-325 MG per tablet 42 tablet 0     Sig: Take 1 tablet by mouth Every 4 (Four) Hours As Needed for Moderate Pain .        Pharmacy where request should be sent:  [facility]    Does the patient have less than a 3 day supply:  [x] Yes  [] No    William Walters Rep   06/20/22 14:58 EDT

## 2022-06-20 NOTE — PROGRESS NOTES
Physical Therapy Daily Progress Note        Patient: Kimberly Dennis   : 1950  Diagnosis/ICD-10 Code:  Aftercare following left knee joint replacement surgery [Z47.1, Z96.652]  Referring practitioner: Abdoulaye Paz MD  Date of Initial Visit: Type: THERAPY  Noted: 2022  Today's Date: 2022  Patient seen for 4 sessions             Subjective   Kimberly Dennis reports having 3/10 pain in her left knee upon arrival today.    Objective     Active Range of Motion / Passive Range of Motion   Left Knee   Flexion: 92 degrees  /95 degrees  Extension: 8 degrees /  5 degrees (Lacking full extension)       See Exercise, Manual, and Modality Logs for complete treatment.       Assessment/Plan     Pt tolerated therapy session well - with progression of therapeutic exercises, CKC-Functional activities, and Manual therapy. She has improved, but continues to have deficits in her Left knee ROM,  Strength, and Stability; limiting function and ability to perform ADLs at this time.  She continues to exhibit moderate hip/Knee Muscular tightness with intermittent exacerbation of symptoms in back pain.  Pt able to make full revolution on bike backwards today. She responded well to trials of IFC/Ice - reporting decrease in pain and tightness pos session today.    Progress per Plan of Care           Timed:  Manual Therapy:    10     mins  48655;  Therapeutic Exercise:    28     mins  52572;     Neuromuscular Mona:    0    mins  40257;    Therapeutic Activity:     0     mins  71736;     Gait Trainin     mins  18915;     Ultrasound:     0     mins  57917;    Electrical Stimulation:    0     mins  70743;  Iontophoresis     0     mins  42826    Untimed:  Electrical Stimulation:    15     mins  02454 ( );  Mechanical Traction:    0     mins  40600;   Fluidotherapy     0     mins  89729    Timed Treatment:   38   mins   Total Treatment:     53   mins        Louise Caraballo PTA  Physical Therapist Assistant

## 2022-06-22 ENCOUNTER — TREATMENT (OUTPATIENT)
Dept: PHYSICAL THERAPY | Facility: CLINIC | Age: 72
End: 2022-06-22

## 2022-06-22 DIAGNOSIS — R26.9 GAIT DISTURBANCE: ICD-10-CM

## 2022-06-22 DIAGNOSIS — Z47.1 AFTERCARE FOLLOWING LEFT KNEE JOINT REPLACEMENT SURGERY: Primary | ICD-10-CM

## 2022-06-22 DIAGNOSIS — Z96.652 AFTERCARE FOLLOWING LEFT KNEE JOINT REPLACEMENT SURGERY: Primary | ICD-10-CM

## 2022-06-22 DIAGNOSIS — M25.562 LEFT KNEE PAIN, UNSPECIFIED CHRONICITY: ICD-10-CM

## 2022-06-22 PROCEDURE — 97140 MANUAL THERAPY 1/> REGIONS: CPT | Performed by: PHYSICAL THERAPIST

## 2022-06-22 PROCEDURE — G0283 ELEC STIM OTHER THAN WOUND: HCPCS | Performed by: PHYSICAL THERAPIST

## 2022-06-22 PROCEDURE — 97530 THERAPEUTIC ACTIVITIES: CPT | Performed by: PHYSICAL THERAPIST

## 2022-06-22 PROCEDURE — 97110 THERAPEUTIC EXERCISES: CPT | Performed by: PHYSICAL THERAPIST

## 2022-06-22 NOTE — PROGRESS NOTES
"Physical Therapy Daily Treatment Note        Patient: Kimberly Dennis   : 1950  Diagnosis/ICD-10 Code:  Aftercare following left knee joint replacement surgery [Z47.1, Z96.652]  Referring practitioner: Abdoulaye Paz MD  Date of Initial Visit: Type: THERAPY  Noted: 2022  Today's Date: 2022  Patient seen for 5 sessions             Subjective   Kimberly Dennis reports having \"No more than 1 to 1 and a half\" pain in her left knee upon arrival today.      Objective     Active Range of Motion / Passive Range of Motion   Left Knee   Flexion: 95 degrees  /98 degrees  Extension: 6 degrees /  4 degrees (Lacking full extension)    See Exercise, Manual, and Modality Logs for complete treatment.       Assessment/Plan     Pt tolerated therapy session well - with progression of therapeutic exercises, CKC-Functional activities, and Manual therapy. She has improved, but continues to have deficits in her Left knee ROM,  Strength, and Stability; limiting function and ability to perform ADLs at this time.  She continues to exhibit moderate hip/Knee Muscular tightness with intermittent exacerbation of symptoms in back pain.  Pt able to make full revolution on bike backwards 17 times today. She continues to respond well to trials of IFC/Ice - reporting decrease in pain and tightness post sessions.  She has progressed from rolling walker to  Single tip cane for indoor ambulation.  She exhibits good form and sequencing with trials in clinic.        Progress per Plan of Care           Timed:  Manual Therapy:    10     mins  52237;  Therapeutic Exercise:    20     mins  35528;     Neuromuscular Mona:    0    mins  70014;    Therapeutic Activity:     8     mins  11637;     Gait Trainin     mins  10768;     Ultrasound:     0     mins  23730;    Electrical Stimulation:    0     mins  37956;  Iontophoresis     0     mins  24502    Untimed:  Electrical Stimulation:    15     mins  18989 ( );  Mechanical " Traction:    0     mins  17253;   Fluidotherapy     0     mins  11393      Timed Treatment:   38   mins   Total Treatment:     53   mins        Louise Caraballo PTA  Physical Therapist Assistant

## 2022-06-23 ENCOUNTER — HOSPITAL ENCOUNTER (OUTPATIENT)
Dept: CARDIOLOGY | Facility: HOSPITAL | Age: 72
Discharge: HOME | End: 2022-06-23
Admitting: STUDENT IN AN ORGANIZED HEALTH CARE EDUCATION/TRAINING PROGRAM

## 2022-06-23 DIAGNOSIS — I82.462 ACUTE DEEP VEIN THROMBOSIS (DVT) OF CALF MUSCLE VEIN OF LEFT LOWER EXTREMITY: ICD-10-CM

## 2022-06-23 LAB
BH CV LOW VAS LEFT GASTRONEMIUS VESSEL: 1
BH CV LOWER VASCULAR LEFT COMMON FEMORAL AUGMENT: NORMAL
BH CV LOWER VASCULAR LEFT COMMON FEMORAL COMPETENT: NORMAL
BH CV LOWER VASCULAR LEFT COMMON FEMORAL COMPRESS: NORMAL
BH CV LOWER VASCULAR LEFT COMMON FEMORAL PHASIC: NORMAL
BH CV LOWER VASCULAR LEFT COMMON FEMORAL SPONT: NORMAL
BH CV LOWER VASCULAR LEFT DISTAL FEMORAL COMPRESS: NORMAL
BH CV LOWER VASCULAR LEFT GASTRONEMIUS COMPRESS: NORMAL
BH CV LOWER VASCULAR LEFT GASTRONEMIUS THROMBUS: NORMAL
BH CV LOWER VASCULAR LEFT GREATER SAPH AK COMPRESS: NORMAL
BH CV LOWER VASCULAR LEFT GREATER SAPH BK COMPRESS: NORMAL
BH CV LOWER VASCULAR LEFT MID FEMORAL AUGMENT: NORMAL
BH CV LOWER VASCULAR LEFT MID FEMORAL COMPETENT: NORMAL
BH CV LOWER VASCULAR LEFT MID FEMORAL COMPRESS: NORMAL
BH CV LOWER VASCULAR LEFT MID FEMORAL PHASIC: NORMAL
BH CV LOWER VASCULAR LEFT MID FEMORAL SPONT: NORMAL
BH CV LOWER VASCULAR LEFT PERONEAL COMPRESS: NORMAL
BH CV LOWER VASCULAR LEFT POPLITEAL AUGMENT: NORMAL
BH CV LOWER VASCULAR LEFT POPLITEAL COMPETENT: NORMAL
BH CV LOWER VASCULAR LEFT POPLITEAL COMPRESS: NORMAL
BH CV LOWER VASCULAR LEFT POPLITEAL PHASIC: NORMAL
BH CV LOWER VASCULAR LEFT POPLITEAL SPONT: NORMAL
BH CV LOWER VASCULAR LEFT POSTERIOR TIBIAL COMPRESS: NORMAL
BH CV LOWER VASCULAR LEFT PROXIMAL FEMORAL COMPRESS: NORMAL
BH CV LOWER VASCULAR LEFT SAPHENOFEMORAL JUNCTION COMPRESS: NORMAL
BH CV LOWER VASCULAR RIGHT COMMON FEMORAL AUGMENT: NORMAL
BH CV LOWER VASCULAR RIGHT COMMON FEMORAL COMPETENT: NORMAL
BH CV LOWER VASCULAR RIGHT COMMON FEMORAL COMPRESS: NORMAL
BH CV LOWER VASCULAR RIGHT COMMON FEMORAL PHASIC: NORMAL
BH CV LOWER VASCULAR RIGHT COMMON FEMORAL SPONT: NORMAL
BH CV LOWER VASCULAR RIGHT DISTAL FEMORAL COMPRESS: NORMAL
BH CV LOWER VASCULAR RIGHT GASTRONEMIUS COMPRESS: NORMAL
BH CV LOWER VASCULAR RIGHT GREATER SAPH AK COMPRESS: NORMAL
BH CV LOWER VASCULAR RIGHT GREATER SAPH BK COMPRESS: NORMAL
BH CV LOWER VASCULAR RIGHT LESSER SAPH COMPRESS: NORMAL
BH CV LOWER VASCULAR RIGHT MID FEMORAL AUGMENT: NORMAL
BH CV LOWER VASCULAR RIGHT MID FEMORAL COMPETENT: NORMAL
BH CV LOWER VASCULAR RIGHT MID FEMORAL COMPRESS: NORMAL
BH CV LOWER VASCULAR RIGHT MID FEMORAL PHASIC: NORMAL
BH CV LOWER VASCULAR RIGHT MID FEMORAL SPONT: NORMAL
BH CV LOWER VASCULAR RIGHT PERONEAL COMPRESS: NORMAL
BH CV LOWER VASCULAR RIGHT POPLITEAL AUGMENT: NORMAL
BH CV LOWER VASCULAR RIGHT POPLITEAL COMPETENT: NORMAL
BH CV LOWER VASCULAR RIGHT POPLITEAL COMPRESS: NORMAL
BH CV LOWER VASCULAR RIGHT POPLITEAL PHASIC: NORMAL
BH CV LOWER VASCULAR RIGHT POPLITEAL SPONT: NORMAL
BH CV LOWER VASCULAR RIGHT POSTERIOR TIBIAL COMPRESS: NORMAL
BH CV LOWER VASCULAR RIGHT PROFUNDA FEMORAL COMPRESS: NORMAL
BH CV LOWER VASCULAR RIGHT PROXIMAL FEMORAL COMPRESS: NORMAL
BH CV LOWER VASCULAR RIGHT SAPHENOFEMORAL JUNCTION COMPRESS: NORMAL
MAXIMAL PREDICTED HEART RATE: 150 BPM
STRESS TARGET HR: 128 BPM

## 2022-06-23 PROCEDURE — 93971 EXTREMITY STUDY: CPT

## 2022-06-23 PROCEDURE — 93971 EXTREMITY STUDY: CPT | Performed by: SURGERY

## 2022-06-24 ENCOUNTER — TREATMENT (OUTPATIENT)
Dept: PHYSICAL THERAPY | Facility: CLINIC | Age: 72
End: 2022-06-24

## 2022-06-24 DIAGNOSIS — Z96.652 AFTERCARE FOLLOWING LEFT KNEE JOINT REPLACEMENT SURGERY: Primary | ICD-10-CM

## 2022-06-24 DIAGNOSIS — Z47.1 AFTERCARE FOLLOWING LEFT KNEE JOINT REPLACEMENT SURGERY: Primary | ICD-10-CM

## 2022-06-24 DIAGNOSIS — R26.9 GAIT DISTURBANCE: ICD-10-CM

## 2022-06-24 DIAGNOSIS — M25.562 LEFT KNEE PAIN, UNSPECIFIED CHRONICITY: ICD-10-CM

## 2022-06-24 PROCEDURE — 97110 THERAPEUTIC EXERCISES: CPT | Performed by: PHYSICAL THERAPIST

## 2022-06-24 PROCEDURE — 97530 THERAPEUTIC ACTIVITIES: CPT | Performed by: PHYSICAL THERAPIST

## 2022-06-24 PROCEDURE — G0283 ELEC STIM OTHER THAN WOUND: HCPCS | Performed by: PHYSICAL THERAPIST

## 2022-06-24 PROCEDURE — 97140 MANUAL THERAPY 1/> REGIONS: CPT | Performed by: PHYSICAL THERAPIST

## 2022-06-24 NOTE — PROGRESS NOTES
Physical Therapy Daily Treatment Note        Patient: Kimberly Dennis   : 1950  Diagnosis/ICD-10 Code:  Aftercare following left knee joint replacement surgery [Z47.1, Z96.652]  Referring practitioner: Abdoulaye Paz MD  Date of Initial Visit: Type: THERAPY  Noted: 2022  Today's Date: 2022  Patient seen for 6 sessions             Subjective   Kimberly Dennis reports that her pain has not been too bad in her left knee.  She reports that she continues to take less pain medication.  She reports her greatest issue is regarding persistent swelling and stiffness.  She rates her discomfort at 2/10 upon arrival.    Objective     Active Range of Motion / Passive Range of Motion   Left Knee   Flexion: 98 degrees  /102 degrees  Extension: 6 degrees /  4 degrees (Lacking full extension)    See Exercise, Manual, and Modality Logs for complete treatment.       Assessment/Plan     Pt tolerated therapy session well - with progression of therapeutic exercises, CKC-Functional activities, and Manual therapy. She has improved, but continues to have deficits in her Left knee ROM,  Strength, and Stability; limiting function and ability to perform ADLs at this time.  She continues to exhibit moderate hip/Knee Muscular tightness with intermittent exacerbation of symptoms in back pain.  Pt able to make full revolution on bike backwards approximately 25% of time. She continues to respond well to trials of IFC/Ice - reporting decrease in pain and tightness post sessions.  She has progressed from rolling walker to  Single tip cane for indoor ambulation.   Moderate edema persist .          Progress per Plan of Care           Timed:  Manual Therapy:    10     mins  47035;  Therapeutic Exercise:    20     mins  76387;     Neuromuscular Mona:    0    mins  41148;    Therapeutic Activity:     8     mins  87198;     Gait Trainin     mins  24797;     Ultrasound:     0     mins  78020;    Electrical Stimulation:    0      mins  84146;  Iontophoresis     0     mins  08663    Untimed:  Electrical Stimulation:    15     mins  90329 ( );  Mechanical Traction:    0     mins  35690;     Timed Treatment:   38   mins   Total Treatment:     53   mins        Louise Caraballo PTA  Physical Therapist Assistant

## 2022-06-27 ENCOUNTER — TREATMENT (OUTPATIENT)
Dept: PHYSICAL THERAPY | Facility: CLINIC | Age: 72
End: 2022-06-27

## 2022-06-27 DIAGNOSIS — R26.9 GAIT DISTURBANCE: ICD-10-CM

## 2022-06-27 DIAGNOSIS — Z96.652 AFTERCARE FOLLOWING LEFT KNEE JOINT REPLACEMENT SURGERY: Primary | ICD-10-CM

## 2022-06-27 DIAGNOSIS — Z47.1 AFTERCARE FOLLOWING LEFT KNEE JOINT REPLACEMENT SURGERY: Primary | ICD-10-CM

## 2022-06-27 DIAGNOSIS — M25.562 LEFT KNEE PAIN, UNSPECIFIED CHRONICITY: ICD-10-CM

## 2022-06-27 PROCEDURE — 97530 THERAPEUTIC ACTIVITIES: CPT | Performed by: PHYSICAL THERAPIST

## 2022-06-27 PROCEDURE — G0283 ELEC STIM OTHER THAN WOUND: HCPCS | Performed by: PHYSICAL THERAPIST

## 2022-06-27 PROCEDURE — 97110 THERAPEUTIC EXERCISES: CPT | Performed by: PHYSICAL THERAPIST

## 2022-06-27 PROCEDURE — 97140 MANUAL THERAPY 1/> REGIONS: CPT | Performed by: PHYSICAL THERAPIST

## 2022-06-27 NOTE — PROGRESS NOTES
Physical Therapy Daily Treatment Note        Patient: Kimberly Dennis   : 1950  Diagnosis/ICD-10 Code:  Aftercare following left knee joint replacement surgery [Z47.1, Z96.652]  Referring practitioner: Abdoulaye Paz MD  Date of Initial Visit: Type: THERAPY  Noted: 2022  Today's Date: 2022  Patient seen for 7 sessions             Subjective   Kimberly Dennis denies having any left knee pain upon arrival.  She does report that she has had skin issues from Band at proximal Compression hose on her left Thigh.      Objective     Active Range of Motion / Passive Range of Motion   Left Knee   Flexion: 97 degrees  /102 degrees  Extension: 5 degrees /  3 degrees (Lacking full extension)       See Exercise, Manual, and Modality Logs for complete treatment.       Assessment/Plan     Pt tolerated therapy session well - with progression of therapeutic exercises, CKC-Functional activities, and Manual therapy. She has improved, but continues to have deficits in her Left knee ROM,  Strength, and Stability; limiting function and ability to perform ADLs at this time.  She continues to exhibit moderate hip/Knee Muscular tightness with intermittent exacerbation of symptoms in back pain.  Pt able to make full revolution on bike backwards approximately 95% of time. She continues to respond well to trials of IFC/Ice - reporting decrease in pain and tightness post sessions.  She has progressed from rolling walker to  Single tip cane for indoor ambulation.         Progress per Plan of Care           Timed:  Manual Therapy:    10     mins  82285;  Therapeutic Exercise:    20     mins  44728;     Neuromuscular Mona:    0    mins  64348;    Therapeutic Activity:     10     mins  58847;     Gait Trainin     mins  44044;     Ultrasound:     0     mins  76731;    Electrical Stimulation:    0     mins  99877;  Iontophoresis     0     mins  75372    Untimed:  Electrical Stimulation:    15     mins  09728 (  );  Mechanical Traction:    0     mins  78131;       Timed Treatment:   40   mins   Total Treatment:     55   mins        Louise Caraballo PTA  Physical Therapist Assistant

## 2022-06-28 DIAGNOSIS — J30.2 SEASONAL ALLERGIC RHINITIS, UNSPECIFIED TRIGGER: ICD-10-CM

## 2022-06-28 RX ORDER — MONTELUKAST SODIUM 10 MG/1
TABLET ORAL
Qty: 90 TABLET | Refills: 3 | Status: SHIPPED | OUTPATIENT
Start: 2022-06-28

## 2022-06-28 RX ORDER — LOSARTAN POTASSIUM 25 MG/1
TABLET ORAL
Qty: 90 TABLET | Refills: 3 | Status: SHIPPED | OUTPATIENT
Start: 2022-06-28

## 2022-06-28 NOTE — TELEPHONE ENCOUNTER
Leukotriene Inhibitors Protocol Passed 06/28/2022 11:54 AM    No active pregnancy on record    No positive pregnancy test in past 12 months    Visit with authorizing provider in past 12 months or upcoming 30 days

## 2022-06-28 NOTE — TELEPHONE ENCOUNTER
ARB Protocol Passed 06/28/2022 11:54 AM   Protocol Details  No active pregnancy on record    Normal serum potassium on file within the past year    BP under 140/90 in past year    No positive pregnancy test in past 12 months    Patient is not on Entresto    Patient is not on concurrent ACE    Normal serum creatinine on file within the past year    Visit with authorizing provider in past 12 months or upcoming 30 days

## 2022-06-29 ENCOUNTER — TREATMENT (OUTPATIENT)
Dept: PHYSICAL THERAPY | Facility: CLINIC | Age: 72
End: 2022-06-29

## 2022-06-29 DIAGNOSIS — Z47.1 AFTERCARE FOLLOWING LEFT KNEE JOINT REPLACEMENT SURGERY: Primary | ICD-10-CM

## 2022-06-29 DIAGNOSIS — Z96.652 AFTERCARE FOLLOWING LEFT KNEE JOINT REPLACEMENT SURGERY: Primary | ICD-10-CM

## 2022-06-29 DIAGNOSIS — M25.562 LEFT KNEE PAIN, UNSPECIFIED CHRONICITY: ICD-10-CM

## 2022-06-29 DIAGNOSIS — R26.9 GAIT DISTURBANCE: ICD-10-CM

## 2022-06-29 PROCEDURE — 97530 THERAPEUTIC ACTIVITIES: CPT | Performed by: PHYSICAL THERAPIST

## 2022-06-29 PROCEDURE — 97110 THERAPEUTIC EXERCISES: CPT | Performed by: PHYSICAL THERAPIST

## 2022-06-29 PROCEDURE — 97140 MANUAL THERAPY 1/> REGIONS: CPT | Performed by: PHYSICAL THERAPIST

## 2022-06-29 PROCEDURE — G0283 ELEC STIM OTHER THAN WOUND: HCPCS | Performed by: PHYSICAL THERAPIST

## 2022-06-29 NOTE — PROGRESS NOTES
"Physical Therapy Daily Treatment Note        Patient: Kimberly Dennis   : 1950  Diagnosis/ICD-10 Code:  Aftercare following left knee joint replacement surgery [Z47.1, Z96.652]  Referring practitioner: Abdoulaye Paz MD  Date of Initial Visit: Type: THERAPY  Noted: 2022  Today's Date: 2022  Patient seen for 8 sessions             Subjective   Kimberly Dennis denies having any pain in her knee upon arrival.  She primarily reports general \"Soreness\" and \"Stiffness\".  She reports that her back and hip feel better after seeing her Chiropractor earlier today.  She states, \"He worked the kinks out\".     Objective     Active Range of Motion / Passive Range of Motion   Left Knee   Flexion: 98 degrees  /105 degrees  Extension: 5 degrees /  3 degrees (Lacking full extension)    See Exercise, Manual, and Modality Logs for complete treatment.       Assessment/Plan     Pt tolerated therapy session well - with progression of therapeutic exercises, CKC-Functional activities, and Manual therapy. She has improved, but continues to have deficits in her Left knee ROM,  Strength, and Pain; limiting function and ability to perform ADLs at this time.  She continues to exhibit moderate hip/Knee Muscular tightness with intermittent exacerbation of back pain. Improved tolerance noted overall as pt denied any  flare-up of back pain today during Manual Stretching.  She continues to respond well to trials of IFC/Ice - reporting decrease in pain and tightness post sessions.  She is now ambulating in/out of clinic with  Single tip cane.  She is also now able to make full revolution on bike with less difficulty and pain.          Progress per Plan of Care           Timed:  Manual Therapy:    10     mins  20279;  Therapeutic Exercise:    20     mins  02865;     Neuromuscular Mona:    0    mins  88596;    Therapeutic Activity:     10     mins  35346;     Gait Trainin     mins  73430;     Ultrasound:     0     mins  96852; "    Electrical Stimulation:    0     mins  11777;  Iontophoresis     0     mins  96098    Untimed:  Electrical Stimulation:    15     mins  81063 ( );  Mechanical Traction:    0     mins  21616;   Fluidotherapy     0     mins  19270  Hot pack     0     mins  18681  Cold pack     0     mins  71024    Timed Treatment:   40   mins   Total Treatment:     55   mins        Louise Caraballo PTA  Physical Therapist Assistant

## 2022-07-01 ENCOUNTER — TREATMENT (OUTPATIENT)
Dept: PHYSICAL THERAPY | Facility: CLINIC | Age: 72
End: 2022-07-01

## 2022-07-01 DIAGNOSIS — R26.9 GAIT DISTURBANCE: ICD-10-CM

## 2022-07-01 DIAGNOSIS — M25.562 LEFT KNEE PAIN, UNSPECIFIED CHRONICITY: ICD-10-CM

## 2022-07-01 DIAGNOSIS — Z96.652 AFTERCARE FOLLOWING LEFT KNEE JOINT REPLACEMENT SURGERY: Primary | ICD-10-CM

## 2022-07-01 DIAGNOSIS — Z47.1 AFTERCARE FOLLOWING LEFT KNEE JOINT REPLACEMENT SURGERY: Primary | ICD-10-CM

## 2022-07-01 PROCEDURE — G0283 ELEC STIM OTHER THAN WOUND: HCPCS | Performed by: PHYSICAL THERAPIST

## 2022-07-01 PROCEDURE — 97140 MANUAL THERAPY 1/> REGIONS: CPT | Performed by: PHYSICAL THERAPIST

## 2022-07-01 PROCEDURE — 97530 THERAPEUTIC ACTIVITIES: CPT | Performed by: PHYSICAL THERAPIST

## 2022-07-01 PROCEDURE — 97110 THERAPEUTIC EXERCISES: CPT | Performed by: PHYSICAL THERAPIST

## 2022-07-01 NOTE — PROGRESS NOTES
"Physical Therapy Daily Treatment Note        Patient: Kimberly Dennis   : 1950  Diagnosis/ICD-10 Code:  Aftercare following left knee joint replacement surgery [Z47.1, Z96.652]  Referring practitioner: Abdoulaye Paz MD  Date of Initial Visit: No linked episodes  Today's Date: 2022  Patient seen for Visit count could not be calculated. Make sure you are using a visit which is associated with an episode. sessions             Subjective   Kimberly Dennis reports having increased stiffness and \"Tremors\"-\"Vibration\"  Sensation in Left Lower extremity today.  She also reports having slight increase in hamstring discomfort.    Objective     Active Range of Motion / Passive Range of Motion   Left Knee   Flexion: 100 degrees  /105 degrees  Extension: 5 degrees /  3 degrees (Lacking full extension)    See Exercise, Manual, and Modality Logs for complete treatment.       Assessment/Plan     Pt tolerated therapy session well - with progression of therapeutic exercises, CKC-Functional activities, and Manual therapy. She has improved, but continues to have deficits in her Left knee ROM,  Strength, and Pain; limiting function and ability to perform ADLs at this time.  She continues to exhibit moderate hip/Knee Muscular tightness.  She continues to respond well to trials of IFC/Ice - reporting decrease in pain and tightness post sessions.  She is now ambulating in/out of clinic with  Single tip cane.  She is also now able to make full revolution on bike with less difficulty and pain.    Progress per Plan of Care           Timed:  Manual Therapy:    10     mins  59553;  Therapeutic Exercise:    20     mins  18889;     Neuromuscular Mona:    0    mins  67540;    Therapeutic Activity:     8     mins  38037;     Gait Trainin     mins  83078;     Ultrasound:     0     mins  06137;    Electrical Stimulation:    0     mins  83034;  Iontophoresis     0     mins  43610    Untimed:  Electrical Stimulation:    15     " mins  33351 ( );  Mechanical Traction:    0     mins  48282;   Fluidotherapy     0     mins  68177  Hot pack     0     mins  61121  Cold pack     0     mins  09938    Timed Treatment:   38   mins   Total Treatment:     53   mins        Louise Caraballo PTA  Physical Therapist Assistant

## 2022-07-05 ENCOUNTER — TREATMENT (OUTPATIENT)
Dept: PHYSICAL THERAPY | Facility: CLINIC | Age: 72
End: 2022-07-05

## 2022-07-05 DIAGNOSIS — Z96.652 AFTERCARE FOLLOWING LEFT KNEE JOINT REPLACEMENT SURGERY: Primary | ICD-10-CM

## 2022-07-05 DIAGNOSIS — M25.562 LEFT KNEE PAIN, UNSPECIFIED CHRONICITY: ICD-10-CM

## 2022-07-05 DIAGNOSIS — Z47.1 AFTERCARE FOLLOWING LEFT KNEE JOINT REPLACEMENT SURGERY: Primary | ICD-10-CM

## 2022-07-05 DIAGNOSIS — R26.9 GAIT DISTURBANCE: ICD-10-CM

## 2022-07-05 PROCEDURE — 97140 MANUAL THERAPY 1/> REGIONS: CPT | Performed by: PHYSICAL THERAPIST

## 2022-07-05 PROCEDURE — 97110 THERAPEUTIC EXERCISES: CPT | Performed by: PHYSICAL THERAPIST

## 2022-07-05 PROCEDURE — G0283 ELEC STIM OTHER THAN WOUND: HCPCS | Performed by: PHYSICAL THERAPIST

## 2022-07-05 NOTE — PROGRESS NOTES
Physical Therapy Progress Note      Patient: Kimberly Dennis   : 1950  Referring practitioner: Abdoulaye Paz MD  Date of Initial Visit: Type: THERAPY  Noted: 2022  Today's Date: 2022  Patient seen for 10 sessions           Subjective   Kimberly Dennis reports: left knee stiff; pain 1/10 in hamstrings  Subjective Questionnaire: LEFS: 34/80=58% limitation improved from initial score : =91% limitation      Objective          Active Range of Motion   Left Knee   Flexion: 108 degrees   Extension: 3 degrees     Additional Active Range of Motion Details  AAROM  Patient unable to achieve neutral extension    Strength/Myotome Testing     Left Knee   Flexion: 3+  Extension: 3+  Quadriceps contraction: fair    Left Knee Flexibility Comments:   Noted left hamstring tightness    Ambulation     Comments   Patient ambulates with straight cane which is an upgrade from front wheeled walker. Noted decrease left heel strike/toe off.       See Exercise, Manual, and Modality Logs for complete treatment.       Assessment & Plan     Assessment  Impairments: abnormal gait, abnormal or restricted ROM, activity intolerance, impaired balance, impaired physical strength, lacks appropriate home exercise program, pain with function and weight-bearing intolerance  Functional Limitations: walking, pushing, uncomfortable because of pain, moving in bed, sitting and standing  Assessment details: Pt presents with limitations, noted by evaluation that impede patient's ability to ambulate/functional mobility.  The skills of a therapist will be required to safely and effectively implement the following treatment plan to restore maximal level of function.    Prognosis: good  Prognosis details: Patient demonstrates increase in knee strength/range of motion, but still needing to use an assistive device for ambulation. Patient would benefit from continued skilled physical therapy for improvement in patient's functional  independence/mobility.    Goals  Plan Goals: 1. The patient has limited ROM of the left knee.   LTG 1: 8 weeks:  The patient will demonstrate 0 to 120 degrees of ROM for the left knee in order to allow patient to normalize gait/negotiate stairs.   STATUS:  Progressing  STG 1a: 4 weeks:  The patient will demonstrate 5 to 110 degrees of ROM for the left knee.   STATUS: Progressing      2. The patient has limited strength of the left knee.   LTG 2: 12 weeks: The patient will demonstrate 4/5 strength for left knee flexion and extension in order to allow patient improved joint stability.   STATUS:  progressing  STG 2a: 6 weeks: The patient will demonstrate 3+/5 strength for left knee flexion and extension   STATUS: Met       3. The patient has gait dysfunction.   LTG 3: 8 weeks:  The patient will ambulate without assistive device, independently, for community distances with minimal limp to the left lower extremity in order to improve mobility and allow patient to perform activities such as grocery shopping with greater ease.   STATUS:  progressing  STG 3a: The patient will be independent in HEP.   STATUS: ongoing     4. The patient complains of left knee pain.   LTG 4: 12 weeks:  The patient will report a pain rating of 2/10 or better in order to improve   tolerance to activities of daily living and improve sleep quality.   STATUS:  ongoing   STG 4a: 6 weeks:  The patient will report a pain rating of 4/10 or better.   STATUS:  Met     5. Mobility: Walking/Moving Around Functional Limitation     LTG 5: 12weeks:  The patient will demonstrate 37% limitation by achieving a score of 50/80 on the Lower Extremity Functional Scale.   STATUS:  progressing  STG 5a: 6 weeks:  The patient will demonstrate 50% limitation by achieving a score of 40/80 on the Lower Extremity Functional Scale.     STATUS:  Progressing      Plan  Therapy options: will be seen for skilled therapy services  Planned modality interventions: cryotherapy and  TENS  Planned therapy interventions: manual therapy, soft tissue mobilization, spinal/joint mobilization, strengthening, stretching, therapeutic activities, transfer training, home exercise program, gait training, functional ROM exercises and flexibility  Frequency: 3x week  Duration in weeks: 8  Treatment plan discussed with: patient  Plan details: 3-2xwk x8 wks        Visit Diagnoses:    ICD-10-CM ICD-9-CM   1. Aftercare following left knee joint replacement surgery  Z47.1 V54.81    Z96.652 V43.65   2. Gait disturbance  R26.9 781.2   3. Left knee pain, unspecified chronicity  M25.562 719.46       Progress per Plan of Care           Timed:  Manual Therapy:    12     mins  71032;  Therapeutic Exercise:    28     mins  97209;     Neuromuscular Mona:        mins  11202;    Therapeutic Activity:          mins  51133;     Gait Training:           mins  57723;     Ultrasound:          mins  15486;    Electrical Stimulation:         mins  86502 ( );    Untimed:  Electrical Stimulation:     15    mins  72091 ( );  Mechanical Traction:         mins  94155;     Timed Treatment:   40   mins   Total Treatment:     55   mins  Becki Olvera PT    Electronically singed 7/5/2022      [place] PT license: [id number]  Physical Therapist

## 2022-07-08 ENCOUNTER — TREATMENT (OUTPATIENT)
Dept: PHYSICAL THERAPY | Facility: CLINIC | Age: 72
End: 2022-07-08

## 2022-07-08 DIAGNOSIS — Z96.652 AFTERCARE FOLLOWING LEFT KNEE JOINT REPLACEMENT SURGERY: Primary | ICD-10-CM

## 2022-07-08 DIAGNOSIS — M25.562 LEFT KNEE PAIN, UNSPECIFIED CHRONICITY: ICD-10-CM

## 2022-07-08 DIAGNOSIS — Z47.1 AFTERCARE FOLLOWING LEFT KNEE JOINT REPLACEMENT SURGERY: Primary | ICD-10-CM

## 2022-07-08 DIAGNOSIS — R26.9 GAIT DISTURBANCE: ICD-10-CM

## 2022-07-08 PROCEDURE — 97530 THERAPEUTIC ACTIVITIES: CPT | Performed by: PHYSICAL THERAPIST

## 2022-07-08 PROCEDURE — 97140 MANUAL THERAPY 1/> REGIONS: CPT | Performed by: PHYSICAL THERAPIST

## 2022-07-08 PROCEDURE — 97110 THERAPEUTIC EXERCISES: CPT | Performed by: PHYSICAL THERAPIST

## 2022-07-08 PROCEDURE — G0283 ELEC STIM OTHER THAN WOUND: HCPCS | Performed by: PHYSICAL THERAPIST

## 2022-07-08 NOTE — PROGRESS NOTES
"Physical Therapy Daily Treatment Note        Patient: Kimberly Dennis   : 1950  Diagnosis/ICD-10 Code:  Aftercare following left knee joint replacement surgery [Z47.1, Z96.652]  Referring practitioner: Abdoulaye Paz MD  Date of Initial Visit: Type: THERAPY  Noted: 2022  Today's Date: 2022  Patient seen for 11 sessions             Subjective   Kimberly Dennis reports that she was able to sleep in her bed all night after purchasing \"Wedge\" for her bed to support her knees.  She reports having only mild \"Discomfort\"- Pulling and Tightness  in left hamstring.    Objective     Active Range of Motion / Passive Range of Motion   Left Knee   Flexion: 105 degrees  /110 degrees  Extension: 8 degrees /  5 degrees (Lacking full extension)       See Exercise,  Manual, and Modality Logs for complete treatment.       Assessment/Plan     Pt tolerated therapy session well - with progression of therapeutic exercises, CKC-Functional activities, and Manual therapy. She has improved, but continues to have deficits in her Left knee ROM,  Strength, and Pain; limiting function and ability to perform ADLs at this time.  She continues to exhibit moderate hip/Knee Muscular tightness.  She continues to respond well to trials of IFC/Ice - reporting decrease in pain and tightness post sessions.  She is now ambulating in/out of clinic with  Single tip cane and is also now able to make full revolution on bike with less difficulty and pain.  Therapy session hindered today by exacerbation of pain and tightness in hamstring musculature.       Progress per Plan of Care           Timed:  Manual Therapy:    10     mins  29904;  Therapeutic Exercise:    20     mins  80210;     Neuromuscular Mona:    0    mins  73352;    Therapeutic Activity:     8     mins  66585;     Gait Trainin     mins  84470;     Ultrasound:     0     mins  65152;    Electrical Stimulation:    0     mins  16338;  Iontophoresis     0     mins  " 08744    Untimed:  Electrical Stimulation:    15     mins  05328 ( );  Mechanical Traction:    0     mins  46640;       Timed Treatment:   38   mins   Total Treatment:     53   mins        Louise Caraballo PTA  Physical Therapist Assistant

## 2022-07-11 ENCOUNTER — TREATMENT (OUTPATIENT)
Dept: PHYSICAL THERAPY | Facility: CLINIC | Age: 72
End: 2022-07-11

## 2022-07-11 DIAGNOSIS — Z96.652 AFTERCARE FOLLOWING LEFT KNEE JOINT REPLACEMENT SURGERY: Primary | ICD-10-CM

## 2022-07-11 DIAGNOSIS — R26.9 GAIT DISTURBANCE: ICD-10-CM

## 2022-07-11 DIAGNOSIS — M25.562 LEFT KNEE PAIN, UNSPECIFIED CHRONICITY: ICD-10-CM

## 2022-07-11 DIAGNOSIS — Z47.1 AFTERCARE FOLLOWING LEFT KNEE JOINT REPLACEMENT SURGERY: Primary | ICD-10-CM

## 2022-07-11 PROCEDURE — 97110 THERAPEUTIC EXERCISES: CPT | Performed by: PHYSICAL THERAPIST

## 2022-07-11 PROCEDURE — 97530 THERAPEUTIC ACTIVITIES: CPT | Performed by: PHYSICAL THERAPIST

## 2022-07-11 PROCEDURE — G0283 ELEC STIM OTHER THAN WOUND: HCPCS | Performed by: PHYSICAL THERAPIST

## 2022-07-11 PROCEDURE — 97140 MANUAL THERAPY 1/> REGIONS: CPT | Performed by: PHYSICAL THERAPIST

## 2022-07-11 NOTE — PROGRESS NOTES
Physical Therapy Daily Treatment Note        Patient: Kimberly Dennis   : 1950  Diagnosis/ICD-10 Code:  Aftercare following left knee joint replacement surgery [Z47.1, Z96.652]  Referring practitioner: Abdoulaye Paz MD  Date of Initial Visit: Type: THERAPY  Noted: 2022  Today's Date: 2022  Patient seen for 12 sessions             Subjective   Kimberly Dennis reports that she has been trying to wean off Pain medicine; which she reports makes her therapy a little harder and more painful.  She reports having hamstring tightness and irritation.  She rates her pain from 2/10 to 6/10 at worst.    She also reports continued redness and irritation at proximal incision site.    Objective     Active Range of Motion   Left Knee   Flexion: 100-108 degrees   Extension: (-)6 degrees  ( Lacking full extension)    Passive Range of Motion  Left Knee  Flexion:  108-110 degrees  Extension:  (-)4 degrees (Lacking full extension)    Strength/Myotome Testing      Left Knee   Flexion: 4/5  Extension: 4/5  Quadriceps contraction: fair     Left Knee Flexibility Comments:   Noted left hamstring tightness     Ambulation      Comments   Patient ambulates with straight cane which is an upgrade from front wheeled walker. Noted decrease left heel strike/toe off.      See Exercise, Manual, and Modality Logs for complete treatment.       Assessment/Plan     Pt tolerated therapy session well - with progression of therapeutic exercises, CKC-Functional activities, and Manual therapy. She has improved, but continues to have deficits in her Left knee ROM,  Strength, and Pain; limiting function and ability to perform ADLs at this time.  She continues to exhibit moderate hip/Knee Muscular tightness.  She continues to respond well to trials of IFC/Ice - reporting decrease in pain and tightness post sessions.  She is now ambulating in/out of clinic with  Single tip cane and is also now able to make full revolution on bike with less  difficulty and pain.  Therapy session hindered today by ease in exacerbation of pain and tightness in her lower back and hamstring musculature.     See Progress note to MD for follow-up with Orthopedic [facility].     Progress per Plan of Care           Timed:  Manual Therapy:    10     mins  68226;  Therapeutic Exercise:    20     mins  22181;     Neuromuscular Mona:    0    mins  16058;    Therapeutic Activity:     8     mins  49754;     Gait Trainin     mins  63047;     Ultrasound:     0     mins  69137;    Electrical Stimulation:    0     mins  01029;  Iontophoresis     0     mins  93331    Untimed:  Electrical Stimulation:    15     mins  02495 ( );  Mechanical Traction:    0     mins  11199;   Fluidotherapy     0     mins  82963  Hot pack     0     mins  23694  Cold pack     0     mins  73969    Timed Treatment:   38   mins   Total Treatment:     53   mins        Louise Caraballo PTA  Physical Therapist Assistant

## 2022-07-12 ENCOUNTER — OFFICE VISIT (OUTPATIENT)
Dept: INTERNAL MEDICINE | Facility: CLINIC | Age: 72
End: 2022-07-12

## 2022-07-12 VITALS
TEMPERATURE: 97.5 F | HEART RATE: 71 BPM | OXYGEN SATURATION: 95 % | BODY MASS INDEX: 40.76 KG/M2 | DIASTOLIC BLOOD PRESSURE: 69 MMHG | WEIGHT: 253.6 LBS | HEIGHT: 66 IN | SYSTOLIC BLOOD PRESSURE: 145 MMHG

## 2022-07-12 DIAGNOSIS — I10 ESSENTIAL HYPERTENSION: ICD-10-CM

## 2022-07-12 DIAGNOSIS — Z47.1 AFTERCARE FOLLOWING LEFT KNEE JOINT REPLACEMENT SURGERY: Primary | ICD-10-CM

## 2022-07-12 DIAGNOSIS — I82.462 ACUTE DEEP VEIN THROMBOSIS (DVT) OF CALF MUSCLE VEIN OF LEFT LOWER EXTREMITY: ICD-10-CM

## 2022-07-12 DIAGNOSIS — Z23 NEED FOR PNEUMOCOCCAL VACCINATION: ICD-10-CM

## 2022-07-12 DIAGNOSIS — I48.0 PAROXYSMAL ATRIAL FIBRILLATION: ICD-10-CM

## 2022-07-12 DIAGNOSIS — Z96.652 AFTERCARE FOLLOWING LEFT KNEE JOINT REPLACEMENT SURGERY: Primary | ICD-10-CM

## 2022-07-12 PROCEDURE — 99214 OFFICE O/P EST MOD 30 MIN: CPT | Performed by: INTERNAL MEDICINE

## 2022-07-12 PROCEDURE — 90677 PCV20 VACCINE IM: CPT | Performed by: INTERNAL MEDICINE

## 2022-07-12 PROCEDURE — G0009 ADMIN PNEUMOCOCCAL VACCINE: HCPCS | Performed by: INTERNAL MEDICINE

## 2022-07-12 RX ORDER — GABAPENTIN 300 MG/1
300 CAPSULE ORAL 3 TIMES DAILY
Qty: 90 CAPSULE | Refills: 0 | Status: SHIPPED | OUTPATIENT
Start: 2022-07-12 | End: 2022-09-26 | Stop reason: SDUPTHER

## 2022-07-12 NOTE — PROGRESS NOTES
Chief Complaint  Atrial Fibrillation (./) and knee surgery  (Patient has knee replacement- LEFT June 3RD )    Meghan Dennis presents to [facility]  History of Present Illness  DVT- diagnosed about 1 week after surgery. Patient noted calf swelling and pain initially. These symptoms are largely unchanged at this time.  patient is now on eliquis.   afib- patient is back on eliquis.   hypertension- patient without recent home checks. Patient had previously been stable on regimen.   Left knee status post replacement- patient is undergoing physical therapy. Patient reports percocet caused abdominal discomfort and mental status changes. Patient reports ongoing pain. Patient reports sleeping poorly. Patient is only getting 2-4 hours per night on her CPAP. Patient reports no help with tylenol PM. Patient cox snot like taking percocet due to mental effects.       Current Outpatient Medications   Medication Instructions   • cetirizine (ZYRTEC) 10 mg, Oral, Daily   • Cholecalciferol 125 MCG (5000 UT) tablet 1 tablet, Oral, Daily   • Eliquis 5 MG tablet tablet Take 1 tablet by mouth twice daily   • fluticasone (Flonase) 50 MCG/ACT nasal spray 2 sprays, Nasal, Daily   • gabapentin (NEURONTIN) 300 mg, Oral, 3 Times Daily   • hydroCHLOROthiazide (HYDRODIURIL) 25 MG tablet Take 1 tablet by mouth once daily   • levothyroxine (SYNTHROID, LEVOTHROID) 75 MCG tablet Take 1 tablet by mouth once daily   • losartan (COZAAR) 25 MG tablet Take 1 tablet by mouth once daily   • metoprolol succinate XL (TOPROL-XL) 50 mg, Oral, Daily   • montelukast (SINGULAIR) 10 MG tablet TAKE 1 TABLET BY MOUTH ONCE DAILY AT NIGHT   • polyethylene glycol (GLYCOLAX) 17 g, Oral, Daily       The following portions of the patient's history were reviewed and updated as appropriate: allergies, current medications, past family history, past medical history, past social history, past surgical history,  "and problem list.    Objective   Vital Signs:   /69 (BP Location: Right arm)   Pulse 71   Temp 97.5 °F (36.4 °C) (Temporal)   Ht 167.6 cm (66\")   Wt 115 kg (253 lb 9.6 oz)   SpO2 95%   BMI 40.93 kg/m²     Wt Readings from Last 3 Encounters:   07/12/22 115 kg (253 lb 9.6 oz)   06/15/22 117 kg (257 lb)   06/09/22 118 kg (260 lb 12.8 oz)     BP Readings from Last 3 Encounters:   07/12/22 145/69   06/09/22 126/56   06/07/22 144/64     Physical Exam   Appearance: No acute distress, well-nourished  Head: normocephalic, atraumatic  Eyes: extraocular movements intact, no scleral icterus, no conjunctival injection  Ears, Nose, and Throat: external ears normal, nares patent, moist mucous membranes  Cardiovascular: regular rate and rhythm. no murmurs, rubs, or gallops. no edema  Respiratory: breathing comfortably, symmetric chest rise, clear to auscultation bilaterally. No wheezes, rales, or rhonchi.  Neuro: alert and oriented to time, place, and person. Normal gait  Psych: normal mood and affect     Result Review :   The following data was reviewed by: Milan Coppola Jr, MD on 07/12/2022:  Common labs    Common Labsle 5/23/22 5/23/22 5/23/22 6/4/22 6/4/22 6/7/22 6/7/22    1122 1122 1122 0006 0459 1933 1933   Glucose  99     109 (A)   BUN  13     11   Creatinine  0.60     0.53 (A)   Sodium  138     137   Potassium  4.1     4.0   Chloride  100     98   Calcium  9.9     9.3   Albumin  4.40     3.60   Total Bilirubin  0.4     0.6   Alkaline Phosphatase  84     70   AST (SGOT)  18     15   ALT (SGPT)  15     11   WBC 6.73   8.66  6.90    Hemoglobin 12.2   9.5 (A) 9.1 (A) 9.1 (A)    Hematocrit 37.2   29.5 (A) 27.5 (A) 27.4 (A)    Platelets 199   145  218    Hemoglobin A1C   5.30       (A) Abnormal value                Lab Results   Component Value Date    SARSANTIGEN Not Detected 05/24/2022    RAPFLUA Negative 05/24/2022    RAPFLUB Negative 05/24/2022    INR 1.23 (H) 05/23/2022        Assessment and Plan  "   Diagnoses and all orders for this visit:    1. Aftercare following left knee joint replacement surgery (Primary)  Comments:  will start gabapentin in lieu of percocet. hope this helps with sleep as well. f/u in approx 1 month for repeat eval. cont PT.   Orders:  -     gabapentin (NEURONTIN) 300 MG capsule; Take 1 capsule by mouth 3 (Three) Times a Day.  Dispense: 90 capsule; Refill: 0    2. Paroxysmal atrial fibrillation (HCC)  Comments:  HR controlled. pt is back on eliquis.     3. Essential hypertension  Comments:  elevated today but possibly due to pain. cont monitoring.     4. Need for pneumococcal vaccination  -     Pneumococcal Conjugate Vaccine 20-Valent All    5. Acute deep vein thrombosis (DVT) of calf muscle vein of left lower extremity (HCC)  Comments:  after knee replacement. treating with eliquis. monitor symptoms. serial US reviewed without change in clot burden.           Medications Discontinued During This Encounter   Medication Reason   • oxyCODONE-acetaminophen (PERCOCET) 7.5-325 MG per tablet Alternate therapy        Follow Up   Return in about 4 weeks (around 8/9/2022) for Recheck.  Patient was given instructions and counseling regarding her condition or for health maintenance advice. Please see specific information pulled into the AVS if appropriate.       Milan Coppola Jr, MD  07/13/22  12:22 EDT

## 2022-07-13 ENCOUNTER — TELEPHONE (OUTPATIENT)
Dept: INTERNAL MEDICINE | Facility: CLINIC | Age: 72
End: 2022-07-13

## 2022-07-13 ENCOUNTER — OFFICE VISIT (OUTPATIENT)
Dept: ORTHOPEDIC SURGERY | Facility: CLINIC | Age: 72
End: 2022-07-13

## 2022-07-13 VITALS — BODY MASS INDEX: 41.05 KG/M2 | OXYGEN SATURATION: 97 % | WEIGHT: 255.4 LBS | HEART RATE: 61 BPM | HEIGHT: 66 IN

## 2022-07-13 DIAGNOSIS — Z96.652 AFTERCARE FOLLOWING LEFT KNEE JOINT REPLACEMENT SURGERY: Primary | ICD-10-CM

## 2022-07-13 DIAGNOSIS — Z47.1 AFTERCARE FOLLOWING LEFT KNEE JOINT REPLACEMENT SURGERY: Primary | ICD-10-CM

## 2022-07-13 PROCEDURE — 99024 POSTOP FOLLOW-UP VISIT: CPT | Performed by: PHYSICIAN ASSISTANT

## 2022-07-13 RX ORDER — CEPHALEXIN 500 MG/1
500 CAPSULE ORAL EVERY 6 HOURS
Qty: 40 CAPSULE | Refills: 0 | Status: SHIPPED | OUTPATIENT
Start: 2022-07-13 | End: 2022-09-26

## 2022-07-13 NOTE — PATIENT INSTRUCTIONS
Patient advised to continue with PT to progress strength, stretching/ROM, and weightbearing. Advised on wound care. Please continue to keep incision clear and dry. Do not submerge incision until fully healed. No creams or lotion over incision.     Prescription sent for Keflex.     Follow up in 6 weeks. X-rays needed.

## 2022-07-13 NOTE — PROGRESS NOTES
"Chief Complaint  Follow-up of the Left Knee    Subjective          Kimberly Dennis presents to [facility] for s/p left total knee arthroplasty with computer navigation performed on 06/03/2022 by Dr. Paz.  Patient states she continues to attend physical therapy at [facility] in [facility] twice weekly.  She is currently being treated for a DVT in the gastrocnemius with Eliquis.  She states she continues to have pain on the IT band that is causing her to struggle to improve range of motion.  She states she has been working at home as well.  She states she is able to use her bands for the need to get her knee to fully extend without difficulty.  She is here today using a cane for ambulation assistance.  She has no other additional complaints.  Denies any numbness or tingling.    Objective   Allergies   Allergen Reactions   • Amoxicillin-Pot Clavulanate Itching   • Hydrocodone-Acetaminophen Itching   • Hydrocodone Itching and Rash   • Iodine Rash   • Latex Rash   • Metal Rash     YELLOW GOLD CAUSES RASH    • Shellfish Allergy Rash       Vital Signs:   Pulse 61   Ht 167.6 cm (66\")   Wt 116 kg (255 lb 6.4 oz)   SpO2 97%   BMI 41.22 kg/m²       Physical Exam  Constitutional:       Appearance: Normal appearance. Patient is well-developed and normal weight.   HENT:      Head: Normocephalic.      Right Ear: Hearing and external ear normal.      Left Ear: Hearing and external ear normal.      Nose: Nose normal.   Eyes:      Conjunctiva/sclera: Conjunctivae normal.   Cardiovascular:      Rate and Rhythm: Normal rate.   Pulmonary:      Effort: Pulmonary effort is normal.      Breath sounds: No wheezing or rales.   Abdominal:      Palpations: Abdomen is soft.      Tenderness: There is no abdominal tenderness.   Musculoskeletal:      Cervical back: Normal range of motion.   Skin:     Findings: No rash.   Neurological:      Mental Status: Patient is alert and oriented to person, " place, and time.   Psychiatric:         Mood and Affect: Mood and affect normal.         Judgment: Judgment normal.     Ortho Exam  Left knee: Well-healed surgical incision, mild dehiscence at the superior aspect, no surrounding erythema.  Moderate swelling.  No discoloration.  No tenderness along the calf.  AROM is -6 to 108 degrees of flexion.  Stable to varus and valgus stress.  Full plantarflexion and dorsiflexion of the ankle.  Patella is well tracking.  Patient ambulates with cane.  Sensation is intact.  Neurovascular intact distally.    Result Review :            Imaging Results (Most Recent)     None                Assessment and Plan    Problem List Items Addressed This Visit        Musculoskeletal and Injuries    Aftercare following left knee joint replacement surgery - Primary          Follow Up   Return in about 6 weeks (around 8/24/2022).  Patient Instructions   Patient advised to continue with PT to progress strength, stretching/ROM, and weightbearing. Advised on wound care. Please continue to keep incision clear and dry. Do not submerge incision until fully healed. No creams or lotion over incision.     Prescription sent for Keflex.     Follow up in 6 weeks. X-rays needed.     Patient was given instructions and counseling regarding her condition or for health maintenance advice. Please see specific information pulled into the AVS if appropriate.

## 2022-07-13 NOTE — TELEPHONE ENCOUNTER
Caller: Kimberly Dennis    Relationship to patient: Self    Best call back number: [phone]    Patient is needing: PATIENT IS REQUESTING A PAPERWORK FOR A HANDICAP STICKER.  PATIENT ALSO WANTED TO FOLLOW UP ON THE gabapentin (NEURONTIN) 300 MG capsule   SHE WAS PRESCRIBED LAST VISIT. SHE STATES SHE LOVES IT AND GOT HER FIRST FULL NIGHT OF SLEEP IN THE PAST 6 WEEKS.

## 2022-07-14 ENCOUNTER — TREATMENT (OUTPATIENT)
Dept: PHYSICAL THERAPY | Facility: CLINIC | Age: 72
End: 2022-07-14

## 2022-07-14 DIAGNOSIS — R26.9 GAIT DISTURBANCE: ICD-10-CM

## 2022-07-14 DIAGNOSIS — Z96.652 AFTERCARE FOLLOWING LEFT KNEE JOINT REPLACEMENT SURGERY: Primary | ICD-10-CM

## 2022-07-14 DIAGNOSIS — M25.562 LEFT KNEE PAIN, UNSPECIFIED CHRONICITY: ICD-10-CM

## 2022-07-14 DIAGNOSIS — Z47.1 AFTERCARE FOLLOWING LEFT KNEE JOINT REPLACEMENT SURGERY: Primary | ICD-10-CM

## 2022-07-14 PROCEDURE — 97110 THERAPEUTIC EXERCISES: CPT | Performed by: PHYSICAL THERAPIST

## 2022-07-14 PROCEDURE — G0283 ELEC STIM OTHER THAN WOUND: HCPCS | Performed by: PHYSICAL THERAPIST

## 2022-07-14 PROCEDURE — 97530 THERAPEUTIC ACTIVITIES: CPT | Performed by: PHYSICAL THERAPIST

## 2022-07-14 PROCEDURE — 97140 MANUAL THERAPY 1/> REGIONS: CPT | Performed by: PHYSICAL THERAPIST

## 2022-07-14 NOTE — PROGRESS NOTES
Physical Therapy Daily Treatment Note      Patient: Kimberly Dennis   : 1950  Referring practitioner: Abdoulaye Paz MD  Date of Initial Visit: Type: THERAPY  Noted: 2022  Today's Date: 2022  Patient seen for 13 sessions           Subjective   Kimberly Dennis reports: no left knee pain; just stiff/tight      Objective   See Exercise, Manual, and Modality Logs for complete treatment.       Assessment & Plan     Assessment  Prognosis details: Patient continues to reports pain in left hamstrings that hinders her ability to tolerate left knee flexion.          Visit Diagnoses:    ICD-10-CM ICD-9-CM   1. Aftercare following left knee joint replacement surgery  Z47.1 V54.81    Z96.652 V43.65   2. Gait disturbance  R26.9 781.2   3. Left knee pain, unspecified chronicity  M25.562 719.46       Progress per Plan of Care           Timed:  Manual Therapy:    12     mins  93569;  Therapeutic Exercise:    20     mins  13586;     Neuromuscular Mona:        mins  73760;    Therapeutic Activity:     8     mins  94311;     Gait Training:           mins  46126;     Ultrasound:          mins  52404;    Electrical Stimulation:         mins  60393 ( );    Untimed:  Electrical Stimulation:    15     mins  79736 ( );  Mechanical Traction:         mins  30239;     Timed Treatment:   40   mins   Total Treatment:     55   mins  Becki Olvera PT    Electronically singed 2022      [place] PT license: [id number]  Physical Therapist

## 2022-07-15 ENCOUNTER — TELEPHONE (OUTPATIENT)
Dept: ORTHOPEDIC SURGERY | Facility: CLINIC | Age: 72
End: 2022-07-15

## 2022-07-15 NOTE — TELEPHONE ENCOUNTER
Spoke with patient, she forwarded the images to me. I have sent a message to Dr. Bautista since he is on call to determine course of treatment for patient. Will update once response received from Dr. Bautista.

## 2022-07-15 NOTE — TELEPHONE ENCOUNTER
Per Dr. Bautista patient is to continue antibiotics and to apply dry dressing to the knee incision. Patient understood and will call on Monday to let myself or Evelyne know how it is doing.

## 2022-07-15 NOTE — TELEPHONE ENCOUNTER
Caller: PATIENT      Relationship: SELF     Best call back number: [phone]    What is your medical concern? BLOOD AND DRAINAGE AT INCISION SITE OF LEFT KNEE SURGERY.    How long has this issue been going on? THERE WAS A SMALL AMOUNT OF BLOOD WHEN SHE WOKE UP THIS MORNING ON HER LEG AND GOWN. SHE HAS PHOTOS IF YOU WOULD LIKE TO SEE THEM. PLEASE CALL TO DISCUSS.     Is your provider already aware of this issue? NO     Have you been treated for this issue? NO

## 2022-07-18 NOTE — TELEPHONE ENCOUNTER
Spoke with patient, she states the incision has stopped draining and is starting to scab over in the areas that were bleeding and draining. Patient will finish the course of antibiotics and will apply dry dressings as needed. She is okay with keeping her follow up as scheduled for 08/29/22, she will call with any concerns.

## 2022-07-18 NOTE — TELEPHONE ENCOUNTER
HUB UNSUCCESSFULLY ATTEMPTED TO WARM TRANSFER.     PT CALLED TO CHECK IN W/ POLO OR LEIDY. PLEASE CALL HER BACK [phone].

## 2022-07-19 ENCOUNTER — TREATMENT (OUTPATIENT)
Dept: PHYSICAL THERAPY | Facility: CLINIC | Age: 72
End: 2022-07-19

## 2022-07-19 ENCOUNTER — IMMUNIZATION (OUTPATIENT)
Dept: INTERNAL MEDICINE | Facility: CLINIC | Age: 72
End: 2022-07-19

## 2022-07-19 ENCOUNTER — TELEPHONE (OUTPATIENT)
Dept: INTERNAL MEDICINE | Facility: CLINIC | Age: 72
End: 2022-07-19

## 2022-07-19 DIAGNOSIS — Z23 ENCOUNTER FOR IMMUNIZATION: Primary | ICD-10-CM

## 2022-07-19 DIAGNOSIS — M25.562 LEFT KNEE PAIN, UNSPECIFIED CHRONICITY: ICD-10-CM

## 2022-07-19 DIAGNOSIS — R26.9 GAIT DISTURBANCE: ICD-10-CM

## 2022-07-19 DIAGNOSIS — Z47.1 AFTERCARE FOLLOWING LEFT KNEE JOINT REPLACEMENT SURGERY: Primary | ICD-10-CM

## 2022-07-19 DIAGNOSIS — Z96.652 AFTERCARE FOLLOWING LEFT KNEE JOINT REPLACEMENT SURGERY: Primary | ICD-10-CM

## 2022-07-19 PROCEDURE — 0064A: CPT | Performed by: INTERNAL MEDICINE

## 2022-07-19 PROCEDURE — 91306: CPT | Performed by: INTERNAL MEDICINE

## 2022-07-19 PROCEDURE — G0283 ELEC STIM OTHER THAN WOUND: HCPCS | Performed by: PHYSICAL THERAPIST

## 2022-07-19 PROCEDURE — 97140 MANUAL THERAPY 1/> REGIONS: CPT | Performed by: PHYSICAL THERAPIST

## 2022-07-19 PROCEDURE — 97530 THERAPEUTIC ACTIVITIES: CPT | Performed by: PHYSICAL THERAPIST

## 2022-07-19 PROCEDURE — 97110 THERAPEUTIC EXERCISES: CPT | Performed by: PHYSICAL THERAPIST

## 2022-07-19 NOTE — PROGRESS NOTES
Physical Therapy Daily Treatment Note        Patient: Kimberly Dennis   : 1950  Diagnosis/ICD-10 Code:  Aftercare following left knee joint replacement surgery [Z47.1, Z96.652]  Referring practitioner: Abdoulaye Paz MD  Date of Initial Visit: Type: THERAPY  Noted: 2022  Today's Date: 2022  Patient seen for 14 sessions             Subjective   Kimberly Dennis denies having any pain in her left knee upon arrival.  She reports that she had some drainage from proximal incision over the weekend.  She then reports that a stitch had worked out.  She reports that it looks and feels better.  She denies having any drainage now.      Objective     Active Range of Motion   Left Knee   Flexion: 104 degrees   Extension: (-) 5 degrees  ( Lacking full extension)    Passive Range of Motion  Left Knee  Flexion:  108 degrees  Extension:  (-)4 degrees (Lacking full extension)     Strength/Myotome Testing      Left Knee   Flexion: 4+/5  Extension: 4+/5  Quadriceps contraction: fair     Left Knee Flexibility Comments:   Noted left hamstring tightness       See Exercise, Manual, and Modality Logs for complete treatment.       Assessment/Plan     Pt tolerated therapy session well - with progression of therapeutic exercises, CKC-Functional activities, and Manual therapy. She has improved, but continues to have deficits in her Left knee ROM,  Strength, and Pain; limiting function and ability to perform ADLs at this time.  She continues to exhibit moderate hip/Knee Muscular tightness.  She continues to respond well to trials of IFC/Ice - reporting decrease in pain and tightness post sessions.  She is now ambulating in/out of clinic with  Single tip cane and is also now able to make full revolution on bike with less difficulty and pain.  Therapy session contniues to be hindered  by ease in exacerbation of pain and tightness in her lower back and hamstring musculature.       Progress per Plan of Care           Timed:  Manual  Therapy:    10     mins  42426;  Therapeutic Exercise:    20     mins  60346;     Neuromuscular Mona:    0    mins  97020;    Therapeutic Activity:     8     mins  36272;     Gait Trainin     mins  20321;     Ultrasound:     0     mins  60711;    Electrical Stimulation:    0     mins  58764;  Iontophoresis     0     mins  30747    Untimed:  Electrical Stimulation:    15    mins  14040 ( );  Mechanical Traction:    0     mins  67136;   Fluidotherapy     0     mins  93489  Hot pack     0     mins  85535  Cold pack     0     mins  20470    Timed Treatment:   38   mins   Total Treatment:     53   mins        Louise Caraballo PTA  Physical Therapist Assistant

## 2022-07-19 NOTE — TELEPHONE ENCOUNTER
SEE BELOW:    Scan on 7/19/2022 1310 by Jennifer Agarwal: LETTER FROM PATIENT REGARDING MEDICATION

## 2022-07-20 ENCOUNTER — OFFICE VISIT (OUTPATIENT)
Dept: CARDIOLOGY | Facility: CLINIC | Age: 72
End: 2022-07-20

## 2022-07-20 VITALS
SYSTOLIC BLOOD PRESSURE: 138 MMHG | HEIGHT: 66 IN | HEART RATE: 75 BPM | DIASTOLIC BLOOD PRESSURE: 62 MMHG | WEIGHT: 253 LBS | BODY MASS INDEX: 40.66 KG/M2

## 2022-07-20 DIAGNOSIS — I48.0 PAROXYSMAL ATRIAL FIBRILLATION: Primary | ICD-10-CM

## 2022-07-20 DIAGNOSIS — I10 ESSENTIAL HYPERTENSION: ICD-10-CM

## 2022-07-20 PROCEDURE — 99213 OFFICE O/P EST LOW 20 MIN: CPT | Performed by: INTERNAL MEDICINE

## 2022-07-20 NOTE — ASSESSMENT & PLAN NOTE
She is currently in sinus rhythm and denies any significant palpitations.  Continue Eliquis for anticoagulation along with metoprolol for rate and rhythm management.  Recent labs showed a drop in hemoglobin after surgery.  We will check CBC before next follow-up visit.

## 2022-07-20 NOTE — PROGRESS NOTES
CARDIOLOGY FOLLOW-UP PROGRESS NOTE        Chief Complaint  Atrial Fibrillation and Hypertension    Subjective            Kimberly Dennis presents to [facility] CARDIOLOGY  History of Present Illness    Ms. Ms. Dennis is here for follow-up visit of atrial fibrillation.  She recently had knee replacement surgery done.  Postprocedure later, she developed DVT, which was treated as outpatient with higher dose of Eliquis.  She is back on regular 5 mg twice daily of Eliquis.  She she has some pain of the left calf with swelling but better.  Denies any palpitation at this time.  She is undergoing physical therapy.       Past History:    (1) Paroxysmal atrial fibrillation, recent cardioversion in the emergency room. (2) Hypertension. (3) Hypothyroidism.     Medical History:  Past Medical History:   Diagnosis Date   • Acid reflux    • Anemia    • Anesthesia complication     TROUBLE WAKING UP   • Arthritis    • Atrial fibrillation (HCC)     PAROXYSMAL   • Bladder disorder    • Colon polyp    • GI bleed    • History of total knee arthroplasty, right 09/08/2016   • HTN (hypertension)    • Limb swelling    • Medial meniscus tear 09/12/2017    LEFT   • Mitral valve prolapse    • Primary osteoarthritis of left knee 09/12/2017   • Seasonal allergies    • Sleep apnea    • SOB (shortness of breath)    • Thoracic outlet syndrome    • Thyroid disorder        Surgical History: has a past surgical history that includes Bladder repair; Cholecystectomy; Colonoscopy (2014); Intraocular lens insertion; Hysterectomy; Colonoscopy; Joint replacement (Right); Knee arthroscopy (Left); and Total knee arthroplasty (Left, 6/3/2022).     Family History: family history includes Arthritis in her mother and sister; Diabetes in her brother; Heart disease in her brother and mother.     Social History: reports that she has never smoked. She has never used smokeless tobacco. She reports that she does not drink alcohol and does not use  "drugs.    Allergies: Amoxicillin-pot clavulanate, Hydrocodone-acetaminophen, Hydrocodone, Iodine, Latex, Metal, and Shellfish allergy    Current Outpatient Medications on File Prior to Visit   Medication Sig   • cephalexin (KEFLEX) 500 MG capsule Take 1 capsule by mouth Every 6 (Six) Hours.   • cetirizine (zyrTEC) 10 MG tablet Take 10 mg by mouth Daily.   • Cholecalciferol 125 MCG (5000 UT) tablet Take 1 tablet by mouth Daily.   • Eliquis 5 MG tablet tablet Take 1 tablet by mouth twice daily   • fluticasone (Flonase) 50 MCG/ACT nasal spray 2 sprays into the nostril(s) as directed by provider Daily.   • gabapentin (NEURONTIN) 300 MG capsule Take 1 capsule by mouth 3 (Three) Times a Day.   • hydroCHLOROthiazide (HYDRODIURIL) 25 MG tablet Take 1 tablet by mouth once daily   • levothyroxine (SYNTHROID, LEVOTHROID) 75 MCG tablet Take 1 tablet by mouth once daily   • losartan (COZAAR) 25 MG tablet Take 1 tablet by mouth once daily   • metoprolol succinate XL (TOPROL-XL) 50 MG 24 hr tablet Take 1 tablet by mouth Daily.   • montelukast (SINGULAIR) 10 MG tablet TAKE 1 TABLET BY MOUTH ONCE DAILY AT NIGHT   • polyethylene glycol (GlycoLax) 17 GM/SCOOP powder Take 17 g by mouth Daily.     No current facility-administered medications on file prior to visit.          Review of Systems   Respiratory: Negative for cough, shortness of breath and wheezing.    Cardiovascular: Negative for chest pain, palpitations and leg swelling.   Gastrointestinal: Negative for nausea and vomiting.   Musculoskeletal: Positive for arthralgias.   Neurological: Negative for dizziness and syncope.        Objective     /62   Pulse 75   Ht 167.6 cm (66\")   Wt 115 kg (253 lb)   BMI 40.84 kg/m²       Physical Exam    General : Alert, awake, no acute distress  Neck : Supple, no carotid bruit, no jugular venous distention  CVS : Regular rate and rhythm, no murmur, rubs or gallops  Lungs: Clear to auscultation bilaterally, no crackles or " rhonchi  Abdomen: Soft, nontender, bowel sounds heard in all 4 quadrants  Extremities: Warm, well-perfused, 1+ edema LLE, trace edema RLE      Result Review :     The following data was reviewed by: Chip Mendoza MD on 07/20/2022:    CMP    CMP 10/19/21 5/23/22 6/7/22   Glucose 97 99 109 (A)   BUN 12 13 11   Creatinine 0.57 0.60 0.53 (A)   eGFR Non African Am 105     Sodium 141 138 137   Potassium 4.0 4.1 4.0   Chloride 102 100 98   Calcium 9.3 9.9 9.3   Albumin 4.60 4.40 3.60   Total Bilirubin 0.3 0.4 0.6   Alkaline Phosphatase 73 84 70   AST (SGOT) 20 18 15   ALT (SGPT) 18 15 11   (A) Abnormal value            CBC    CBC 5/23/22 6/4/22 6/4/22 6/7/22     0006 0459    WBC 6.73 8.66  6.90   RBC 4.20 3.29 (A)  3.11 (A)   Hemoglobin 12.2 9.5 (A) 9.1 (A) 9.1 (A)   Hematocrit 37.2 29.5 (A) 27.5 (A) 27.4 (A)   MCV 88.6 89.7  88.1   MCH 29.0 28.9  29.3   MCHC 32.8 32.2  33.2   RDW 13.5 13.6  13.6   Platelets 199 145  218   (A) Abnormal value            TSH    TSH 10/19/21   TSH 1.200           Lipid Panel    Lipid Panel 10/19/21   Total Cholesterol 192   Triglycerides 172 (A)   HDL Cholesterol 49   VLDL Cholesterol 30   LDL Cholesterol  113 (A)   LDL/HDL Ratio 2.22   (A) Abnormal value                 Data reviewed: Cardiology studies          Echocardiogram done on 6/22/2020 showed    1.  Preserved left ventricular systolic function with estimated LV ejection fraction of 55%.   2.  Mild concentric left ventricular hypertrophy.   3.  Mild left atrial enlargement.   4.  Mild mitral regurgitation.   5.  Diastolic dysfunction of the left ventricle.   6.  Mild aortic insufficiency.                 Assessment and Plan        Diagnoses and all orders for this visit:    1. Paroxysmal atrial fibrillation (HCC) (Primary)  Assessment & Plan:  She is currently in sinus rhythm and denies any significant palpitations.  Continue Eliquis for anticoagulation along with metoprolol for rate and rhythm management.  Recent labs showed a  drop in hemoglobin after surgery.  We will check CBC before next follow-up visit.    Orders:  -     CBC (No Diff); Future  -     Comprehensive Metabolic Panel; Future  -     apixaban (ELIQUIS) 5 MG tablet tablet; Take 1 tablet by mouth 2 (Two) Times a Day.  Dispense: 42 tablet; Refill: 0    2. Essential hypertension  Assessment & Plan:  Blood pressure well controlled.  Continue losartan and metoprolol at the current dose.              Follow Up     Return in about 6 months (around 1/20/2023) for Next scheduled follow up.    Patient was given instructions and counseling regarding her condition or for health maintenance advice. Please see specific information pulled into the AVS if appropriate.

## 2022-07-21 ENCOUNTER — TREATMENT (OUTPATIENT)
Dept: PHYSICAL THERAPY | Facility: CLINIC | Age: 72
End: 2022-07-21

## 2022-07-21 DIAGNOSIS — R26.9 GAIT DISTURBANCE: ICD-10-CM

## 2022-07-21 DIAGNOSIS — Z96.652 AFTERCARE FOLLOWING LEFT KNEE JOINT REPLACEMENT SURGERY: Primary | ICD-10-CM

## 2022-07-21 DIAGNOSIS — M25.562 LEFT KNEE PAIN, UNSPECIFIED CHRONICITY: ICD-10-CM

## 2022-07-21 DIAGNOSIS — Z47.1 AFTERCARE FOLLOWING LEFT KNEE JOINT REPLACEMENT SURGERY: Primary | ICD-10-CM

## 2022-07-21 PROCEDURE — 97530 THERAPEUTIC ACTIVITIES: CPT | Performed by: PHYSICAL THERAPIST

## 2022-07-21 PROCEDURE — 97110 THERAPEUTIC EXERCISES: CPT | Performed by: PHYSICAL THERAPIST

## 2022-07-21 PROCEDURE — 97140 MANUAL THERAPY 1/> REGIONS: CPT | Performed by: PHYSICAL THERAPIST

## 2022-07-21 PROCEDURE — G0283 ELEC STIM OTHER THAN WOUND: HCPCS | Performed by: PHYSICAL THERAPIST

## 2022-07-21 NOTE — PROGRESS NOTES
"Physical Therapy Daily Treatment Note        Patient: Kimberly Dennis   : 1950  Diagnosis/ICD-10 Code:  Aftercare following left knee joint replacement surgery [Z47.1, Z96.652]  Referring practitioner: Abdoulaye Paz MD  Date of Initial Visit: Type: THERAPY  Noted: 2022  Today's Date: 2022  Patient seen for 15 sessions             Subjective   Kimberly Dennis reports primarily \"Aching\" in her Left knee.  She reports that her knee felt better after stretches done at last session.    Objective     Active Range of Motion   Left Knee   Flexion: 104 degrees   Extension: (-) 5 degrees  ( Lacking full extension)    Passive Range of Motion  Left Knee  Flexion:  110 degrees  Extension:  (-)4 degrees (Lacking full extension)     Strength/Myotome Testing      Left Knee   Flexion: 4+/5  Extension: 4+/5  Quadriceps contraction: fair     Left Knee Flexibility Comments:   Noted left hamstring tightness    See Exercise, Manual, and Modality Logs for complete treatment.       Assessment/Plan     Pt tolerated therapy session well - with progression of therapeutic exercises, CKC-Functional activities, and Manual therapy. She has improved, but continues to have deficits in her Left knee ROM,  Strength, and Pain; limiting function and ability to perform ADLs at this time.  She continues to exhibit moderate hip/Knee Muscular tightness.  She continues to respond well to trials of IFC/Ice - reporting decrease in pain and tightness post sessions.  She is now ambulating in/out of clinic with  Single tip cane. Therapy session contniues to be hindered  by ease in exacerbation of pain and tightness in her lower back and hamstring musculature.       Progress per Plan of Care           Timed:  Manual Therapy:    10     mins  16427;  Therapeutic Exercise:    20     mins  01068;     Neuromuscular Mona:    0    mins  27687;    Therapeutic Activity:     8     mins  68410;     Gait Trainin     mins  05235;     Ultrasound:    "  0     mins  34441;    Electrical Stimulation:    0     mins  11777;  Iontophoresis     0     mins  54448    Untimed:  Electrical Stimulation:    15     mins  01057 ( );  Mechanical Traction:    0     mins  55899;   Fluidotherapy     0     mins  73102  Hot pack     0     mins  02934  Cold pack     0     mins  73300    Timed Treatment:   38   mins   Total Treatment:     53   mins        Louise Caraballo PTA  Physical Therapist Assistant

## 2022-07-23 RX ORDER — CYCLOBENZAPRINE HCL 5 MG
5 TABLET ORAL 2 TIMES DAILY PRN
Qty: 60 TABLET | Refills: 0 | Status: SHIPPED | OUTPATIENT
Start: 2022-07-23 | End: 2023-01-12

## 2022-07-26 ENCOUNTER — TREATMENT (OUTPATIENT)
Dept: PHYSICAL THERAPY | Facility: CLINIC | Age: 72
End: 2022-07-26

## 2022-07-26 DIAGNOSIS — R26.9 GAIT DISTURBANCE: ICD-10-CM

## 2022-07-26 DIAGNOSIS — Z47.1 AFTERCARE FOLLOWING LEFT KNEE JOINT REPLACEMENT SURGERY: Primary | ICD-10-CM

## 2022-07-26 DIAGNOSIS — M25.562 LEFT KNEE PAIN, UNSPECIFIED CHRONICITY: ICD-10-CM

## 2022-07-26 DIAGNOSIS — Z96.652 AFTERCARE FOLLOWING LEFT KNEE JOINT REPLACEMENT SURGERY: Primary | ICD-10-CM

## 2022-07-26 PROCEDURE — 97530 THERAPEUTIC ACTIVITIES: CPT | Performed by: PHYSICAL THERAPIST

## 2022-07-26 PROCEDURE — G0283 ELEC STIM OTHER THAN WOUND: HCPCS | Performed by: PHYSICAL THERAPIST

## 2022-07-26 PROCEDURE — 97140 MANUAL THERAPY 1/> REGIONS: CPT | Performed by: PHYSICAL THERAPIST

## 2022-07-26 PROCEDURE — 97110 THERAPEUTIC EXERCISES: CPT | Performed by: PHYSICAL THERAPIST

## 2022-07-26 RX ORDER — LEVOTHYROXINE SODIUM 0.07 MG/1
TABLET ORAL
Qty: 90 TABLET | Refills: 1 | Status: SHIPPED | OUTPATIENT
Start: 2022-07-26 | End: 2023-01-23

## 2022-07-26 RX ORDER — HYDROCHLOROTHIAZIDE 25 MG/1
TABLET ORAL
Qty: 90 TABLET | Refills: 0 | Status: SHIPPED | OUTPATIENT
Start: 2022-07-26 | End: 2022-10-24

## 2022-07-26 NOTE — PROGRESS NOTES
"Physical Therapy Daily Treatment Note        Patient: Kimberly Dennis   : 1950  Diagnosis/ICD-10 Code:  Aftercare following left knee joint replacement surgery [Z47.1, Z96.652]  Referring practitioner: Abdoulaye Paz MD  Date of Initial Visit: Type: THERAPY  Noted: 2022  Today's Date: 2022  Patient seen for 16 sessions             Subjective   Kimberly Dennis reports having 1/10 pain in her left knee.  She reports that she does have intermittent \"Sharp\" pain at her medial knee.  She also reports having persistent swelling and states \"It is still warm to the touch\".  She states that she is only taking the \"Gapapentin\" at night secondary to it - \"Making her feel funny\".  She reports that her PCP has ordered her a different muscle relaxer to take.    Objective     Active Range of Motion   Left Knee   Flexion: 104 degrees   Extension: (-) 5 degrees  ( Lacking full extension)    Passive Range of Motion  Left Knee  Flexion:  110 degrees  Extension:  (-) 4 degrees (Lacking full extension)       See Exercise, Manual, and Modality Logs for complete treatment.       Assessment/Plan     Pt tolerated therapy session well - with progression of therapeutic exercises, CKC-Functional activities, and Manual therapy. She has improved, but continues to have deficits in her Left knee ROM,  Strength, and Pain; limiting function and ability to perform ADLs at this time.  She continues to exhibit moderate hip/Knee Muscular tightness.  She has responded well to trials of IFC/Ice - reporting decrease in pain and tightness post sessions.  She is now ambulating in/out of clinic with  Single tip cane. Therapy session contniues to be hindered  by ease in exacerbation of pain and tightness in her lower back and hamstring musculature.       Progress per Plan of Care           Timed:  Manual Therapy:    10     mins  15130;  Therapeutic Exercise:    20     mins  96353;     Neuromuscular Mona:    0    mins  83751;    Therapeutic " Activity:     8     mins  15565;     Gait Trainin     mins  97587;     Ultrasound:     0     mins  04310;    Electrical Stimulation:    0     mins  42115;  Iontophoresis     0     mins  36079    Untimed:  Electrical Stimulation:    15     mins  59647 ( );  Mechanical Traction:    0     mins  62942;   Fluidotherapy     0     mins  62633      Timed Treatment:   38   mins   Total Treatment:     53   mins        Louise Caraballo PTA  Physical Therapist Assistant

## 2022-07-28 ENCOUNTER — TREATMENT (OUTPATIENT)
Dept: PHYSICAL THERAPY | Facility: CLINIC | Age: 72
End: 2022-07-28

## 2022-07-28 DIAGNOSIS — R26.9 GAIT DISTURBANCE: ICD-10-CM

## 2022-07-28 DIAGNOSIS — Z96.652 AFTERCARE FOLLOWING LEFT KNEE JOINT REPLACEMENT SURGERY: Primary | ICD-10-CM

## 2022-07-28 DIAGNOSIS — M25.562 LEFT KNEE PAIN, UNSPECIFIED CHRONICITY: ICD-10-CM

## 2022-07-28 DIAGNOSIS — Z47.1 AFTERCARE FOLLOWING LEFT KNEE JOINT REPLACEMENT SURGERY: Primary | ICD-10-CM

## 2022-07-28 PROCEDURE — 97140 MANUAL THERAPY 1/> REGIONS: CPT | Performed by: PHYSICAL THERAPIST

## 2022-07-28 PROCEDURE — G0283 ELEC STIM OTHER THAN WOUND: HCPCS | Performed by: PHYSICAL THERAPIST

## 2022-07-28 PROCEDURE — 97530 THERAPEUTIC ACTIVITIES: CPT | Performed by: PHYSICAL THERAPIST

## 2022-07-28 PROCEDURE — 97110 THERAPEUTIC EXERCISES: CPT | Performed by: PHYSICAL THERAPIST

## 2022-07-28 NOTE — PROGRESS NOTES
Physical Therapy Progress Note      Patient: Kimberly Dennis   : 1950  Referring practitioner: Abdoulaye Paz MD  Date of Initial Visit: Type: THERAPY  Noted: 2022  Today's Date: 2022  Patient seen for 17 sessions           Subjective   Kimberly Dennis reports: no left knee pain; just stiffness  Subjective Questionnaire: LEFS: 39/80=51% limitation improved from previous score 34/80=58% limitation       Objective          Active Range of Motion   Left Knee   Flexion: 110 degrees   Extension: 1 degrees     Additional Active Range of Motion Details  AAROM    Strength/Myotome Testing     Left Knee   Flexion: 4-  Extension: 4-  Quadriceps contraction: fair    Left Knee Flexibility Comments:   Noted left hamstring tightness    Ambulation     Comments   Patient progressed from independent in household ambulation, but still using straight cane for outside/uneven surfaces.       See Exercise, Manual, and Modality Logs for complete treatment.       Assessment & Plan     Assessment  Impairments: abnormal gait, abnormal or restricted ROM, activity intolerance, impaired balance, impaired physical strength, lacks appropriate home exercise program, pain with function and weight-bearing intolerance  Functional Limitations: walking, pushing, uncomfortable because of pain, moving in bed, sitting and standing  Assessment details: Pt presents with limitations, noted by evaluation that impede patient's ability to ambulate/functional mobility.  The skills of a therapist will be required to safely and effectively implement the following treatment plan to restore maximal level of function.    Prognosis: good  Prognosis details: Patient demonstrates increase in knee strength/range of motion, but still needing to use an assistive device for ambulation on uneven surfaces. Patient would benefit from continued skilled physical therapy for improvement in patient's functional independence/mobility.    Goals  Plan Goals: 1. The  patient has limited ROM of the left knee.   LTG 1: 8 weeks:  The patient will demonstrate 0 to 120 degrees of ROM for the left knee in order to allow patient to normalize gait/negotiate stairs.   STATUS:  Progressing  STG 1a: 4 weeks:  The patient will demonstrate 5 to 110 degrees of ROM for the left knee.   STATUS:Met     2. The patient has limited strength of the left knee.   LTG 2: 12 weeks: The patient will demonstrate 4/5 strength for left knee flexion and extension in order to allow patient improved joint stability.   STATUS:  progressing  STG 2a: 6 weeks: The patient will demonstrate 3+/5 strength for left knee flexion and extension   STATUS: Met       3. The patient has gait dysfunction.   LTG 3: 8 weeks:  The patient will ambulate without assistive device, independently, for community distances with minimal limp to the left lower extremity in order to improve mobility and allow patient to perform activities such as grocery shopping with greater ease.   STATUS:  progressing  STG 3a: The patient will be independent in HEP.   STATUS: ongoing     4. The patient complains of left knee pain.   LTG 4: 12 weeks:  The patient will report a pain rating of 2/10 or better in order to improve   tolerance to activities of daily living and improve sleep quality.   STATUS:  ongoing   STG 4a: 6 weeks:  The patient will report a pain rating of 4/10 or better.   STATUS:  Met     5. Mobility: Walking/Moving Around Functional Limitation     LTG 5: 12weeks:  The patient will demonstrate 37% limitation by achieving a score of 50/80 on the Lower Extremity Functional Scale.   STATUS:  progressing  STG 5a: 6 weeks:  The patient will demonstrate 50% limitation by achieving a score of 40/80 on the Lower Extremity Functional Scale.     STATUS:  Almost met at 39/80      Plan  Therapy options: will be seen for skilled therapy services  Planned modality interventions: cryotherapy and TENS  Planned therapy interventions: manual therapy,  soft tissue mobilization, spinal/joint mobilization, strengthening, stretching, therapeutic activities, transfer training, home exercise program, gait training, functional ROM exercises and flexibility  Frequency: 2x week  Duration in weeks: 4  Treatment plan discussed with: patient  Plan details: 3-2xwk x8 wks        Visit Diagnoses:    ICD-10-CM ICD-9-CM   1. Aftercare following left knee joint replacement surgery  Z47.1 V54.81    Z96.652 V43.65   2. Gait disturbance  R26.9 781.2   3. Left knee pain, unspecified chronicity  M25.562 719.46       Progress per Plan of Care           Timed:  Manual Therapy:    10     mins  25673;  Therapeutic Exercise:    20     mins  01096;     Neuromuscular Mona:        mins  44575;    Therapeutic Activity:     10     mins  99494;     Gait Training:           mins  71769;     Ultrasound:          mins  40324;    Electrical Stimulation:         mins  88630 ( );    Untimed:  Electrical Stimulation:     15    mins  70543 ( );  Mechanical Traction:         mins  27087;     Timed Treatment:   40   mins   Total Treatment:     55   mins  Becki Olvera PT    Electronically singed 7/28/2022      [place] PT license: [id number]  Physical Therapist

## 2022-08-02 ENCOUNTER — TREATMENT (OUTPATIENT)
Dept: PHYSICAL THERAPY | Facility: CLINIC | Age: 72
End: 2022-08-02

## 2022-08-02 DIAGNOSIS — Z47.1 AFTERCARE FOLLOWING LEFT KNEE JOINT REPLACEMENT SURGERY: Primary | ICD-10-CM

## 2022-08-02 DIAGNOSIS — R26.9 GAIT DISTURBANCE: ICD-10-CM

## 2022-08-02 DIAGNOSIS — M25.562 LEFT KNEE PAIN, UNSPECIFIED CHRONICITY: ICD-10-CM

## 2022-08-02 DIAGNOSIS — Z96.652 AFTERCARE FOLLOWING LEFT KNEE JOINT REPLACEMENT SURGERY: Primary | ICD-10-CM

## 2022-08-02 PROCEDURE — 97530 THERAPEUTIC ACTIVITIES: CPT | Performed by: PHYSICAL THERAPIST

## 2022-08-02 PROCEDURE — G0283 ELEC STIM OTHER THAN WOUND: HCPCS | Performed by: PHYSICAL THERAPIST

## 2022-08-02 PROCEDURE — 97110 THERAPEUTIC EXERCISES: CPT | Performed by: PHYSICAL THERAPIST

## 2022-08-02 PROCEDURE — 97140 MANUAL THERAPY 1/> REGIONS: CPT | Performed by: PHYSICAL THERAPIST

## 2022-08-02 NOTE — PROGRESS NOTES
Physical Therapy Daily Treatment Note        Patient: Kimberly Dennis   : 1950  Diagnosis/ICD-10 Code:  Aftercare following left knee joint replacement surgery [Z47.1, Z96.652]  Referring practitioner: Abdoulaye Paz MD  Date of Initial Visit: Type: THERAPY  Noted: 2022  Today's Date: 2022  Patient seen for 18 sessions             Subjective   Kimberly Dennis denies having any pain in her left knee.  She states, that she has really been working to get her knee to bend more.  She reports that she has been taking a muscle relaxer approximately 2 hours before therapy session.    Objective     Active Range of Motion   Left Knee   Flexion: 106 degrees   Extension: (-) 5 degrees  ( Lacking full extension)    Right Knee AROM:  Flexion:  112 degrees    Passive Range of Motion  Left Knee  Flexion:  113 degrees  Extension:  (-) 4 degrees (Lacking full extension)    See Exercise, Manual, and Modality Logs for complete treatment.       Assessment/Plan     Pt tolerated therapy session well - with progression of therapeutic exercises, CKC-Functional activities, and Manual therapy. She has improved, but continues to have deficits in her Left knee ROM,  Strength, and Pain; limiting function and ability to perform ADLs at this time.  She continues to exhibit moderate hip/Knee Muscular tightness.  She has responded well to trials of IFC/Ice - reporting decrease in pain and tightness post sessions.  Therapy session contniues to be hindered  by ease in exacerbation of pain and tightness in her lower back and bilateral  hamstring musculature.    Progress per Plan of Care           Timed:  Manual Therapy:    15     mins  69339;  Therapeutic Exercise:    30     mins  96123;     Neuromuscular Mona:    0    mins  72391;    Therapeutic Activity:     10     mins  91688;     Gait Trainin     mins  09609;     Ultrasound:     0     mins  33817;    Electrical Stimulation:    0     mins  32932;  Iontophoresis     0      mins  08695    Untimed:  Electrical Stimulation:    15     mins  56778 ( );  Mechanical Traction:    0     mins  49467;       Timed Treatment:   55   mins   Total Treatment:     70   mins        Louise Caraballo PTA  Physical Therapist Assistant

## 2022-08-04 ENCOUNTER — TREATMENT (OUTPATIENT)
Dept: PHYSICAL THERAPY | Facility: CLINIC | Age: 72
End: 2022-08-04

## 2022-08-04 DIAGNOSIS — Z96.652 AFTERCARE FOLLOWING LEFT KNEE JOINT REPLACEMENT SURGERY: Primary | ICD-10-CM

## 2022-08-04 DIAGNOSIS — Z47.1 AFTERCARE FOLLOWING LEFT KNEE JOINT REPLACEMENT SURGERY: Primary | ICD-10-CM

## 2022-08-04 DIAGNOSIS — M25.562 LEFT KNEE PAIN, UNSPECIFIED CHRONICITY: ICD-10-CM

## 2022-08-04 DIAGNOSIS — R26.9 GAIT DISTURBANCE: ICD-10-CM

## 2022-08-04 PROCEDURE — 97140 MANUAL THERAPY 1/> REGIONS: CPT | Performed by: PHYSICAL THERAPIST

## 2022-08-04 PROCEDURE — 97110 THERAPEUTIC EXERCISES: CPT | Performed by: PHYSICAL THERAPIST

## 2022-08-04 PROCEDURE — G0283 ELEC STIM OTHER THAN WOUND: HCPCS | Performed by: PHYSICAL THERAPIST

## 2022-08-04 PROCEDURE — 97530 THERAPEUTIC ACTIVITIES: CPT | Performed by: PHYSICAL THERAPIST

## 2022-08-04 NOTE — PROGRESS NOTES
"Physical Therapy Daily Treatment Note        Patient: Kimberly Dennis   : 1950  Diagnosis/ICD-10 Code:  Aftercare following left knee joint replacement surgery [Z47.1, Z96.652]  Referring practitioner: Abdoulaye Paz MD  Date of Initial Visit: Type: THERAPY  Noted: 2022  Today's Date: 2022  Patient seen for 19 sessions             Subjective  Kimberly Dennis reports  Having 3/10 pain in her left knee after \"Stepping in hole\" in garden yesterday.  She reports having noticed slight increase in swelling as well.           Objective       Active Range of Motion   Left Knee   Flexion: 106 degrees   Extension: (-) 4 degrees  ( Lacking full extension)     Right Knee AROM:  Flexion:  112 degrees    Passive Range of Motion  Left Knee  Flexion:  110 degrees  Extension:  (-) 2 degrees (Lacking full extension)    See Exercise, Manual, and Modality Logs for complete treatment.       Assessment/Plan     Pt tolerated therapy session well - with progression of therapeutic exercises, CKC-Functional activities, and Manual therapy. She has improved, but continues to have deficits in her Left knee ROM,  Strength, and Pain; limiting function and ability to perform ADLs at this time.  She continues to exhibit moderate hip/Knee Muscular tightness.  Held some of Standing- CKC-Functional activities secondary to increased discomfort and edema after she subjectively reported \"Stepping in hole\" in garden. She has responded well to trials of IFC/Ice - reporting decrease in pain and inflammation post session.    Progress per Plan of Care           Timed:  Manual Therapy:    10     mins  63869;  Therapeutic Exercise:    20     mins  89740;     Neuromuscular Mona:    0    mins  12626;    Therapeutic Activity:     8     mins  81669;     Gait Trainin     mins  83879;     Ultrasound:     0     mins  65162;    Electrical Stimulation:    0     mins  88636;  Iontophoresis     0     mins  40167    Untimed:  Electrical " Stimulation:    15     mins  59326 ( );  Mechanical Traction:    0     mins  32986;   Fluidotherapy     0     mins  12506  Hot pack     0     mins  53879  Cold pack     0     mins  07024    Timed Treatment:   38   mins   Total Treatment:     53   mins        Louise Caraballo PTA  Physical Therapist Assistant

## 2022-08-09 ENCOUNTER — TREATMENT (OUTPATIENT)
Dept: PHYSICAL THERAPY | Facility: CLINIC | Age: 72
End: 2022-08-09

## 2022-08-09 DIAGNOSIS — M25.562 LEFT KNEE PAIN, UNSPECIFIED CHRONICITY: ICD-10-CM

## 2022-08-09 DIAGNOSIS — Z47.1 AFTERCARE FOLLOWING LEFT KNEE JOINT REPLACEMENT SURGERY: Primary | ICD-10-CM

## 2022-08-09 DIAGNOSIS — Z96.652 AFTERCARE FOLLOWING LEFT KNEE JOINT REPLACEMENT SURGERY: Primary | ICD-10-CM

## 2022-08-09 DIAGNOSIS — R26.9 GAIT DISTURBANCE: ICD-10-CM

## 2022-08-09 PROCEDURE — 97110 THERAPEUTIC EXERCISES: CPT | Performed by: PHYSICAL THERAPIST

## 2022-08-09 PROCEDURE — G0283 ELEC STIM OTHER THAN WOUND: HCPCS | Performed by: PHYSICAL THERAPIST

## 2022-08-09 PROCEDURE — 97140 MANUAL THERAPY 1/> REGIONS: CPT | Performed by: PHYSICAL THERAPIST

## 2022-08-09 PROCEDURE — 97530 THERAPEUTIC ACTIVITIES: CPT | Performed by: PHYSICAL THERAPIST

## 2022-08-09 NOTE — PROGRESS NOTES
Physical Therapy Daily Treatment Note        Patient: Kimberly Dennis   : 1950  Diagnosis/ICD-10 Code:  Aftercare following left knee joint replacement surgery [Z47.1, Z96.652]  Referring practitioner: Abdoulaye Paz MD  Date of Initial Visit: Type: THERAPY  Noted: 2022  Today's Date: 2022  Patient seen for 20 sessions             Subjective   Kimberly Dennis reports having 2-3/10 pain in her left knee upon arrival today.  She reports that she feels as if her knee is doing better.    Objective     Active Range of Motion   Left Knee   Flexion: 106 degrees   Extension: (-) 4 degrees  ( Lacking full extension)    Passive Range of Motion  Left Knee  Flexion:  112 degrees  Extension:  (- ) 2 degrees (Lacking full extension)         See Exercise, Manual, and Modality Logs for complete treatment.       Assessment/Plan     Pt tolerated therapy session well - with progression of therapeutic exercises, CKC-Functional activities, and Manual therapy. She has improved, but continues to have deficits in her Left knee ROM,  Strength, and Pain; limiting function and ability to perform ADLs at this time.  She continues to exhibit moderate hip/Knee Muscular tightness, but was able to make full revolution on bike with less pain and difficulty.  She continues to respond well to trials of IFC/Ice - reporting decrease in pain and inflammation post sessions.       Progress per Plan of Care           Timed:  Manual Therapy:    10     mins  19472;  Therapeutic Exercise:    20     mins  83446;     Neuromuscular Mona:    0    mins  89888;    Therapeutic Activity:     8     mins  65918;     Gait Trainin     mins  08710;     Ultrasound:     0     mins  80016;    Electrical Stimulation:    0     mins  53490;  Iontophoresis     0     mins  81711    Untimed:  Electrical Stimulation:    15     mins  62082 ( );  Mechanical Traction:    0     mins  80914;   Fluidotherapy     0     mins  45280      Timed Treatment:    38   mins   Total Treatment:     53   mins        Louise Caraballo PTA  Physical Therapist Assistant

## 2022-08-11 ENCOUNTER — TREATMENT (OUTPATIENT)
Dept: PHYSICAL THERAPY | Facility: CLINIC | Age: 72
End: 2022-08-11

## 2022-08-11 DIAGNOSIS — M25.562 LEFT KNEE PAIN, UNSPECIFIED CHRONICITY: ICD-10-CM

## 2022-08-11 DIAGNOSIS — Z96.652 AFTERCARE FOLLOWING LEFT KNEE JOINT REPLACEMENT SURGERY: Primary | ICD-10-CM

## 2022-08-11 DIAGNOSIS — Z47.1 AFTERCARE FOLLOWING LEFT KNEE JOINT REPLACEMENT SURGERY: Primary | ICD-10-CM

## 2022-08-11 DIAGNOSIS — R26.9 GAIT DISTURBANCE: ICD-10-CM

## 2022-08-11 PROCEDURE — 97530 THERAPEUTIC ACTIVITIES: CPT | Performed by: PHYSICAL THERAPIST

## 2022-08-11 PROCEDURE — 97110 THERAPEUTIC EXERCISES: CPT | Performed by: PHYSICAL THERAPIST

## 2022-08-11 PROCEDURE — 97140 MANUAL THERAPY 1/> REGIONS: CPT | Performed by: PHYSICAL THERAPIST

## 2022-08-11 NOTE — PROGRESS NOTES
Physical Therapy Daily Treatment Note        Patient: Kimberly Dennis   : 1950  Diagnosis/ICD-10 Code:  Aftercare following left knee joint replacement surgery [Z47.1, Z96.652]  Referring practitioner: Abdoulaye Paz MD  Date of Initial Visit: Type: THERAPY  Noted: 2022  Today's Date: 2022  Patient seen for 21 sessions             Subjective   Kimberly Dennis reports: no pain today, just L knee stiffness at beginning of session.     Objective   L knee AROM: 0-6-107 (prior to stretching)  See Exercise, Manual, and Modality Logs for complete treatment.       Assessment/Plan  Patient tolerated all exercise progressions and manual therapy well today but continues to demonstrate L knee strength/ROM deficits limiting function. Continue to progress per patient tolerance.    Progress per Plan of Care           Timed:  Manual Therapy:    8     mins  99039;  Therapeutic Exercise:    22     mins  46583;     Neuromuscular Mona:    0    mins  95685;    Therapeutic Activity:     8     mins  21544;     Gait Trainin     mins  25813;     Ultrasound:     0     mins  45725;    Electrical Stimulation:    0     mins  95335;  Iontophoresis     0     mins  49145    Untimed:  Electrical Stimulation:    0     mins  51221 (MC );  Mechanical Traction:    0     mins  78603;   Fluidotherapy     0     mins  09088  Hot pack     0     Mins    Cold pack                       0     Mins     Timed Treatment:   38   mins   Total Treatment:     40   mins        Greta Nesbitt PT    Electronically signed [unfilled]  [place] License: [id number]  NPI number: [id number]

## 2022-08-16 ENCOUNTER — TREATMENT (OUTPATIENT)
Dept: PHYSICAL THERAPY | Facility: CLINIC | Age: 72
End: 2022-08-16

## 2022-08-16 DIAGNOSIS — M25.562 LEFT KNEE PAIN, UNSPECIFIED CHRONICITY: ICD-10-CM

## 2022-08-16 DIAGNOSIS — Z96.652 AFTERCARE FOLLOWING LEFT KNEE JOINT REPLACEMENT SURGERY: Primary | ICD-10-CM

## 2022-08-16 DIAGNOSIS — Z47.1 AFTERCARE FOLLOWING LEFT KNEE JOINT REPLACEMENT SURGERY: Primary | ICD-10-CM

## 2022-08-16 DIAGNOSIS — R26.9 GAIT DISTURBANCE: ICD-10-CM

## 2022-08-16 PROCEDURE — 97110 THERAPEUTIC EXERCISES: CPT | Performed by: PHYSICAL THERAPIST

## 2022-08-16 PROCEDURE — 97140 MANUAL THERAPY 1/> REGIONS: CPT | Performed by: PHYSICAL THERAPIST

## 2022-08-16 PROCEDURE — 97530 THERAPEUTIC ACTIVITIES: CPT | Performed by: PHYSICAL THERAPIST

## 2022-08-16 NOTE — PROGRESS NOTES
Physical Therapy Daily Treatment Note        Patient: Kimberly Dennis   : 1950  Diagnosis/ICD-10 Code:  Aftercare following left knee joint replacement surgery [Z47.1, Z96.652]  Referring practitioner: Abdoulaye Paz MD  Date of Initial Visit: Type: THERAPY  Noted: 2022  Today's Date: 2022  Patient seen for 22 sessions             Subjective   Kimberly Dennis reports that her left knee has been feeling better. She reports that stairs and daily activities have gotten easier and less painful.    Objective     Active Range of Motion   Left Knee   Flexion: 110 degrees   Extension: (-) 4 degrees  ( Lacking full extension)     Passive Range of Motion  Left Knee  Flexion:  115 degrees  Extension:  (- ) 2 degrees (Lacking full extension)          See Exercise, Manual, and Modality Logs for complete treatment.       Assessment/Plan     Pt tolerated therapy session well - with progression of therapeutic exercises, CKC-Functional activities, and Manual therapy. She has improved, but continues to have deficits in her Left knee ROM,  Strength, and Pain; limiting function and ability to perform ADLs at this time.  She continues to exhibit moderate hip/Knee Muscular tightness, but has made notable functional gains in both stair and bike performance- reporting less pain and difficulty.         Progress per Plan of Care           Timed:  Manual Therapy:    10     mins  67392;  Therapeutic Exercise:    22     mins  27136;     Neuromuscular Mona:    0    mins  79745;    Therapeutic Activity:     10     mins  62028;     Gait Trainin     mins  47564;     Ultrasound:     0     mins  24106;    Electrical Stimulation:    0     mins  63219;  Iontophoresis     0     mins  20892    Untimed:  Electrical Stimulation:    0     mins  44323 ( );  Mechanical Traction:    0     mins  69598;   Fluidotherapy     0     mins  36924      Timed Treatment:   42   mins   Total Treatment:     42   mins        Louise Caraballo  PTA  Physical Therapist Assistant

## 2022-08-18 ENCOUNTER — TREATMENT (OUTPATIENT)
Dept: PHYSICAL THERAPY | Facility: CLINIC | Age: 72
End: 2022-08-18

## 2022-08-18 DIAGNOSIS — M25.562 LEFT KNEE PAIN, UNSPECIFIED CHRONICITY: ICD-10-CM

## 2022-08-18 DIAGNOSIS — Z47.1 AFTERCARE FOLLOWING LEFT KNEE JOINT REPLACEMENT SURGERY: Primary | ICD-10-CM

## 2022-08-18 DIAGNOSIS — R26.9 GAIT DISTURBANCE: ICD-10-CM

## 2022-08-18 DIAGNOSIS — Z96.652 AFTERCARE FOLLOWING LEFT KNEE JOINT REPLACEMENT SURGERY: Primary | ICD-10-CM

## 2022-08-18 PROCEDURE — 97530 THERAPEUTIC ACTIVITIES: CPT | Performed by: PHYSICAL THERAPIST

## 2022-08-18 PROCEDURE — 97110 THERAPEUTIC EXERCISES: CPT | Performed by: PHYSICAL THERAPIST

## 2022-08-18 PROCEDURE — G0283 ELEC STIM OTHER THAN WOUND: HCPCS | Performed by: PHYSICAL THERAPIST

## 2022-08-18 PROCEDURE — 97140 MANUAL THERAPY 1/> REGIONS: CPT | Performed by: PHYSICAL THERAPIST

## 2022-08-18 NOTE — PROGRESS NOTES
"Physical Therapy Daily Treatment Note        Patient: Kimberly Dennis   : 1950  Diagnosis/ICD-10 Code:  Aftercare following left knee joint replacement surgery [Z47.1, Z96.652]  Referring practitioner: Abdoulaye Paz MD  Date of Initial Visit: Type: THERAPY  Noted: 2022  Today's Date: 2022  Patient seen for 23 sessions             Subjective     Kimberly Dennis denies having any pain upon arrival.  She reports primarily having \"Soreness\" only in her left knee.    Objective     Strength/Myotome Testing      Left Knee   Flexion: 4/5  Extension: 4/5  Quadriceps contraction: fair       See Exercise, Manual, and Modality Logs for complete treatment.       Assessment/Plan     Pt tolerated therapy session well - with progression of therapeutic exercises, CKC-Functional activities, and Manual therapy. She has improved, but continues to have deficits in her Left knee ROM and   Strength; limiting function and ability to perform ADLs at this time.  She continues to exhibit moderate hip/Knee Muscular tightness, but has made notable functional gains in both stair and bike performance- reporting less pain and difficulty.  She is now ambulating in/out of clinic without  assistive device without any noticeable limp.    Progress per Plan of Care           Timed:  Manual Therapy:    10     mins  35095;  Therapeutic Exercise:    22     mins  03342;     Neuromuscular Mona:    0    mins  87187;    Therapeutic Activity:     8     mins  52363;     Gait Trainin     mins  56416;     Ultrasound:     0     mins  72790;    Electrical Stimulation:    0     mins  82801;  Iontophoresis     0     mins  88076    Untimed:  Electrical Stimulation:    15     mins  70861 ( );  Mechanical Traction:    0     mins  75633;   Fluidotherapy     0     mins  50308      Timed Treatment:   40   mins   Total Treatment:     55   mins        Louise Caraballo PTA  Physical Therapist Assistant  "

## 2022-08-23 ENCOUNTER — TREATMENT (OUTPATIENT)
Dept: PHYSICAL THERAPY | Facility: CLINIC | Age: 72
End: 2022-08-23

## 2022-08-23 DIAGNOSIS — R26.9 GAIT DISTURBANCE: ICD-10-CM

## 2022-08-23 DIAGNOSIS — M25.562 LEFT KNEE PAIN, UNSPECIFIED CHRONICITY: ICD-10-CM

## 2022-08-23 DIAGNOSIS — Z96.652 AFTERCARE FOLLOWING LEFT KNEE JOINT REPLACEMENT SURGERY: Primary | ICD-10-CM

## 2022-08-23 DIAGNOSIS — Z47.1 AFTERCARE FOLLOWING LEFT KNEE JOINT REPLACEMENT SURGERY: Primary | ICD-10-CM

## 2022-08-23 PROCEDURE — 97140 MANUAL THERAPY 1/> REGIONS: CPT | Performed by: PHYSICAL THERAPIST

## 2022-08-23 PROCEDURE — 97110 THERAPEUTIC EXERCISES: CPT | Performed by: PHYSICAL THERAPIST

## 2022-08-23 PROCEDURE — 97530 THERAPEUTIC ACTIVITIES: CPT | Performed by: PHYSICAL THERAPIST

## 2022-08-23 PROCEDURE — G0283 ELEC STIM OTHER THAN WOUND: HCPCS | Performed by: PHYSICAL THERAPIST

## 2022-08-23 NOTE — PROGRESS NOTES
Physical Therapy Daily Treatment Note        Patient: Kimberly Dennis   : 1950  Diagnosis/ICD-10 Code:  Aftercare following left knee joint replacement surgery [Z47.1, Z96.652]  Referring practitioner: Abdoulaye Paz MD  Date of Initial Visit: Type: THERAPY  Noted: 2022  Today's Date: 2022  Patient seen for 24 sessions             Subjective   Kimberly Dennis denies having any pain upon arrival today.  She states that she feels like she can move her left knee as much as or a little better than her right knee.    Objective       Active Range of Motion   Left Knee   Flexion: 110 degrees   Extension: (-) 5 degrees  ( Lacking full extension)     Passive Range of Motion  Left Knee  Flexion:  115 degrees  Extension:  (- ) 3 degrees (Lacking full extension)     See Exercise, Manual, and Modality Logs for complete treatment.       Assessment/Plan     Pt tolerated therapy session well - with progression of therapeutic exercises, CKC-Functional activities, and Manual therapy. She has improved, but continues to have deficits in her Left knee ROM and   Strength; limiting function and ability to perform ADLs at this time.  She continues to exhibit moderate hip/Knee Muscular tightness, but has made notable functional gains in both stair and bike performance- reporting less pain and difficulty.  She is now ambulating in/out of clinic without assistive device without any noticeable limp.  She also reports that she is now able to get in/out of car and goes up/down stairs with less pain and difficulty.       Progress per Plan of Care           Timed:  Manual Therapy:    10     mins  65588;  Therapeutic Exercise:    20     mins  66103;     Neuromuscular Mona:    0    mins  19098;    Therapeutic Activity:     8     mins  15411;     Gait Trainin     mins  20855;     Ultrasound:     0     mins  24258;    Electrical Stimulation:    0     mins  68917;  Iontophoresis     0     mins  46626    Untimed:  Electrical  Stimulation:    15     mins  55517 ( );  Mechanical Traction:    0     mins  20925;   Fluidotherapy     0     mins  07209  Hot pack     0     mins  81352  Cold pack     0     mins  02573    Timed Treatment:   38   mins   Total Treatment:     53   mins        Louise Caraballo PTA  Physical Therapist Assistant

## 2022-08-24 ENCOUNTER — APPOINTMENT (OUTPATIENT)
Dept: WOMENS IMAGING | Facility: HOSPITAL | Age: 72
End: 2022-08-24

## 2022-08-24 PROCEDURE — 77067 SCR MAMMO BI INCL CAD: CPT | Performed by: RADIOLOGY

## 2022-08-24 PROCEDURE — 77063 BREAST TOMOSYNTHESIS BI: CPT | Performed by: RADIOLOGY

## 2022-08-25 ENCOUNTER — TREATMENT (OUTPATIENT)
Dept: PHYSICAL THERAPY | Facility: CLINIC | Age: 72
End: 2022-08-25

## 2022-08-25 DIAGNOSIS — M25.562 LEFT KNEE PAIN, UNSPECIFIED CHRONICITY: ICD-10-CM

## 2022-08-25 DIAGNOSIS — R26.9 GAIT DISTURBANCE: ICD-10-CM

## 2022-08-25 DIAGNOSIS — Z47.1 AFTERCARE FOLLOWING LEFT KNEE JOINT REPLACEMENT SURGERY: Primary | ICD-10-CM

## 2022-08-25 DIAGNOSIS — Z96.652 AFTERCARE FOLLOWING LEFT KNEE JOINT REPLACEMENT SURGERY: Primary | ICD-10-CM

## 2022-08-25 PROCEDURE — 97530 THERAPEUTIC ACTIVITIES: CPT | Performed by: PHYSICAL THERAPIST

## 2022-08-25 PROCEDURE — 97140 MANUAL THERAPY 1/> REGIONS: CPT | Performed by: PHYSICAL THERAPIST

## 2022-08-25 PROCEDURE — G0283 ELEC STIM OTHER THAN WOUND: HCPCS | Performed by: PHYSICAL THERAPIST

## 2022-08-25 PROCEDURE — 97110 THERAPEUTIC EXERCISES: CPT | Performed by: PHYSICAL THERAPIST

## 2022-08-25 NOTE — PROGRESS NOTES
Physical Therapy Daily Treatment Note      Patient: Kimberly Dennis   : 1950  Referring practitioner: Abdoulaye Paz MD  Date of Initial Visit: Type: THERAPY  Noted: 2022  Today's Date: 2022  Patient seen for 25 sessions           Subjective   Kimberly Dennis reports: only a little soreness in left medial knee      Objective   See Exercise, Manual, and Modality Logs for complete treatment.       Assessment & Plan     Assessment    Assessment details: Supine AAROM flexion to 118 degrees        Visit Diagnoses:    ICD-10-CM ICD-9-CM   1. Aftercare following left knee joint replacement surgery  Z47.1 V54.81    Z96.652 V43.65   2. Left knee pain, unspecified chronicity  M25.562 719.46   3. Gait disturbance  R26.9 781.2       Progress per Plan of Care and Anticipate DC next Visit           Timed:  Manual Therapy:    8     mins  20678;  Therapeutic Exercise:    22     mins  94546;     Neuromuscular Mona:        mins  69023;    Therapeutic Activity:     8     mins  70516;     Gait Training:           mins  37974;     Ultrasound:          mins  85766;    Electrical Stimulation:         mins  66590 ( );    Untimed:  Electrical Stimulation:         mins  90684 ( );  Mechanical Traction:         mins  13678;     Timed Treatment:   38   mins   Total Treatment:     53   mins  Becki Olvera PT    Electronically singed 2022      [place] PT license: [id number]  Physical Therapist

## 2022-08-29 ENCOUNTER — DOCUMENTATION (OUTPATIENT)
Dept: PHYSICAL THERAPY | Facility: CLINIC | Age: 72
End: 2022-08-29

## 2022-08-29 ENCOUNTER — OFFICE VISIT (OUTPATIENT)
Dept: ORTHOPEDIC SURGERY | Facility: CLINIC | Age: 72
End: 2022-08-29

## 2022-08-29 VITALS — WEIGHT: 253 LBS | HEIGHT: 66 IN | OXYGEN SATURATION: 99 % | HEART RATE: 69 BPM | BODY MASS INDEX: 40.66 KG/M2

## 2022-08-29 DIAGNOSIS — Z96.652 AFTERCARE FOLLOWING LEFT KNEE JOINT REPLACEMENT SURGERY: Primary | ICD-10-CM

## 2022-08-29 DIAGNOSIS — Z47.1 AFTERCARE FOLLOWING LEFT KNEE JOINT REPLACEMENT SURGERY: Primary | ICD-10-CM

## 2022-08-29 PROCEDURE — 99024 POSTOP FOLLOW-UP VISIT: CPT | Performed by: PHYSICIAN ASSISTANT

## 2022-08-29 NOTE — PATIENT INSTRUCTIONS
X-rays reviewed, showing hardware intact. Continue PT to completion and home exercise program to progress strength and ROM. Continue icing knee as needed up to 3-4 times daily for no longer than 15 to 20 minutes at a time..     Continue with lifelong antibiotic prophylaxis with dental procedures following total joint replacement.    Follow-up in 9 months. Call sooner, if needed with any changes or concerns. Repeat x-rays.

## 2022-08-29 NOTE — PROGRESS NOTES
"Chief Complaint  Pain and Follow-up of the Left Knee    Subjective      Kimberly Dennis presents to [facility] for follow-up of left total knee arthroplasty with computer navigation performed on 6/3/2022 by Dr. Paz.  She presents today with a cane, although states she has been using this for uneven terrain only.  Reports she had to park in a distant parking lot and it was raining so she was concerned.  Otherwise, has not been using cane.  Reports she is due to complete PT within the next week.  She has been able to achieve 120 degrees of knee flexion and -1 degrees of knee extension.  Denies pain.  Has minimal swelling, controlled with rest and icing.  She is overall pleased with her postoperative recovery.    Objective   Allergies   Allergen Reactions   • Amoxicillin-Pot Clavulanate Itching   • Hydrocodone-Acetaminophen Itching   • Hydrocodone Itching and Rash   • Iodine Rash   • Latex Rash   • Metal Rash     YELLOW GOLD CAUSES RASH    • Shellfish Allergy Rash       Vital Signs:   Pulse 69   Ht 167.6 cm (66\")   Wt 115 kg (253 lb)   SpO2 99%   BMI 40.84 kg/m²       Physical Exam    Constitutional: Awake, alert. Well nourished appearance.    Integumentary: Warm, dry, intact. No obvious rashes.    HENT: Atraumatic, normocephalic.   Respiratory: Non labored respirations .   Cardiovascular: Intact peripheral pulses.    Psychiatric: Normal mood and affect. A&O X3    Ortho Exam  Left knee: Surgical scar visualized and is well-healed.  There is no dehiscence, surrounding erythema, warmth, or drainage.  Patella is well tracking.  Knee is stable to varus and valgus stress.  -1 degree knee extension.  Knee flexion to 120 degrees.  Full plantar flexion and dorsiflexion of the ankle.  Sensation intact to light touch.  Distal neurovascular intact.  Nonantalgic gait noted without use of cane.    Imaging Results (Most Recent)     Procedure Component Value Units Date/Time    XR Knee 3 View Left " [[id number]] Resulted: 08/29/22 1142     Updated: 08/29/22 1143    Narrative:      X-Ray Report:  Study: X-rays ordered, taken in the office, and reviewed today.   Site: Left knee Xray  Indication: Total knee arthroplasty  View: AP and Lateral view(s)  Findings: Intact left total knee arthroplasty. No evidence of hardware   malfunction or loosening.   Prior studies available for comparison: yes               Assessment and Plan   Problem List Items Addressed This Visit        Musculoskeletal and Injuries    Aftercare following left knee joint replacement surgery - Primary    Relevant Orders    XR Knee 3 View Left (Completed)        Follow Up   Return in about 9 months (around 5/29/2023).    Patient Instructions   X-rays reviewed, showing hardware intact. Continue PT to completion and home exercise program to progress strength and ROM. Continue icing knee as needed up to 3-4 times daily for no longer than 15 to 20 minutes at a time..     Continue with lifelong antibiotic prophylaxis with dental procedures following total joint replacement.    Follow-up in 9 months. Call sooner, if needed with any changes or concerns. Repeat x-rays.       Patient was given instructions and counseling regarding her condition or for health maintenance advice. Please see specific information pulled into the AVS if appropriate.

## 2022-09-06 ENCOUNTER — DOCUMENTATION (OUTPATIENT)
Dept: PHYSICAL THERAPY | Facility: CLINIC | Age: 72
End: 2022-09-06

## 2022-09-26 ENCOUNTER — OFFICE VISIT (OUTPATIENT)
Dept: INTERNAL MEDICINE | Facility: CLINIC | Age: 72
End: 2022-09-26

## 2022-09-26 VITALS
HEIGHT: 66 IN | TEMPERATURE: 97.8 F | WEIGHT: 258.4 LBS | BODY MASS INDEX: 41.53 KG/M2 | HEART RATE: 60 BPM | DIASTOLIC BLOOD PRESSURE: 63 MMHG | SYSTOLIC BLOOD PRESSURE: 135 MMHG | OXYGEN SATURATION: 96 %

## 2022-09-26 DIAGNOSIS — Z23 NEED FOR INFLUENZA VACCINATION: ICD-10-CM

## 2022-09-26 DIAGNOSIS — E78.49 OTHER HYPERLIPIDEMIA: ICD-10-CM

## 2022-09-26 DIAGNOSIS — Z96.652 AFTERCARE FOLLOWING LEFT KNEE JOINT REPLACEMENT SURGERY: ICD-10-CM

## 2022-09-26 DIAGNOSIS — Z47.1 AFTERCARE FOLLOWING LEFT KNEE JOINT REPLACEMENT SURGERY: ICD-10-CM

## 2022-09-26 DIAGNOSIS — K21.9 GASTROESOPHAGEAL REFLUX DISEASE WITHOUT ESOPHAGITIS: ICD-10-CM

## 2022-09-26 DIAGNOSIS — I10 ESSENTIAL HYPERTENSION: ICD-10-CM

## 2022-09-26 DIAGNOSIS — E55.9 VITAMIN D DEFICIENCY: ICD-10-CM

## 2022-09-26 DIAGNOSIS — I10 ESSENTIAL HYPERTENSION: Primary | ICD-10-CM

## 2022-09-26 DIAGNOSIS — I48.0 PAROXYSMAL ATRIAL FIBRILLATION: ICD-10-CM

## 2022-09-26 PROCEDURE — 99214 OFFICE O/P EST MOD 30 MIN: CPT | Performed by: INTERNAL MEDICINE

## 2022-09-26 PROCEDURE — 90662 IIV NO PRSV INCREASED AG IM: CPT | Performed by: INTERNAL MEDICINE

## 2022-09-26 PROCEDURE — G0008 ADMIN INFLUENZA VIRUS VAC: HCPCS | Performed by: INTERNAL MEDICINE

## 2022-09-26 RX ORDER — GABAPENTIN 300 MG/1
300 CAPSULE ORAL NIGHTLY
Qty: 30 CAPSULE | Refills: 0 | Status: SHIPPED | OUTPATIENT
Start: 2022-09-26 | End: 2022-10-24

## 2022-09-26 RX ORDER — METOPROLOL SUCCINATE 50 MG/1
TABLET, EXTENDED RELEASE ORAL
Qty: 90 TABLET | Refills: 3 | Status: SHIPPED | OUTPATIENT
Start: 2022-09-26

## 2022-09-26 RX ORDER — FAMOTIDINE 40 MG/1
40 TABLET, FILM COATED ORAL DAILY
Qty: 90 TABLET | Refills: 1 | Status: SHIPPED | OUTPATIENT
Start: 2022-09-26 | End: 2023-03-23

## 2022-09-26 RX ORDER — GABAPENTIN 300 MG/1
CAPSULE ORAL
Qty: 90 CAPSULE | Refills: 0 | OUTPATIENT
Start: 2022-09-26

## 2022-09-26 NOTE — PROGRESS NOTES
Chief Complaint  Atrial Fibrillation (Follow up )    Subjective     {Problem List  Visit Diagnosis   Encounters  Notes  Medications  Labs  Result Review Imaging  Media :23}     Kimberly Dennis presents to [facility]  History of Present Illness  Patient reports her knee is doing well. She has finished physical therapy. Patient continue to be on eliquis. Patient reports some swelling and numbness. Patient still utilizing gabapentin at nighttime. Patient uses flexeril prior to therapy. Follow-up with orthopedics in approx 1 year.   hypertension- patient denies Has, dizziness, chest pain.   Patient reports ongoing indigestion. Patient notices it mostly after eating. Patient does not associated with exertion.     Current Outpatient Medications   Medication Instructions   • apixaban (ELIQUIS) 5 mg, Oral, 2 Times Daily   • cetirizine (ZYRTEC) 10 mg, Oral, Daily   • Cholecalciferol 125 MCG (5000 UT) tablet 1 tablet, Oral, Daily   • cyclobenzaprine (FLEXERIL) 5 mg, Oral, 2 Times Daily PRN   • famotidine (PEPCID) 40 mg, Oral, Daily   • fluticasone (Flonase) 50 MCG/ACT nasal spray 2 sprays, Nasal, Daily   • gabapentin (NEURONTIN) 300 mg, Oral, Nightly   • hydroCHLOROthiazide (HYDRODIURIL) 25 MG tablet Take 1 tablet by mouth once daily   • levothyroxine (SYNTHROID, LEVOTHROID) 75 MCG tablet Take 1 tablet by mouth once daily   • losartan (COZAAR) 25 MG tablet Take 1 tablet by mouth once daily   • metoprolol succinate XL (TOPROL-XL) 50 mg, Oral, Daily   • montelukast (SINGULAIR) 10 MG tablet TAKE 1 TABLET BY MOUTH ONCE DAILY AT NIGHT   • polyethylene glycol (GLYCOLAX) 17 g, Oral, Daily       The following portions of the patient's history were reviewed and updated as appropriate: allergies, current medications, past family history, past medical history, past social history, past surgical history, and problem list.    Objective   Vital Signs:   /63 (BP Location: Right  "arm)   Pulse 60   Temp 97.8 °F (36.6 °C) (Temporal)   Ht 167.6 cm (66\")   Wt 117 kg (258 lb 6.4 oz)   SpO2 96%   BMI 41.71 kg/m²     Wt Readings from Last 3 Encounters:   09/26/22 117 kg (258 lb 6.4 oz)   08/29/22 115 kg (253 lb)   07/20/22 115 kg (253 lb)     BP Readings from Last 3 Encounters:   09/26/22 135/63   07/20/22 138/62   07/12/22 145/69     Physical Exam   Appearance: No acute distress, well-nourished  Head: normocephalic, atraumatic  Eyes: extraocular movements intact, no scleral icterus, no conjunctival injection  Ears, Nose, and Throat: external ears normal, nares patent, moist mucous membranes  Cardiovascular: regular rate and rhythm. no murmurs, rubs, or gallops. no edema  Respiratory: breathing comfortably, symmetric chest rise, clear to auscultation bilaterally. No wheezes, rales, or rhonchi.  Neuro: alert and oriented to time, place, and person. Normal gait  Psych: normal mood and affect     Result Review :   The following data was reviewed by: Milan Coppola Jr, MD on 09/26/2022:  Common labs    Common Labs 5/23/22 5/23/22 5/23/22 6/4/22 6/4/22 6/7/22 6/7/22    1122 1122 1122 0006 [id number] 1933 1933   Glucose  99     109 (A)   BUN  13     11   Creatinine  0.60     0.53 (A)   Sodium  138     137   Potassium  4.1     4.0   Chloride  100     98   Calcium  9.9     9.3   Albumin  4.40     3.60   Total Bilirubin  0.4     0.6   Alkaline Phosphatase  84     70   AST (SGOT)  18     15   ALT (SGPT)  15     11   WBC 6.73   8.66  6.90    Hemoglobin 12.2   9.5 (A) 9.1 (A) 9.1 (A)    Hematocrit 37.2   29.5 (A) 27.5 (A) 27.4 (A)    Platelets 199   145  218    Hemoglobin A1C   5.30       (A) Abnormal value              Lab Results   Component Value Date    SARSANTIGEN Not Detected 05/24/2022    RAPFLUA Negative 05/24/2022    RAPFLUB Negative 05/24/2022    INR 1.23 (H) 05/23/2022         Last Urine Toxicity    There is no flowsheet data to display.         Assessment and Plan    Diagnoses and all " orders for this visit:    1. Essential hypertension (Primary)  Comments:  well controlled currently.     2. Paroxysmal atrial fibrillation (HCC)  Comments:  rate contreolled. cont eliquis.     3. Aftercare following left knee joint replacement surgery  Comments:  doing well. cont gabapentin as needed. using mostly at nighttime.   Orders:  -     gabapentin (NEURONTIN) 300 MG capsule; Take 1 capsule by mouth Every Night.  Dispense: 30 capsule; Refill: 0    4. Gastroesophageal reflux disease without esophagitis  -     famotidine (PEPCID) 40 MG tablet; Take 1 tablet by mouth Daily.  Dispense: 90 tablet; Refill: 1    5. Vitamin D deficiency  -     Vitamin D 25 Hydroxy; Future    6. Other hyperlipidemia  -     Lipid Panel; Future    7. Need for influenza vaccination  -     Fluzone High-Dose 65+yrs (3824-5624)        Medications Discontinued During This Encounter   Medication Reason   • cephalexin (KEFLEX) 500 MG capsule *Therapy completed   • Eliquis 5 MG tablet tablet *Error   • gabapentin (NEURONTIN) 300 MG capsule Reorder        Follow Up   Return in about 4 months (around 1/26/2023) for Recheck.  Patient was given instructions and counseling regarding her condition or for health maintenance advice. Please see specific information pulled into the AVS if appropriate.       Milan Coppola Jr, MD  09/26/22  12:40 EDT

## 2022-10-23 DIAGNOSIS — Z96.652 AFTERCARE FOLLOWING LEFT KNEE JOINT REPLACEMENT SURGERY: ICD-10-CM

## 2022-10-23 DIAGNOSIS — Z47.1 AFTERCARE FOLLOWING LEFT KNEE JOINT REPLACEMENT SURGERY: ICD-10-CM

## 2022-10-24 RX ORDER — GABAPENTIN 300 MG/1
CAPSULE ORAL
Qty: 30 CAPSULE | Refills: 0 | Status: SHIPPED | OUTPATIENT
Start: 2022-10-24 | End: 2022-11-22

## 2022-10-24 RX ORDER — HYDROCHLOROTHIAZIDE 25 MG/1
TABLET ORAL
Qty: 90 TABLET | Refills: 3 | Status: SHIPPED | OUTPATIENT
Start: 2022-10-24 | End: 2022-12-19

## 2022-11-21 DIAGNOSIS — Z96.652 AFTERCARE FOLLOWING LEFT KNEE JOINT REPLACEMENT SURGERY: ICD-10-CM

## 2022-11-21 DIAGNOSIS — Z47.1 AFTERCARE FOLLOWING LEFT KNEE JOINT REPLACEMENT SURGERY: ICD-10-CM

## 2022-11-22 RX ORDER — GABAPENTIN 300 MG/1
CAPSULE ORAL
Qty: 30 CAPSULE | Refills: 0 | Status: SHIPPED | OUTPATIENT
Start: 2022-11-22 | End: 2022-12-12

## 2022-11-22 NOTE — TELEPHONE ENCOUNTER
Rx Refill Note  Requested Prescriptions     Pending Prescriptions Disp Refills   • gabapentin (NEURONTIN) 300 MG capsule [Pharmacy Med Name: Gabapentin 300 MG Oral Capsule] 30 capsule 0     Sig: TAKE 1 CAPSULE BY MOUTH ONCE DAILY AT NIGHT      Last office visit with prescribing clinician: 9/26/2022      Next office visit with prescribing clinician: 1/12/2023            Dharmesh Wilhelm  11/22/22, 07:42 EST     Refill request for  controlled substance.      Date of request: 11/22/2022  Medication requested: Gabapentin  Last OV: 9/26/2022  Last UDS: couldn't find one in chart   Contract signed: no    Date:couldnt find one in chart   Next office visit:12/05/2022    Dharmesh Wilhelm

## 2022-12-12 DIAGNOSIS — Z47.1 AFTERCARE FOLLOWING LEFT KNEE JOINT REPLACEMENT SURGERY: ICD-10-CM

## 2022-12-12 DIAGNOSIS — Z96.652 AFTERCARE FOLLOWING LEFT KNEE JOINT REPLACEMENT SURGERY: ICD-10-CM

## 2022-12-12 RX ORDER — GABAPENTIN 300 MG/1
CAPSULE ORAL
Qty: 30 CAPSULE | Refills: 0 | Status: SHIPPED | OUTPATIENT
Start: 2022-12-12 | End: 2023-01-12 | Stop reason: SDUPTHER

## 2022-12-12 NOTE — TELEPHONE ENCOUNTER
Control Refill Request:  Medication: Gabapentin   Last Office Visit: 9/26/2022  Next Office Visit: 1/12/2023  Last UDS: NOT ON FILE  Last Contract:  NOT ON FILE

## 2022-12-19 ENCOUNTER — OFFICE VISIT (OUTPATIENT)
Dept: INTERNAL MEDICINE | Facility: CLINIC | Age: 72
End: 2022-12-19

## 2022-12-19 VITALS
SYSTOLIC BLOOD PRESSURE: 172 MMHG | DIASTOLIC BLOOD PRESSURE: 80 MMHG | BODY MASS INDEX: 41.11 KG/M2 | HEART RATE: 62 BPM | HEIGHT: 66 IN | TEMPERATURE: 97.9 F | OXYGEN SATURATION: 97 % | WEIGHT: 255.8 LBS

## 2022-12-19 DIAGNOSIS — I10 ESSENTIAL HYPERTENSION: ICD-10-CM

## 2022-12-19 DIAGNOSIS — J40 BRONCHITIS: ICD-10-CM

## 2022-12-19 DIAGNOSIS — I16.0 HYPERTENSIVE URGENCY: ICD-10-CM

## 2022-12-19 DIAGNOSIS — R05.8 PRODUCTIVE COUGH: Primary | ICD-10-CM

## 2022-12-19 DIAGNOSIS — R07.89 CHEST TIGHTNESS: ICD-10-CM

## 2022-12-19 DIAGNOSIS — J02.0 STREP PHARYNGITIS: ICD-10-CM

## 2022-12-19 PROBLEM — A38.8 STREP PHARYNGITIS WITH SCARLET FEVER: Status: ACTIVE | Noted: 2022-12-19

## 2022-12-19 PROBLEM — A38.8 STREP PHARYNGITIS WITH SCARLET FEVER: Status: RESOLVED | Noted: 2022-12-19 | Resolved: 2022-12-19

## 2022-12-19 LAB
EXPIRATION DATE: ABNORMAL
EXPIRATION DATE: NORMAL
FLUAV AG UPPER RESP QL IA.RAPID: NOT DETECTED
FLUBV AG UPPER RESP QL IA.RAPID: NOT DETECTED
INTERNAL CONTROL: ABNORMAL
INTERNAL CONTROL: NORMAL
Lab: ABNORMAL
Lab: NORMAL
S PYO AG THROAT QL: POSITIVE
SARS-COV-2 AG UPPER RESP QL IA.RAPID: NOT DETECTED
SARS-COV-2 RNA PNL SPEC NAA+PROBE: NOT DETECTED

## 2022-12-19 PROCEDURE — 87880 STREP A ASSAY W/OPTIC: CPT | Performed by: STUDENT IN AN ORGANIZED HEALTH CARE EDUCATION/TRAINING PROGRAM

## 2022-12-19 PROCEDURE — 93000 ELECTROCARDIOGRAM COMPLETE: CPT | Performed by: STUDENT IN AN ORGANIZED HEALTH CARE EDUCATION/TRAINING PROGRAM

## 2022-12-19 PROCEDURE — 99214 OFFICE O/P EST MOD 30 MIN: CPT | Performed by: STUDENT IN AN ORGANIZED HEALTH CARE EDUCATION/TRAINING PROGRAM

## 2022-12-19 PROCEDURE — U0004 COV-19 TEST NON-CDC HGH THRU: HCPCS | Performed by: STUDENT IN AN ORGANIZED HEALTH CARE EDUCATION/TRAINING PROGRAM

## 2022-12-19 PROCEDURE — U0005 INFEC AGEN DETEC AMPLI PROBE: HCPCS | Performed by: STUDENT IN AN ORGANIZED HEALTH CARE EDUCATION/TRAINING PROGRAM

## 2022-12-19 PROCEDURE — 87428 SARSCOV & INF VIR A&B AG IA: CPT | Performed by: STUDENT IN AN ORGANIZED HEALTH CARE EDUCATION/TRAINING PROGRAM

## 2022-12-19 PROCEDURE — 96372 THER/PROPH/DIAG INJ SC/IM: CPT | Performed by: STUDENT IN AN ORGANIZED HEALTH CARE EDUCATION/TRAINING PROGRAM

## 2022-12-19 RX ORDER — CHLORTHALIDONE 25 MG/1
25 TABLET ORAL DAILY
Qty: 90 TABLET | Refills: 2 | Status: SHIPPED | OUTPATIENT
Start: 2022-12-19 | End: 2023-01-12 | Stop reason: ALTCHOICE

## 2022-12-19 RX ORDER — BENZONATATE 100 MG/1
100 CAPSULE ORAL 3 TIMES DAILY PRN
Qty: 30 CAPSULE | Refills: 0 | Status: SHIPPED | OUTPATIENT
Start: 2022-12-19 | End: 2023-01-12

## 2022-12-19 RX ORDER — PREDNISONE 20 MG/1
40 TABLET ORAL DAILY
Qty: 10 TABLET | Refills: 0 | Status: SHIPPED | OUTPATIENT
Start: 2022-12-19 | End: 2022-12-24

## 2022-12-19 NOTE — PROGRESS NOTES
"Chief Complaint  Cough (Swollen lymph nodes) and Nasal Congestion    Subjective          Kimberly Dennis presents to [facility]  History of Present Illness    Here for a sick visit.  Here with complaints of cough productive of yellow sputum, wheeze, \"right at the verge\" of a sore throat.  No fever, no vomiting or diarrhea.    Reports \"tightness\" in bilateral upper chest this morning that's not present at clinic today    Has history of MVP and has had a fib in the past, but denies any history of MI and no stents.    Sick symptoms started Tuesday.    Current Outpatient Medications   Medication Instructions   • apixaban (ELIQUIS) 5 mg, Oral, 2 Times Daily   • benzonatate (TESSALON PERLES) 100 mg, Oral, 3 Times Daily PRN   • cetirizine (ZYRTEC) 10 mg, Oral, Daily   • chlorthalidone (HYGROTON) 25 mg, Oral, Daily   • Cholecalciferol 125 MCG (5000 UT) tablet 1 tablet, Oral, Daily   • cyclobenzaprine (FLEXERIL) 5 mg, Oral, 2 Times Daily PRN   • famotidine (PEPCID) 40 mg, Oral, Daily   • fluticasone (Flonase) 50 MCG/ACT nasal spray 2 sprays, Nasal, Daily   • gabapentin (NEURONTIN) 300 MG capsule TAKE 1 CAPSULE BY MOUTH ONCE DAILY AT NIGHT   • levothyroxine (SYNTHROID, LEVOTHROID) 75 MCG tablet Take 1 tablet by mouth once daily   • losartan (COZAAR) 25 MG tablet Take 1 tablet by mouth once daily   • metoprolol succinate XL (TOPROL-XL) 50 MG 24 hr tablet Take 1 tablet by mouth once daily   • montelukast (SINGULAIR) 10 MG tablet TAKE 1 TABLET BY MOUTH ONCE DAILY AT NIGHT   • polyethylene glycol (GLYCOLAX) 17 g, Oral, Daily   • predniSONE (DELTASONE) 40 mg, Oral, Daily       The following portions of the patient's history were reviewed and updated as appropriate: allergies, current medications, past family history, past medical history, past social history, past surgical history, and problem list.    Objective   Vital Signs:   /80 (BP Location: Right arm, Patient Position: " "Sitting, Cuff Size: Adult) Comment: MANUAL RECHECK  Pulse 62   Temp 97.9 °F (36.6 °C) (Oral)   Ht 167.6 cm (66\")   Wt 116 kg (255 lb 12.8 oz)   SpO2 97%   BMI 41.29 kg/m²     Wt Readings from Last 3 Encounters:   12/19/22 116 kg (255 lb 12.8 oz)   09/26/22 117 kg (258 lb 6.4 oz)   08/29/22 115 kg (253 lb)     BP Readings from Last 3 Encounters:   12/19/22 172/80   09/26/22 135/63   07/20/22 138/62     Physical Exam  Vitals reviewed.   Constitutional:       General: She is not in acute distress.     Appearance: Normal appearance. She is not ill-appearing, toxic-appearing or diaphoretic.   HENT:      Head: Normocephalic and atraumatic.      Right Ear: Tympanic membrane, ear canal and external ear normal.      Left Ear: Tympanic membrane, ear canal and external ear normal.   Eyes:      Conjunctiva/sclera: Conjunctivae normal.   Cardiovascular:      Rate and Rhythm: Normal rate and regular rhythm.      Pulses: Normal pulses.      Heart sounds: Normal heart sounds. No murmur heard.    No friction rub. No gallop.   Pulmonary:      Effort: Pulmonary effort is normal. No respiratory distress.      Breath sounds: Normal breath sounds. No stridor. No wheezing, rhonchi or rales.   Chest:      Chest wall: No tenderness.   Abdominal:      General: Abdomen is flat.      Palpations: Abdomen is soft. There is no mass.      Tenderness: There is no abdominal tenderness.   Musculoskeletal:      Right lower leg: No edema.      Left lower leg: No edema.   Skin:     General: Skin is warm and dry.   Neurological:      General: No focal deficit present.      Mental Status: She is alert. Mental status is at baseline.   Psychiatric:         Mood and Affect: Mood normal.         Behavior: Behavior normal.         Thought Content: Thought content normal.         Judgment: Judgment normal.         Result Review :   The following data was reviewed by: Ian Rabago MD on 12/19/2022:  Common labs    Common Labs 5/23/22 5/23/22 5/23/22 " 6/4/22 6/4/22 6/7/22 6/7/22    1122 1122 1122 0006 [id number] 1933 1933   Glucose  99     109 (A)   BUN  13     11   Creatinine  0.60     0.53 (A)   Sodium  138     137   Potassium  4.1     4.0   Chloride  100     98   Calcium  9.9     9.3   Albumin  4.40     3.60   Total Bilirubin  0.4     0.6   Alkaline Phosphatase  84     70   AST (SGOT)  18     15   ALT (SGPT)  15     11   WBC 6.73   8.66  6.90    Hemoglobin 12.2   9.5 (A) 9.1 (A) 9.1 (A)    Hematocrit 37.2   29.5 (A) 27.5 (A) 27.4 (A)    Platelets 199   145  218    Hemoglobin A1C   5.30       (A) Abnormal value                   Lab Results   Component Value Date    SARSANTIGEN Not Detected 12/19/2022    RAPFLUA Negative 05/24/2022    RAPFLUB Negative 05/24/2022    FLUAAG Not Detected 12/19/2022    FLUBAG Not Detected 12/19/2022    RAPSCRN Positive (A) 12/19/2022    INR 1.23 (H) 05/23/2022       Procedures        Assessment and Plan    Diagnoses and all orders for this visit:    1. Productive cough (Primary)  -     POCT SARS-CoV-2 Antigen NARCISO  -     POC Rapid Strep A  -     benzonatate (Tessalon Perles) 100 MG capsule; Take 1 capsule by mouth 3 (Three) Times a Day As Needed for Cough.  Dispense: 30 capsule; Refill: 0  -     COVID-19,APTIMA PANTHER(PATEL),[facility], NP/OP SWAB IN UTM/VTM/SALINE TRANSPORT MEDIA,24 HR TAT - Swab, Nasopharynx    2. Chest tightness  -     ECG 12 Lead    3. Strep pharyngitis  -     Penicillin G Benzathine (BICILLIN-LA) injection 1.2 Million Units    4. Hypertensive urgency    5. Bronchitis  -     predniSONE (DELTASONE) 20 MG tablet; Take 2 tablets by mouth Daily for 5 days.  Dispense: 10 tablet; Refill: 0    6. Essential hypertension  -     chlorthalidone (HYGROTON) 25 MG tablet; Take 1 tablet by mouth Daily.  Dispense: 90 tablet; Refill: 2      HTN:  -BP at   -will switch from HCTZ to chlorthalidone    Medications Discontinued During This Encounter   Medication Reason   • hydroCHLOROthiazide (HYDRODIURIL) 25 MG tablet           Follow  Up   Return if symptoms worsen or fail to improve.  Patient was given instructions and counseling regarding her condition or for health maintenance advice. Please see specific information pulled into the AVS if appropriate.       Ian Rabago MD  12/19/22  13:29 EST

## 2023-01-11 NOTE — PROGRESS NOTES
"Chief Complaint  Hypertension (4 month follow-up. Patient does not like the Chlorthalidone medication and believes it is harming her. )    Subjective          Kimberly Dennis presents to [facility]  History of Present Illness  Patient reports feeling sick approx 3 weeks ago. Patient reports feeling better, but having ongoing cough. She had been having steroids and bicillin injection. Patient is taking zyrtec, singulair. She has been less compliant with flonase recently.    hypertension- patient switched to chlorthalidone and having more swelling and myalgias. Would like to switch back to HCTZ.     Current Outpatient Medications   Medication Instructions   • apixaban (ELIQUIS) 5 mg, Oral, 2 Times Daily   • benzonatate (TESSALON) 200 mg, Oral, 3 Times Daily PRN   • cetirizine (ZYRTEC) 10 mg, Oral, Daily   • Cholecalciferol 125 MCG (5000 UT) tablet 1 tablet, Oral, Daily   • famotidine (PEPCID) 40 mg, Oral, Daily   • fluticasone (Flonase) 50 MCG/ACT nasal spray 2 sprays, Nasal, Daily   • gabapentin (NEURONTIN) 300 mg, Oral, Nightly   • hydroCHLOROthiazide (HYDRODIURIL) 25 mg, Oral, Daily   • levothyroxine (SYNTHROID, LEVOTHROID) 75 MCG tablet Take 1 tablet by mouth once daily   • losartan (COZAAR) 25 MG tablet Take 1 tablet by mouth once daily   • metoprolol succinate XL (TOPROL-XL) 50 MG 24 hr tablet Take 1 tablet by mouth once daily   • montelukast (SINGULAIR) 10 MG tablet TAKE 1 TABLET BY MOUTH ONCE DAILY AT NIGHT       The following portions of the patient's history were reviewed and updated as appropriate: allergies, current medications, past family history, past medical history, past social history, past surgical history, and problem list.    Objective   Vital Signs:   /75 (BP Location: Right arm, Patient Position: Sitting, Cuff Size: Adult)   Pulse 68   Temp 98 °F (36.7 °C) (Temporal)   Ht 167.6 cm (66\")   Wt 117 kg (259 lb)   SpO2 96%   BMI 41.80 kg/m²   "   Wt Readings from Last 3 Encounters:   01/12/23 117 kg (259 lb)   12/19/22 116 kg (255 lb 12.8 oz)   09/26/22 117 kg (258 lb 6.4 oz)     BP Readings from Last 3 Encounters:   01/12/23 144/75   12/19/22 172/80   09/26/22 135/63     Physical Exam   Appearance: No acute distress, well-nourished  Head: normocephalic, atraumatic  Eyes: extraocular movements intact, no scleral icterus, no conjunctival injection  Ears, Nose, and Throat: external ears normal, nares patent, moist mucous membranes  Cardiovascular: regular rate and rhythm. no murmurs, rubs, or gallops. no edema  Respiratory: breathing comfortably, symmetric chest rise, clear to auscultation bilaterally. No wheezes, rales, or rhonchi.  Neuro: alert and oriented to time, place, and person. Normal gait  Psych: normal mood and affect     Result Review :   The following data was reviewed by: Milan Coppola Jr, MD on 01/12/2023:  Common labs    Common Labs 5/23/22 5/23/22 5/23/22 6/4/22 6/4/22 6/7/22 6/7/22    1122 1122 1122 0006 0459 1933 1933   Glucose  99     109 (A)   BUN  13     11   Creatinine  0.60     0.53 (A)   Sodium  138     137   Potassium  4.1     4.0   Chloride  100     98   Calcium  9.9     9.3   Albumin  4.40     3.60   Total Bilirubin  0.4     0.6   Alkaline Phosphatase  84     70   AST (SGOT)  18     15   ALT (SGPT)  15     11   WBC 6.73   8.66  6.90    Hemoglobin 12.2   9.5 (A) 9.1 (A) 9.1 (A)    Hematocrit 37.2   29.5 (A) 27.5 (A) 27.4 (A)    Platelets 199   145  218    Hemoglobin A1C   5.30       (A) Abnormal value                Lab Results   Component Value Date    SARSANTIGEN Not Detected 12/19/2022    COVID19 Not Detected 12/19/2022    RAPFLUA Negative 05/24/2022    RAPFLUB Negative 05/24/2022    FLUAAG Not Detected 12/19/2022    FLUBAG Not Detected 12/19/2022    RAPSCRN Positive (A) 12/19/2022    INR 1.23 (H) 05/23/2022          Assessment and Plan    Diagnoses and all orders for this visit:    1. Essential hypertension  (Primary)  Comments:  switch from chlorthalidone to HCTZ for efficacy. monitor BP through trnasition.   Orders:  -     hydroCHLOROthiazide (HYDRODIURIL) 25 MG tablet; Take 1 tablet by mouth Daily.  Dispense: 90 tablet; Refill: 1  -     CBC & Differential; Future    2. Need for vaccination  -     Td Vaccine Greater Than or Equal To 8yo With Preservative IM    3. Paroxysmal atrial fibrillation (HCC)  Comments:  HR controlled. cont AC.     4. Productive cough  Comments:  wiull Rx tessalon perles to help.   Orders:  -     benzonatate (TESSALON) 200 MG capsule; Take 1 capsule by mouth 3 (Three) Times a Day As Needed for Cough.  Dispense: 60 capsule; Refill: 0    5. Aftercare following left knee joint replacement surgery  Comments:  doing well. cont gabapentin as needed. using mostly at nighttime. NICHOLAS and nita reviewed.   Orders:  -     gabapentin (NEURONTIN) 300 MG capsule; Take 1 capsule by mouth Every Night.  Dispense: 30 capsule; Refill: 0    6. Acquired hypothyroidism  -     TSH; Future    7. Other hyperlipidemia  -     Lipid Panel; Future    8. Vitamin D deficiency  -     Vitamin D,25-Hydroxy; Future        Medications Discontinued During This Encounter   Medication Reason   • benzonatate (Tessalon Perles) 100 MG capsule    • cyclobenzaprine (FLEXERIL) 5 MG tablet    • polyethylene glycol (GlycoLax) 17 GM/SCOOP powder    • chlorthalidone (HYGROTON) 25 MG tablet Alternate therapy   • gabapentin (NEURONTIN) 300 MG capsule Reorder          Follow Up   Return in about 6 months (around 7/12/2023) for Medicare Wellness.  Patient was given instructions and counseling regarding her condition or for health maintenance advice. Please see specific information pulled into the AVS if appropriate.       Milan Coppola Jr, MD  01/17/23  08:20 EST

## 2023-01-12 ENCOUNTER — OFFICE VISIT (OUTPATIENT)
Dept: INTERNAL MEDICINE | Facility: CLINIC | Age: 73
End: 2023-01-12
Payer: MEDICARE

## 2023-01-12 VITALS
DIASTOLIC BLOOD PRESSURE: 75 MMHG | HEART RATE: 68 BPM | HEIGHT: 66 IN | SYSTOLIC BLOOD PRESSURE: 144 MMHG | WEIGHT: 259 LBS | TEMPERATURE: 98 F | OXYGEN SATURATION: 96 % | BODY MASS INDEX: 41.62 KG/M2

## 2023-01-12 DIAGNOSIS — I48.0 PAROXYSMAL ATRIAL FIBRILLATION: ICD-10-CM

## 2023-01-12 DIAGNOSIS — E03.9 ACQUIRED HYPOTHYROIDISM: ICD-10-CM

## 2023-01-12 DIAGNOSIS — I10 ESSENTIAL HYPERTENSION: Primary | ICD-10-CM

## 2023-01-12 DIAGNOSIS — Z96.652 AFTERCARE FOLLOWING LEFT KNEE JOINT REPLACEMENT SURGERY: ICD-10-CM

## 2023-01-12 DIAGNOSIS — R05.8 PRODUCTIVE COUGH: ICD-10-CM

## 2023-01-12 DIAGNOSIS — E55.9 VITAMIN D DEFICIENCY: ICD-10-CM

## 2023-01-12 DIAGNOSIS — Z23 NEED FOR VACCINATION: ICD-10-CM

## 2023-01-12 DIAGNOSIS — E78.49 OTHER HYPERLIPIDEMIA: ICD-10-CM

## 2023-01-12 DIAGNOSIS — Z47.1 AFTERCARE FOLLOWING LEFT KNEE JOINT REPLACEMENT SURGERY: ICD-10-CM

## 2023-01-12 PROCEDURE — 99214 OFFICE O/P EST MOD 30 MIN: CPT | Performed by: INTERNAL MEDICINE

## 2023-01-12 PROCEDURE — 90471 IMMUNIZATION ADMIN: CPT | Performed by: INTERNAL MEDICINE

## 2023-01-12 PROCEDURE — 90714 TD VACC NO PRESV 7 YRS+ IM: CPT | Performed by: INTERNAL MEDICINE

## 2023-01-12 RX ORDER — GABAPENTIN 300 MG/1
300 CAPSULE ORAL NIGHTLY
Qty: 30 CAPSULE | Refills: 0 | Status: SHIPPED | OUTPATIENT
Start: 2023-01-12 | End: 2023-01-22 | Stop reason: SDUPTHER

## 2023-01-12 RX ORDER — HYDROCHLOROTHIAZIDE 25 MG/1
25 TABLET ORAL DAILY
Qty: 90 TABLET | Refills: 1 | Status: SHIPPED | OUTPATIENT
Start: 2023-01-12

## 2023-01-12 RX ORDER — BENZONATATE 200 MG/1
200 CAPSULE ORAL 3 TIMES DAILY PRN
Qty: 60 CAPSULE | Refills: 0 | Status: SHIPPED | OUTPATIENT
Start: 2023-01-12 | End: 2023-02-13 | Stop reason: ALTCHOICE

## 2023-01-19 ENCOUNTER — PATIENT MESSAGE (OUTPATIENT)
Dept: INTERNAL MEDICINE | Facility: CLINIC | Age: 73
End: 2023-01-19
Payer: MEDICARE

## 2023-01-19 DIAGNOSIS — Z47.1 AFTERCARE FOLLOWING LEFT KNEE JOINT REPLACEMENT SURGERY: ICD-10-CM

## 2023-01-19 DIAGNOSIS — Z96.652 AFTERCARE FOLLOWING LEFT KNEE JOINT REPLACEMENT SURGERY: ICD-10-CM

## 2023-01-22 RX ORDER — GABAPENTIN 300 MG/1
300 CAPSULE ORAL NIGHTLY
Qty: 30 CAPSULE | Refills: 0 | Status: SHIPPED | OUTPATIENT
Start: 2023-01-22 | End: 2023-02-21

## 2023-01-22 NOTE — TELEPHONE ENCOUNTER
From: Kimberly Dennis  To: Milan Coppola  Sent: 1/19/2023 10:55 PM EST  Subject: Lost prescription    When I saw you last week we discussed my Gabapentin and the fact my insurance has listed it as a Tier 3 medicine at $47.00 per 30 day prescription. You gave me a printed prescription so I could try to find it at a lower cost. I folded and put in my purse with my notebook. Over the weekend I cleaned my purse out and when I went to get the prescription out to put in my wallet I could not find. After searching I finally remembered I had taken a couple of grocery list written on paper out and obviously the prescription paper was with those. They were in the trash and it was already picked up. I am so sorry and should have checked each paper better but thought that one was in my notebook. Unsure what to do about my negligence. I am so sorry.

## 2023-01-23 RX ORDER — LEVOTHYROXINE SODIUM 0.07 MG/1
TABLET ORAL
Qty: 90 TABLET | Refills: 0 | Status: SHIPPED | OUTPATIENT
Start: 2023-01-23

## 2023-02-03 ENCOUNTER — LAB (OUTPATIENT)
Dept: LAB | Facility: HOSPITAL | Age: 73
End: 2023-02-03
Payer: MEDICARE

## 2023-02-03 DIAGNOSIS — I10 ESSENTIAL HYPERTENSION: ICD-10-CM

## 2023-02-03 DIAGNOSIS — E55.9 VITAMIN D DEFICIENCY: ICD-10-CM

## 2023-02-03 DIAGNOSIS — E03.9 ACQUIRED HYPOTHYROIDISM: ICD-10-CM

## 2023-02-03 DIAGNOSIS — E78.49 OTHER HYPERLIPIDEMIA: ICD-10-CM

## 2023-02-03 DIAGNOSIS — I48.0 PAROXYSMAL ATRIAL FIBRILLATION: ICD-10-CM

## 2023-02-03 LAB
25(OH)D3 SERPL-MCNC: 56.8 NG/ML (ref 30–100)
ALBUMIN SERPL-MCNC: 4.5 G/DL (ref 3.5–5.2)
ALBUMIN/GLOB SERPL: 1.6 G/DL
ALP SERPL-CCNC: 81 U/L (ref 39–117)
ALT SERPL W P-5'-P-CCNC: 13 U/L (ref 1–33)
ANION GAP SERPL CALCULATED.3IONS-SCNC: 8 MMOL/L (ref 5–15)
AST SERPL-CCNC: 19 U/L (ref 1–32)
BASOPHILS # BLD AUTO: 0.03 10*3/MM3 (ref 0–0.2)
BASOPHILS NFR BLD AUTO: 0.5 % (ref 0–1.5)
BILIRUB SERPL-MCNC: 0.4 MG/DL (ref 0–1.2)
BUN SERPL-MCNC: 13 MG/DL (ref 8–23)
BUN/CREAT SERPL: 20.3 (ref 7–25)
CALCIUM SPEC-SCNC: 9.8 MG/DL (ref 8.6–10.5)
CHLORIDE SERPL-SCNC: 100 MMOL/L (ref 98–107)
CHOLEST SERPL-MCNC: 198 MG/DL (ref 0–200)
CO2 SERPL-SCNC: 32 MMOL/L (ref 22–29)
CREAT SERPL-MCNC: 0.64 MG/DL (ref 0.57–1)
DEPRECATED RDW RBC AUTO: 41.7 FL (ref 37–54)
EGFRCR SERPLBLD CKD-EPI 2021: 95.2 ML/MIN/1.73
EOSINOPHIL # BLD AUTO: 0.23 10*3/MM3 (ref 0–0.4)
EOSINOPHIL NFR BLD AUTO: 3.5 % (ref 0.3–6.2)
ERYTHROCYTE [DISTWIDTH] IN BLOOD BY AUTOMATED COUNT: 13.6 % (ref 12.3–15.4)
GLOBULIN UR ELPH-MCNC: 2.8 GM/DL
GLUCOSE SERPL-MCNC: 103 MG/DL (ref 65–99)
HCT VFR BLD AUTO: 37 % (ref 34–46.6)
HDLC SERPL-MCNC: 46 MG/DL (ref 40–60)
HGB BLD-MCNC: 12.3 G/DL (ref 12–15.9)
IMM GRANULOCYTES # BLD AUTO: 0.02 10*3/MM3 (ref 0–0.05)
IMM GRANULOCYTES NFR BLD AUTO: 0.3 % (ref 0–0.5)
LDLC SERPL CALC-MCNC: 119 MG/DL (ref 0–100)
LDLC/HDLC SERPL: 2.5 {RATIO}
LYMPHOCYTES # BLD AUTO: 2.01 10*3/MM3 (ref 0.7–3.1)
LYMPHOCYTES NFR BLD AUTO: 30.9 % (ref 19.6–45.3)
MCH RBC QN AUTO: 28.7 PG (ref 26.6–33)
MCHC RBC AUTO-ENTMCNC: 33.2 G/DL (ref 31.5–35.7)
MCV RBC AUTO: 86.4 FL (ref 79–97)
MONOCYTES # BLD AUTO: 0.4 10*3/MM3 (ref 0.1–0.9)
MONOCYTES NFR BLD AUTO: 6.2 % (ref 5–12)
NEUTROPHILS NFR BLD AUTO: 3.81 10*3/MM3 (ref 1.7–7)
NEUTROPHILS NFR BLD AUTO: 58.6 % (ref 42.7–76)
NRBC BLD AUTO-RTO: 0 /100 WBC (ref 0–0.2)
PLATELET # BLD AUTO: 199 10*3/MM3 (ref 140–450)
PMV BLD AUTO: 10.2 FL (ref 6–12)
POTASSIUM SERPL-SCNC: 4.1 MMOL/L (ref 3.5–5.2)
PROT SERPL-MCNC: 7.3 G/DL (ref 6–8.5)
RBC # BLD AUTO: 4.28 10*6/MM3 (ref 3.77–5.28)
SODIUM SERPL-SCNC: 140 MMOL/L (ref 136–145)
TRIGL SERPL-MCNC: 185 MG/DL (ref 0–150)
TSH SERPL DL<=0.05 MIU/L-ACNC: 1.55 UIU/ML (ref 0.27–4.2)
VLDLC SERPL-MCNC: 33 MG/DL (ref 5–40)
WBC NRBC COR # BLD: 6.5 10*3/MM3 (ref 3.4–10.8)

## 2023-02-03 PROCEDURE — 80053 COMPREHEN METABOLIC PANEL: CPT

## 2023-02-03 PROCEDURE — 82306 VITAMIN D 25 HYDROXY: CPT

## 2023-02-03 PROCEDURE — 84443 ASSAY THYROID STIM HORMONE: CPT

## 2023-02-03 PROCEDURE — 36415 COLL VENOUS BLD VENIPUNCTURE: CPT

## 2023-02-03 PROCEDURE — 80061 LIPID PANEL: CPT

## 2023-02-03 PROCEDURE — 85025 COMPLETE CBC W/AUTO DIFF WBC: CPT

## 2023-02-13 ENCOUNTER — OFFICE VISIT (OUTPATIENT)
Dept: CARDIOLOGY | Facility: CLINIC | Age: 73
End: 2023-02-13
Payer: MEDICARE

## 2023-02-13 VITALS
HEIGHT: 66 IN | WEIGHT: 258 LBS | DIASTOLIC BLOOD PRESSURE: 59 MMHG | HEART RATE: 60 BPM | BODY MASS INDEX: 41.46 KG/M2 | SYSTOLIC BLOOD PRESSURE: 151 MMHG

## 2023-02-13 DIAGNOSIS — I10 ESSENTIAL HYPERTENSION: ICD-10-CM

## 2023-02-13 DIAGNOSIS — I48.0 PAROXYSMAL ATRIAL FIBRILLATION: ICD-10-CM

## 2023-02-13 DIAGNOSIS — I48.0 PAROXYSMAL ATRIAL FIBRILLATION: Primary | ICD-10-CM

## 2023-02-13 DIAGNOSIS — I38 VALVULAR HEART DISEASE: ICD-10-CM

## 2023-02-13 PROBLEM — I82.462 ACUTE DEEP VEIN THROMBOSIS (DVT) OF CALF MUSCLE VEIN OF LEFT LOWER EXTREMITY: Status: RESOLVED | Noted: 2022-06-09 | Resolved: 2023-02-13

## 2023-02-13 PROCEDURE — 99214 OFFICE O/P EST MOD 30 MIN: CPT | Performed by: INTERNAL MEDICINE

## 2023-02-13 NOTE — ASSESSMENT & PLAN NOTE
Last documented episode in 2019.  She reports rare palpitations now.  We will continue metoprolol for rate and rhythm management along with Eliquis for anticoagulation.  Recent labs showed hemoglobin of 12.5 which is back to her baseline.

## 2023-02-13 NOTE — ASSESSMENT & PLAN NOTE
Mild mitral and aortic insufficiency noted on echocardiogram done in 2020.  We will repeat the echocardiogram now to reevaluate severity of valvular lesions and LV function.

## 2023-02-13 NOTE — ASSESSMENT & PLAN NOTE
Blood pressure on the higher side in the office today.  Previously well controlled.  Counseled regarding low-sodium diet.  Recommend to keep home blood pressure log and the dose of losartan may be increased for any persistently high blood pressure.  We will continue losartan, hydrochlorothiazide and metoprolol the current dose

## 2023-02-13 NOTE — PROGRESS NOTES
CARDIOLOGY FOLLOW-UP PROGRESS NOTE        Chief Complaint  Atrial Fibrillation, Hypertension, Follow-up, and Hyperlipidemia    Subjective            Kimberly Dennis presents to [facility] CARDIOLOGY  History of Present Illness    Ms Dennis is here for routine follow-up visit for atrial fibrillation.  She underwent knee replacement surgery last year which is complicated by DVT.  Today she reports feeling fine and back to her baseline.  She denies any significant palpitations.  Intermittently gets a fluttering sensation in the chest.  Denies any chest pain, increasing shortness of breath, pedal edema or dizziness.  No bleeding problems.  She is taking Eliquis as prescribed.    Past History:    (1) Paroxysmal atrial fibrillation, s/o cardioversion in ER in 2019 (2) Hypertension. (3) Hypothyroidism.     Medical History:  Past Medical History:   Diagnosis Date   • Acid reflux    • Acute deep vein thrombosis (DVT) of calf muscle vein of left lower extremity (HCC) 6/9/2022   • Anemia    • Anesthesia complication    • Arthritis    • Atrial fibrillation (HCC)     PAROXYSMAL   • Bladder disorder    • Colon polyp    • GI bleed    • History of total knee arthroplasty, right 09/08/2016   • HTN (hypertension)    • Limb swelling    • Medial meniscus tear 09/12/2017    LEFT   • Mitral valve prolapse    • Primary osteoarthritis of left knee 09/12/2017   • Seasonal allergies    • Sleep apnea    • SOB (shortness of breath)    • Thoracic outlet syndrome    • Thyroid disorder        Surgical History: has a past surgical history that includes Bladder repair; Cholecystectomy; Colonoscopy (2014); Intraocular lens insertion; Hysterectomy; Colonoscopy; Joint replacement (Right); Knee arthroscopy (Left); and Total knee arthroplasty (Left, 6/3/2022).     Family History: family history includes Arthritis in her mother and sister; Diabetes in her brother; Heart disease in her brother and mother.     Social History: reports  "that she has never smoked. She has never used smokeless tobacco. She reports that she does not drink alcohol and does not use drugs.    Allergies: Amoxicillin-pot clavulanate, Hydrocodone-acetaminophen, Hydrocodone, Iodine, Latex, Metal, and Shellfish allergy    Current Outpatient Medications on File Prior to Visit   Medication Sig   • apixaban (ELIQUIS) 5 MG tablet tablet Take 1 tablet by mouth 2 (Two) Times a Day.   • cetirizine (zyrTEC) 10 MG tablet Take 10 mg by mouth Daily.   • Cholecalciferol 125 MCG (5000 UT) tablet Take 1 tablet by mouth Daily.   • famotidine (PEPCID) 40 MG tablet Take 1 tablet by mouth Daily.   • gabapentin (NEURONTIN) 300 MG capsule Take 1 capsule by mouth Every Night.   • hydroCHLOROthiazide (HYDRODIURIL) 25 MG tablet Take 1 tablet by mouth Daily.   • levothyroxine (SYNTHROID, LEVOTHROID) 75 MCG tablet Take 1 tablet by mouth once daily   • losartan (COZAAR) 25 MG tablet Take 1 tablet by mouth once daily   • metoprolol succinate XL (TOPROL-XL) 50 MG 24 hr tablet Take 1 tablet by mouth once daily   • montelukast (SINGULAIR) 10 MG tablet TAKE 1 TABLET BY MOUTH ONCE DAILY AT NIGHT       Review of Systems   Respiratory: Negative for cough, shortness of breath and wheezing.    Cardiovascular: Positive for palpitations. Negative for chest pain and leg swelling.   Gastrointestinal: Negative for nausea and vomiting.   Neurological: Negative for dizziness and syncope.        Objective     /59 (BP Location: Right arm, Patient Position: Sitting, Cuff Size: Adult)   Pulse 60   Ht 167.6 cm (66\")   Wt 117 kg (258 lb)   BMI 41.64 kg/m²       Physical Exam    General : Alert, awake, no acute distress  Neck : Supple, no carotid bruit, no jugular venous distention  CVS : Regular rate and rhythm, no murmur, rubs or gallops  Lungs: Clear to auscultation bilaterally, no crackles or rhonchi  Abdomen: Soft, nontender, bowel sounds heard in all 4 quadrants  Extremities: Warm, well-perfused, no pedal " edema    Result Review :     The following data was reviewed by: Chip Mendoza MD on 02/13/2023:    CMP    CMP 5/23/22 6/7/22 2/3/23   Glucose 99 109 (A) 103 (A)   BUN 13 11 13   Creatinine 0.60 0.53 (A) 0.64   eGFR 96.7 99.6 95.2   Sodium 138 137 140   Potassium 4.1 4.0 4.1   Chloride 100 98 100   Calcium 9.9 9.3 9.8   Total Protein 7.8 7.0 7.3   Albumin 4.40 3.60 4.5   Globulin 3.4 3.4 2.8   Total Bilirubin 0.4 0.6 0.4   Alkaline Phosphatase 84 70 81   AST (SGOT) 18 15 19   ALT (SGPT) 15 11 13   Albumin/Globulin Ratio 1.3 1.1 1.6   BUN/Creatinine Ratio 21.7 20.8 20.3   Anion Gap 9.8 10.0 8.0   (A) Abnormal value       Comments are available for some flowsheets but are not being displayed.           CBC    CBC 6/4/22 6/4/22 6/7/22 2/3/23    0006 0459     WBC 8.66  6.90 6.50   RBC 3.29 (A)  3.11 (A) 4.28   Hemoglobin 9.5 (A) 9.1 (A) 9.1 (A) 12.3   Hematocrit 29.5 (A) 27.5 (A) 27.4 (A) 37.0   MCV 89.7  88.1 86.4   MCH 28.9  29.3 28.7   MCHC 32.2  33.2 33.2   RDW 13.6  13.6 13.6   Platelets 145  218 199   (A) Abnormal value            TSH    TSH 2/3/23   TSH 1.550           Lipid Panel    Lipid Panel 2/3/23   Total Cholesterol 198   Triglycerides 185 (A)   HDL Cholesterol 46   VLDL Cholesterol 33   LDL Cholesterol  119 (A)   LDL/HDL Ratio 2.50   (A) Abnormal value                 Data reviewed: Cardiology studies        Echocardiogram done on 6/22/2020 showed    1.  Preserved left ventricular systolic function with estimated LV ejection fraction of 55%.   2.  Mild concentric left ventricular hypertrophy.   3.  Mild left atrial enlargement.   4.  Mild mitral regurgitation.   5.  Diastolic dysfunction of the left ventricle.   6.  Mild aortic insufficiency.                 Assessment and Plan        Diagnoses and all orders for this visit:    1. Paroxysmal atrial fibrillation (HCC) (Primary)  Assessment & Plan:  Last documented episode in 2019.  She reports rare palpitations now.  We will continue metoprolol for rate  and rhythm management along with Eliquis for anticoagulation.  Recent labs showed hemoglobin of 12.5 which is back to her baseline.      2. Essential hypertension  Assessment & Plan:  Blood pressure on the higher side in the office today.  Previously well controlled.  Counseled regarding low-sodium diet.  Recommend to keep home blood pressure log and the dose of losartan may be increased for any persistently high blood pressure.  We will continue losartan, hydrochlorothiazide and metoprolol the current dose      3. Valvular heart disease  Assessment & Plan:  Mild mitral and aortic insufficiency noted on echocardiogram done in 2020.  We will repeat the echocardiogram now to reevaluate severity of valvular lesions and LV function.    Orders:  -     Adult Transthoracic Echo Complete W/ Cont if Necessary Per Protocol; Future            Follow Up     Return in about 9 months (around 11/13/2023) for Next scheduled follow up.    Patient was given instructions and counseling regarding her condition or for health maintenance advice. Please see specific information pulled into the AVS if appropriate.

## 2023-02-21 DIAGNOSIS — Z47.1 AFTERCARE FOLLOWING LEFT KNEE JOINT REPLACEMENT SURGERY: ICD-10-CM

## 2023-02-21 DIAGNOSIS — Z96.652 AFTERCARE FOLLOWING LEFT KNEE JOINT REPLACEMENT SURGERY: ICD-10-CM

## 2023-02-21 RX ORDER — GABAPENTIN 300 MG/1
CAPSULE ORAL
Qty: 30 CAPSULE | Refills: 0 | Status: SHIPPED | OUTPATIENT
Start: 2023-02-21 | End: 2023-03-23

## 2023-02-21 NOTE — TELEPHONE ENCOUNTER
Control Refill Request:  Medication: GABAPENTIN  Last Office Visit: 1/12/2023  Next Office Visit:7/13/2023  Last UDS: NOT ON FILE   Last Contract: NOT ON FILE

## 2023-03-03 ENCOUNTER — TELEPHONE (OUTPATIENT)
Dept: CARDIOLOGY | Facility: CLINIC | Age: 73
End: 2023-03-03
Payer: MEDICARE

## 2023-03-03 NOTE — TELEPHONE ENCOUNTER
----- Message from Chip Mendoza MD sent at 3/2/2023  6:27 PM EST -----  Echocardiogram shows normal heart function.  There is mild mitral and aortic insufficiency, which are unchanged from previous echocardiogram in 2020.    No further testing is needed at this time.  Follow-up as scheduled earlier.      Electronically signed by Chip Mendoza MD, 03/02/23, 6:27 PM EST.

## 2023-03-17 ENCOUNTER — TELEPHONE (OUTPATIENT)
Dept: INTERNAL MEDICINE | Facility: CLINIC | Age: 73
End: 2023-03-17
Payer: MEDICARE

## 2023-03-17 PROCEDURE — 85025 COMPLETE CBC W/AUTO DIFF WBC: CPT | Performed by: FAMILY MEDICINE

## 2023-03-17 PROCEDURE — 80053 COMPREHEN METABOLIC PANEL: CPT | Performed by: FAMILY MEDICINE

## 2023-03-17 NOTE — TELEPHONE ENCOUNTER
Patient states Wednesday she felt dizzy.   Yesterday she started feeling pretty bad.     Patient states today her blood pressure is running 170s/80s.   She is feeling dizzy currently.     I told the patient she should really go to Urgent Care or ER and she then asked to just be seen Monday if we can not see her today and I told her I really don't think she should wait to be seen and should go to Urgent Care and she told me that Urgent Care would just send her to the ER and wanted me to ask if we could add her on today anyway.     I told the patient I would ask the providers and call her back.

## 2023-03-17 NOTE — TELEPHONE ENCOUNTER
Called patient back.     Dr. fletcher message stated that she needed to go to the ER .     She is aware of that. While on the phone PT stated that she was pulling into urgent care

## 2023-03-19 ENCOUNTER — TELEPHONE (OUTPATIENT)
Dept: INTERNAL MEDICINE | Facility: CLINIC | Age: 73
End: 2023-03-19
Payer: MEDICARE

## 2023-03-19 NOTE — TELEPHONE ENCOUNTER
"Received on call message that Miss Dennis covid positive and wanting advice.    Thursday began feeling \"off kilter.\"  Reports woke up with a migraine the following day, with sore throat, left ear otalgia.  Went to urgent care later that day (the 17th), and diagnosed with pneumonia.  Started on doxycycline and sent home.  Yesterday, began having headache again, worsening sore throat and photophobia.   also appearing ill by this point.    Then got call from two sisters who tested positive for COVID.  Mrs. Dennis and her  tested themselves last night and both her and her  were positive for COVID.    I asked them to check pulse oximetry at home, and wife's was 97% with HR 91.   96% with HR 76.    I performed med rec, reviewed med rec and GFR for both  And Mrs. Dennis.  I obtained verbal consent for paxlovid, which I sent.  In addition, I counseled Mrs. Dennis that for the next 8 days she would need to take 2.5 mg BID apixaban.  This new prescription was also sent.    I counseled them both that they would need to quarantine for 10 days from start of symptoms.  If they feel recovered on day 6 or later, they can leave quarantine so long as they wear a well fitting mask around others.  I also counseled them that rarely with paxlovid one can have rebound symptoms, and that quarantine clock would re-start if this were to happen.  "

## 2023-03-20 ENCOUNTER — TELEPHONE (OUTPATIENT)
Dept: INTERNAL MEDICINE | Facility: CLINIC | Age: 73
End: 2023-03-20
Payer: MEDICARE

## 2023-03-20 NOTE — TELEPHONE ENCOUNTER
Caller: Kimberly Dennis    Relationship: Self    Best call back number: [phone]     What medications are you currently taking:   Current Outpatient Medications on File Prior to Visit   Medication Sig Dispense Refill   • apixaban (ELIQUIS) 2.5 MG tablet tablet Take 1 tablet by mouth 2 (Two) Times a Day. 16 tablet 0   • apixaban (ELIQUIS) 5 MG tablet tablet Take 1 tablet by mouth 2 (Two) Times a Day. 42 tablet 0   • cetirizine (zyrTEC) 10 MG tablet Take 1 tablet by mouth Daily.     • Cholecalciferol 125 MCG (5000 UT) tablet Take 1 tablet by mouth Daily.     • doxycycline (MONODOX) 100 MG capsule Take 1 capsule by mouth 2 (Two) Times a Day for 10 days. 20 capsule 0   • famotidine (PEPCID) 40 MG tablet Take 1 tablet by mouth Daily. 90 tablet 1   • gabapentin (NEURONTIN) 300 MG capsule TAKE 1 CAPSULE BY MOUTH AT NIGHT 30 capsule 0   • hydroCHLOROthiazide (HYDRODIURIL) 25 MG tablet Take 1 tablet by mouth Daily. 90 tablet 1   • levothyroxine (SYNTHROID, LEVOTHROID) 75 MCG tablet Take 1 tablet by mouth once daily 90 tablet 0   • losartan (COZAAR) 25 MG tablet Take 1 tablet by mouth once daily 90 tablet 3   • metoprolol succinate XL (TOPROL-XL) 50 MG 24 hr tablet Take 1 tablet by mouth once daily 90 tablet 3   • montelukast (SINGULAIR) 10 MG tablet TAKE 1 TABLET BY MOUTH ONCE DAILY AT NIGHT 90 tablet 3   • Nirmatrelvir&Ritonavir 300/100 (PAXLOVID) 20 x 150 MG & 10 x 100MG tablet therapy pack tablet Take 3 tablets by mouth 2 (Two) Times a Day for 5 days. 30 tablet 0     No current facility-administered medications on file prior to visit.          When did you start taking these medications: Friday 3.17.2023    Which medication are you concerned about: doxycycline (MONODOX) 100 MG capsule    Who prescribed you this medication: GHASSAN GALVAN    What are your concerns: PATIENT STATES SINCE TAKING THIS MEDICATION SHE IS HAVING SEVERE DIARRHEA AND CAUSING SEVERE NAUSEA. WOULD LIKE TO BE ADVISED IF THIS  MEDICATION SHOULD BE CONTINUED NOW THAT Nirmatrelvir&Ritonavir 300/100 (PAXLOVID) 20 x 150 MG & 10 x 100MG tablet therapy pack tablet HAS BEEN PRESCRIBED.     How long have you had these concerns: Saturday 3.18.2023

## 2023-03-22 DIAGNOSIS — Z96.652 AFTERCARE FOLLOWING LEFT KNEE JOINT REPLACEMENT SURGERY: ICD-10-CM

## 2023-03-22 DIAGNOSIS — K21.9 GASTROESOPHAGEAL REFLUX DISEASE WITHOUT ESOPHAGITIS: ICD-10-CM

## 2023-03-22 DIAGNOSIS — Z47.1 AFTERCARE FOLLOWING LEFT KNEE JOINT REPLACEMENT SURGERY: ICD-10-CM

## 2023-03-22 NOTE — TELEPHONE ENCOUNTER
Control Refill Request:  Medication: gabapentin   Last Office Visit: 1/12/2023  Next Office Visit: 7/13/2023  Last UDS: NOT ON FILE   Last Contract: NOT ON FILE

## 2023-03-23 RX ORDER — GABAPENTIN 300 MG/1
CAPSULE ORAL
Qty: 30 CAPSULE | Refills: 0 | Status: SHIPPED | OUTPATIENT
Start: 2023-03-23

## 2023-03-23 RX ORDER — FAMOTIDINE 40 MG/1
TABLET, FILM COATED ORAL
Qty: 90 TABLET | Refills: 0 | Status: SHIPPED | OUTPATIENT
Start: 2023-03-23

## 2023-03-29 ENCOUNTER — OFFICE VISIT (OUTPATIENT)
Dept: INTERNAL MEDICINE | Facility: CLINIC | Age: 73
End: 2023-03-29
Payer: MEDICARE

## 2023-03-29 VITALS
OXYGEN SATURATION: 96 % | WEIGHT: 257.4 LBS | SYSTOLIC BLOOD PRESSURE: 138 MMHG | HEIGHT: 66 IN | TEMPERATURE: 97.5 F | HEART RATE: 70 BPM | BODY MASS INDEX: 41.37 KG/M2 | DIASTOLIC BLOOD PRESSURE: 66 MMHG

## 2023-03-29 DIAGNOSIS — J02.9 ACUTE PHARYNGITIS, UNSPECIFIED ETIOLOGY: ICD-10-CM

## 2023-03-29 DIAGNOSIS — J40 BRONCHITIS: ICD-10-CM

## 2023-03-29 DIAGNOSIS — R06.00 DYSPNEA, UNSPECIFIED TYPE: Primary | ICD-10-CM

## 2023-03-29 DIAGNOSIS — Z86.16 PERSONAL HISTORY OF COVID-19: ICD-10-CM

## 2023-03-29 LAB
EXPIRATION DATE: NORMAL
EXPIRATION DATE: NORMAL
FLUAV AG UPPER RESP QL IA.RAPID: NOT DETECTED
FLUBV AG UPPER RESP QL IA.RAPID: NOT DETECTED
INTERNAL CONTROL: NORMAL
INTERNAL CONTROL: NORMAL
Lab: NORMAL
Lab: NORMAL
S PYO AG THROAT QL: NEGATIVE
SARS-COV-2 AG UPPER RESP QL IA.RAPID: NOT DETECTED

## 2023-03-29 PROCEDURE — U0004 COV-19 TEST NON-CDC HGH THRU: HCPCS | Performed by: STUDENT IN AN ORGANIZED HEALTH CARE EDUCATION/TRAINING PROGRAM

## 2023-03-29 PROCEDURE — 87081 CULTURE SCREEN ONLY: CPT | Performed by: STUDENT IN AN ORGANIZED HEALTH CARE EDUCATION/TRAINING PROGRAM

## 2023-03-29 PROCEDURE — U0005 INFEC AGEN DETEC AMPLI PROBE: HCPCS | Performed by: STUDENT IN AN ORGANIZED HEALTH CARE EDUCATION/TRAINING PROGRAM

## 2023-03-29 RX ORDER — AZITHROMYCIN 250 MG/1
TABLET, FILM COATED ORAL
Qty: 6 TABLET | Refills: 0 | Status: SHIPPED | OUTPATIENT
Start: 2023-03-29

## 2023-03-29 RX ORDER — PREDNISONE 20 MG/1
40 TABLET ORAL DAILY
Qty: 10 TABLET | Refills: 0 | Status: SHIPPED | OUTPATIENT
Start: 2023-03-29 | End: 2023-04-03

## 2023-03-29 NOTE — PROGRESS NOTES
Chief Complaint  COVID follow up (Quarantine ended Saturday. Patient states she is still having SOB, slight dizziness. )    Meghan Dennis presents to [facility]  History of Present Illness    Recently diagnosed with COVID, now s/p paxlovid, with quarantine ending March 25th.  Had felt recovered, but 2-3 days ago, started feeling more short of breath, with cough productive of yellow sputum.  Also with sore throat.  No fever.  No nausea and vomiting.  Was experiencing some diarrhea since her COVID infection, but that is gradually improving.  She reports light-headedness, but denies sensation of room spinning around.    Current Outpatient Medications   Medication Instructions   • apixaban (ELIQUIS) 5 mg, Oral, 2 Times Daily   • apixaban (ELIQUIS) 2.5 mg, Oral, 2 Times Daily   • azithromycin (Zithromax) 250 MG tablet Take 500 mg (two pills) PO day one, and 250 mg PO (one pill) PO days 2-5.   • cetirizine (ZYRTEC) 10 mg, Oral, Daily   • Cholecalciferol 125 MCG (5000 UT) tablet 1 tablet, Oral, Daily   • famotidine (PEPCID) 40 MG tablet Take 1 tablet by mouth once daily   • gabapentin (NEURONTIN) 300 MG capsule TAKE 1 CAPSULE BY MOUTH AT NIGHT   • hydroCHLOROthiazide (HYDRODIURIL) 25 mg, Oral, Daily   • levothyroxine (SYNTHROID, LEVOTHROID) 75 MCG tablet Take 1 tablet by mouth once daily   • losartan (COZAAR) 25 MG tablet Take 1 tablet by mouth once daily   • metoprolol succinate XL (TOPROL-XL) 50 MG 24 hr tablet Take 1 tablet by mouth once daily   • montelukast (SINGULAIR) 10 MG tablet TAKE 1 TABLET BY MOUTH ONCE DAILY AT NIGHT   • predniSONE (DELTASONE) 40 mg, Oral, Daily       The following portions of the patient's history were reviewed and updated as appropriate: allergies, current medications, past family history, past medical history, past social history, past surgical history, and problem list.    Objective   Vital Signs:   /66 (BP Location:  "Right arm, Patient Position: Sitting)   Pulse 70   Temp 97.5 °F (36.4 °C) (Temporal)   Ht 167.6 cm (66\")   Wt 117 kg (257 lb 6.4 oz)   SpO2 96%   BMI 41.55 kg/m²     Wt Readings from Last 3 Encounters:   03/29/23 117 kg (257 lb 6.4 oz)   03/17/23 117 kg (257 lb 4.8 oz)   03/02/23 117 kg (257 lb)     BP Readings from Last 3 Encounters:   03/29/23 138/66   03/17/23 159/73   03/02/23 151/59     Physical Exam  Vitals reviewed.   Constitutional:       General: She is not in acute distress.     Appearance: Normal appearance. She is not ill-appearing, toxic-appearing or diaphoretic.   HENT:      Head: Normocephalic and atraumatic.      Right Ear: External ear normal.      Left Ear: External ear normal.   Eyes:      Conjunctiva/sclera: Conjunctivae normal.   Cardiovascular:      Rate and Rhythm: Normal rate and regular rhythm.      Pulses: Normal pulses.      Heart sounds: Normal heart sounds. No murmur heard.    No friction rub. No gallop.   Pulmonary:      Effort: Pulmonary effort is normal. No respiratory distress.      Breath sounds: Normal breath sounds. No stridor. No wheezing, rhonchi or rales.   Chest:      Chest wall: No tenderness.   Abdominal:      General: Abdomen is flat.      Palpations: Abdomen is soft. There is no mass.      Tenderness: There is no abdominal tenderness.   Musculoskeletal:      Right lower leg: No edema.      Left lower leg: No edema.   Skin:     General: Skin is warm and dry.   Neurological:      General: No focal deficit present.      Mental Status: She is alert. Mental status is at baseline.   Psychiatric:         Mood and Affect: Mood normal.         Behavior: Behavior normal.         Thought Content: Thought content normal.         Judgment: Judgment normal.       Result Review :   The following data was reviewed by: Ian Rabago MD on 03/29/2023:  Common labs    Common Labs 6/7/22 6/7/22 2/3/23 2/3/23 2/3/23 3/17/23 3/17/23    1933 1933 1031 1031 1031 1658 1658   Glucose  109 " (A)  103 (A)  104 (A)    BUN  11  13  10    Creatinine  0.53 (A)  0.64  0.63    Sodium  137  140  139    Potassium  4.0  4.1  3.9    Chloride  98  100  99    Calcium  9.3  9.8  9.7    Albumin  3.60  4.5  4.4    Total Bilirubin  0.6  0.4  0.4    Alkaline Phosphatase  70  81  91    AST (SGOT)  15  19  19    ALT (SGPT)  11  13  15    WBC 6.90  6.50    5.21   Hemoglobin 9.1 (A)  12.3    12.7   Hematocrit 27.4 (A)  37.0    38.1   Platelets 218  199    172   Total Cholesterol     198     Triglycerides     185 (A)     HDL Cholesterol     46     LDL Cholesterol      119 (A)     (A) Abnormal value                   Lab Results   Component Value Date    SARSANTIGEN Not Detected 03/29/2023    COVID19 Not Detected 12/19/2022    RAPFLUA Negative 05/24/2022    RAPFLUB Negative 05/24/2022    FLUAAG Not Detected 03/29/2023    FLUBAG Not Detected 03/29/2023    RAPSCRN Negative 03/29/2023    INR 1.23 (H) 05/23/2022       Procedures        Assessment and Plan    Diagnoses and all orders for this visit:    1. Dyspnea, unspecified type (Primary)  -     POCT SARS-CoV-2 Antigen NARCISO + Flu  -     COVID-19,APTIMA PANTHER(PATEL),[facility], NP/OP SWAB IN UTM/VTM/SALINE TRANSPORT MEDIA,24 HR TAT - Swab, Nasopharynx    2. Acute pharyngitis, unspecified etiology  -     POCT rapid strep A  -     Beta Strep Culture, Throat - , Throat    3. Bronchitis  -     azithromycin (Zithromax) 250 MG tablet; Take 500 mg (two pills) PO day one, and 250 mg PO (one pill) PO days 2-5.  Dispense: 6 tablet; Refill: 0  -     predniSONE (DELTASONE) 20 MG tablet; Take 2 tablets by mouth Daily for 5 days.  Dispense: 10 tablet; Refill: 0  -     COVID-19,APTIMA PANTHER(PATEL), [facility]/ [facility], NP/OP SWAB IN UTM/VTM/SALINE TRANSPORT MEDIA,24 HR TAT - Swab, Nasopharynx    4. Personal history of COVID-19  -     COVID-19,APTIMA PANTHER(PATEL), [facility]/ [facility], NP/OP SWAB IN UTM/VTM/SALINE TRANSPORT MEDIA,24 HR TAT - Swab, Nasopharynx      Bronchitis:  -no tachypnea, sats are  appropriate on RA  -as repeat POC COVID test was negative, I think rebound viremia after paxlovid is less likely  -will treat with azithromycin plus steroid course      There are no discontinued medications.       Follow Up   Return if symptoms worsen or fail to improve.  Patient was given instructions and counseling regarding her condition or for health maintenance advice. Please see specific information pulled into the AVS if appropriate.       Ian Rabago MD  03/29/23  12:38 EDT

## 2023-03-30 LAB — SARS-COV-2 RNA RESP QL NAA+PROBE: DETECTED

## 2023-03-31 LAB — BACTERIA SPEC AEROBE CULT: NORMAL

## 2023-04-10 ENCOUNTER — TELEPHONE (OUTPATIENT)
Dept: CARDIOLOGY | Facility: CLINIC | Age: 73
End: 2023-04-10

## 2023-04-10 DIAGNOSIS — I48.0 PAROXYSMAL ATRIAL FIBRILLATION: ICD-10-CM

## 2023-04-10 NOTE — TELEPHONE ENCOUNTER
Caller: Kimberly Dennis    Relationship: Self    Best call back number: [phone]    Requested Prescriptions:   Requested Prescriptions     Pending Prescriptions Disp Refills   • apixaban (ELIQUIS) 5 MG tablet tablet 42 tablet 0     Sig: Take 1 tablet by mouth 2 (Two) Times a Day.        Pharmacy where request should be sent: [address] - [phone]  - [phone]     Last office visit with prescribing clinician: 2/13/2023   Last telemedicine visit with prescribing clinician: Visit date not found   Next office visit with prescribing clinician: 11/13/2023     Additional details provided by patient: HAS REQUESTED THROUGH PHARMACY, DOWN TO SAMPLES    Does the patient have less than a 3 day supply:  [] Yes  [x] No

## 2023-04-17 RX ORDER — LEVOTHYROXINE SODIUM 0.07 MG/1
TABLET ORAL
Qty: 90 TABLET | Refills: 0 | Status: SHIPPED | OUTPATIENT
Start: 2023-04-17

## 2023-04-25 DIAGNOSIS — Z47.1 AFTERCARE FOLLOWING LEFT KNEE JOINT REPLACEMENT SURGERY: ICD-10-CM

## 2023-04-25 DIAGNOSIS — Z96.652 AFTERCARE FOLLOWING LEFT KNEE JOINT REPLACEMENT SURGERY: ICD-10-CM

## 2023-04-25 RX ORDER — GABAPENTIN 300 MG/1
CAPSULE ORAL
Qty: 30 CAPSULE | Refills: 0 | Status: SHIPPED | OUTPATIENT
Start: 2023-04-25

## 2023-04-25 NOTE — TELEPHONE ENCOUNTER
CONTROLLED MEDICATION. PLEASE ADVISE.     Gabapentin 300 mg     Last Filled: 3/23/23 #30-no refills  Last Visit: 3/29/23  Next Appt: 7/13/23  Last UDS: not seen on file   Last Controlled Contract: not seen on file

## 2023-05-25 DIAGNOSIS — Z47.1 AFTERCARE FOLLOWING LEFT KNEE JOINT REPLACEMENT SURGERY: ICD-10-CM

## 2023-05-25 DIAGNOSIS — Z96.652 AFTERCARE FOLLOWING LEFT KNEE JOINT REPLACEMENT SURGERY: ICD-10-CM

## 2023-05-26 RX ORDER — GABAPENTIN 300 MG/1
CAPSULE ORAL
Qty: 30 CAPSULE | Refills: 0 | Status: SHIPPED | OUTPATIENT
Start: 2023-05-26

## 2023-05-26 NOTE — TELEPHONE ENCOUNTER
CONTROLLED MEDICATION. PLEASE ADVISE.     Gabapentin 300mg    Last Filled: 4/25/23 #30-no refills   Last Visit: 3/29/23  Next Appt: 7/13/23  Last UDS: not seen on file   Last Controlled Contract: not seen on file

## 2023-06-02 ENCOUNTER — OFFICE VISIT (OUTPATIENT)
Dept: ORTHOPEDIC SURGERY | Facility: CLINIC | Age: 73
End: 2023-06-02

## 2023-06-02 ENCOUNTER — HOSPITAL ENCOUNTER (OUTPATIENT)
Dept: CARDIOLOGY | Facility: HOSPITAL | Age: 73
Discharge: HOME | End: 2023-06-02

## 2023-06-02 VITALS — WEIGHT: 257 LBS | BODY MASS INDEX: 41.3 KG/M2 | OXYGEN SATURATION: 97 % | HEART RATE: 61 BPM | HEIGHT: 66 IN

## 2023-06-02 DIAGNOSIS — M25.562 LEFT KNEE PAIN, UNSPECIFIED CHRONICITY: ICD-10-CM

## 2023-06-02 DIAGNOSIS — M79.662 PAIN OF LEFT CALF: ICD-10-CM

## 2023-06-02 DIAGNOSIS — M25.562 LEFT KNEE PAIN, UNSPECIFIED CHRONICITY: Primary | ICD-10-CM

## 2023-06-02 DIAGNOSIS — Z96.652 HISTORY OF TOTAL KNEE ARTHROPLASTY, LEFT: ICD-10-CM

## 2023-06-02 LAB
BH CV LOWER VASCULAR LEFT COMMON FEMORAL AUGMENT: NORMAL
BH CV LOWER VASCULAR LEFT COMMON FEMORAL COMPETENT: NORMAL
BH CV LOWER VASCULAR LEFT COMMON FEMORAL COMPRESS: NORMAL
BH CV LOWER VASCULAR LEFT COMMON FEMORAL PHASIC: NORMAL
BH CV LOWER VASCULAR LEFT COMMON FEMORAL SPONT: NORMAL
BH CV LOWER VASCULAR LEFT DISTAL FEMORAL AUGMENT: NORMAL
BH CV LOWER VASCULAR LEFT DISTAL FEMORAL COMPETENT: NORMAL
BH CV LOWER VASCULAR LEFT DISTAL FEMORAL COMPRESS: NORMAL
BH CV LOWER VASCULAR LEFT DISTAL FEMORAL PHASIC: NORMAL
BH CV LOWER VASCULAR LEFT DISTAL FEMORAL SPONT: NORMAL
BH CV LOWER VASCULAR LEFT GASTRONEMIUS COMPRESS: NORMAL
BH CV LOWER VASCULAR LEFT GREATER SAPH AK COMPRESS: NORMAL
BH CV LOWER VASCULAR LEFT GREATER SAPH BK COMPRESS: NORMAL
BH CV LOWER VASCULAR LEFT LESSER SAPH COMPRESS: NORMAL
BH CV LOWER VASCULAR LEFT MID FEMORAL COMPRESS: NORMAL
BH CV LOWER VASCULAR LEFT PERONEAL COMPRESS: NORMAL
BH CV LOWER VASCULAR LEFT POPLITEAL AUGMENT: NORMAL
BH CV LOWER VASCULAR LEFT POPLITEAL COMPETENT: NORMAL
BH CV LOWER VASCULAR LEFT POPLITEAL COMPRESS: NORMAL
BH CV LOWER VASCULAR LEFT POPLITEAL PHASIC: NORMAL
BH CV LOWER VASCULAR LEFT POPLITEAL SPONT: NORMAL
BH CV LOWER VASCULAR LEFT POSTERIOR TIBIAL AUGMENT: NORMAL
BH CV LOWER VASCULAR LEFT POSTERIOR TIBIAL COMPRESS: NORMAL
BH CV LOWER VASCULAR LEFT PROXIMAL FEMORAL COMPRESS: NORMAL
BH CV LOWER VASCULAR RIGHT COMMON FEMORAL AUGMENT: NORMAL
BH CV LOWER VASCULAR RIGHT COMMON FEMORAL COMPETENT: NORMAL
BH CV LOWER VASCULAR RIGHT COMMON FEMORAL COMPRESS: NORMAL
BH CV LOWER VASCULAR RIGHT COMMON FEMORAL PHASIC: NORMAL
BH CV LOWER VASCULAR RIGHT COMMON FEMORAL SPONT: NORMAL
BH CV LOWER VASCULAR RIGHT MID FEMORAL COMPRESS: NORMAL
MAXIMAL PREDICTED HEART RATE: 149 BPM
STRESS TARGET HR: 127 BPM

## 2023-06-02 PROCEDURE — 93971 EXTREMITY STUDY: CPT

## 2023-06-02 NOTE — PATIENT INSTRUCTIONS
X-rays reviewed, showing hardware intact. Continue home exercise program to progress strength and ROM. Continue with lifelong antibiotic prophylaxis with dental procedures following total joint replacement. Follow-up in 12 months. Call sooner, if needed with any changes or concerns. Repeat x-rays.     She does c/o intermittent L calf pain, similar to that she had with prior DVT. She endorses compliance with Eliquis. Will order STAT doppler venous US to r/o DVT. Will call if positive only.

## 2023-06-02 NOTE — PROGRESS NOTES
"Chief Complaint  Follow-up of the Left Knee    Subjective      Kimberly Dennis presents to [facility] for follow-up of left knee.  She has a history of left total knee arthroplasty with computer navigation performed on 6/3/2022 by Dr. Paz.  She presents today independently ambulatory without use of assistive device.  Reports that she is pleased with her postoperative recovery and is able to return to all of her baseline activities/ADLs.  She does report some discomfort with certain activities, which is quickly resolved with resting, icing, and elevation.  She is complaining of intermittent left calf pain, which she reports feels similar to previous DVT she had experienced.  She is on chronic anticoagulation with Eliquis and endorses compliance with this.  However, patient does report recent history of COVID, during which she was treated with Paxlovid and Eliquis dosing was decreased.    Objective   Allergies   Allergen Reactions   • Amoxicillin-Pot Clavulanate Itching   • Hydrocodone-Acetaminophen Itching   • Hydrocodone Itching and Rash   • Iodine Rash   • Latex Rash   • Metal Rash     YELLOW GOLD CAUSES RASH    • Shellfish Allergy Rash       Vital Signs:   Pulse 61   Ht 167.6 cm (66\")   Wt 117 kg (257 lb)   SpO2 97%   BMI 41.48 kg/m²       Physical Exam    Constitutional: Awake, alert. Well nourished appearance.    Integumentary: Warm, dry, intact. No obvious rashes.    HENT: Atraumatic, normocephalic.   Respiratory: Non labored respirations .   Cardiovascular: Intact peripheral pulses.    Psychiatric: Normal mood and affect. A&O X3    Ortho Exam  Left knee: Skin is warm, dry, and intact.  Well-healed surgical scar noted.  Moderate edema.  Patella is well tracking and knee is stable to varus and valgus stress.  Full knee extension and flexion to 125 degrees.  Full plantarflexion and dorsiflexion of the ankle.  Sensation intact light touch.  Distal neurovascular intact.  Smooth " sit to stand transition.  Patient fully weightbearing with nonantalgic gait.  Negative Homans.    Imaging Results (Most Recent)     Procedure Component Value Units Date/Time    XR Knee 3 View Left [[id number]] Resulted: 06/02/23 1313     Updated: 06/02/23 1314    Narrative:      X-Ray Report:  Study: X-rays ordered, taken in the office, and reviewed today.   Site: Left knee Xray  Indication: TKA  View: AP/Lateral, Standing and Sunrise view(s)  Findings: Intact left total knee arthroplasty. No evidence of hardware   malfunction.   Prior studies available for comparison: yes                     Assessment and Plan   Problem List Items Addressed This Visit        Musculoskeletal and Injuries    History of total knee arthroplasty, left    Relevant Orders    Duplex Venous Lower Extremity - Left CAR   Other Visit Diagnoses     Left knee pain, unspecified chronicity    -  Primary    Relevant Orders    XR Knee 3 View Left (Completed)    Duplex Venous Lower Extremity - Left CAR    Pain of left calf        Relevant Orders    Duplex Venous Lower Extremity - Left CAR          Follow Up   Return in about 1 year (around 6/2/2024).    Tobacco Use: Low Risk    • Smoking Tobacco Use: Never   • Smokeless Tobacco Use: Never   • Passive Exposure: Not on file     Patient is a non-smoker.  Did not discuss tobacco cessation options.    Patient Instructions   X-rays reviewed, showing hardware intact. Continue home exercise program to progress strength and ROM. Continue with lifelong antibiotic prophylaxis with dental procedures following total joint replacement. Follow-up in 12 months. Call sooner, if needed with any changes or concerns. Repeat x-rays.     She does c/o intermittent L calf pain, similar to that she had with prior DVT. She endorses compliance with Eliquis. Will order STAT doppler venous US to r/o DVT. Will call if positive only.       Patient was given instructions and counseling regarding her condition or for health  maintenance advice. Please see specific information pulled into the AVS if appropriate.

## 2023-06-16 RX ORDER — LOSARTAN POTASSIUM 25 MG/1
TABLET ORAL
Qty: 90 TABLET | Refills: 0 | Status: SHIPPED | OUTPATIENT
Start: 2023-06-16

## 2023-06-18 DIAGNOSIS — K21.9 GASTROESOPHAGEAL REFLUX DISEASE WITHOUT ESOPHAGITIS: ICD-10-CM

## 2023-06-19 RX ORDER — FAMOTIDINE 40 MG/1
TABLET, FILM COATED ORAL
Qty: 90 TABLET | Refills: 0 | Status: SHIPPED | OUTPATIENT
Start: 2023-06-19

## 2023-07-28 DIAGNOSIS — Z47.1 AFTERCARE FOLLOWING LEFT KNEE JOINT REPLACEMENT SURGERY: ICD-10-CM

## 2023-07-28 DIAGNOSIS — Z96.652 AFTERCARE FOLLOWING LEFT KNEE JOINT REPLACEMENT SURGERY: ICD-10-CM

## 2023-07-28 RX ORDER — GABAPENTIN 300 MG/1
CAPSULE ORAL
Qty: 30 CAPSULE | Refills: 0 | Status: SHIPPED | OUTPATIENT
Start: 2023-07-28

## 2023-07-28 NOTE — TELEPHONE ENCOUNTER
Refill request for  controlled substance.      Date of request: 7/28/2023  Medication requested: Gabapentin  Last OV: 7/13/23  Last UDS: 7/13/23  Contract signed: yes    Date:7/18/23  Next office visit: 1/15/24    Sena Moy    
Routine observation

## 2023-08-28 ENCOUNTER — HOSPITAL ENCOUNTER (OUTPATIENT)
Dept: MAMMOGRAPHY | Facility: HOSPITAL | Age: 73
Discharge: HOME | End: 2023-08-28
Admitting: OBSTETRICS & GYNECOLOGY
Payer: MEDICARE

## 2023-08-28 DIAGNOSIS — Z12.31 SCREENING MAMMOGRAM FOR HIGH-RISK PATIENT: ICD-10-CM

## 2023-08-28 DIAGNOSIS — Z47.1 AFTERCARE FOLLOWING LEFT KNEE JOINT REPLACEMENT SURGERY: ICD-10-CM

## 2023-08-28 DIAGNOSIS — Z96.652 AFTERCARE FOLLOWING LEFT KNEE JOINT REPLACEMENT SURGERY: ICD-10-CM

## 2023-08-28 PROCEDURE — 77063 BREAST TOMOSYNTHESIS BI: CPT

## 2023-08-28 PROCEDURE — 77067 SCR MAMMO BI INCL CAD: CPT

## 2023-08-28 RX ORDER — GABAPENTIN 300 MG/1
CAPSULE ORAL
Qty: 30 CAPSULE | Refills: 0 | Status: SHIPPED | OUTPATIENT
Start: 2023-08-28

## 2023-08-28 NOTE — TELEPHONE ENCOUNTER
Refill request for  controlled substance.      Date of request: 8/28/2023   Medication requested: Gabapentin  Last OV: 7/13/23  Last UDS: 7/13/23  Contract signed: yes    Date:7/18/23  Next office visit: 1/15/24    Sena Moy

## 2023-09-19 ENCOUNTER — CLINICAL SUPPORT (OUTPATIENT)
Dept: INTERNAL MEDICINE | Facility: CLINIC | Age: 73
End: 2023-09-19
Payer: MEDICARE

## 2023-09-19 DIAGNOSIS — Z23 FLU VACCINE NEED: Primary | ICD-10-CM

## 2023-09-19 PROCEDURE — 90662 IIV NO PRSV INCREASED AG IM: CPT | Performed by: INTERNAL MEDICINE

## 2023-09-19 PROCEDURE — G0008 ADMIN INFLUENZA VIRUS VAC: HCPCS | Performed by: INTERNAL MEDICINE

## 2023-09-28 DIAGNOSIS — J30.2 SEASONAL ALLERGIC RHINITIS, UNSPECIFIED TRIGGER: ICD-10-CM

## 2023-09-28 DIAGNOSIS — Z96.652 AFTERCARE FOLLOWING LEFT KNEE JOINT REPLACEMENT SURGERY: ICD-10-CM

## 2023-09-28 DIAGNOSIS — I10 ESSENTIAL HYPERTENSION: ICD-10-CM

## 2023-09-28 DIAGNOSIS — Z47.1 AFTERCARE FOLLOWING LEFT KNEE JOINT REPLACEMENT SURGERY: ICD-10-CM

## 2023-09-29 RX ORDER — METOPROLOL SUCCINATE 50 MG/1
TABLET, EXTENDED RELEASE ORAL
Qty: 90 TABLET | Refills: 0 | Status: SHIPPED | OUTPATIENT
Start: 2023-09-29

## 2023-09-29 RX ORDER — MONTELUKAST SODIUM 10 MG/1
TABLET ORAL
Qty: 90 TABLET | Refills: 0 | Status: SHIPPED | OUTPATIENT
Start: 2023-09-29

## 2023-09-29 RX ORDER — GABAPENTIN 300 MG/1
CAPSULE ORAL
Qty: 30 CAPSULE | Refills: 0 | Status: SHIPPED | OUTPATIENT
Start: 2023-09-29

## 2023-09-29 RX ORDER — LOSARTAN POTASSIUM 25 MG/1
TABLET ORAL
Qty: 90 TABLET | Refills: 0 | Status: SHIPPED | OUTPATIENT
Start: 2023-09-29

## 2023-09-29 NOTE — TELEPHONE ENCOUNTER
Refill request for  controlled substance.      Date of request: 9/29/2023    Medication requested: Gabapentin  Last OV: 7/13/23  Last UDS: 7/13/23  Contract signed: yes    Date:7/18/23  Next office visit: 1/15/24    Sena Moy

## 2023-10-03 ENCOUNTER — FLU SHOT (OUTPATIENT)
Dept: INTERNAL MEDICINE | Facility: CLINIC | Age: 73
End: 2023-10-03
Payer: MEDICARE

## 2023-10-06 RX ORDER — APIXABAN 5 MG/1
TABLET, FILM COATED ORAL
Qty: 180 TABLET | Refills: 3 | Status: SHIPPED | OUTPATIENT
Start: 2023-10-06

## 2023-10-09 RX ORDER — LEVOTHYROXINE SODIUM 0.07 MG/1
TABLET ORAL
Qty: 90 TABLET | Refills: 0 | Status: SHIPPED | OUTPATIENT
Start: 2023-10-09

## 2023-10-26 DIAGNOSIS — Z96.652 AFTERCARE FOLLOWING LEFT KNEE JOINT REPLACEMENT SURGERY: ICD-10-CM

## 2023-10-26 DIAGNOSIS — Z47.1 AFTERCARE FOLLOWING LEFT KNEE JOINT REPLACEMENT SURGERY: ICD-10-CM

## 2023-10-27 RX ORDER — GABAPENTIN 300 MG/1
CAPSULE ORAL
Qty: 30 CAPSULE | Refills: 0 | Status: SHIPPED | OUTPATIENT
Start: 2023-10-27

## 2023-10-27 NOTE — TELEPHONE ENCOUNTER
Refill request for  controlled substance.      Date of request: 10/27/2023   Medication requested: Gabapentin  Last OV: 7/13/23  Last UDS: 7/13/23  Contract signed: yes    Date:7/18/23  Next office visit: 1/15/24    Sena Moy

## 2023-11-13 ENCOUNTER — OFFICE VISIT (OUTPATIENT)
Dept: CARDIOLOGY | Facility: CLINIC | Age: 73
End: 2023-11-13
Payer: MEDICARE

## 2023-11-13 VITALS
BODY MASS INDEX: 44.02 KG/M2 | WEIGHT: 264.2 LBS | HEIGHT: 65 IN | DIASTOLIC BLOOD PRESSURE: 85 MMHG | HEART RATE: 62 BPM | SYSTOLIC BLOOD PRESSURE: 155 MMHG

## 2023-11-13 DIAGNOSIS — I10 ESSENTIAL HYPERTENSION: ICD-10-CM

## 2023-11-13 DIAGNOSIS — I48.0 PAROXYSMAL ATRIAL FIBRILLATION: Primary | ICD-10-CM

## 2023-11-13 DIAGNOSIS — I38 VALVULAR HEART DISEASE: ICD-10-CM

## 2023-11-13 NOTE — PROGRESS NOTES
Chief Complaint  Follow-up and Paroxysmal atrial fibrillation    Subjective        History of Present Illness  Kimberly Dennis presents to [facility]   Ms. Dennis is a 73-year-old female patient coming in today for routine follow-up of atrial fibrillation.  She describes occasionally feeling palpitations, which she describes as fluttering, and generally only last a few seconds.  She has no complaints of chest pains, increasing shortness of breath, lightheadedness, or dizziness.  She reports good compliance with medications.        Past History:     (1) Paroxysmal atrial fibrillation, s/o cardioversion in ER in 2019 (2) Hypertension. (3) Hypothyroidism.     Past Medical History:   Diagnosis Date    Acid reflux     Acute deep vein thrombosis (DVT) of calf muscle vein of left lower extremity 06/09/2022    Anemia     Anesthesia complication     Arthritis     Arthritis of back     Atrial fibrillation     Bladder disorder     Colon polyp     GI bleed     History of total knee arthroplasty, right 09/08/2016    HTN (hypertension)     Limb swelling     Low back strain     Medial meniscus tear 09/12/2017    Mitral valve prolapse     Primary osteoarthritis of left knee 09/12/2017    Seasonal allergies     Sleep apnea     SOB (shortness of breath)     Thoracic outlet syndrome     Thyroid disorder        Allergies   Allergen Reactions    Amoxicillin-Pot Clavulanate Itching    Hydrocodone-Acetaminophen Itching    Hydrocodone Itching and Rash    Iodine Rash    Latex Rash    Metal Rash     YELLOW GOLD CAUSES RASH     Shellfish Allergy Rash        Past Surgical History:   Procedure Laterality Date    BLADDER REPAIR      CHOLECYSTECTOMY      COLONOSCOPY  2014    CAUSEY    COLONOSCOPY      DR. EDGE    HYSTERECTOMY      INTRAOCULAR LENS INSERTION      BOTH    JOINT REPLACEMENT Right     TKR    KNEE ARTHROSCOPY Left     TOTAL KNEE ARTHROPLASTY Left 06/03/2022    Procedure: LEFT TOTAL KNEE ARTHROPLASTY WITH  "COMPUTER NAVIGATION;  Surgeon: Abdoulaye Paz MD;  Location: [facility] OR;  Service: Orthopedics;  Laterality: Left;        Social History  She  reports that she has never smoked. She has never been exposed to tobacco smoke. She has never used smokeless tobacco. She reports that she does not drink alcohol and does not use drugs.    Family History  Her family history includes Anesthesia problems in her mother; Arthritis in her mother and sister; Diabetes in her brother; Heart disease in her brother and mother.       Current Outpatient Medications on File Prior to Visit   Medication Sig    cetirizine (zyrTEC) 10 MG tablet Take 1 tablet by mouth Daily.    Cholecalciferol 125 MCG (5000 UT) tablet Take 1 tablet by mouth Daily.    Eliquis 5 MG tablet tablet Take 1 tablet by mouth twice daily    famotidine (PEPCID) 40 MG tablet Take 1 tablet by mouth once daily    gabapentin (NEURONTIN) 300 MG capsule TAKE 1 CAPSULE BY MOUTH ONCE DAILY AT NIGHT    hydroCHLOROthiazide (HYDRODIURIL) 25 MG tablet Take 1 tablet by mouth Daily.    levothyroxine (SYNTHROID, LEVOTHROID) 75 MCG tablet Take 1 tablet by mouth once daily    losartan (COZAAR) 25 MG tablet Take 1 tablet by mouth once daily    metoprolol succinate XL (TOPROL-XL) 50 MG 24 hr tablet Take 1 tablet by mouth once daily    montelukast (SINGULAIR) 10 MG tablet TAKE 1 TABLET BY MOUTH ONCE DAILY AT NIGHT     No current facility-administered medications on file prior to visit.         Review of Systems     Respiratory: Negative for cough, shortness of breath and wheezing.    Cardiovascular: Positive for palpitations. Negative for chest pain and leg swelling.  Gastrointestinal: Negative for nausea and vomiting.  Neurological: Negative for dizziness and syncope.        Objective   Vitals:    11/13/23 1013   BP: 155/85   Pulse: 62   Weight: 120 kg (264 lb 3.2 oz)   Height: 165.1 cm (65\")         Physical Exam  General : Alert, awake, no acute distress  Neck : Supple, no carotid " "bruit, no jugular venous distention  CVS : Regular rate and rhythm, no murmur, rubs or gallops  Lungs: Clear to auscultation bilaterally, no crackles or rhonchi  Abdomen: Soft, nontender, bowel sounds active  Extremities: Warm, well-perfused, no pedal edema      Result Review     The following data was reviewed by ROME Dickson  No results found for: \"PROBNP\"  CMP          2/3/2023    10:31 3/17/2023    16:58 7/13/2023    14:44   CMP   Glucose 103  104  130    BUN 13  10  10    Creatinine 0.64  0.63  0.60    EGFR 95.2  95.0  96.1    Sodium 140  139  140    Potassium 4.1  3.9  3.7    Chloride 100  99  102    Calcium 9.8  9.7  9.4    Total Protein 7.3  8.0  7.3    Albumin 4.5  4.4  4.1    Globulin 2.8  3.6  3.2    Total Bilirubin 0.4  0.4  0.3    Alkaline Phosphatase 81  91  74    AST (SGOT) 19  19  23    ALT (SGPT) 13  15  18    Albumin/Globulin Ratio 1.6  1.2  1.3    BUN/Creatinine Ratio 20.3  15.9  16.7    Anion Gap 8.0  10.3  11.8      CBC w/diff          2/3/2023    10:31 3/17/2023    16:58 7/13/2023    14:44   CBC w/Diff   WBC 6.50  5.21  5.56    RBC 4.28  4.38  4.04    Hemoglobin 12.3  12.7  11.7    Hematocrit 37.0  38.1  34.3    MCV 86.4  87.0  84.9    MCH 28.7  29.0  29.0    MCHC 33.2  33.3  34.1    RDW 13.6  13.8  13.2    Platelets 199  172  204    Neutrophil Rel % 58.6  73.6  62.3    Immature Granulocyte Rel % 0.3  0.4  0.2    Lymphocyte Rel % 30.9  12.5  28.2    Monocyte Rel % 6.2  9.4  6.1    Eosinophil Rel % 3.5  3.5  2.7    Basophil Rel % 0.5  0.6  0.5       Lab Results   Component Value Date    TSH 1.300 07/13/2023      Lab Results   Component Value Date    FREET4 1.4 12/20/2019      No results found for: \"DDIMERQUANT\"  Magnesium   Date Value Ref Range Status   05/13/2021 2.03 1.60 - 2.30 mg/dL Final      No results found for: \"DIGOXIN\"   Lab Results   Component Value Date    TROPONINT <0.01 12/20/2019           Lipid Panel          2/3/2023    10:31 7/13/2023    14:44   Lipid Panel   Total " Cholesterol 198  186    Triglycerides 185  195    HDL Cholesterol 46  44    VLDL Cholesterol 33  34    LDL Cholesterol  119  108    LDL/HDL Ratio 2.50  2.34        Results for orders placed in visit on 03/02/23    Adult Transthoracic Echo Complete W/ Cont if Necessary Per Protocol    Interpretation Summary    Left ventricular systolic function is normal. Left ventricular ejection fraction appears to be 61 - 65%.    There is diastolic dysfunction of the left ventricle.    There is mild mitral regurgitation    There is mild tricuspid regurgitation.         Assessment and Plan   Diagnoses and all orders for this visit:    1. Paroxysmal atrial fibrillation (Primary)  Assessment & Plan:  Has not had any documented episodes of atrial fibrillation for few years, and does not have a significant symptom burden in terms of palpitations.  Continue current dose metoprolol for rate and rhythm control, along with Eliquis for anticoagulation.      2. Valvular heart disease  Assessment & Plan:  Echocardiogram earlier this year again shows mild aortic regurgitation and mild mitral valve regurgitation.  He is asymptomatic, and her systolic function was normal with EF of 61 to 65%.  We will plan to repeat the echocardiogram in 2 to 3 years or sooner if she has symptoms suggestive of worsening valvular function.      3. Essential hypertension  Assessment & Plan:  Blood pressures  elevated in the office today, reports better home control.  Continue current dose losartan, hydrochlorothiazide and metoprolol, recommend home BP journal, and if blood pressure continues to be high we can make further adjustments to medications.                  Follow Up   Return in about 6 months (around 5/13/2024) for Next scheduled follow up, with Dr. Mendoza.    Patient was given instructions and counseling regarding her condition or for health maintenance advice. Please see specific information pulled into the AVS if appropriate.     Signed,  Sandra Menchaca  ROME Bradshaw  11/13/2023     Dictated Utilizing Dragon Dictation: Please note that portions of this note were completed with a voice recognition program.  Part of this note may be an electronic transcription/translation of spoken language to printed text using the Dragon Dictation System.

## 2023-11-13 NOTE — ASSESSMENT & PLAN NOTE
Echocardiogram earlier this year again shows mild aortic regurgitation and mild mitral valve regurgitation.  He is asymptomatic, and her systolic function was normal with EF of 61 to 65%.  We will plan to repeat the echocardiogram in 2 to 3 years or sooner if she has symptoms suggestive of worsening valvular function.

## 2023-11-17 NOTE — ASSESSMENT & PLAN NOTE
Has not had any documented episodes of atrial fibrillation for few years, and does not have a significant symptom burden in terms of palpitations.  Continue current dose metoprolol for rate and rhythm control, along with Eliquis for anticoagulation.

## 2023-11-17 NOTE — ASSESSMENT & PLAN NOTE
Blood pressures  elevated in the office today, reports better home control.  Continue current dose losartan, hydrochlorothiazide and metoprolol, recommend home BP journal, and if blood pressure continues to be high we can make further adjustments to medications.

## 2023-11-27 DIAGNOSIS — Z96.652 AFTERCARE FOLLOWING LEFT KNEE JOINT REPLACEMENT SURGERY: ICD-10-CM

## 2023-11-27 DIAGNOSIS — Z47.1 AFTERCARE FOLLOWING LEFT KNEE JOINT REPLACEMENT SURGERY: ICD-10-CM

## 2023-11-27 RX ORDER — GABAPENTIN 300 MG/1
CAPSULE ORAL
Qty: 30 CAPSULE | Refills: 0 | Status: SHIPPED | OUTPATIENT
Start: 2023-11-27

## 2023-11-27 NOTE — TELEPHONE ENCOUNTER
LAST APPOINTMENT: 07/13/2023  NEXT APPOINTMENT: 01/15/2024  LAST UDS: 07/13/2023  LAST CONTROLLED SUBSTANCE AGREEMENT: 07/18/2023

## 2023-12-15 DIAGNOSIS — I10 ESSENTIAL HYPERTENSION: ICD-10-CM

## 2023-12-15 DIAGNOSIS — Z47.1 AFTERCARE FOLLOWING LEFT KNEE JOINT REPLACEMENT SURGERY: ICD-10-CM

## 2023-12-15 DIAGNOSIS — Z96.652 AFTERCARE FOLLOWING LEFT KNEE JOINT REPLACEMENT SURGERY: ICD-10-CM

## 2023-12-15 DIAGNOSIS — J30.2 SEASONAL ALLERGIC RHINITIS, UNSPECIFIED TRIGGER: ICD-10-CM

## 2023-12-15 RX ORDER — MONTELUKAST SODIUM 10 MG/1
TABLET ORAL
Qty: 90 TABLET | Refills: 3 | Status: SHIPPED | OUTPATIENT
Start: 2023-12-15

## 2023-12-15 RX ORDER — GABAPENTIN 300 MG/1
CAPSULE ORAL
Qty: 30 CAPSULE | Refills: 0 | Status: SHIPPED | OUTPATIENT
Start: 2023-12-22

## 2023-12-15 RX ORDER — METOPROLOL SUCCINATE 50 MG/1
TABLET, EXTENDED RELEASE ORAL
Qty: 90 TABLET | Refills: 3 | Status: SHIPPED | OUTPATIENT
Start: 2023-12-15

## 2023-12-15 RX ORDER — LOSARTAN POTASSIUM 25 MG/1
TABLET ORAL
Qty: 90 TABLET | Refills: 0 | Status: SHIPPED | OUTPATIENT
Start: 2023-12-15

## 2023-12-21 ENCOUNTER — HOSPITAL ENCOUNTER (EMERGENCY)
Facility: HOSPITAL | Age: 73
Discharge: HOME | End: 2023-12-21
Attending: EMERGENCY MEDICINE
Payer: MEDICARE

## 2023-12-21 ENCOUNTER — APPOINTMENT (OUTPATIENT)
Dept: GENERAL RADIOLOGY | Facility: HOSPITAL | Age: 73
End: 2023-12-21
Payer: MEDICARE

## 2023-12-21 VITALS
TEMPERATURE: 98.1 F | WEIGHT: 259.7 LBS | SYSTOLIC BLOOD PRESSURE: 128 MMHG | DIASTOLIC BLOOD PRESSURE: 65 MMHG | RESPIRATION RATE: 24 BRPM | HEART RATE: 67 BPM | OXYGEN SATURATION: 100 % | HEIGHT: 65 IN | BODY MASS INDEX: 43.27 KG/M2

## 2023-12-21 DIAGNOSIS — I48.91 ATRIAL FIBRILLATION WITH RAPID VENTRICULAR RESPONSE: Primary | ICD-10-CM

## 2023-12-21 LAB
ALBUMIN SERPL-MCNC: 4 G/DL (ref 3.5–5.2)
ALBUMIN/GLOB SERPL: 1.1 G/DL
ALP SERPL-CCNC: 79 U/L (ref 39–117)
ALT SERPL W P-5'-P-CCNC: 15 U/L (ref 1–33)
ANION GAP SERPL CALCULATED.3IONS-SCNC: 13.2 MMOL/L (ref 5–15)
AST SERPL-CCNC: 21 U/L (ref 1–32)
BASOPHILS # BLD AUTO: 0.04 10*3/MM3 (ref 0–0.2)
BASOPHILS NFR BLD AUTO: 0.5 % (ref 0–1.5)
BILIRUB SERPL-MCNC: 0.3 MG/DL (ref 0–1.2)
BUN SERPL-MCNC: 11 MG/DL (ref 8–23)
BUN/CREAT SERPL: 15.1 (ref 7–25)
CALCIUM SPEC-SCNC: 9.4 MG/DL (ref 8.6–10.5)
CHLORIDE SERPL-SCNC: 102 MMOL/L (ref 98–107)
CO2 SERPL-SCNC: 25.8 MMOL/L (ref 22–29)
CREAT SERPL-MCNC: 0.73 MG/DL (ref 0.57–1)
DEPRECATED RDW RBC AUTO: 43.4 FL (ref 37–54)
EGFRCR SERPLBLD CKD-EPI 2021: 88 ML/MIN/1.73
EOSINOPHIL # BLD AUTO: 0.13 10*3/MM3 (ref 0–0.4)
EOSINOPHIL NFR BLD AUTO: 1.8 % (ref 0.3–6.2)
ERYTHROCYTE [DISTWIDTH] IN BLOOD BY AUTOMATED COUNT: 13.6 % (ref 12.3–15.4)
GLOBULIN UR ELPH-MCNC: 3.6 GM/DL
GLUCOSE SERPL-MCNC: 100 MG/DL (ref 65–99)
HCT VFR BLD AUTO: 38.7 % (ref 34–46.6)
HGB BLD-MCNC: 12.8 G/DL (ref 12–15.9)
HOLD SPECIMEN: NORMAL
HOLD SPECIMEN: NORMAL
IMM GRANULOCYTES # BLD AUTO: 0.02 10*3/MM3 (ref 0–0.05)
IMM GRANULOCYTES NFR BLD AUTO: 0.3 % (ref 0–0.5)
LYMPHOCYTES # BLD AUTO: 2.12 10*3/MM3 (ref 0.7–3.1)
LYMPHOCYTES NFR BLD AUTO: 29 % (ref 19.6–45.3)
MAGNESIUM SERPL-MCNC: 1.7 MG/DL (ref 1.6–2.4)
MCH RBC QN AUTO: 28.9 PG (ref 26.6–33)
MCHC RBC AUTO-ENTMCNC: 33.1 G/DL (ref 31.5–35.7)
MCV RBC AUTO: 87.4 FL (ref 79–97)
MONOCYTES # BLD AUTO: 0.51 10*3/MM3 (ref 0.1–0.9)
MONOCYTES NFR BLD AUTO: 7 % (ref 5–12)
NEUTROPHILS NFR BLD AUTO: 4.49 10*3/MM3 (ref 1.7–7)
NEUTROPHILS NFR BLD AUTO: 61.4 % (ref 42.7–76)
NRBC BLD AUTO-RTO: 0 /100 WBC (ref 0–0.2)
PLATELET # BLD AUTO: 216 10*3/MM3 (ref 140–450)
PMV BLD AUTO: 10.2 FL (ref 6–12)
POTASSIUM SERPL-SCNC: 3.5 MMOL/L (ref 3.5–5.2)
PROT SERPL-MCNC: 7.6 G/DL (ref 6–8.5)
QT INTERVAL: 312 MS
QTC INTERVAL: 477 MS
RBC # BLD AUTO: 4.43 10*6/MM3 (ref 3.77–5.28)
SODIUM SERPL-SCNC: 141 MMOL/L (ref 136–145)
TROPONIN T SERPL HS-MCNC: 31 NG/L
TSH SERPL DL<=0.05 MIU/L-ACNC: 1.19 UIU/ML (ref 0.27–4.2)
WBC NRBC COR # BLD AUTO: 7.31 10*3/MM3 (ref 3.4–10.8)
WHOLE BLOOD HOLD COAG: NORMAL
WHOLE BLOOD HOLD SPECIMEN: NORMAL

## 2023-12-21 PROCEDURE — 96376 TX/PRO/DX INJ SAME DRUG ADON: CPT

## 2023-12-21 PROCEDURE — 84484 ASSAY OF TROPONIN QUANT: CPT | Performed by: EMERGENCY MEDICINE

## 2023-12-21 PROCEDURE — 99214 OFFICE O/P EST MOD 30 MIN: CPT | Performed by: INTERNAL MEDICINE

## 2023-12-21 PROCEDURE — 25010000002 FENTANYL CITRATE (PF) 50 MCG/ML SOLUTION: Performed by: EMERGENCY MEDICINE

## 2023-12-21 PROCEDURE — 25010000002 FENTANYL CITRATE (PF) 50 MCG/ML SOLUTION

## 2023-12-21 PROCEDURE — 25010000002 MIDAZOLAM PER 1MG: Performed by: EMERGENCY MEDICINE

## 2023-12-21 PROCEDURE — 84443 ASSAY THYROID STIM HORMONE: CPT | Performed by: EMERGENCY MEDICINE

## 2023-12-21 PROCEDURE — 85025 COMPLETE CBC W/AUTO DIFF WBC: CPT | Performed by: EMERGENCY MEDICINE

## 2023-12-21 PROCEDURE — 92960 CARDIOVERSION ELECTRIC EXT: CPT | Performed by: INTERNAL MEDICINE

## 2023-12-21 PROCEDURE — 92960 CARDIOVERSION ELECTRIC EXT: CPT

## 2023-12-21 PROCEDURE — 83735 ASSAY OF MAGNESIUM: CPT | Performed by: EMERGENCY MEDICINE

## 2023-12-21 PROCEDURE — 71045 X-RAY EXAM CHEST 1 VIEW: CPT

## 2023-12-21 PROCEDURE — 96374 THER/PROPH/DIAG INJ IV PUSH: CPT

## 2023-12-21 PROCEDURE — 99285 EMERGENCY DEPT VISIT HI MDM: CPT

## 2023-12-21 PROCEDURE — 25010000002 MIDAZOLAM HCL (PF) 10 MG/2ML SOLUTION: Performed by: INTERNAL MEDICINE

## 2023-12-21 PROCEDURE — 80053 COMPREHEN METABOLIC PANEL: CPT | Performed by: EMERGENCY MEDICINE

## 2023-12-21 PROCEDURE — 96375 TX/PRO/DX INJ NEW DRUG ADDON: CPT

## 2023-12-21 PROCEDURE — 93005 ELECTROCARDIOGRAM TRACING: CPT

## 2023-12-21 PROCEDURE — 93005 ELECTROCARDIOGRAM TRACING: CPT | Performed by: EMERGENCY MEDICINE

## 2023-12-21 RX ORDER — FENTANYL CITRATE 50 UG/ML
50 INJECTION, SOLUTION INTRAMUSCULAR; INTRAVENOUS ONCE
Status: COMPLETED | OUTPATIENT
Start: 2023-12-21 | End: 2023-12-21

## 2023-12-21 RX ORDER — MIDAZOLAM HYDROCHLORIDE 1 MG/ML
5 INJECTION INTRAMUSCULAR; INTRAVENOUS ONCE
Status: DISCONTINUED | OUTPATIENT
Start: 2023-12-21 | End: 2023-12-21

## 2023-12-21 RX ORDER — MIDAZOLAM HYDROCHLORIDE 5 MG/ML
INJECTION, SOLUTION INTRAMUSCULAR; INTRAVENOUS
Status: DISCONTINUED
Start: 2023-12-21 | End: 2023-12-21 | Stop reason: HOSPADM

## 2023-12-21 RX ORDER — SODIUM CHLORIDE 0.9 % (FLUSH) 0.9 %
10 SYRINGE (ML) INJECTION AS NEEDED
Status: DISCONTINUED | OUTPATIENT
Start: 2023-12-21 | End: 2023-12-21 | Stop reason: HOSPADM

## 2023-12-21 RX ORDER — MIDAZOLAM HYDROCHLORIDE 2 MG/2ML
2 INJECTION, SOLUTION INTRAMUSCULAR; INTRAVENOUS ONCE
Status: COMPLETED | OUTPATIENT
Start: 2023-12-21 | End: 2023-12-21

## 2023-12-21 RX ORDER — MIDAZOLAM HYDROCHLORIDE 2 MG/2ML
2 INJECTION, SOLUTION INTRAMUSCULAR; INTRAVENOUS ONCE
Status: DISCONTINUED | OUTPATIENT
Start: 2023-12-21 | End: 2023-12-21 | Stop reason: SDUPTHER

## 2023-12-21 RX ORDER — FENTANYL CITRATE 50 UG/ML
INJECTION, SOLUTION INTRAMUSCULAR; INTRAVENOUS
Status: COMPLETED
Start: 2023-12-21 | End: 2023-12-21

## 2023-12-21 RX ORDER — MIDAZOLAM HYDROCHLORIDE 2 MG/2ML
INJECTION, SOLUTION INTRAMUSCULAR; INTRAVENOUS
Status: DISCONTINUED
Start: 2023-12-21 | End: 2023-12-21 | Stop reason: WASHOUT

## 2023-12-21 RX ORDER — MIDAZOLAM HYDROCHLORIDE 5 MG/ML
INJECTION, SOLUTION INTRAMUSCULAR; INTRAVENOUS
Status: COMPLETED | OUTPATIENT
Start: 2023-12-21 | End: 2023-12-21

## 2023-12-21 RX ADMIN — FENTANYL CITRATE 50 MCG: 50 INJECTION, SOLUTION INTRAMUSCULAR; INTRAVENOUS at 15:01

## 2023-12-21 RX ADMIN — MIDAZOLAM HYDROCHLORIDE 2.5 MG: 5 INJECTION, SOLUTION INTRAMUSCULAR; INTRAVENOUS at 15:09

## 2023-12-21 RX ADMIN — FENTANYL CITRATE 50 MCG: 50 INJECTION, SOLUTION INTRAMUSCULAR; INTRAVENOUS at 12:32

## 2023-12-21 RX ADMIN — MIDAZOLAM HYDROCHLORIDE 2 MG: 1 INJECTION, SOLUTION INTRAMUSCULAR; INTRAVENOUS at 12:38

## 2023-12-21 RX ADMIN — MIDAZOLAM HYDROCHLORIDE 2.5 MG: 5 INJECTION, SOLUTION INTRAMUSCULAR; INTRAVENOUS at 15:06

## 2023-12-21 NOTE — ED PROVIDER NOTES
Time: 12:08 PM EST  Date of encounter:  12/21/2023  Independent Historian/Clinical History and Information was obtained by:   Patient    History is limited by: N/A    Chief Complaint: Palpitations      History of Present Illness:  Patient is a 73 y.o. year old female who presents to the emergency department for evaluation of palpitations and dyspnea that began earlier this morning.  She denies chest pain.  She has history of A-fib and states she has had similar symptoms in association with exacerbations of her A-fib in the past.  She is on anticoagulant therapy.  She currently denies chest pain.    HPI    Patient Care Team  Primary Care Provider: Milan Coppola Jr., MD    Past Medical History:     Allergies   Allergen Reactions    Amoxicillin-Pot Clavulanate Itching    Hydrocodone-Acetaminophen Itching    Hydrocodone Itching and Rash    Iodine Rash    Latex Rash    Metal Rash     YELLOW GOLD CAUSES RASH     Shellfish Allergy Rash     Past Medical History:   Diagnosis Date    Acid reflux     Acute deep vein thrombosis (DVT) of calf muscle vein of left lower extremity 06/09/2022    Anemia     Anesthesia complication     Arthritis     Arthritis of back     Atrial fibrillation     PAROXYSMAL    Bladder disorder     Colon polyp     GI bleed     History of total knee arthroplasty, right 09/08/2016    HTN (hypertension)     Limb swelling     Low back strain     Medial meniscus tear 09/12/2017    LEFT    Mitral valve prolapse     Primary osteoarthritis of left knee 09/12/2017    Seasonal allergies     Sleep apnea     SOB (shortness of breath)     Tear of medial cartilage or meniscus of knee, current 09/12/2017    Thoracic outlet syndrome     Thyroid disorder      Past Surgical History:   Procedure Laterality Date    BLADDER REPAIR      CHOLECYSTECTOMY      COLONOSCOPY  2014    [facility]A    COLONOSCOPY      DR. EDGE    HYSTERECTOMY      INTRAOCULAR LENS INSERTION      BOTH    JOINT REPLACEMENT Right     TKR    KNEE  ARTHROSCOPY Left     TOTAL KNEE ARTHROPLASTY Left 06/03/2022    Procedure: LEFT TOTAL KNEE ARTHROPLASTY WITH COMPUTER NAVIGATION;  Surgeon: Abdoulaye Paz MD;  Location: [facility] MAIN OR;  Service: Orthopedics;  Laterality: Left;     Family History   Problem Relation Age of Onset    Heart disease Mother     Arthritis Mother     Anesthesia problems Mother         slow to wake up    Arthritis Sister     Heart disease Brother     Diabetes Brother     Malig Hyperthermia Neg Hx        Home Medications:  Prior to Admission medications    Medication Sig Start Date End Date Taking? Authorizing Provider   cetirizine (zyrTEC) 10 MG tablet Take 1 tablet by mouth Daily.    Provider, MD Kathleen   Cholecalciferol 125 MCG (5000 UT) tablet Take 1 tablet by mouth Daily.    ProviderKathleen MD   Eliquis 5 MG tablet tablet Take 1 tablet by mouth twice daily 10/6/23   Chip Mendoza MD   famotidine (PEPCID) 40 MG tablet Take 1 tablet by mouth once daily 6/19/23   Ian Rabago MD   gabapentin (NEURONTIN) 300 MG capsule TAKE 1 CAPSULE BY MOUTH ONCE DAILY AT NIGHT 12/22/23   Milan Coppola Jr., MD   hydroCHLOROthiazide (HYDRODIURIL) 25 MG tablet Take 1 tablet by mouth Daily. 1/12/23   Milan Coppola Jr., MD   levothyroxine (SYNTHROID, LEVOTHROID) 75 MCG tablet Take 1 tablet by mouth once daily 10/9/23   Milan Coppola Jr., MD   losartan (COZAAR) 25 MG tablet Take 1 tablet by mouth once daily 12/15/23   Ian Rabago MD   metoprolol succinate XL (TOPROL-XL) 50 MG 24 hr tablet Take 1 tablet by mouth once daily 12/15/23   Milan Coppola Jr., MD   montelukast (SINGULAIR) 10 MG tablet TAKE 1 TABLET BY MOUTH ONCE DAILY AT NIGHT 12/15/23   Milan Coppola Jr., MD        Social History:   Social History     Tobacco Use    Smoking status: Never     Passive exposure: Never    Smokeless tobacco: Never   Vaping Use    Vaping Use: Never used   Substance Use Topics    Alcohol use: Never  "   Drug use: Never         Review of Systems:  Review of Systems   Constitutional:  Negative for chills and fever.   HENT:  Negative for congestion, ear pain and sore throat.    Eyes:  Negative for pain.   Respiratory:  Positive for shortness of breath. Negative for cough and chest tightness.    Cardiovascular:  Positive for palpitations. Negative for chest pain.   Gastrointestinal:  Negative for abdominal pain, diarrhea, nausea and vomiting.   Genitourinary:  Negative for flank pain and hematuria.   Musculoskeletal:  Negative for joint swelling.   Skin:  Negative for pallor.   Neurological:  Negative for seizures and headaches.   All other systems reviewed and are negative.       Physical Exam:  /67   Pulse (!) 121   Temp 98.1 °F (36.7 °C) (Oral)   Resp 20   Ht 165.1 cm (65\")   Wt 118 kg (259 lb 11.2 oz)   SpO2 98%   BMI 43.22 kg/m²     Physical Exam  Constitutional:       Appearance: Normal appearance.   HENT:      Head: Normocephalic and atraumatic.      Nose: Nose normal.      Mouth/Throat:      Mouth: Mucous membranes are moist.   Eyes:      Extraocular Movements: Extraocular movements intact.      Conjunctiva/sclera: Conjunctivae normal.      Pupils: Pupils are equal, round, and reactive to light.   Cardiovascular:      Pulses: Normal pulses.      Heart sounds: Normal heart sounds.      Comments: Tachycardic with irregularly irregular rhythm.  Pulmonary:      Effort: Pulmonary effort is normal.      Breath sounds: Normal breath sounds. No wheezing.   Abdominal:      Palpations: Abdomen is soft.      Tenderness: There is no abdominal tenderness.   Musculoskeletal:         General: Normal range of motion.      Cervical back: Normal range of motion and neck supple.      Right lower leg: No edema.      Left lower leg: No edema.   Skin:     General: Skin is warm and dry.      Capillary Refill: Capillary refill takes less than 2 seconds.      Findings: No rash.   Neurological:      General: No focal " deficit present.      Mental Status: She is alert and oriented to person, place, and time. Mental status is at baseline.      Cranial Nerves: No cranial nerve deficit.      Sensory: No sensory deficit.      Motor: No weakness.   Psychiatric:         Mood and Affect: Mood normal.         Behavior: Behavior normal.                  Procedures:  Electrical Cardioversion    Date/Time: 12/21/2023 3:23 PM    Performed by: Kelby Salcedo MD  Authorized by: Kelby Salcedo MD    Consent:     Consent obtained:  Verbal    Consent given by:  Patient    Risks, benefits, and alternatives were discussed: yes    Universal protocol:     Patient identity confirmed:  Verbally with patient  Pre-procedure details:     Cardioversion basis:  Emergent    Rhythm:  Atrial fibrillation  Attempt one:     Cardioversion mode:  Synchronous    Shock (Joules):  200    Shock outcome:  No change in rhythm  Post-procedure details:     Patient status:  Awake    Procedure completion:  Tolerated        Medical Decision Making:      Comorbidities that affect care:    Atrial Fibrillation    External Notes reviewed:    None      The following orders were placed and all results were independently analyzed by me:  Orders Placed This Encounter   Procedures    Electrical Cardioversion    XR Chest 1 View    [place] Draw    Comprehensive Metabolic Panel    Magnesium    Single High Sensitivity Troponin T    TSH    CBC Auto Differential    Undress & Gown    Continuous Pulse Oximetry    Cardioversion External at Bedside    Cardiology - Interventional (on-call MD unless specified)    Cardiology - Interventional (on-call MD unless specified)    Cardiology - Interventional (on-call MD unless specified)    Oxygen Therapy- Nasal Cannula; Titrate 1-6 LPM Per SpO2; 90 - 95%    ECG 12 Lead ED Triage Standing Order; Dysrhythmia    Insert Peripheral IV    CBC & Differential    Green Top (Gel)    Lavender Top    Gold Top - SST    Light Blue Top       Medications Given in the  Emergency Department:  Medications   sodium chloride 0.9 % flush 10 mL (has no administration in time range)   Midazolam HCl (PF) (VERSED) 10 MG/2ML injection  - ADS Override Pull (has no administration in time range)   midazolam (VERSED) injection 5 mg (has no administration in time range)   Midazolam HCl (PF) (VERSED) injection 2 mg (2 mg Intravenous Given 12/21/23 1238)   fentaNYL citrate (PF) (SUBLIMAZE) injection 50 mcg (50 mcg Intravenous Given 12/21/23 1232)   fentaNYL citrate (PF) (SUBLIMAZE) injection 50 mcg (50 mcg Intravenous Given 12/21/23 1501)        ED Course:    ED Course as of 12/21/23 1523   Thu Dec 21, 2023   1152 EKG: Rate 140, abnormal P waves, atrial fibrillation, abnormal QRS, nonspecific ST segment changes, normal QT interval. [RW]      ED Course User Index  [RW] Kelby Salcedo MD       Labs:    Lab Results (last 24 hours)       Procedure Component Value Units Date/Time    CBC & Differential [[id number]]  (Normal) Collected: 12/21/23 1203    Specimen: Blood Updated: 12/21/23 1211    Narrative:      The following orders were created for panel order CBC & Differential.  Procedure                               Abnormality         Status                     ---------                               -----------         ------                     CBC Auto Differential[[id number]]        Normal              Final result                 Please view results for these tests on the individual orders.    Comprehensive Metabolic Panel [[id number]]  (Abnormal) Collected: 12/21/23 1203    Specimen: Blood Updated: 12/21/23 1239     Glucose 100 mg/dL      BUN 11 mg/dL      Creatinine 0.73 mg/dL      Sodium 141 mmol/L      Potassium 3.5 mmol/L      Chloride 102 mmol/L      CO2 25.8 mmol/L      Calcium 9.4 mg/dL      Total Protein 7.6 g/dL      Albumin 4.0 g/dL      ALT (SGPT) 15 U/L      AST (SGOT) 21 U/L      Alkaline Phosphatase 79 U/L      Total Bilirubin 0.3 mg/dL      Globulin 3.6 gm/dL      A/G Ratio 1.1 g/dL       BUN/Creatinine Ratio 15.1     Anion Gap 13.2 mmol/L      eGFR 88.0 mL/min/1.73     Narrative:      GFR Normal >60  Chronic Kidney Disease <60  Kidney Failure <15    The GFR formula is only valid for adults with stable renal function between ages 18 and 70.    Magnesium [[id number]]  (Normal) Collected: 12/21/23 1203    Specimen: Blood Updated: 12/21/23 1239     Magnesium 1.7 mg/dL     Single High Sensitivity Troponin T [[id number]]  (Abnormal) Collected: 12/21/23 1203    Specimen: Blood Updated: 12/21/23 1242     HS Troponin T 31 ng/L     Narrative:      High Sensitive Troponin T Reference Range:  <14.0 ng/L- Negative Female for AMI  <22.0 ng/L- Negative Male for AMI  >=14 - Abnormal Female indicating possible myocardial injury.  >=22 - Abnormal Male indicating possible myocardial injury.   Clinicians would have to utilize clinical acumen, EKG, Troponin, and serial changes to determine if it is an Acute Myocardial Infarction or myocardial injury due to an underlying chronic condition.         TSH [[id number]]  (Normal) Collected: 12/21/23 1203    Specimen: Blood Updated: 12/21/23 1242     TSH 1.190 uIU/mL     CBC Auto Differential [[id number]]  (Normal) Collected: 12/21/23 1203    Specimen: Blood Updated: 12/21/23 1211     WBC 7.31 10*3/mm3      RBC 4.43 10*6/mm3      Hemoglobin 12.8 g/dL      Hematocrit 38.7 %      MCV 87.4 fL      MCH 28.9 pg      MCHC 33.1 g/dL      RDW 13.6 %      RDW-SD 43.4 fl      MPV 10.2 fL      Platelets 216 10*3/mm3      Neutrophil % 61.4 %      Lymphocyte % 29.0 %      Monocyte % 7.0 %      Eosinophil % 1.8 %      Basophil % 0.5 %      Immature Grans % 0.3 %      Neutrophils, Absolute 4.49 10*3/mm3      Lymphocytes, Absolute 2.12 10*3/mm3      Monocytes, Absolute 0.51 10*3/mm3      Eosinophils, Absolute 0.13 10*3/mm3      Basophils, Absolute 0.04 10*3/mm3      Immature Grans, Absolute 0.02 10*3/mm3      nRBC 0.0 /100 WBC              Imaging:    XR Chest 1 View    Result Date:  12/21/2023  PROCEDURE: XR CHEST 1 VW  COMPARISON: [facility], CR, XR CHEST 2 VW, 3/17/2023, 16:28.  INDICATIONS: Dysrhythmia Triage Protocol  FINDINGS:  Cardiomediastinal silhouette stable from prior.  Negative for infiltrate, edema, large effusion or pneumothorax.  Degenerative related osseous changes noted.        No active pulmonary process.       BAL GAMBOA MD       Electronically Signed and Approved By: BLA GAMBOA MD on 12/21/2023 at 12:04                Differential Diagnosis and Discussion:    Palpitations: Differential diagnosis includes but is not limited to anxiety, atrioventricular blocks, mitral valve disease, hypoxia, coronary artery disease, hypokalemia, anemia, fever, COPD, congestive heart failure, pericarditis, Teagan-Parkinson-White syndrome, pulmonary embolism, SVT, atrial fibrillation, atrial flutter, sinus tachycardia, thyrotoxicosis, and pheochromocytoma.    All labs were reviewed and interpreted by me.  EKG was interpreted by me.    MDM     Amount and/or Complexity of Data Reviewed  Clinical lab tests: reviewed  Tests in the radiology section of CPT®: reviewed  Tests in the medicine section of CPT®: reviewed         Critical Care Note: Total Critical Care time of 35 minutes. Total critical care time documented does not include time spent on separately billed procedures for services of nurses or physician assistants. I personally saw and examined the patient. I have reviewed all diagnostic interpretations and treatment plans as written. I was present for the key portions of any procedures performed and the inclusive time noted in any critical care statement. Critical care time includes patient management by me, time spent at the patients bedside,  time to review lab and imaging results, discussing patient care, documentation in the medical record, and time spent with family or caregiver.        Patient Care Considerations:    SEPSIS was considered but is NOT present in  the emergency department as SIRS criteria is not present.      Consultants/Shared Management Plan:    Consultant: I have discussed the case with Christa who states cardiovert the patient in the ED and if unsuccessful Dr. Goodwin will see the patient in the ED    Social Determinants of Health:    Patient has presented with family members who are responsible, reliable and will ensure follow up care.      Disposition and Care Coordination:            Final diagnoses:   Atrial fibrillation with rapid ventricular response        ED Disposition       ED Disposition   Discharge    Condition   Stable    Comment   --               This medical record created using voice recognition software.             Kelby Salcedo MD  12/21/23 2924

## 2023-12-21 NOTE — CONSULTS
[facility]   Cardiology Consult Note    Patient Name: Kimberly Dennis  : 1950  MRN: [id number]  Primary Care Physician:  Milan Coppola Jr., MD  Referring Physician: No ref. provider found  Date of admission: 2023    Subjective   Subjective     Reason for Consult/ Chief Complaint: A-fib with RVR    HPI:  Kimberly Dennis is a 73 y.o. female with paroxysmal atrial fibrillation, status post previous DC cardioversion in 2020, on metoprolol and Eliquis, hypertension, obstructive sleep apnea, presented to the emergency room with palpitations associated with shortness of breath.  Her symptoms started this morning.  She has been compliant with her medications including anticoagulation.  She has no chest pain, fever, chills or other concerns.    Review of Systems   All systems were reviewed and negative except as above    Personal History     Past Medical History:   Diagnosis Date    Acid reflux     Acute deep vein thrombosis (DVT) of calf muscle vein of left lower extremity 2022    Anemia     Anesthesia complication     Arthritis     Arthritis of back     Atrial fibrillation     PAROXYSMAL    Bladder disorder     Colon polyp     GI bleed     History of total knee arthroplasty, right 2016    HTN (hypertension)     Limb swelling     Low back strain     Medial meniscus tear 2017    LEFT    Mitral valve prolapse     Primary osteoarthritis of left knee 2017    Seasonal allergies     Sleep apnea     SOB (shortness of breath)     Tear of medial cartilage or meniscus of knee, current 2017    Thoracic outlet syndrome     Thyroid disorder         Family History: family history includes Anesthesia problems in her mother; Arthritis in her mother and sister; Diabetes in her brother; Heart disease in her brother and mother. Otherwise pertinent FHx was reviewed and not pertinent to current issue.    Social History:  reports that she has never smoked. She has never been  exposed to tobacco smoke. She has never used smokeless tobacco. She reports that she does not drink alcohol and does not use drugs.    Home Medications:  Cholecalciferol, apixaban, cetirizine, famotidine, gabapentin, hydroCHLOROthiazide, levothyroxine, losartan, metoprolol succinate XL, and montelukast    Allergies:  Allergies   Allergen Reactions    Amoxicillin-Pot Clavulanate Itching    Hydrocodone-Acetaminophen Itching    Hydrocodone Itching and Rash    Iodine Rash    Latex Rash    Metal Rash     YELLOW GOLD CAUSES RASH     Shellfish Allergy Rash       Objective    Objective     Vitals:   Temp:  [98.1 °F (36.7 °C)] 98.1 °F (36.7 °C)  Heart Rate:  [] 67  Resp:  [20-24] 24  BP: ()/(65-96) 128/65      Physical Exam:   Constitutional: Awake, alert, No acute distress    Eyes: PERRLA, sclerae anicteric, no conjunctival injection   HENT: NCAT, mucous membranes moist   Neck: Supple, no thyromegaly, no lymphadenopathy, trachea midline   Respiratory: Clear to auscultation bilaterally, nonlabored respirations    Cardiovascular: RRR, no murmurs, rubs, or gallops, palpable pedal pulses bilaterally   Gastrointestinal: Positive bowel sounds, soft, nontender, nondistended   Musculoskeletal: No bilateral ankle edema, no clubbing or cyanosis to extremities   Psychiatric: Appropriate affect, cooperative   Neurologic: Oriented x 3, strength symmetric in all extremities, Cranial Nerves grossly intact to confrontation, speech clear   Skin: No rashes     Result Review    Result Review:  I have personally reviewed the results from the time of this admission to 12/21/2023 16:50 EST and agree with these findings:  [x]  Laboratory  []  Microbiology  [x]  Radiology  [x]  EKG/Telemetry   [x]  Cardiology/Vascular   []  Pathology  [x]  Old records  []  Other:  Most notable findings include:     CMP          3/17/2023    16:58 7/13/2023    14:44 12/21/2023    12:03   CMP   Glucose 104  130  100    BUN 10  10  11    Creatinine 0.63   0.60  0.73    EGFR 95.0  96.1  88.0    Sodium 139  140  141    Potassium 3.9  3.7  3.5    Chloride 99  102  102    Calcium 9.7  9.4  9.4    Total Protein 8.0  7.3  7.6    Albumin 4.4  4.1  4.0    Globulin 3.6  3.2  3.6    Total Bilirubin 0.4  0.3  0.3    Alkaline Phosphatase 91  74  79    AST (SGOT) 19  23  21    ALT (SGPT) 15  18  15    Albumin/Globulin Ratio 1.2  1.3  1.1    BUN/Creatinine Ratio 15.9  16.7  15.1    Anion Gap 10.3  11.8  13.2       CBC          3/17/2023    16:58 7/13/2023    14:44 12/21/2023    12:03   CBC   WBC 5.21  5.56  7.31    RBC 4.38  4.04  4.43    Hemoglobin 12.7  11.7  12.8    Hematocrit 38.1  34.3  38.7    MCV 87.0  84.9  87.4    MCH 29.0  29.0  28.9    MCHC 33.3  34.1  33.1    RDW 13.8  13.2  13.6    Platelets 172  204  216       Lab Results   Component Value Date    TROPONINT 31 (H) 12/21/2023         Assessment & Plan   Assessment / Plan     Brief Patient Summary:  Kimberly Dennis is a 73 y.o. female  with:    Paroxysmal atrial fibrillation with rapid ventricular response, has been anticoagulation with Eliquis without interruption for over a month.  Slightly elevated troponin.  She has no chest pain.  This is more likely related to A-fib with RVR.  Hypertension  Obesity  Obstructive sleep apnea    Plan:   Will proceed with electrical cardioversion.  Continue Eliquis and metoprolol.  She may be discharged if normal sinus rhythm is restored.  She will need follow-up in our clinic within 1 week after discharge.  If she develops recurrent A-fib in the future, she might be a good candidate for A-fib ablation.    Electronically signed by Andres Goodwin MD, 12/21/23, 4:50 PM EST.

## 2023-12-21 NOTE — PROCEDURES
[facility]   CARDIOVERSION PROCEDURE REPORT      Patient: Kimberly Dennis  : 1950  MRN: [id number]    Procedure Date:  23    Referring Physician:   Dr Salcedo    Interventional Cardiologist:   Andres Goodwin MD    Indication:  Paroxysmal atrial fibrillation, symptomatic    Clinical Presentation:  Patient is a 73-year-old lady with past medical history notable paroxysmal atrial fibrillation, presented emergency room with palpitations.  She was found to be in atrial fibrillation with rapid ventricular response.  DC cardioversion was attempted by ER staff but was not successful.  I was called for another attempt at DC cardioversion.  She has been on anticoagulation with Eliquis without interruption.  She had successful DC cardioversion in 2020.  She has not had any recurrent atrial fibrillation that she is aware of since then.    Procedure performed:  Electrical DC cardioversion using synchronized 200 J x 1    Conclusions:  Successful synchronized DC cardioversion with restoration of normal sinus rhythm.    Recommendations:   Continue metoprolol and Eliquis  Follow-up in our office within 1 week after discharge  If she continues to have recurrent atrial fibrillation, she would be a good candidate for A-fib ablation.      Andres Goodwin MD    23  16:46 EST

## 2023-12-21 NOTE — DISCHARGE INSTRUCTIONS
Rest and avoid exertion.  Return for worsening chest pain, palpitations or other concerns.  
[FreeTextEntry6] : pt here today to discuss medical exemption to the covid 19 vaccine\par pt had covid  illness October 2020\par \par As  per mother, blood drawn feb 2021 demonstrates antibodies \par \par Pt is very anxious in regards to the vaccine. Pt has a h/o anxiety. Has seen psychiatry in the past

## 2023-12-26 ENCOUNTER — TELEPHONE (OUTPATIENT)
Dept: CARDIOLOGY | Facility: CLINIC | Age: 73
End: 2023-12-26
Payer: MEDICARE

## 2023-12-26 NOTE — TELEPHONE ENCOUNTER
Caller: Kimberly Dennis    Relationship to patient: Self    Best call back number: [phone]     Chief complaint: AFIB, CARDIOVERSION    Type of visit: HOSPITAL FOLLOW UP    Requested date: ASAP     If rescheduling, when is the original appointment:      Additional notes: DR. BOLES DONE CARDIOVERSION IN ER.

## 2023-12-31 LAB
QT INTERVAL: 312 MS
QTC INTERVAL: 477 MS

## 2024-01-02 RX ORDER — LEVOTHYROXINE SODIUM 0.07 MG/1
TABLET ORAL
Qty: 90 TABLET | Refills: 0 | Status: SHIPPED | OUTPATIENT
Start: 2024-01-02

## 2024-01-03 ENCOUNTER — OFFICE VISIT (OUTPATIENT)
Dept: CARDIOLOGY | Facility: CLINIC | Age: 74
End: 2024-01-03
Payer: MEDICARE

## 2024-01-03 VITALS
HEIGHT: 65 IN | DIASTOLIC BLOOD PRESSURE: 61 MMHG | WEIGHT: 257.6 LBS | SYSTOLIC BLOOD PRESSURE: 159 MMHG | HEART RATE: 66 BPM | BODY MASS INDEX: 42.92 KG/M2

## 2024-01-03 DIAGNOSIS — I48.0 PAROXYSMAL ATRIAL FIBRILLATION: Primary | ICD-10-CM

## 2024-01-03 DIAGNOSIS — I38 VALVULAR HEART DISEASE: ICD-10-CM

## 2024-01-03 DIAGNOSIS — I10 ESSENTIAL HYPERTENSION: ICD-10-CM

## 2024-01-03 DIAGNOSIS — R06.09 DYSPNEA ON EXERTION: ICD-10-CM

## 2024-01-03 PROCEDURE — 3078F DIAST BP <80 MM HG: CPT | Performed by: FAMILY MEDICINE

## 2024-01-03 PROCEDURE — 3077F SYST BP >= 140 MM HG: CPT | Performed by: FAMILY MEDICINE

## 2024-01-03 PROCEDURE — 99214 OFFICE O/P EST MOD 30 MIN: CPT | Performed by: FAMILY MEDICINE

## 2024-01-10 ENCOUNTER — APPOINTMENT (OUTPATIENT)
Dept: GENERAL RADIOLOGY | Facility: HOSPITAL | Age: 74
End: 2024-01-10
Payer: MEDICARE

## 2024-01-10 ENCOUNTER — HOSPITAL ENCOUNTER (OUTPATIENT)
Facility: HOSPITAL | Age: 74
Setting detail: OBSERVATION
Discharge: HOME | End: 2024-01-11
Attending: EMERGENCY MEDICINE | Admitting: STUDENT IN AN ORGANIZED HEALTH CARE EDUCATION/TRAINING PROGRAM
Payer: MEDICARE

## 2024-01-10 ENCOUNTER — NURSE TRIAGE (OUTPATIENT)
Dept: CALL CENTER | Facility: HOSPITAL | Age: 74
End: 2024-01-10
Payer: MEDICARE

## 2024-01-10 DIAGNOSIS — R07.89 CHEST DISCOMFORT: Primary | ICD-10-CM

## 2024-01-10 PROBLEM — R07.9 CHEST PAIN: Status: ACTIVE | Noted: 2024-01-10

## 2024-01-10 LAB
ALBUMIN SERPL-MCNC: 4.2 G/DL (ref 3.5–5.2)
ALBUMIN/GLOB SERPL: 1.2 G/DL
ALP SERPL-CCNC: 87 U/L (ref 39–117)
ALT SERPL W P-5'-P-CCNC: 16 U/L (ref 1–33)
ANION GAP SERPL CALCULATED.3IONS-SCNC: 10.6 MMOL/L (ref 5–15)
AST SERPL-CCNC: 18 U/L (ref 1–32)
BASOPHILS # BLD AUTO: 0.03 10*3/MM3 (ref 0–0.2)
BASOPHILS NFR BLD AUTO: 0.5 % (ref 0–1.5)
BILIRUB SERPL-MCNC: 0.2 MG/DL (ref 0–1.2)
BUN SERPL-MCNC: 12 MG/DL (ref 8–23)
BUN/CREAT SERPL: 15.4 (ref 7–25)
CALCIUM SPEC-SCNC: 9.6 MG/DL (ref 8.6–10.5)
CHLORIDE SERPL-SCNC: 99 MMOL/L (ref 98–107)
CO2 SERPL-SCNC: 28.4 MMOL/L (ref 22–29)
CREAT SERPL-MCNC: 0.78 MG/DL (ref 0.57–1)
DEPRECATED RDW RBC AUTO: 42.5 FL (ref 37–54)
EGFRCR SERPLBLD CKD-EPI 2021: 81.3 ML/MIN/1.73
EOSINOPHIL # BLD AUTO: 0.41 10*3/MM3 (ref 0–0.4)
EOSINOPHIL NFR BLD AUTO: 6.2 % (ref 0.3–6.2)
ERYTHROCYTE [DISTWIDTH] IN BLOOD BY AUTOMATED COUNT: 13.3 % (ref 12.3–15.4)
GLOBULIN UR ELPH-MCNC: 3.4 GM/DL
GLUCOSE SERPL-MCNC: 102 MG/DL (ref 65–99)
HCT VFR BLD AUTO: 37.3 % (ref 34–46.6)
HGB BLD-MCNC: 12.2 G/DL (ref 12–15.9)
HOLD SPECIMEN: NORMAL
HOLD SPECIMEN: NORMAL
IMM GRANULOCYTES # BLD AUTO: 0.02 10*3/MM3 (ref 0–0.05)
IMM GRANULOCYTES NFR BLD AUTO: 0.3 % (ref 0–0.5)
LYMPHOCYTES # BLD AUTO: 1.93 10*3/MM3 (ref 0.7–3.1)
LYMPHOCYTES NFR BLD AUTO: 29.3 % (ref 19.6–45.3)
MAGNESIUM SERPL-MCNC: 1.9 MG/DL (ref 1.6–2.4)
MCH RBC QN AUTO: 28.7 PG (ref 26.6–33)
MCHC RBC AUTO-ENTMCNC: 32.7 G/DL (ref 31.5–35.7)
MCV RBC AUTO: 87.8 FL (ref 79–97)
MONOCYTES # BLD AUTO: 0.44 10*3/MM3 (ref 0.1–0.9)
MONOCYTES NFR BLD AUTO: 6.7 % (ref 5–12)
NEUTROPHILS NFR BLD AUTO: 3.76 10*3/MM3 (ref 1.7–7)
NEUTROPHILS NFR BLD AUTO: 57 % (ref 42.7–76)
NRBC BLD AUTO-RTO: 0 /100 WBC (ref 0–0.2)
PLATELET # BLD AUTO: 205 10*3/MM3 (ref 140–450)
PMV BLD AUTO: 9.8 FL (ref 6–12)
POTASSIUM SERPL-SCNC: 4.1 MMOL/L (ref 3.5–5.2)
PROT SERPL-MCNC: 7.6 G/DL (ref 6–8.5)
RBC # BLD AUTO: 4.25 10*6/MM3 (ref 3.77–5.28)
SODIUM SERPL-SCNC: 138 MMOL/L (ref 136–145)
TROPONIN T SERPL HS-MCNC: 10 NG/L
TSH SERPL DL<=0.05 MIU/L-ACNC: 1.17 UIU/ML (ref 0.27–4.2)
WBC NRBC COR # BLD AUTO: 6.59 10*3/MM3 (ref 3.4–10.8)
WHOLE BLOOD HOLD COAG: NORMAL
WHOLE BLOOD HOLD SPECIMEN: NORMAL

## 2024-01-10 PROCEDURE — 84443 ASSAY THYROID STIM HORMONE: CPT

## 2024-01-10 PROCEDURE — 99223 1ST HOSP IP/OBS HIGH 75: CPT | Performed by: STUDENT IN AN ORGANIZED HEALTH CARE EDUCATION/TRAINING PROGRAM

## 2024-01-10 PROCEDURE — 85025 COMPLETE CBC W/AUTO DIFF WBC: CPT

## 2024-01-10 PROCEDURE — 84484 ASSAY OF TROPONIN QUANT: CPT

## 2024-01-10 PROCEDURE — 99284 EMERGENCY DEPT VISIT MOD MDM: CPT

## 2024-01-10 PROCEDURE — 80053 COMPREHEN METABOLIC PANEL: CPT

## 2024-01-10 PROCEDURE — 93005 ELECTROCARDIOGRAM TRACING: CPT

## 2024-01-10 PROCEDURE — 36415 COLL VENOUS BLD VENIPUNCTURE: CPT

## 2024-01-10 PROCEDURE — 83735 ASSAY OF MAGNESIUM: CPT

## 2024-01-10 PROCEDURE — 93005 ELECTROCARDIOGRAM TRACING: CPT | Performed by: EMERGENCY MEDICINE

## 2024-01-10 PROCEDURE — 71045 X-RAY EXAM CHEST 1 VIEW: CPT

## 2024-01-10 RX ORDER — SODIUM CHLORIDE 0.9 % (FLUSH) 0.9 %
10 SYRINGE (ML) INJECTION AS NEEDED
Status: DISCONTINUED | OUTPATIENT
Start: 2024-01-10 | End: 2024-01-11 | Stop reason: HOSPADM

## 2024-01-10 RX ADMIN — NITROGLYCERIN 0.5 INCH: 20 OINTMENT TOPICAL at 23:54

## 2024-01-10 RX ADMIN — APIXABAN 5 MG: 5 TABLET, FILM COATED ORAL at 23:54
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
## 2024-01-10 NOTE — TELEPHONE ENCOUNTER
"ALPESH, 12/21 afib in ER, shocked in rhythm, seen in Dr. Mendoza office last week, nuclear stress ashlee. 02/09,  I am taking it easy, BP monitored for Hypertension, HR 50-60's I feel off balance today,  lightheaded and dizzy, pressure across chest HR reg but pounding harder. /70 . Today I am lightheaded and weak . HR 59. I need to speak to Dr. Coppola. I have had some pressure in chest today couple times. I do not need to wait till 01/25 for my next appt. I have some indigestion also today and  last night. Triage was done and pt. Sent to ER for evaluation  Reason for Disposition   Dizziness or lightheadedness    Additional Information   Negative: SEVERE difficulty breathing (e.g., struggling for each breath, speaks in single words)   Negative: Difficult to awaken or acting confused (e.g., disoriented, slurred speech)   Negative: Shock suspected (e.g., cold/pale/clammy skin, too weak to stand, low BP, rapid pulse)   Negative: Passed out (i.e., lost consciousness, collapsed and was not responding)   Negative: [1] Chest pain lasts > 5 minutes AND [2] age > 44   Negative: [1] Chest pain lasts > 5 minutes AND [2] age > 30 AND [3] one or more cardiac risk factors (e.g., diabetes, high blood pressure, high cholesterol, smoker, or strong family history of heart disease)   Negative: [1] Chest pain lasts > 5 minutes AND [2] history of heart disease (i.e., angina, heart attack, heart failure, bypass surgery, takes nitroglycerin)   Negative: [1] Chest pain lasts > 5 minutes AND [2] described as crushing, pressure-like, or heavy   Negative: Heart beating < 50 beats per minute OR > 140 beats per minute   Negative: Visible sweat on face or sweat dripping down face   Negative: Sounds like a life-threatening emergency to the triager   Negative: Followed a chest injury   Negative: SEVERE chest pain   Negative: [1] Chest pain (or \"angina\") comes and goes AND [2] is happening more often (increasing in frequency) or getting worse " "(increasing in severity)  (Exception: Chest pains that last only a few seconds.)   Negative: Pain also in shoulder(s) or arm(s) or jaw  (Exception: Pain is clearly made worse by movement.)   Negative: Difficulty breathing   Negative: Coughing up blood   Negative: Cocaine use within last 3 days   Negative: Major surgery in past month   Negative: Hip or leg fracture (broken bone) in past month (or had cast on leg or ankle in past month)   Negative: Illness requiring prolonged bedrest in past month (e.g., immobilization, long hospital stay)   Negative: Long-distance travel in past month (e.g., car, bus, train, plane; with trip lasting 6 or more hours)   Negative: History of prior \"blood clot\" in leg or lungs (i.e., deep vein thrombosis, pulmonary embolism)   Negative: History of inherited increased risk of blood clots (e.g., Factor 5 Leiden, Anti-thrombin 3, Protein C or Protein S deficiency, Prothrombin mutation)   Negative: Cancer treatment in past six months (or has cancer now)   Negative: [1] Chest pain lasts > 5 minutes AND [2] occurred in past 3 days (72 hours) (Exception: Feels exactly the same as previously diagnosed heartburn and has accompanying sour taste in mouth.)   Negative: Taking a deep breath makes pain worse   Negative: Patient sounds very sick or weak to the triager   Negative: [1] Chest pain lasts > 5 minutes AND [2] occurred > 3 days ago (72 hours) AND [3] NO chest pain or cardiac symptoms now    Answer Assessment - Initial Assessment Questions  1. LOCATION: \"Where does it hurt?\"        Across chest   2. RADIATION: \"Does the pain go anywhere else?\" (e.g., into neck, jaw, arms, back)      no  3. ONSET: \"When did the chest pain begin?\" (Minutes, hours or days)       Last night and on and off today  4. PATTERN: \"Does the pain come and go, or has it been constant since it started?\"  \"Does it get worse with exertion?\"       Comes and goes  5. DURATION: \"How long does it last\" (e.g., seconds, minutes, " "hours)      Not long few seconds  6. SEVERITY: \"How bad is the pain?\"  (e.g., Scale 1-10; mild, moderate, or severe)     - MILD (1-3): doesn't interfere with normal activities      - MODERATE (4-7): interferes with normal activities or awakens from sleep     - SEVERE (8-10): excruciating pain, unable to do any normal activities        Mild moderate  7. CARDIAC RISK FACTORS: \"Do you have any history of heart problems or risk factors for heart disease?\" (e.g., angina, prior heart attack; diabetes, high blood pressure, high cholesterol, smoker, or strong family history of heart disease)      CAD  8. PULMONARY RISK FACTORS: \"Do you have any history of lung disease?\"  (e.g., blood clots in lung, asthma, emphysema, birth control pills)      no  9. CAUSE: \"What do you think is causing the chest pain?\"      unknown  10. OTHER SYMPTOMS: \"Do you have any other symptoms?\" (e.g., dizziness, nausea, vomiting, sweating, fever, difficulty breathing, cough)        Dizzy,lightheaded  11. PREGNANCY: \"Is there any chance you are pregnant?\" \"When was your last menstrual period?\"        no    Protocols used: Chest Pain-ADULT-AH    "

## 2024-01-11 ENCOUNTER — APPOINTMENT (OUTPATIENT)
Dept: NUCLEAR MEDICINE | Facility: HOSPITAL | Age: 74
End: 2024-01-11
Payer: MEDICARE

## 2024-01-11 ENCOUNTER — READMISSION MANAGEMENT (OUTPATIENT)
Dept: CALL CENTER | Facility: HOSPITAL | Age: 74
End: 2024-01-11
Payer: MEDICARE

## 2024-01-11 VITALS
SYSTOLIC BLOOD PRESSURE: 133 MMHG | WEIGHT: 255.29 LBS | HEIGHT: 66 IN | TEMPERATURE: 98.2 F | BODY MASS INDEX: 41.03 KG/M2 | HEART RATE: 77 BPM | DIASTOLIC BLOOD PRESSURE: 47 MMHG | RESPIRATION RATE: 18 BRPM | OXYGEN SATURATION: 96 %

## 2024-01-11 LAB
ALBUMIN SERPL-MCNC: 3.6 G/DL (ref 3.5–5.2)
ALBUMIN/GLOB SERPL: 1 G/DL
ALP SERPL-CCNC: 78 U/L (ref 39–117)
ALT SERPL W P-5'-P-CCNC: 13 U/L (ref 1–33)
ANION GAP SERPL CALCULATED.3IONS-SCNC: 13.6 MMOL/L (ref 5–15)
AST SERPL-CCNC: 20 U/L (ref 1–32)
BASOPHILS # BLD AUTO: 0.02 10*3/MM3 (ref 0–0.2)
BASOPHILS NFR BLD AUTO: 0.3 % (ref 0–1.5)
BH CV IMMEDIATE POST TECH DATA BLOOD PRESSURE: NORMAL MMHG
BH CV IMMEDIATE POST TECH DATA HEART RATE: 80 BPM
BH CV IMMEDIATE POST TECH DATA OXYGEN SATS: 95 %
BH CV REST NUCLEAR ISOTOPE DOSE: 9 MCI
BH CV SIX MINUTE RECOVERY TECH DATA BLOOD PRESSURE: NORMAL
BH CV SIX MINUTE RECOVERY TECH DATA HEART RATE: 71 BPM
BH CV SIX MINUTE RECOVERY TECH DATA OXYGEN SATURATION: 93 %
BH CV STRESS BP STAGE 1: NORMAL
BH CV STRESS COMMENTS STAGE 1: NORMAL
BH CV STRESS DOSE REGADENOSON STAGE 1: 0.4
BH CV STRESS DURATION MIN STAGE 1: 0
BH CV STRESS DURATION SEC STAGE 1: 10
BH CV STRESS HR STAGE 1: 65
BH CV STRESS NUCLEAR ISOTOPE DOSE: 31.3 MCI
BH CV STRESS O2 STAGE 1: 96
BH CV STRESS PROTOCOL 1: NORMAL
BH CV STRESS RECOVERY BP: NORMAL MMHG
BH CV STRESS RECOVERY HR: 71 BPM
BH CV STRESS RECOVERY O2: 93 %
BH CV STRESS STAGE 1: 1
BH CV THREE MINUTE POST TECH DATA BLOOD PRESSURE: NORMAL MMHG
BH CV THREE MINUTE POST TECH DATA HEART RATE: 73 BPM
BH CV THREE MINUTE POST TECH DATA OXYGEN SATURATION: 95 %
BILIRUB SERPL-MCNC: 0.4 MG/DL (ref 0–1.2)
BUN SERPL-MCNC: 13 MG/DL (ref 8–23)
BUN/CREAT SERPL: 22.8 (ref 7–25)
CALCIUM SPEC-SCNC: 9.3 MG/DL (ref 8.6–10.5)
CHLORIDE SERPL-SCNC: 101 MMOL/L (ref 98–107)
CO2 SERPL-SCNC: 22.4 MMOL/L (ref 22–29)
CREAT SERPL-MCNC: 0.57 MG/DL (ref 0.57–1)
DEPRECATED RDW RBC AUTO: 42.4 FL (ref 37–54)
EGFRCR SERPLBLD CKD-EPI 2021: 97.3 ML/MIN/1.73
EOSINOPHIL # BLD AUTO: 0.37 10*3/MM3 (ref 0–0.4)
EOSINOPHIL NFR BLD AUTO: 5.2 % (ref 0.3–6.2)
ERYTHROCYTE [DISTWIDTH] IN BLOOD BY AUTOMATED COUNT: 13.2 % (ref 12.3–15.4)
GLOBULIN UR ELPH-MCNC: 3.5 GM/DL
GLUCOSE BLDC GLUCOMTR-MCNC: 100 MG/DL (ref 70–99)
GLUCOSE BLDC GLUCOMTR-MCNC: 103 MG/DL (ref 70–99)
GLUCOSE BLDC GLUCOMTR-MCNC: 125 MG/DL (ref 70–99)
GLUCOSE BLDC GLUCOMTR-MCNC: 129 MG/DL (ref 70–99)
GLUCOSE SERPL-MCNC: 102 MG/DL (ref 65–99)
HCT VFR BLD AUTO: 36.1 % (ref 34–46.6)
HGB BLD-MCNC: 11.9 G/DL (ref 12–15.9)
IMM GRANULOCYTES # BLD AUTO: 0.02 10*3/MM3 (ref 0–0.05)
IMM GRANULOCYTES NFR BLD AUTO: 0.3 % (ref 0–0.5)
LV EF NUC BP: 67 %
LYMPHOCYTES # BLD AUTO: 2.24 10*3/MM3 (ref 0.7–3.1)
LYMPHOCYTES NFR BLD AUTO: 31.4 % (ref 19.6–45.3)
MAGNESIUM SERPL-MCNC: 1.8 MG/DL (ref 1.6–2.4)
MAXIMAL PREDICTED HEART RATE: 149 BPM
MCH RBC QN AUTO: 28.9 PG (ref 26.6–33)
MCHC RBC AUTO-ENTMCNC: 33 G/DL (ref 31.5–35.7)
MCV RBC AUTO: 87.6 FL (ref 79–97)
MONOCYTES # BLD AUTO: 0.52 10*3/MM3 (ref 0.1–0.9)
MONOCYTES NFR BLD AUTO: 7.3 % (ref 5–12)
NEUTROPHILS NFR BLD AUTO: 3.97 10*3/MM3 (ref 1.7–7)
NEUTROPHILS NFR BLD AUTO: 55.5 % (ref 42.7–76)
NRBC BLD AUTO-RTO: 0 /100 WBC (ref 0–0.2)
PERCENT MAX PREDICTED HR: 53.69 %
PHOSPHATE SERPL-MCNC: 3.8 MG/DL (ref 2.5–4.5)
PLATELET # BLD AUTO: 178 10*3/MM3 (ref 140–450)
PMV BLD AUTO: 10.1 FL (ref 6–12)
POTASSIUM SERPL-SCNC: 3.8 MMOL/L (ref 3.5–5.2)
PROT SERPL-MCNC: 7.1 G/DL (ref 6–8.5)
QT INTERVAL: 417 MS
QT INTERVAL: 435 MS
QTC INTERVAL: 419 MS
QTC INTERVAL: 426 MS
RBC # BLD AUTO: 4.12 10*6/MM3 (ref 3.77–5.28)
SODIUM SERPL-SCNC: 137 MMOL/L (ref 136–145)
STRESS BASELINE BP: NORMAL MMHG
STRESS BASELINE HR: 56 BPM
STRESS O2 SAT REST: 94 %
STRESS PERCENT HR: 63 %
STRESS POST O2 SAT PEAK: 97 %
STRESS POST PEAK BP: NORMAL MMHG
STRESS POST PEAK HR: 80 BPM
STRESS TARGET HR: 127 BPM
TROPONIN T SERPL HS-MCNC: 10 NG/L
WBC NRBC COR # BLD AUTO: 7.14 10*3/MM3 (ref 3.4–10.8)

## 2024-01-11 PROCEDURE — 82948 REAGENT STRIP/BLOOD GLUCOSE: CPT

## 2024-01-11 PROCEDURE — 99214 OFFICE O/P EST MOD 30 MIN: CPT | Performed by: INTERNAL MEDICINE

## 2024-01-11 PROCEDURE — 78452 HT MUSCLE IMAGE SPECT MULT: CPT

## 2024-01-11 PROCEDURE — A9502 TC99M TETROFOSMIN: HCPCS | Performed by: INTERNAL MEDICINE

## 2024-01-11 PROCEDURE — 93017 CV STRESS TEST TRACING ONLY: CPT

## 2024-01-11 PROCEDURE — 93005 ELECTROCARDIOGRAM TRACING: CPT | Performed by: STUDENT IN AN ORGANIZED HEALTH CARE EDUCATION/TRAINING PROGRAM

## 2024-01-11 PROCEDURE — 84484 ASSAY OF TROPONIN QUANT: CPT | Performed by: INTERNAL MEDICINE

## 2024-01-11 PROCEDURE — 25010000002 REGADENOSON 0.4 MG/5ML SOLUTION: Performed by: INTERNAL MEDICINE

## 2024-01-11 PROCEDURE — 85025 COMPLETE CBC W/AUTO DIFF WBC: CPT | Performed by: STUDENT IN AN ORGANIZED HEALTH CARE EDUCATION/TRAINING PROGRAM

## 2024-01-11 PROCEDURE — 0 TECHNETIUM TETROFOSMIN KIT: Performed by: INTERNAL MEDICINE

## 2024-01-11 PROCEDURE — G0378 HOSPITAL OBSERVATION PER HR: HCPCS

## 2024-01-11 PROCEDURE — 93018 CV STRESS TEST I&R ONLY: CPT | Performed by: INTERNAL MEDICINE

## 2024-01-11 PROCEDURE — 84100 ASSAY OF PHOSPHORUS: CPT | Performed by: STUDENT IN AN ORGANIZED HEALTH CARE EDUCATION/TRAINING PROGRAM

## 2024-01-11 PROCEDURE — 78452 HT MUSCLE IMAGE SPECT MULT: CPT | Performed by: INTERNAL MEDICINE

## 2024-01-11 PROCEDURE — 99238 HOSP IP/OBS DSCHRG MGMT 30/<: CPT | Performed by: INTERNAL MEDICINE

## 2024-01-11 PROCEDURE — 80053 COMPREHEN METABOLIC PANEL: CPT | Performed by: STUDENT IN AN ORGANIZED HEALTH CARE EDUCATION/TRAINING PROGRAM

## 2024-01-11 PROCEDURE — 83735 ASSAY OF MAGNESIUM: CPT | Performed by: STUDENT IN AN ORGANIZED HEALTH CARE EDUCATION/TRAINING PROGRAM

## 2024-01-11 PROCEDURE — 93016 CV STRESS TEST SUPVJ ONLY: CPT | Performed by: NURSE PRACTITIONER

## 2024-01-11 RX ORDER — IBUPROFEN 600 MG/1
1 TABLET ORAL
Status: DISCONTINUED | OUTPATIENT
Start: 2024-01-11 | End: 2024-01-11 | Stop reason: HOSPADM

## 2024-01-11 RX ORDER — BISACODYL 10 MG
10 SUPPOSITORY, RECTAL RECTAL DAILY PRN
Status: DISCONTINUED | OUTPATIENT
Start: 2024-01-11 | End: 2024-01-11 | Stop reason: HOSPADM

## 2024-01-11 RX ORDER — POLYETHYLENE GLYCOL 3350 17 G/17G
17 POWDER, FOR SOLUTION ORAL DAILY PRN
Status: DISCONTINUED | OUTPATIENT
Start: 2024-01-11 | End: 2024-01-11 | Stop reason: HOSPADM

## 2024-01-11 RX ORDER — MONTELUKAST SODIUM 10 MG/1
10 TABLET ORAL NIGHTLY
Status: DISCONTINUED | OUTPATIENT
Start: 2024-01-11 | End: 2024-01-11 | Stop reason: HOSPADM

## 2024-01-11 RX ORDER — NICOTINE POLACRILEX 4 MG
15 LOZENGE BUCCAL
Status: DISCONTINUED | OUTPATIENT
Start: 2024-01-11 | End: 2024-01-11 | Stop reason: HOSPADM

## 2024-01-11 RX ORDER — SODIUM CHLORIDE 0.9 % (FLUSH) 0.9 %
10 SYRINGE (ML) INJECTION AS NEEDED
Status: DISCONTINUED | OUTPATIENT
Start: 2024-01-11 | End: 2024-01-11 | Stop reason: HOSPADM

## 2024-01-11 RX ORDER — FAMOTIDINE 20 MG/1
40 TABLET, FILM COATED ORAL DAILY
Status: DISCONTINUED | OUTPATIENT
Start: 2024-01-11 | End: 2024-01-11 | Stop reason: HOSPADM

## 2024-01-11 RX ORDER — METOPROLOL SUCCINATE 50 MG/1
50 TABLET, EXTENDED RELEASE ORAL DAILY
Status: DISCONTINUED | OUTPATIENT
Start: 2024-01-11 | End: 2024-01-11 | Stop reason: HOSPADM

## 2024-01-11 RX ORDER — LEVOTHYROXINE SODIUM 0.07 MG/1
75 TABLET ORAL
Status: DISCONTINUED | OUTPATIENT
Start: 2024-01-11 | End: 2024-01-11 | Stop reason: HOSPADM

## 2024-01-11 RX ORDER — SODIUM CHLORIDE 0.9 % (FLUSH) 0.9 %
10 SYRINGE (ML) INJECTION EVERY 12 HOURS SCHEDULED
Status: DISCONTINUED | OUTPATIENT
Start: 2024-01-11 | End: 2024-01-11 | Stop reason: HOSPADM

## 2024-01-11 RX ORDER — BISACODYL 5 MG/1
5 TABLET, DELAYED RELEASE ORAL DAILY PRN
Status: DISCONTINUED | OUTPATIENT
Start: 2024-01-11 | End: 2024-01-11 | Stop reason: HOSPADM

## 2024-01-11 RX ORDER — GABAPENTIN 300 MG/1
300 CAPSULE ORAL NIGHTLY
Status: DISCONTINUED | OUTPATIENT
Start: 2024-01-11 | End: 2024-01-11 | Stop reason: HOSPADM

## 2024-01-11 RX ORDER — NITROGLYCERIN 0.4 MG/1
0.4 TABLET SUBLINGUAL
Status: DISCONTINUED | OUTPATIENT
Start: 2024-01-11 | End: 2024-01-11 | Stop reason: HOSPADM

## 2024-01-11 RX ORDER — CETIRIZINE HYDROCHLORIDE 10 MG/1
20 TABLET ORAL DAILY
Status: DISCONTINUED | OUTPATIENT
Start: 2024-01-11 | End: 2024-01-11 | Stop reason: HOSPADM

## 2024-01-11 RX ORDER — LOSARTAN POTASSIUM 25 MG/1
25 TABLET ORAL DAILY
Status: DISCONTINUED | OUTPATIENT
Start: 2024-01-11 | End: 2024-01-11 | Stop reason: HOSPADM

## 2024-01-11 RX ORDER — ACETAMINOPHEN 325 MG/1
650 TABLET ORAL EVERY 6 HOURS PRN
Status: DISCONTINUED | OUTPATIENT
Start: 2024-01-11 | End: 2024-01-11 | Stop reason: HOSPADM

## 2024-01-11 RX ORDER — DEXTROSE MONOHYDRATE 25 G/50ML
25 INJECTION, SOLUTION INTRAVENOUS
Status: DISCONTINUED | OUTPATIENT
Start: 2024-01-11 | End: 2024-01-11 | Stop reason: HOSPADM

## 2024-01-11 RX ORDER — AMOXICILLIN 250 MG
2 CAPSULE ORAL 2 TIMES DAILY
Status: DISCONTINUED | OUTPATIENT
Start: 2024-01-11 | End: 2024-01-11 | Stop reason: HOSPADM

## 2024-01-11 RX ORDER — ACETAMINOPHEN 650 MG/1
650 SUPPOSITORY RECTAL EVERY 6 HOURS PRN
Status: DISCONTINUED | OUTPATIENT
Start: 2024-01-11 | End: 2024-01-11 | Stop reason: HOSPADM

## 2024-01-11 RX ORDER — SODIUM CHLORIDE 9 MG/ML
40 INJECTION, SOLUTION INTRAVENOUS AS NEEDED
Status: DISCONTINUED | OUTPATIENT
Start: 2024-01-11 | End: 2024-01-11 | Stop reason: HOSPADM

## 2024-01-11 RX ORDER — REGADENOSON 0.08 MG/ML
0.4 INJECTION, SOLUTION INTRAVENOUS
Status: COMPLETED | OUTPATIENT
Start: 2024-01-11 | End: 2024-01-11

## 2024-01-11 RX ADMIN — FAMOTIDINE 40 MG: 20 TABLET ORAL at 08:09

## 2024-01-11 RX ADMIN — Medication 10 ML: at 08:14

## 2024-01-11 RX ADMIN — GABAPENTIN 300 MG: 300 CAPSULE ORAL at 01:59

## 2024-01-11 RX ADMIN — REGADENOSON 0.4 MG: 0.08 INJECTION, SOLUTION INTRAVENOUS at 10:39

## 2024-01-11 RX ADMIN — ACETAMINOPHEN 650 MG: 325 TABLET ORAL at 08:46

## 2024-01-11 RX ADMIN — LEVOTHYROXINE SODIUM 75 MCG: 75 TABLET ORAL at 06:11

## 2024-01-11 RX ADMIN — Medication 10 ML: at 01:56

## 2024-01-11 RX ADMIN — LOSARTAN POTASSIUM 25 MG: 25 TABLET, FILM COATED ORAL at 08:46

## 2024-01-11 RX ADMIN — TETROFOSMIN 1 DOSE: 1.38 INJECTION, POWDER, LYOPHILIZED, FOR SOLUTION INTRAVENOUS at 10:39

## 2024-01-11 RX ADMIN — Medication 5000 UNITS: at 08:09

## 2024-01-11 RX ADMIN — TETROFOSMIN 1 DOSE: 1.38 INJECTION, POWDER, LYOPHILIZED, FOR SOLUTION INTRAVENOUS at 09:00

## 2024-01-11 RX ADMIN — CETIRIZINE HYDROCHLORIDE 20 MG: 10 TABLET, FILM COATED ORAL at 08:09

## 2024-01-11 NOTE — NURSING NOTE
RRT called on pt in nuclear med at 1000. Pt was dizzy, lightheaded, and then passed out. She was A&O x4 on my arrival to the floor. An EKG was ordered and completed.    Her /66(85) HR57/ Spo2 96 on RA, RR 13. Then following 133/59(78), HR56, 96%RA, 14 RR. Her glucose was checked at was within normal limits at 125. No further orders were completed. Dr Mendoza was contacted. Josee Berg RN

## 2024-01-11 NOTE — PLAN OF CARE
Problem: Adult Inpatient Plan of Care  Goal: Plan of Care Review  Outcome: Ongoing, Progressing  Goal: Patient-Specific Goal (Individualized)  Outcome: Ongoing, Progressing  Goal: Absence of Hospital-Acquired Illness or Injury  Outcome: Ongoing, Progressing  Goal: Optimal Comfort and Wellbeing  Outcome: Ongoing, Progressing  Goal: Readiness for Transition of Care  Outcome: Ongoing, Progressing  Intervention: Mutually Develop Transition Plan   Goal Outcome Evaluation:  Plan of Care Reviewed With: patient        Progress: improving  Outcome Evaluation: Patient states the chest pressure has been gone for a few hours.  resting, call light in reach.

## 2024-01-11 NOTE — H&P
[facility] HISTORY AND PHYSICAL  Date: 2024   Patient Name: Kimberly Dennis  : 1950  MRN: [id number]  Primary Care Physician:  Milan Coppola Jr., MD  Date of admission: 1/10/2024    Subjective   Subjective     Chief Complaint: Intermittent chest pain, dyspnea and fatigue with exertion    HPI:    Kimberly Dennis is a 73 y.o. female  with a past medical history of hypothyroidism, A-fib on Eliquis, DVT, hypertension, peripheral neuropathy presented to the ED for evaluation of chest pain, lightheadedness that started this morning.Patient states over the last 2 weeks patient has been experiencing increasing shortness of breath with exertion and fatigue even with minimal exertion.  Patient woke up this morning with complaints of substernal chest pain radiating to the left shoulder and she has been having this chest pains intermittently throughout the day.  Lasting for very few seconds.  Sometimes felt like chest heaviness.  Associated with lightheadedness intermittently.  Denies any palpitations, nausea, vomiting, diaphoresis.  She has some back pain but that is similar to her chronic back pain.    Patient has been recently seen in the ED on  with complaints of palpitations and shortness of breath and noted to be in A-fib with RVR.  Underwent cardioversion.  Returned to sinus rhythm and patient has been discharged home.  Patient has an appointment in February for outpatient stress test.    In the ED, vital signs temperature 98, pulse 64, respiratory 19, blood pressure 166/59 on room air saturating at 100%.  Normal initial troponin, CMP, CBC, TSH.  Chest x-ray showed no acute cardiopulmonary abnormality.  EKG showed normal sinus rhythm with no ST-T wave changes concerning for ischemic heart disease.  Patient has been admitted for further evaluation and management of chest pain.      Personal History     Past Medical History:   Diagnosis Date    Acid reflux     Acute  deep vein thrombosis (DVT) of calf muscle vein of left lower extremity 06/09/2022    Anemia     Anesthesia complication     Arthritis     Arthritis of back     Atrial fibrillation     PAROXYSMAL    Bladder disorder     Colon polyp     GI bleed     History of total knee arthroplasty, right 09/08/2016    HTN (hypertension)     Limb swelling     Low back strain     Medial meniscus tear 09/12/2017    LEFT    Mitral valve prolapse     Primary osteoarthritis of left knee 09/12/2017    Seasonal allergies     Sleep apnea     SOB (shortness of breath)     Tear of medial cartilage or meniscus of knee, current 09/12/2017    Thoracic outlet syndrome     Thyroid disorder          Past Surgical History:   Procedure Laterality Date    BLADDER REPAIR      CHOLECYSTECTOMY      COLONOSCOPY  2014    CAUSEY    COLONOSCOPY      DR. EDGE    ELECTRICAL CARDIOVERSION  12/21/2023    HYSTERECTOMY      INTRAOCULAR LENS INSERTION      BOTH    JOINT REPLACEMENT Right     TKR    KNEE ARTHROSCOPY Left     TOTAL KNEE ARTHROPLASTY Left 06/03/2022    Procedure: LEFT TOTAL KNEE ARTHROPLASTY WITH COMPUTER NAVIGATION;  Surgeon: Abdoulaye Paz MD;  Location: [facility] MAIN OR;  Service: Orthopedics;  Laterality: Left;         Family History   Problem Relation Age of Onset    Heart disease Mother     Arthritis Mother     Anesthesia problems Mother         slow to wake up    Arthritis Sister     Heart disease Brother     Diabetes Brother     Malig Hyperthermia Neg Hx          Social History     Socioeconomic History    Marital status:    Tobacco Use    Smoking status: Never     Passive exposure: Never    Smokeless tobacco: Never   Vaping Use    Vaping Use: Never used   Substance and Sexual Activity    Alcohol use: Never    Drug use: Never    Sexual activity: Defer         Home Medications:  Cholecalciferol, apixaban, cetirizine, famotidine, gabapentin, hydroCHLOROthiazide, levothyroxine, losartan, metoprolol succinate XL, and  montelukast    Allergies:  Allergies   Allergen Reactions    Amoxicillin-Pot Clavulanate Itching    Hydrocodone-Acetaminophen Itching    Hydrocodone Itching and Rash    Iodine Rash    Latex Rash    Metal Rash     YELLOW GOLD CAUSES RASH     Shellfish Allergy Rash       Review of Systems   All other systems reviewed and negative except as mentioned above in the HPI    Objective   Objective     Vitals:   Temp:  [98 °F (36.7 °C)] 98 °F (36.7 °C)  Heart Rate:  [57-88] 60  Resp:  [19] 19  BP: (137-166)/(59-70) 150/67    Physical Exam    Constitutional: Awake, alert, no acute distress   Eyes: Pupils equal, sclerae anicteric, no conjunctival injection   HENT: NCAT, mucous membranes moist   Respiratory: Clear to auscultation bilaterally, nonlabored respirations    Cardiovascular: RRR, no murmurs   Gastrointestinal: Positive bowel sounds, soft, nontender, nondistended   Musculoskeletal: No bilateral ankle edema, no clubbing or cyanosis to extremities   Psychiatric: Appropriate affect, cooperative   Neurologic: Oriented x 3, speech clear   Skin: No rashes     Result Review    Result Review:  I have personally reviewed the results from the time of this admission to 1/11/2024 00:36 EST and agree with these findings:  [x]  Laboratory  []  Microbiology  [x]  Radiology  [x]  EKG/Telemetry   []  Cardiology/Vascular   []  Pathology  []  Old records  []  Other:        Imaging Results (Last 24 Hours)       Procedure Component Value Units Date/Time    XR Chest 1 View [[id number]] Collected: 01/10/24 1724     Updated: 01/10/24 1728    Narrative:      PROCEDURE: XR CHEST 1 VW     COMPARISON: [facility], , XR CHEST 1 VW, 12/21/2023, 12:00.     INDICATIONS: Shortness of air, dizziness     FINDINGS:   LUNGS: Normal.  No significant pulmonary parenchymal abnormalities.    VASCULATURE: Normal.  Unremarkable pulmonary vasculature.    CARDIAC: Cardiomegaly.    MEDIASTINUM: Normal.  No visible mass or adenopathy.     PLEURA: Normal.  No effusion or pleural thickening.    BONES: Normal.  No fracture or visible bony lesion.    OTHER: Negative.         Impression:       No acute cardiopulmonary abnormality.               ORLANDO CYR MD         Electronically Signed and Approved By: ORLANDO CYR MD on 1/10/2024 at 17:24                                      Assessment & Plan   Assessment / Plan     Assessment/Plan:   Atypical Chest pain  Dyspnea and fatigue with exertion since 2 weeks   History of A-fib s/p cardioversion on 12/21/2023, on Eliquis  Hypertension  Hypothyroidism  Peripheral neuropathy    Plan  Admit to observation, telemetry  Cardiology consult in a.m.  Aspirin 81 mg daily  Check A1c, lipid panel  N.p.o. after midnight  POCT glucose every 6 hours  Hypoglycemic protocol  Resume other appropriate home medications once reconciled    DVT prophylaxis:  No DVT prophylaxis order currently exists.    CODE STATUS:    Level Of Support Discussed With: Patient  Code Status (Patient has no pulse and is not breathing): CPR (Attempt to Resuscitate)  Medical Interventions (Patient has pulse or is breathing): Full Support      Admission Status:  I believe this patient meets observation status.    Part of this note may be an electronic transcription/translation of spoken language to printed text using the Dragon Dictation System    Brent Moreno MD

## 2024-01-11 NOTE — DISCHARGE SUMMARY
Date of Admission: 1/10/2024    Date of Discharge:  1/11/2024    Length of stay:  LOS: 0 days     Admission Diagnosis:   Chest discomfort [R07.89]  Chest pain [R07.9]      Discharge Diagnosis:   Atypical Chest pain  Dyspnea and fatigue with exertion since 2 weeks   History of A-fib s/p cardioversion on 12/21/2023, on Eliquis  Hypertension  Hypothyroidism  Peripheral neuropathy  Morbid obesity       Active Hospital Problems:    Active Hospital Problems    Diagnosis  POA    **Chest pain [R07.9]  Yes      Resolved Hospital Problems   No resolved problems to display.       Hospital Course:  Patient is a 73 y.o. female with a past medical history of hypothyroidism, A-fib on Eliquis, DVT, hypertension, peripheral neuropathy presented to the ED for evaluation of chest pain, lightheadedness that started this morning.Patient states over the last 2 weeks patient has been experiencing increasing shortness of breath with exertion and fatigue even with minimal exertion.  Patient woke up this morning with complaints of substernal chest pain radiating to the left shoulder and she has been having this chest pains intermittently throughout the day.  Lasting for very few seconds.  Sometimes felt like chest heaviness.  Associated with lightheadedness intermittently.  Denies any palpitations, nausea, vomiting, diaphoresis.  She has some back pain but that is similar to her chronic back pain.     Patient has been recently seen in the ED on 21st December with complaints of palpitations and shortness of breath and noted to be in A-fib with RVR.  Underwent cardioversion.  Returned to sinus rhythm and patient has been discharged home.  Patient has an appointment in February for outpatient stress test.     Patient was admitted and evaluated by cardiology. She underwent cardiac stress test which was negative for reversible ischemia. Discussed with Dr. Mendoza and he recommends to resume patient's home bp meds and she can follow up with him  in 3 weeks. Patient to continue monitoring her blood pressures and heart rates. Patient did have a presyncope/syncopal episode at her stress test, patient feels this was related to not having anything to eat or drink for almost 24 hours. She had some crackers and her symptoms have resolved. Recommend no driving until follow up appointment.       Procedures Performed:           Consults:   Consults       Date and Time Order Name Status Description    1/11/2024  2:13 AM Inpatient Cardiology Consult Completed     1/10/2024 11:09 PM Inpatient Hospitalist Consult      12/21/2023 12:43 PM Cardiology - Interventional (on-call MD unless specified) Completed     12/21/2023 12:12 PM Cardiology - Interventional (on-call MD unless specified) Completed     12/21/2023 11:59 AM Cardiology - Interventional (on-call MD unless specified) Completed             Vital Signs:  Temp:  [98.1 °F (36.7 °C)-98.5 °F (36.9 °C)] 98.2 °F (36.8 °C)  Heart Rate:  [51-85] 77  Resp:  [17-18] 18  BP: (129-160)/(47-70) 133/47  Body mass index is 41.21 kg/m².    Physical Exam:  Physical Exam  Constitutional:       General: She is not in acute distress.     Appearance: She is obese.   Cardiovascular:      Rate and Rhythm: Normal rate and regular rhythm.      Heart sounds: No murmur heard.  Pulmonary:      Effort: Pulmonary effort is normal. No respiratory distress.   Abdominal:      General: Bowel sounds are normal. There is no distension.      Palpations: Abdomen is soft.      Tenderness: There is no abdominal tenderness.   Neurological:      Mental Status: She is alert.   Psychiatric:         Mood and Affect: Mood normal.         Behavior: Behavior normal.                 Pertinent Test Results:   Lab Results (last 72 hours)       Procedure Component Value Units Date/Time    POC Glucose Once [[id number]]  (Abnormal) Collected: 01/11/24 1202    Specimen: Blood Updated: 01/11/24 1207     Glucose 129 mg/dL      Comment: Serial Number: [id number]:   [id number]       POC Glucose Once [[id number]]  (Abnormal) Collected: 01/11/24 1021    Specimen: Blood Updated: 01/11/24 1022     Glucose 125 mg/dL      Comment: Serial Number: [id number]:  [id number]       High Sensitivity Troponin T [[id number]]  (Normal) Collected: 01/11/24 0415    Specimen: Blood from Arm, Left Updated: 01/11/24 0749     HS Troponin T 10 ng/L     Narrative:      High Sensitive Troponin T Reference Range:  <14.0 ng/L- Negative Female for AMI  <22.0 ng/L- Negative Male for AMI  >=14 - Abnormal Female indicating possible myocardial injury.  >=22 - Abnormal Male indicating possible myocardial injury.   Clinicians would have to utilize clinical acumen, EKG, Troponin, and serial changes to determine if it is an Acute Myocardial Infarction or myocardial injury due to an underlying chronic condition.         POC Glucose Once [[id number]]  (Abnormal) Collected: 01/11/24 0614    Specimen: Blood Updated: 01/11/24 0637     Glucose 103 mg/dL      Comment: Serial Number: [id number]:  [id number]       Magnesium [[id number]]  (Normal) Collected: 01/11/24 0415    Specimen: Blood from Arm, Left Updated: 01/11/24 0510     Magnesium 1.8 mg/dL     Comprehensive Metabolic Panel [[id number]]  (Abnormal) Collected: 01/11/24 0415    Specimen: Blood from Arm, Left Updated: 01/11/24 0510     Glucose 102 mg/dL      BUN 13 mg/dL      Creatinine 0.57 mg/dL      Sodium 137 mmol/L      Potassium 3.8 mmol/L      Comment: Slight hemolysis detected by analyzer. Result may be falsely elevated.        Chloride 101 mmol/L      CO2 22.4 mmol/L      Calcium 9.3 mg/dL      Total Protein 7.1 g/dL      Albumin 3.6 g/dL      ALT (SGPT) 13 U/L      AST (SGOT) 20 U/L      Comment: Slight hemolysis detected by analyzer. Result may be falsely elevated.        Alkaline Phosphatase 78 U/L      Total Bilirubin 0.4 mg/dL      Globulin 3.5 gm/dL      A/G Ratio 1.0 g/dL      BUN/Creatinine Ratio 22.8     Anion Gap 13.6 mmol/L      eGFR 97.3  mL/min/1.73     Narrative:      GFR Normal >60  Chronic Kidney Disease <60  Kidney Failure <15    The GFR formula is only valid for adults with stable renal function between ages 18 and 70.    Phosphorus [[id number]]  (Normal) Collected: 01/11/24 0415    Specimen: Blood from Arm, Left Updated: 01/11/24 0506     Phosphorus 3.8 mg/dL     CBC & Differential [[id number]]  (Abnormal) Collected: 01/11/24 0415    Specimen: Blood from Arm, Left Updated: 01/11/24 0452    Narrative:      The following orders were created for panel order CBC & Differential.  Procedure                               Abnormality         Status                     ---------                               -----------         ------                     CBC Auto Differential[[id number]]        Abnormal            Final result                 Please view results for these tests on the individual orders.    CBC Auto Differential [[id number]]  (Abnormal) Collected: 01/11/24 0415    Specimen: Blood from Arm, Left Updated: 01/11/24 0452     WBC 7.14 10*3/mm3      RBC 4.12 10*6/mm3      Hemoglobin 11.9 g/dL      Hematocrit 36.1 %      MCV 87.6 fL      MCH 28.9 pg      MCHC 33.0 g/dL      RDW 13.2 %      RDW-SD 42.4 fl      MPV 10.1 fL      Platelets 178 10*3/mm3      Neutrophil % 55.5 %      Lymphocyte % 31.4 %      Monocyte % 7.3 %      Eosinophil % 5.2 %      Basophil % 0.3 %      Immature Grans % 0.3 %      Neutrophils, Absolute 3.97 10*3/mm3      Lymphocytes, Absolute 2.24 10*3/mm3      Monocytes, Absolute 0.52 10*3/mm3      Eosinophils, Absolute 0.37 10*3/mm3      Basophils, Absolute 0.02 10*3/mm3      Immature Grans, Absolute 0.02 10*3/mm3      nRBC 0.0 /100 WBC     POC Glucose Once [[id number]]  (Abnormal) Collected: 01/11/24 0158    Specimen: Blood Updated: 01/11/24 0203     Glucose 100 mg/dL      Comment: Serial Number: [id number]:  [id number]       Single High Sensitivity Troponin T [[id number]]  (Normal) Collected: 01/10/24 1551    Specimen:  Blood Updated: 01/10/24 1635     HS Troponin T 10 ng/L     Narrative:      High Sensitive Troponin T Reference Range:  <14.0 ng/L- Negative Female for AMI  <22.0 ng/L- Negative Male for AMI  >=14 - Abnormal Female indicating possible myocardial injury.  >=22 - Abnormal Male indicating possible myocardial injury.   Clinicians would have to utilize clinical acumen, EKG, Troponin, and serial changes to determine if it is an Acute Myocardial Infarction or myocardial injury due to an underlying chronic condition.         TSH [[id number]]  (Normal) Collected: 01/10/24 1551    Specimen: Blood Updated: 01/10/24 1635     TSH 1.170 uIU/mL     Comprehensive Metabolic Panel [[id number]]  (Abnormal) Collected: 01/10/24 1551    Specimen: Blood Updated: 01/10/24 1632     Glucose 102 mg/dL      BUN 12 mg/dL      Creatinine 0.78 mg/dL      Sodium 138 mmol/L      Potassium 4.1 mmol/L      Chloride 99 mmol/L      CO2 28.4 mmol/L      Calcium 9.6 mg/dL      Total Protein 7.6 g/dL      Albumin 4.2 g/dL      ALT (SGPT) 16 U/L      AST (SGOT) 18 U/L      Alkaline Phosphatase 87 U/L      Total Bilirubin 0.2 mg/dL      Globulin 3.4 gm/dL      A/G Ratio 1.2 g/dL      BUN/Creatinine Ratio 15.4     Anion Gap 10.6 mmol/L      eGFR 81.3 mL/min/1.73     Narrative:      GFR Normal >60  Chronic Kidney Disease <60  Kidney Failure <15    The GFR formula is only valid for adults with stable renal function between ages 18 and 70.    Magnesium [[id number]]  (Normal) Collected: 01/10/24 1551    Specimen: Blood Updated: 01/10/24 1632     Magnesium 1.9 mg/dL     Barron Draw [[id number]] Collected: 01/10/24 1551    Specimen: Blood Updated: 01/10/24 1625    Narrative:      The following orders were created for panel order Barron Draw.  Procedure                               Abnormality         Status                     ---------                               -----------         ------                     Green Top (Gel)[[id number]]                                   Final result               Lavender Top[[id number]]                                     Final result               Gold Top - SST[[id number]]                                   Final result               Light Blue Top[[id number]]                                   Final result                 Please view results for these tests on the individual orders.    Green Top (Gel) [[id number]] Collected: 01/10/24 1551    Specimen: Blood Updated: 01/10/24 1625     Extra Tube Hold for add-ons.     Comment: Auto resulted.       Lavender Top [[id number]] Collected: 01/10/24 1551    Specimen: Blood Updated: 01/10/24 1625     Extra Tube hold for add-on     Comment: Auto resulted       Gold Top - SST [[id number]] Collected: 01/10/24 1551    Specimen: Blood Updated: 01/10/24 1625     Extra Tube Hold for add-ons.     Comment: Auto resulted.       Light Blue Top [[id number]] Collected: 01/10/24 1551    Specimen: Blood Updated: 01/10/24 1625     Extra Tube Hold for add-ons.     Comment: Auto resulted       CBC & Differential [[id number]]  (Abnormal) Collected: 01/10/24 1551    Specimen: Blood Updated: 01/10/24 1604    Narrative:      The following orders were created for panel order CBC & Differential.  Procedure                               Abnormality         Status                     ---------                               -----------         ------                     CBC Auto Differential[[id number]]        Abnormal            Final result                 Please view results for these tests on the individual orders.    CBC Auto Differential [[id number]]  (Abnormal) Collected: 01/10/24 1551    Specimen: Blood Updated: 01/10/24 1604     WBC 6.59 10*3/mm3      RBC 4.25 10*6/mm3      Hemoglobin 12.2 g/dL      Hematocrit 37.3 %      MCV 87.8 fL      MCH 28.7 pg      MCHC 32.7 g/dL      RDW 13.3 %      RDW-SD 42.5 fl      MPV 9.8 fL      Platelets 205 10*3/mm3      Neutrophil % 57.0 %      Lymphocyte % 29.3 %      Monocyte % 6.7 %       Eosinophil % 6.2 %      Basophil % 0.5 %      Immature Grans % 0.3 %      Neutrophils, Absolute 3.76 10*3/mm3      Lymphocytes, Absolute 1.93 10*3/mm3      Monocytes, Absolute 0.44 10*3/mm3      Eosinophils, Absolute 0.41 10*3/mm3      Basophils, Absolute 0.03 10*3/mm3      Immature Grans, Absolute 0.02 10*3/mm3      nRBC 0.0 /100 WBC           Imaging Results (Most Recent)       Procedure Component Value Units Date/Time    XR Chest 1 View [[id number]] Collected: 01/10/24 1724     Updated: 01/10/24 1728    Narrative:      PROCEDURE: XR CHEST 1 VW     COMPARISON: [facility], , XR CHEST 1 VW, 12/21/2023, 12:00.     INDICATIONS: Shortness of air, dizziness     FINDINGS:   LUNGS: Normal.  No significant pulmonary parenchymal abnormalities.    VASCULATURE: Normal.  Unremarkable pulmonary vasculature.    CARDIAC: Cardiomegaly.    MEDIASTINUM: Normal.  No visible mass or adenopathy.    PLEURA: Normal.  No effusion or pleural thickening.    BONES: Normal.  No fracture or visible bony lesion.    OTHER: Negative.         Impression:       No acute cardiopulmonary abnormality.               ORLANDO CYR MD         Electronically Signed and Approved By: ORLANDO CYR MD on 1/10/2024 at 17:24                                 Results for orders placed in visit on 03/02/23    Adult Transthoracic Echo Complete W/ Cont if Necessary Per Protocol    Interpretation Summary    Left ventricular systolic function is normal. Left ventricular ejection fraction appears to be 61 - 65%.    There is diastolic dysfunction of the left ventricle.    There is mild mitral regurgitation    There is mild tricuspid regurgitation.      Imaging Results (All)       Procedure Component Value Units Date/Time    XR Chest 1 View [[id number]] Collected: 01/10/24 1724     Updated: 01/10/24 1728    Narrative:      PROCEDURE: XR CHEST 1 VW     COMPARISON: [facility], , XR CHEST 1 VW, 12/21/2023, 12:00.     INDICATIONS: Shortness of air,  dizziness     FINDINGS:   LUNGS: Normal.  No significant pulmonary parenchymal abnormalities.    VASCULATURE: Normal.  Unremarkable pulmonary vasculature.    CARDIAC: Cardiomegaly.    MEDIASTINUM: Normal.  No visible mass or adenopathy.    PLEURA: Normal.  No effusion or pleural thickening.    BONES: Normal.  No fracture or visible bony lesion.    OTHER: Negative.         Impression:       No acute cardiopulmonary abnormality.               ORLANDO CYR MD         Electronically Signed and Approved By: ORLANDO CYR MD on 1/10/2024 at 17:24                                   Discharge Disposition:  Home or Self Care    Discharge Medications:     Discharge Medications        Changes to Medications        Instructions Start Date   Eliquis 5 MG tablet tablet  Generic drug: apixaban  What changed:   how much to take  when to take this   Take 1 tablet by mouth twice daily      gabapentin 300 MG capsule  Commonly known as: NEURONTIN  What changed: when to take this   TAKE 1 CAPSULE BY MOUTH ONCE DAILY AT NIGHT      losartan 25 MG tablet  Commonly known as: COZAAR  What changed: when to take this   Take 1 tablet by mouth once daily             Continue These Medications        Instructions Start Date   cetirizine 10 MG tablet  Commonly known as: zyrTEC   20 mg, Oral, Daily      Cholecalciferol 125 MCG (5000 UT) tablet   1 tablet, Oral, Every Evening      famotidine 40 MG tablet  Commonly known as: PEPCID   Take 1 tablet by mouth once daily      hydroCHLOROthiazide 25 MG tablet  Commonly known as: HYDRODIURIL   25 mg, Oral, Daily      levothyroxine 75 MCG tablet  Commonly known as: SYNTHROID, LEVOTHROID   Take 1 tablet by mouth once daily      metoprolol succinate XL 50 MG 24 hr tablet  Commonly known as: TOPROL-XL   Take 1 tablet by mouth once daily      montelukast 10 MG tablet  Commonly known as: SINGULAIR   TAKE 1 TABLET BY MOUTH ONCE DAILY AT NIGHT                 Discharge Diet:   Diet Instructions       Diet: Cardiac  Diets; Healthy Heart (2-3 Na+); Regular Texture (IDDSI 7); Thin (IDDSI 0)      Discharge Diet: Cardiac Diets    Cardiac Diet: Healthy Heart (2-3 Na+)    Texture: Regular Texture (IDDSI 7)    Fluid Consistency: Thin (IDDSI 0)            Activity at Discharge:   Activity Instructions       Activity as Tolerated      Driving Restrictions      Type of Restriction: Driving    Driving Restrictions: No Driving Until Next Appointment            Follow-up Appointments  Future Appointments   Date Time Provider Department Center   1/25/2024 10:30 AM Milan Coppola Jr., [place] [facility] [place] [facility]   1/30/2024  2:30 PM Chip Mendoza [place] [facility] [place] [facility]   2/9/2024 10:30 AM [facility] CARD ADMIN RM 1 [facility]   6/3/2024  2:00 PM Charmaine Correia PA-C [facility] [place] [facility]   6/25/2024  3:15 PM Chip Mendoza [place] [facility] [place] [facility]       Additional Instructions for the Follow-ups that You Need to Schedule       Discharge Follow-up with PCP   As directed       Currently Documented PCP:    Milan Coppola Jr., MD    PCP Phone Number:    [phone]     Follow Up Details: in 1 week or sooner if needed        Discharge Follow-up with Specified Provider: Dr. Mendoza; 3 Weeks   As directed      To: Dr. Mendoza   Follow Up: 3 Weeks                Test Results Pending at Discharge:      Condition on Discharge:    stable     Risk for Readmission (LACE): Score: 2 (1/11/2024  6:00 AM)          Patricio Jackson DO  01/11/24  17:36 EST    Time: Discharge 28 min with face-to-face history/exam, writing all prescriptions, and documenting discharge data including care coordination            Note disclaimer: At [facility], we believe that sharing information builds trust and better relationships. You are receiving this note because you recently visited [facility]. It is possible you will see health information before a provider has talked with you about it. This kind of information can be easy to misunderstand. To help you fully  understand what it means for your health, we urge you to discuss this note with your provider.

## 2024-01-11 NOTE — PLAN OF CARE
Goal Outcome Evaluation:   Patient discharging home. Family at bedside. No complaints at this time.

## 2024-01-11 NOTE — CONSULTS
[facility]   Cardiology Consult Note    Patient Name: Kimberly Dennis  : 1950  MRN: [id number]  Primary Care Physician:  Milan Coppola Jr., MD  Referring Physician: Brent Moreno MD    Date of admission: 1/10/2024    Subjective   Subjective     Reason for Consultation : Chest pain    Chief Complaint : Chest discomfort    HPI:  Kimberly Dennis is a 73 y.o. female with paroxysmal atrial fibrillation and hypertension who presented emergency room because of intermittent mild chest pain of 1 day duration.  Yesterday morning, when she woke up she felt dizzy.  Throughout the day she had episodes of minor twinges all across the chest, sometimes on left side but otherwise on the right side.  There was no associated shortness of breath or palpitations.  No diaphoresis.  She called her primary care physician's office, who directed patient to emergency room.    In the ER, she was noted hemodynamically stable.  The episodes of chest pain eventually subsided.  She was later admitted due to multiple risk factors and new onset chest pain.  Overnight, she remained chest pain-free.  Multiple cardiac markers have been negative since admission.    She had a visit to the emergency room in late December because of palpitations.  She was noted to be in atrial fibrillation with RVR.  She underwent electrical cardioversion in the ER.  She had no further episodes of palpitations.    Review of Systems   All systems were reviewed and negative except for: Remittent chest pain and cough.  Negative for shortness of breath palpitations or dizziness.  Currently she reports headache.    Personal History     Past Medical History:   Diagnosis Date    Acid reflux     Acute deep vein thrombosis (DVT) of calf muscle vein of left lower extremity 2022    Anemia     Anesthesia complication     Arthritis     Arthritis of back     Atrial fibrillation     PAROXYSMAL    Bladder disorder     Colon polyp     GI bleed     History  of total knee arthroplasty, right 09/08/2016    HTN (hypertension)     Limb swelling     Low back strain     Medial meniscus tear 09/12/2017    LEFT    Mitral valve prolapse     Primary osteoarthritis of left knee 09/12/2017    Seasonal allergies     Sleep apnea     SOB (shortness of breath)     Tear of medial cartilage or meniscus of knee, current 09/12/2017    Thoracic outlet syndrome     Thyroid disorder           Family History: family history includes Anesthesia problems in her mother; Arthritis in her mother and sister; Diabetes in her brother; Heart disease in her brother and mother. Otherwise pertinent FHx was reviewed and not pertinent to current issue.    Social History:  reports that she has never smoked. She has never been exposed to tobacco smoke. She has never used smokeless tobacco. She reports that she does not drink alcohol and does not use drugs.    Home Medications:  Cholecalciferol, apixaban, cetirizine, famotidine, gabapentin, hydroCHLOROthiazide, levothyroxine, losartan, metoprolol succinate XL, and montelukast    Allergies:  Allergies   Allergen Reactions    Amoxicillin-Pot Clavulanate Itching    Hydrocodone-Acetaminophen Itching    Hydrocodone Itching and Rash    Iodine Rash    Latex Rash    Metal Rash     YELLOW GOLD CAUSES RASH     Shellfish Allergy Rash       Objective    Objective     Vitals:   Temp:  [98 °F (36.7 °C)-98.5 °F (36.9 °C)] 98.1 °F (36.7 °C)  Heart Rate:  [51-88] 85  Resp:  [17-19] 18  BP: (137-166)/(51-70) 154/59      Physical Exam:   Constitutional: Awake, alert, No acute distress    Eyes: PERRLA, sclerae anicteric, no conjunctival injection   HENT: NCAT, mucous membranes moist   Neck: Supple, no thyromegaly, no lymphadenopathy, trachea midline   Respiratory: Clear to auscultation bilaterally, nonlabored respirations    Cardiovascular: RRR, no murmurs, rubs, or gallops, palpable pedal pulses bilaterally   Gastrointestinal: Positive bowel sounds, soft, nontender,  nondistended   Musculoskeletal: No bilateral ankle edema, no clubbing or cyanosis to extremities   Psychiatric: Appropriate affect, cooperative   Neurologic: Oriented x 3, strength symmetric in all extremities, speech clear   Skin: No rashes     Result Review    Result Review:  I have personally reviewed the results from the time of this admission to 1/11/2024 08:32 EST and agree with these findings:  [x]  Laboratory  []  Microbiology  [x]  Radiology  [x]  EKG/Telemetry   [x]  Cardiology/Vascular   []  Pathology  [x]  Old records  []  Other:  Most notable findings include:     CMP          12/21/2023    12:03 1/10/2024    15:51 1/11/2024    04:15   CMP   Glucose 100  102  102    BUN 11  12  13    Creatinine 0.73  0.78  0.57    EGFR 88.0  81.3  97.3    Sodium 141  138  137    Potassium 3.5  4.1  3.8    Chloride 102  99  101    Calcium 9.4  9.6  9.3    Total Protein 7.6  7.6  7.1    Albumin 4.0  4.2  3.6    Globulin 3.6  3.4  3.5    Total Bilirubin 0.3  0.2  0.4    Alkaline Phosphatase 79  87  78    AST (SGOT) 21  18  20    ALT (SGPT) 15  16  13    Albumin/Globulin Ratio 1.1  1.2  1.0    BUN/Creatinine Ratio 15.1  15.4  22.8    Anion Gap 13.2  10.6  13.6       CBC          12/21/2023    12:03 1/10/2024    15:51 1/11/2024    04:15   CBC   WBC 7.31  6.59  7.14    RBC 4.43  4.25  4.12    Hemoglobin 12.8  12.2  11.9    Hematocrit 38.7  37.3  36.1    MCV 87.4  87.8  87.6    MCH 28.9  28.7  28.9    MCHC 33.1  32.7  33.0    RDW 13.6  13.3  13.2    Platelets 216  205  178       Lab Results   Component Value Date    TROPONINT 10 01/11/2024       EKG in the emergency room showed sinus rhythm, normal axis, no significant ST segment changes, normal EKG    Assessment & Plan   Assessment / Plan     Brief Patient Summary:  Kimberly Dennis is a 73 y.o. female with paroxysmal atrial fibrillation hypertension, who presented with episodes of chest pain    Active Hospital Problems:  Active Hospital Problems    Diagnosis     **Chest pain       Chest pain : Symptoms are somewhat atypical for angina.  Currently subsided.  Multiple cardiac markers negative since admission    Paroxysmal atrial fibrillation : Recent cardioversion for RVR, currently in sinus rhythm, on anticoagulation    Mitral valve prolapse : Mild to moderate mitral regurgitation per echocardiogram done last year    Essential hypertension    Plan:     We will proceed with a SPECT stress test today to rule out reversible ischemia as etiology of chest pain    Continue home antihypertensive medications including losartan and hydrochlorothiazide.  Holding metoprolol this morning for stress testing    Further management plans based on clinical course and test results    Electronically signed by Chip Mendoza MD, 01/11/24, 8:32 AM EST.

## 2024-01-12 ENCOUNTER — TRANSITIONAL CARE MANAGEMENT TELEPHONE ENCOUNTER (OUTPATIENT)
Dept: CALL CENTER | Facility: HOSPITAL | Age: 74
End: 2024-01-12
Payer: MEDICARE

## 2024-01-12 NOTE — OUTREACH NOTE
Prep Survey      Flowsheet Row Responses   [facility] patient discharged from? [facility]   Is LACE score < 7 ? Yes   Eligibility [facility]   Date of Admission 01/10/24   Date of Discharge 01/11/24   Discharge Disposition Home or Self Care   Discharge diagnosis Atypical Chest pain, stress test negative   Does the patient have one of the following disease processes/diagnoses(primary or secondary)? Other   Does the patient have Home health ordered? No   Is there a DME ordered? No   Prep survey completed? Yes            Sabina YOUNG - Registered Nurse

## 2024-01-12 NOTE — OUTREACH NOTE
Call Center TCM Note      Flowsheet Row Responses   [facility] patient discharged from? [facility]   Does the patient have one of the following disease processes/diagnoses(primary or secondary)? Other   TCM attempt successful? Yes  [verbal release ]   Call start time 821   Call end time 840   Discharge diagnosis Atypical Chest pain, stress test negative   Medication alerts for this patient ELIQUIS   Meds reviewed with patient/caregiver? Yes   Is the patient having any side effects they believe may be caused by any medication additions or changes? No   Does the patient have all medications ordered at discharge? N/A   Is the patient taking all medications as directed (includes completed medication regime)? Yes   Medication comments pt has questions about metoprolol--reports nursing staff wrote to hold on AVS if pulse is less than 60 but on actual AVS document in states no changes to medications--she would like this verified with her cardiologist.  Reports her pulse since discharge 57-64, noted 77 at discharge.  She will check again this am prior to dose time.  This Rn routed a note to NN with cardiology for f/u with pt to verifiy med orders. Pt will continue to log her BP and pulse   Comments Hospital f/u on 24@115pm   Does the patient have an appointment with their PCP within 7-14 days of discharge? Yes   Has home health visited the patient within 72 hours of discharge? N/A   Psychosocial issues? No   Did the patient receive a copy of their discharge instructions? Yes   Nursing interventions Reviewed instructions with patient   What is the patient's perception of their health status since discharge? Improving  [Pt is feeling better--denies any feelings of syncope or chest pain, aware to seek care for cardiac s/s. Pt reviewed events during stress test when she had syncopal episode she felt was from lack of po intake.]   Is the patient/caregiver able to teach back signs and symptoms related to disease  process for when to call PCP? Yes   Is the patient/caregiver able to teach back signs and symptoms related to disease process for when to call 911? Yes   Additional teach back comments Pt will bring pulse and BP readings to f/u appt   TCM call completed? Yes   Call end time 0840            Vonnie Moy RN    1/12/2024, 09:06 EST

## 2024-01-15 ENCOUNTER — TELEPHONE (OUTPATIENT)
Dept: CARDIOLOGY | Facility: CLINIC | Age: 74
End: 2024-01-15
Payer: MEDICARE

## 2024-01-15 PROBLEM — R06.09 DYSPNEA ON EXERTION: Status: ACTIVE | Noted: 2024-01-15

## 2024-01-15 NOTE — OUTREACH NOTE
Instead of stopping alltogether, may be better to take 1/2 tab when HR <60. Would keep a close eye on it at home with Blood Pressure cuff or pulse oximeter if able.

## 2024-01-15 NOTE — TELEPHONE ENCOUNTER
SW Patient states she had an episode of dizziness, felt as if she was going to pass out-   noted her heart rate was in low 50's.  Patient took her regular dose of medication- metoprolol 50 mg daily yesterday morning.     Patient was instructed by RN (HOSPITAL) when discharged from hospital to hold her metoprolol if her heart rate was lower than 60.     Please advise.     Patient instructed to hold the metoprolol until she hears back from our office in the am.

## 2024-01-15 NOTE — TELEPHONE ENCOUNTER
Per CM: I just spoke with this patient and she has some questions about metoprolol that she would like to verify with Dr. GO. She reports that a nurse wrote to hold if pulse less than 60 but her AVS actual discharge does not indicate this intention. Reports that that since discharge her pulse has been 57-64 and she did not receive metoprolol yesterday. She is going to recheck and is due her dose about 945--can you f/u and provide her with instructions?

## 2024-01-16 NOTE — PROGRESS NOTES
Chief Complaint  Atrial Fibrillation and Follow-up    Subjective        History of Present Illness  Kimberly Dennis presents to [facility]   Ms. Dennis Is a 73-year-old female patient coming in for follow-up from recent emergency room visit.  She does not have a history of atrial fibrillation, presented to the ER when she was having worsening palpitations.  ER physicians attempted unsuccessful cardioversion x 1, and then Dr. Goodwin performed cardioversion with successful return to sinus rhythm.  She had minor elevation of troponin, most likely related to atrial fibrillation with RVR.    She has not had any complaints of chest pain/pressure, or palpitations, but does have exertional shortness of breath with moderate activity.      Past History:     (1) Paroxysmal atrial fibrillation, s/o cardioversion in ER in 2019, and repeat cardioversion 12/23    ( 2) Hypertension. (3) Hypothyroidism.  4) RUBY        Past Medical History:   Diagnosis Date    Acid reflux     Acute deep vein thrombosis (DVT) of calf muscle vein of left lower extremity 06/09/2022    Anemia     Anesthesia complication     Arthritis     Arthritis of back     Atrial fibrillation     Bladder disorder     Colon polyp     GI bleed     History of total knee arthroplasty, right 09/08/2016    HTN (hypertension)     Limb swelling     Low back strain     Medial meniscus tear 09/12/2017    Mitral valve prolapse     Primary osteoarthritis of left knee 09/12/2017    Seasonal allergies     Sleep apnea     SOB (shortness of breath)     Tear of medial cartilage or meniscus of knee, current 09/12/2017    Thoracic outlet syndrome     Thyroid disorder        Allergies   Allergen Reactions    Amoxicillin-Pot Clavulanate Itching    Hydrocodone-Acetaminophen Itching    Hydrocodone Itching and Rash    Iodine Rash    Latex Rash    Metal Rash     YELLOW GOLD CAUSES RASH     Shellfish Allergy Rash        Past Surgical History:   Procedure  Laterality Date    BLADDER REPAIR      CHOLECYSTECTOMY      COLONOSCOPY  2014    [place]    COLONOSCOPY      DR. EDGE    ELECTRICAL CARDIOVERSION  12/21/2023    HYSTERECTOMY      INTRAOCULAR LENS INSERTION      BOTH    JOINT REPLACEMENT Right     TKR    KNEE ARTHROSCOPY Left     TOTAL KNEE ARTHROPLASTY Left 06/03/2022    Procedure: LEFT TOTAL KNEE ARTHROPLASTY WITH COMPUTER NAVIGATION;  Surgeon: Abdoulaye Paz MD;  Location: [facility] MAIN OR;  Service: Orthopedics;  Laterality: Left;        Social History  She  reports that she has never smoked. She has never been exposed to tobacco smoke. She has never used smokeless tobacco. She reports that she does not drink alcohol and does not use drugs.    Family History  Her family history includes Anesthesia problems in her mother; Arthritis in her mother and sister; Diabetes in her brother; Heart disease in her brother and mother.       Current Outpatient Medications on File Prior to Visit   Medication Sig    cetirizine (zyrTEC) 10 MG tablet Take 2 tablets by mouth Daily.    Cholecalciferol 125 MCG (5000 UT) tablet Take 1 tablet by mouth Every Evening.    Eliquis 5 MG tablet tablet Take 1 tablet by mouth twice daily (Patient taking differently: Take 1 tablet by mouth Every 12 (Twelve) Hours.)    famotidine (PEPCID) 40 MG tablet Take 1 tablet by mouth once daily (Patient taking differently: Take 1 tablet by mouth Daily.)    gabapentin (NEURONTIN) 300 MG capsule TAKE 1 CAPSULE BY MOUTH ONCE DAILY AT NIGHT (Patient taking differently: Take 1 capsule by mouth Every Evening.)    hydroCHLOROthiazide (HYDRODIURIL) 25 MG tablet Take 1 tablet by mouth Daily.    levothyroxine (SYNTHROID, LEVOTHROID) 75 MCG tablet Take 1 tablet by mouth once daily (Patient taking differently: Take 1 tablet by mouth Daily.)    losartan (COZAAR) 25 MG tablet Take 1 tablet by mouth once daily (Patient taking differently: Take 1 tablet by mouth Every Evening.)    metoprolol succinate XL (TOPROL-XL) 50  "MG 24 hr tablet Take 1 tablet by mouth once daily (Patient taking differently: Take 1 tablet by mouth Daily.)    montelukast (SINGULAIR) 10 MG tablet TAKE 1 TABLET BY MOUTH ONCE DAILY AT NIGHT (Patient taking differently: Take 1 tablet by mouth Every Night.)     No current facility-administered medications on file prior to visit.         Review of Systems   Respiratory:  Positive for shortness of breath. Negative for cough and chest tightness.    Cardiovascular:  Negative for chest pain, palpitations and leg swelling.   Gastrointestinal:  Negative for nausea and vomiting.   Neurological:  Negative for dizziness and syncope.        Objective   Vitals:    01/03/24 1041   BP: 159/61   Pulse: 66   Weight: 117 kg (257 lb 9.6 oz)   Height: 165.1 cm (65\")         Physical Exam  General : Alert, awake, no acute distress  Neck : Supple, no carotid bruit, no jugular venous distention  CVS : Regular rate and rhythm, no murmur, rubs or gallops  Lungs: Clear to auscultation bilaterally, no crackles or rhonchi  Abdomen: Soft, nontender, bowel sounds active  Extremities: Warm, well-perfused, no pedal edema      Result Review     The following data was reviewed by Sandra Bradshaw, APRN  No results found for: \"PROBNP\"  CMP          12/21/2023    12:03   CMP   Glucose 100    BUN 11    Creatinine 0.73    EGFR 88.0    Sodium 141    Potassium 3.5    Chloride 102    Calcium 9.4    Total Protein 7.6    Albumin 4.0    Globulin 3.6    Total Bilirubin 0.3    Alkaline Phosphatase 79    AST (SGOT) 21    ALT (SGPT) 15    Albumin/Globulin Ratio 1.1    BUN/Creatinine Ratio 15.1    Anion Gap 13.2      CBC w/diff           Lipid Panel          2/3/2023    10:31 7/13/2023    14:44   Lipid Panel   Total Cholesterol 198  186    Triglycerides 185  195    HDL Cholesterol 46  44    VLDL Cholesterol 33  34    LDL Cholesterol  119  108    LDL/HDL Ratio 2.50  2.34        Results for orders placed in visit on 03/02/23    Adult Transthoracic Echo Complete W/ " Cont if Necessary Per Protocol    Interpretation Summary    Left ventricular systolic function is normal. Left ventricular ejection fraction appears to be 61 - 65%.    There is diastolic dysfunction of the left ventricle.    There is mild mitral regurgitation    There is mild tricuspid regurgitation.         Assessment and Plan   Diagnoses and all orders for this visit:    1. Paroxysmal atrial fibrillation (Primary)  Assessment & Plan:  No further episodes of palpitations since recent cardioversion.  Continue metoprolol for rate and rhythm control, Eliquis for anticoagulation.  If she continues to have recurrent, then would recommend evaluation by cardiac EP.    Orders:  -     Stress Test With Myocardial Perfusion One Day; Future    2. Essential hypertension  Assessment & Plan:  Blood pressure is elevated in the office, but generally better controlled.  Will continue her current regiment, recommend continue to monitor blood pressures.      3. Valvular heart disease  Assessment & Plan:  Echocardiogram showed mild valvular regurgitation.  Will plan to repeat echocardiogram in the next couple of years to reassess the severity.      4. Dyspnea on exertion  Assessment & Plan:  She has mild symptoms of shortness of breath on exertion, which could be an anginal equivalent.  Her most recent ER evaluation showed only minor elevation of troponin, which was more likely related to the atrial fibrillation at the time.  However considering her symptoms, and the need for possible antiarrhythmics in the future we will go ahead and proceed with SPECT stress test.    Orders:  -     Stress Test With Myocardial Perfusion One Day; Future           Follow Up   Return for As already scheduled, with Dr. Mendoza.    Patient was given instructions and counseling regarding her condition or for health maintenance advice. Please see specific information pulled into the AVS if appropriate.     Signed,  Sandra Bradshaw, APRN  01/03/2024      Dictated Utilizing Dragon Dictation: Please note that portions of this note were completed with a voice recognition program.  Part of this note may be an electronic transcription/translation of spoken language to printed text using the Dragon Dictation System.

## 2024-01-16 NOTE — TELEPHONE ENCOUNTER
She has had 2 recent episodes of atrial fibrillation, would recommend to avoid holding, however she could decrease the dose to metoprolol 25 mg daily.  But also recommend checking her blood pressure, because of her blood pressure is low this may be causing her symptoms and not heart rate in the 50s (many people are asymptomatic with heart rates in the 50s).  If her blood pressure is low I would rather continue the metoprolol 50 mg, and we could hold losartan.

## 2024-01-16 NOTE — ASSESSMENT & PLAN NOTE
She has mild symptoms of shortness of breath on exertion, which could be an anginal equivalent.  Her most recent ER evaluation showed only minor elevation of troponin, which was more likely related to the atrial fibrillation at the time.  However considering her symptoms, and the need for possible antiarrhythmics in the future we will go ahead and proceed with SPECT stress test.

## 2024-01-16 NOTE — ASSESSMENT & PLAN NOTE
Echocardiogram showed mild valvular regurgitation.  Will plan to repeat echocardiogram in the next couple of years to reassess the severity.

## 2024-01-16 NOTE — ASSESSMENT & PLAN NOTE
Blood pressure is elevated in the office, but generally better controlled.  Will continue her current regiment, recommend continue to monitor blood pressures.

## 2024-01-16 NOTE — TELEPHONE ENCOUNTER
KATE patient. Went over medication adjustments and recommendations. Patient states she has been keeping bp log since 01/04/24- her BP has been running from 140's/70's- 160/70's- her heart rate was the only change noted. Patient is agreealbe to cut her metoprolol in half. Patient encouraged to continue keeping bp/hr log and bring with her to f/u on 1/30/24 with Dr Mendoza. Patient verbalized understanding and appreciation.

## 2024-01-20 DIAGNOSIS — Z47.1 AFTERCARE FOLLOWING LEFT KNEE JOINT REPLACEMENT SURGERY: ICD-10-CM

## 2024-01-20 DIAGNOSIS — Z96.652 AFTERCARE FOLLOWING LEFT KNEE JOINT REPLACEMENT SURGERY: ICD-10-CM

## 2024-01-22 ENCOUNTER — OFFICE VISIT (OUTPATIENT)
Dept: INTERNAL MEDICINE | Facility: CLINIC | Age: 74
End: 2024-01-22
Payer: MEDICARE

## 2024-01-22 VITALS
HEIGHT: 66 IN | SYSTOLIC BLOOD PRESSURE: 166 MMHG | WEIGHT: 259.6 LBS | OXYGEN SATURATION: 96 % | DIASTOLIC BLOOD PRESSURE: 60 MMHG | HEART RATE: 68 BPM | TEMPERATURE: 98.2 F | BODY MASS INDEX: 41.72 KG/M2

## 2024-01-22 DIAGNOSIS — F41.9 ANXIETY: ICD-10-CM

## 2024-01-22 DIAGNOSIS — I10 ESSENTIAL HYPERTENSION: Primary | ICD-10-CM

## 2024-01-22 DIAGNOSIS — J98.8 CONGESTION OF UPPER AIRWAY: ICD-10-CM

## 2024-01-22 DIAGNOSIS — I48.0 PAROXYSMAL ATRIAL FIBRILLATION: ICD-10-CM

## 2024-01-22 PROBLEM — E78.2 MIXED HYPERLIPIDEMIA: Status: ACTIVE | Noted: 2022-09-26

## 2024-01-22 LAB
EXPIRATION DATE: 0
FLUAV AG UPPER RESP QL IA.RAPID: NOT DETECTED
FLUBV AG UPPER RESP QL IA.RAPID: NOT DETECTED
INTERNAL CONTROL: NORMAL
Lab: 0
SARS-COV-2 AG UPPER RESP QL IA.RAPID: NOT DETECTED

## 2024-01-22 PROCEDURE — 99495 TRANSJ CARE MGMT MOD F2F 14D: CPT | Performed by: INTERNAL MEDICINE

## 2024-01-22 PROCEDURE — 87428 SARSCOV & INF VIR A&B AG IA: CPT | Performed by: INTERNAL MEDICINE

## 2024-01-22 PROCEDURE — 3077F SYST BP >= 140 MM HG: CPT | Performed by: INTERNAL MEDICINE

## 2024-01-22 PROCEDURE — 1111F DSCHRG MED/CURRENT MED MERGE: CPT | Performed by: INTERNAL MEDICINE

## 2024-01-22 PROCEDURE — 3078F DIAST BP <80 MM HG: CPT | Performed by: INTERNAL MEDICINE

## 2024-01-22 RX ORDER — BUSPIRONE HYDROCHLORIDE 10 MG/1
10 TABLET ORAL 3 TIMES DAILY PRN
Qty: 60 TABLET | Refills: 0 | Status: SHIPPED | OUTPATIENT
Start: 2024-01-22

## 2024-01-22 RX ORDER — LOSARTAN POTASSIUM 50 MG/1
50 TABLET ORAL DAILY
Qty: 90 TABLET | Refills: 3 | Status: SHIPPED | OUTPATIENT
Start: 2024-01-22

## 2024-01-22 RX ORDER — GABAPENTIN 300 MG/1
CAPSULE ORAL
Qty: 30 CAPSULE | Refills: 0 | Status: SHIPPED | OUTPATIENT
Start: 2024-01-29

## 2024-01-22 RX ORDER — METOPROLOL SUCCINATE 50 MG/1
25 TABLET, EXTENDED RELEASE ORAL DAILY
Qty: 45 TABLET | Refills: 3 | Status: SHIPPED | OUTPATIENT
Start: 2024-01-22

## 2024-01-22 NOTE — TELEPHONE ENCOUNTER
Refill request for controlled substance.      Date of request: 1/22/2024    Medication requested: Gabapentin  Last OV: 7/13/23  Last UDS: 7/13/23  Contract signed: yes    Date:7/18/23  Next office visit: 1/22/24    Sena Moy

## 2024-01-22 NOTE — PROGRESS NOTES
Transitional Care Follow Up Visit  Subjective     Kimberly Dennis is a 73 y.o. female who presents for a transitional care management visit.    Within 48 business hours after discharge our office contacted her via telephone to coordinate her care and needs.      I reviewed and discussed the details of that call along with the discharge summary, hospital problems, inpatient lab results, inpatient diagnostic studies, and consultation reports with Kimberly.     Current outpatient and discharge medications have been reconciled for the patient.  Reviewed by: Milan Coppola Jr., MD          1/11/2024     7:09 PM   Date of TCM Phone Call   [facility]   Date of Admission 1/10/2024   Date of Discharge 1/11/2024   Discharge Disposition Home or Self Care     Risk for Readmission (LACE) Score: 2 (1/11/2024  6:00 AM)      History of Present Illness     Course During Hospital Stay:    Patient admitted twice since last appointment. Patient initially had afib with RVR and now status post cardioversion. Patient then had subsequent chest pain events and went to ER. Patient then admitted for chest pain rule out. Patient had stress test done that was reassuring despite having syncopal episode during test. She thinks her syncopal episode was related to dehydration and hypoglycemia. Patient has continued on home med regimen. Patient recently cut back on her toprol to 0.5 tablet due to low HR. Patient has noticed increase Blood Pressure since lowering toprol. Patient report shaving lightheadedness, but improving this week. Patient thinks several events have increase her anxiety recently as well. 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 Patient also heavily involved at [employer].      The following portions of the patient's history were reviewed and updated as appropriate: allergies, current medications, past family history, past medical history, past social history, past surgical  history, and problem list.      Objective   Vitals:    01/22/24 1302   BP: 166/60   Pulse: 68   Temp: 98.2 °F (36.8 °C)   SpO2: 96%       Appearance: No acute distress, well-nourished  Head: normocephalic, atraumatic  Eyes: extraocular movements intact, no scleral icterus, no conjunctival injection  Ears, Nose, and Throat: external ears normal, nares patent, moist mucous membranes  Cardiovascular: regular rate and rhythm. no murmurs, rales, or rhonchi. no edema  Respiratory: breathing comfortably, symmetric chest rise, clear to auscultation bilaterally. No wheezes, rales, or rhonchi.  Neuro: alert and oriented to time, place, and person. Normal gait  Psych: normal mood and affect       Result Review :   The following data was reviewed by: Milan Coppola Jr, MD on 01/22/2024:  Common labs          12/21/2023    12:03 1/10/2024    15:51 1/11/2024    04:15   Common Labs   Glucose 100  102  102    BUN 11  12  13    Creatinine 0.73  0.78  0.57    Sodium 141  138  137    Potassium 3.5  4.1  3.8    Chloride 102  99  101    Calcium 9.4  9.6  9.3    Albumin 4.0  4.2  3.6    Total Bilirubin 0.3  0.2  0.4    Alkaline Phosphatase 79  87  78    AST (SGOT) 21  18  20    ALT (SGPT) 15  16  13    WBC 7.31  6.59  7.14    Hemoglobin 12.8  12.2  11.9    Hematocrit 38.7  37.3  36.1    Platelets 216  205  178        Lab Results   Component Value Date    SARSANTIGEN Not Detected 01/22/2024    COVID19 Detected (C) 03/29/2023    RAPFLUA Negative 05/24/2022    RAPFLUB Negative 05/24/2022    FLUAAG Not Detected 01/22/2024    FLUBAG Not Detected 01/22/2024    RAPSCRN Negative 06/25/2023    INR 1.23 (H) 05/23/2022       Assessment & Plan     Diagnoses and all orders for this visit:    1. Essential hypertension (Primary)  Comments:  will inc losartan to accmodate the lower dose toprol. monitor BPs. f/u in 3-4 weeks.  Orders:  -     metoprolol succinate XL (TOPROL-XL) 50 MG 24 hr tablet; Take 0.5 tablets by mouth Daily.  Dispense: 45  tablet; Refill: 3  -     losartan (COZAAR) 50 MG tablet; Take 1 tablet by mouth Daily.  Dispense: 90 tablet; Refill: 3    2. Paroxysmal atrial fibrillation  Comments:  rate controlled. recently lowered dose of toprol given low HRs.  Orders:  -     metoprolol succinate XL (TOPROL-XL) 50 MG 24 hr tablet; Take 0.5 tablets by mouth Daily.  Dispense: 45 tablet; Refill: 3    3. Anxiety  Comments:  will start buspar prn.  Orders:  -     busPIRone (BUSPAR) 10 MG tablet; Take 1 tablet by mouth 3 (Three) Times a Day As Needed (anxiety).  Dispense: 60 tablet; Refill: 0    4. Congestion of upper airway  Comments:  rapid test neg.  Orders:  -     POCT SARS-CoV-2 Antigen NARCISO + Flu        Medications Discontinued During This Encounter   Medication Reason    losartan (COZAAR) 25 MG tablet Reorder    metoprolol succinate XL (TOPROL-XL) 50 MG 24 hr tablet Reorder       Return for Next scheduled follow up.           Milan Coppola Jr, MD  01/22/24  15:38 EST

## 2024-01-23 NOTE — TELEPHONE ENCOUNTER
This message is from 4 weeks ago. Pt is scheduled with Dr. Mendoza on 01/30 for hosp follow up. Pt is est with Dr. Mendoza. Will close encounter.

## 2024-01-30 ENCOUNTER — OFFICE VISIT (OUTPATIENT)
Dept: CARDIOLOGY | Facility: CLINIC | Age: 74
End: 2024-01-30
Payer: MEDICARE

## 2024-01-30 VITALS
DIASTOLIC BLOOD PRESSURE: 63 MMHG | HEIGHT: 66 IN | SYSTOLIC BLOOD PRESSURE: 140 MMHG | WEIGHT: 257 LBS | BODY MASS INDEX: 41.3 KG/M2 | HEART RATE: 75 BPM

## 2024-01-30 DIAGNOSIS — I38 VALVULAR HEART DISEASE: ICD-10-CM

## 2024-01-30 DIAGNOSIS — I10 ESSENTIAL HYPERTENSION: ICD-10-CM

## 2024-01-30 DIAGNOSIS — I48.0 PAROXYSMAL ATRIAL FIBRILLATION: Primary | ICD-10-CM

## 2024-01-30 PROCEDURE — 1159F MED LIST DOCD IN RCRD: CPT | Performed by: INTERNAL MEDICINE

## 2024-01-30 PROCEDURE — 3077F SYST BP >= 140 MM HG: CPT | Performed by: INTERNAL MEDICINE

## 2024-01-30 PROCEDURE — 1160F RVW MEDS BY RX/DR IN RCRD: CPT | Performed by: INTERNAL MEDICINE

## 2024-01-30 PROCEDURE — 3078F DIAST BP <80 MM HG: CPT | Performed by: INTERNAL MEDICINE

## 2024-01-30 PROCEDURE — 99214 OFFICE O/P EST MOD 30 MIN: CPT | Performed by: INTERNAL MEDICINE

## 2024-01-30 RX ORDER — METOPROLOL SUCCINATE 50 MG/1
50 TABLET, EXTENDED RELEASE ORAL DAILY
Qty: 90 TABLET | Refills: 1 | Status: SHIPPED | OUTPATIENT
Start: 2024-01-30

## 2024-01-30 NOTE — ASSESSMENT & PLAN NOTE
Mild mitral regurgitation per echocardiogram done last year.  LV systolic function is preserved.  Will continue to monitor.  Planning for repeat echocardiogram in 2 to 3 years from now.

## 2024-01-30 NOTE — PROGRESS NOTES
CARDIOLOGY FOLLOW-UP PROGRESS NOTE        Chief Complaint  Hospital Follow Up Visit and Atrial Fibrillation    Subjective            Kimberly Dennis presents to [facility]  History of Present Illness    Ms. Dennis is here for follow-up from recent hospital admission.  She presented to emergency room on 12/21/2023 because of increasing palpitations.  She was noted to be in atrial fibrillation with rapid ventricular response.  She underwent successful cardioversion in the emergency room and was discharged home.  She presented back to the hospital on 1/11/2022 because of mild chest pain and discomfort.  She was noted to be in sinus rhythm during this visit.  Cardiac markers were negative.  A SPECT stress test was performed which did not show any evidence of reversible ischemia.  She was discharged home in a stable condition.  During subsequent follow-up visits, metoprolol dose was decreased due to bradycardia.  She is currently taking higher dose of losartan for high blood pressure    Today patient denies any new complaints.  She is back to her baseline.  She had 2 minor episodes of palpitations lasting for few seconds.  Denies any dizziness.  No further episodes of chest pain or pressure.  She brought a log of her blood pressure and heart rate which showed systolic blood pressure mostly 150.  Heart rate is in 70s.    Past History:    (1) Paroxysmal atrial fibrillation, s/o cardioversion in ER in 2019.  Recurrence of A-fib noted on ER visit on 12/21/2023, underwent successful cardioversion in the ER.  (2) Hypertension. (3) Hypothyroidism.      Medical History:  Past Medical History:   Diagnosis Date    Acid reflux     Acute deep vein thrombosis (DVT) of calf muscle vein of left lower extremity 06/09/2022    Anemia     Anesthesia complication     Arthritis     Arthritis of back     Atrial fibrillation     PAROXYSMAL    Bladder disorder     Colon polyp     GI bleed     History of total knee  arthroplasty, right 09/08/2016    HTN (hypertension)     Limb swelling     Low back strain     Medial meniscus tear 09/12/2017    LEFT    Mitral valve prolapse     Primary osteoarthritis of left knee 09/12/2017    Seasonal allergies     Sleep apnea     SOB (shortness of breath)     Tear of medial cartilage or meniscus of knee, current 09/12/2017    Thoracic outlet syndrome     Thyroid disorder        Surgical History: has a past surgical history that includes Bladder repair; Cholecystectomy; Colonoscopy (2014); Intraocular lens insertion; Hysterectomy; Colonoscopy; Joint replacement (Right); Knee arthroscopy (Left); Total knee arthroplasty (Left, 06/03/2022); and Electrical Cardioversion (12/21/2023).     Family History: family history includes Anesthesia problems in her mother; Arthritis in her mother and sister; Diabetes in her brother; Heart disease in her brother and mother.     Social History: reports that she has never smoked. She has never been exposed to tobacco smoke. She has never used smokeless tobacco. She reports that she does not drink alcohol and does not use drugs.    Allergies: Amoxicillin-pot clavulanate, Hydrocodone-acetaminophen, Hydrocodone, Iodine, Latex, Metal, and Shellfish allergy    Current Outpatient Medications on File Prior to Visit   Medication Sig    busPIRone (BUSPAR) 10 MG tablet Take 1 tablet by mouth 3 (Three) Times a Day As Needed (anxiety).    cetirizine (zyrTEC) 10 MG tablet Take 2 tablets by mouth Daily.    Cholecalciferol 125 MCG (5000 UT) tablet Take 1 tablet by mouth Every Evening.    Eliquis 5 MG tablet tablet Take 1 tablet by mouth twice daily (Patient taking differently: Take 1 tablet by mouth Every 12 (Twelve) Hours.)    famotidine (PEPCID) 40 MG tablet Take 1 tablet by mouth once daily (Patient taking differently: Take 1 tablet by mouth Daily.)    gabapentin (NEURONTIN) 300 MG capsule TAKE 1 CAPSULE BY MOUTH ONCE DAILY AT NIGHT    hydroCHLOROthiazide (HYDRODIURIL) 25 MG  "tablet Take 1 tablet by mouth Daily.    levothyroxine (SYNTHROID, LEVOTHROID) 75 MCG tablet Take 1 tablet by mouth once daily (Patient taking differently: Take 1 tablet by mouth Daily.)    losartan (COZAAR) 50 MG tablet Take 1 tablet by mouth Daily.    montelukast (SINGULAIR) 10 MG tablet TAKE 1 TABLET BY MOUTH ONCE DAILY AT NIGHT (Patient taking differently: Take 1 tablet by mouth Every Night.)    metoprolol succinate XL (TOPROL-XL) 50 MG 24 hr tablet Take 0.5 tablets by mouth Daily.       Review of Systems   Respiratory:  Negative for cough, shortness of breath and wheezing.    Cardiovascular:  Negative for chest pain, palpitations and leg swelling.   Gastrointestinal:  Negative for nausea and vomiting.   Neurological:  Negative for dizziness and syncope.        Objective     /63   Pulse 75   Ht 167.6 cm (66\")   Wt 117 kg (257 lb)   BMI 41.48 kg/m²       Physical Exam    General : Alert, awake, no acute distress  Neck : Supple, no carotid bruit, no jugular venous distention  CVS : Regular rate and rhythm, no murmur, rubs or gallops  Lungs: Clear to auscultation bilaterally, no crackles or rhonchi  Abdomen: Soft, nontender, bowel sounds heard in all 4 quadrants  Extremities: Warm, well-perfused, no pedal edema    Result Review :     The following data was reviewed by: Chip Mendoza MD on 01/30/2024:    CMP          12/21/2023    12:03 1/10/2024    15:51 1/11/2024    04:15   CMP   Glucose 100  102  102    BUN 11  12  13    Creatinine 0.73  0.78  0.57    EGFR 88.0  81.3  97.3    Sodium 141  138  137    Potassium 3.5  4.1  3.8    Chloride 102  99  101    Calcium 9.4  9.6  9.3    Total Protein 7.6  7.6  7.1    Albumin 4.0  4.2  3.6    Globulin 3.6  3.4  3.5    Total Bilirubin 0.3  0.2  0.4    Alkaline Phosphatase 79  87  78    AST (SGOT) 21  18  20    ALT (SGPT) 15  16  13    Albumin/Globulin Ratio 1.1  1.2  1.0    BUN/Creatinine Ratio 15.1  15.4  22.8    Anion Gap 13.2  10.6  13.6      CBC          " 12/21/2023    12:03 1/10/2024    15:51 1/11/2024    04:15   CBC   WBC 7.31  6.59  7.14    RBC 4.43  4.25  4.12    Hemoglobin 12.8  12.2  11.9    Hematocrit 38.7  37.3  36.1    MCV 87.4  87.8  87.6    MCH 28.9  28.7  28.9    MCHC 33.1  32.7  33.0    RDW 13.6  13.3  13.2    Platelets 216  205  178      TSH          7/13/2023    14:44 12/21/2023    12:03 1/10/2024    15:51   TSH   TSH 1.300  1.190  1.170      Lipid Panel          7/13/2023    14:44   Lipid Panel   Total Cholesterol 186    Triglycerides 195    HDL Cholesterol 44    VLDL Cholesterol 34    LDL Cholesterol  108    LDL/HDL Ratio 2.34           Data reviewed: Cardiology studies        Results for orders placed in visit on 03/02/23    Adult Transthoracic Echo Complete W/ Cont if Necessary Per Protocol    Interpretation Summary    Left ventricular systolic function is normal. Left ventricular ejection fraction appears to be 61 - 65%.    There is diastolic dysfunction of the left ventricle.    There is mild mitral regurgitation    There is mild tricuspid regurgitation.      Results for orders placed during the hospital encounter of 01/10/24    Stress Test With Myocardial Perfusion One Day    Interpretation Summary    Myocardial perfusion imaging indicates a normal myocardial perfusion study with no evidence of ischemia. Impressions are consistent with a low risk study.    Left ventricular ejection fraction is normal (Calculated EF = 67%).    GI artifact is present.    There was no chest pain with regadenoson infusion.    Findings consistent with a normal ECG stress test.               Assessment and Plan        Diagnoses and all orders for this visit:    1. Paroxysmal atrial fibrillation (Primary)  Comments:  rate controlled. recently lowered dose of toprol given low HRs.  Assessment & Plan:  She is in normal sinus rhythm.  Recent cardioversion in the emergency room in December, 2023. Recent SPECT stress test was negative for reversible ischemia. Because of  recurrence of A-fib, will increase the dose of metoprolol back to 50 mg once daily.  .  Continue Eliquis for anticoagulation.    Orders:  -     metoprolol succinate XL (TOPROL-XL) 50 MG 24 hr tablet; Take 1 tablet by mouth Daily.  Dispense: 90 tablet; Refill: 1    2. Essential hypertension  Comments:  will inc losartan to accmodate the lower dose toprol. monitor BPs. f/u in 3-4 weeks.  Assessment & Plan:  Pressure borderline elevated in the office today and also per recent home blood pressure log.  Dose of losartan was increased recently from PCP office.  Recommend to continue losartan 50 mg daily, we are increasing dose of metoprolol succinate 50 mg daily.  She will continue with hydrochlorothiazide.  Encouraged to continue keeping home blood pressure log.    Orders:  -     metoprolol succinate XL (TOPROL-XL) 50 MG 24 hr tablet; Take 1 tablet by mouth Daily.  Dispense: 90 tablet; Refill: 1    3. Valvular heart disease  Assessment & Plan:  Mild mitral regurgitation per echocardiogram done last year.  LV systolic function is preserved.  Will continue to monitor.  Planning for repeat echocardiogram in 2 to 3 years from now.                Follow Up     Return for Will keep June appointment .    Patient was given instructions and counseling regarding her condition or for health maintenance advice. Please see specific information pulled into the AVS if appropriate.

## 2024-01-30 NOTE — ASSESSMENT & PLAN NOTE
Paroxysmal atrial fibrillation, s/o cardioversion in ER in 2019.  Recurrence of A-fib noted on ER visit on 12/21/2023, underwent successful cardioversion in the ER.

## 2024-01-30 NOTE — ASSESSMENT & PLAN NOTE
Pressure borderline elevated in the office today and also per recent home blood pressure log.  Dose of losartan was increased recently from PCP office.  Recommend to continue losartan 50 mg daily, we are increasing dose of metoprolol succinate 50 mg daily.  She will continue with hydrochlorothiazide.  Encouraged to continue keeping home blood pressure log.

## 2024-01-30 NOTE — ASSESSMENT & PLAN NOTE
She is in normal sinus rhythm.  Recent cardioversion in the emergency room in December, 2023. Recent SPECT stress test was negative for reversible ischemia. Because of recurrence of A-fib, will increase the dose of metoprolol back to 50 mg once daily.  .  Continue Eliquis for anticoagulation.

## 2024-02-05 DIAGNOSIS — I10 ESSENTIAL HYPERTENSION: ICD-10-CM

## 2024-02-06 RX ORDER — HYDROCHLOROTHIAZIDE 25 MG/1
25 TABLET ORAL DAILY
Qty: 90 TABLET | Refills: 0 | Status: SHIPPED | OUTPATIENT
Start: 2024-02-06

## 2024-02-23 ENCOUNTER — OFFICE VISIT (OUTPATIENT)
Dept: INTERNAL MEDICINE | Facility: CLINIC | Age: 74
End: 2024-02-23
Payer: MEDICARE

## 2024-02-23 VITALS
HEIGHT: 66 IN | SYSTOLIC BLOOD PRESSURE: 130 MMHG | BODY MASS INDEX: 41.59 KG/M2 | OXYGEN SATURATION: 95 % | WEIGHT: 258.8 LBS | TEMPERATURE: 97.5 F | HEART RATE: 60 BPM | DIASTOLIC BLOOD PRESSURE: 73 MMHG

## 2024-02-23 DIAGNOSIS — F41.9 ANXIETY: ICD-10-CM

## 2024-02-23 DIAGNOSIS — Z47.1 AFTERCARE FOLLOWING LEFT KNEE JOINT REPLACEMENT SURGERY: ICD-10-CM

## 2024-02-23 DIAGNOSIS — Z96.652 AFTERCARE FOLLOWING LEFT KNEE JOINT REPLACEMENT SURGERY: ICD-10-CM

## 2024-02-23 DIAGNOSIS — I10 ESSENTIAL HYPERTENSION: Primary | ICD-10-CM

## 2024-02-23 RX ORDER — LOSARTAN POTASSIUM 100 MG/1
100 TABLET ORAL DAILY
Qty: 90 TABLET | Refills: 1 | Status: SHIPPED | OUTPATIENT
Start: 2024-02-23

## 2024-02-23 RX ORDER — GABAPENTIN 300 MG/1
300 CAPSULE ORAL NIGHTLY
Qty: 90 CAPSULE | Refills: 0 | Status: SHIPPED | OUTPATIENT
Start: 2024-02-23

## 2024-02-23 NOTE — PROGRESS NOTES
"Chief Complaint  Hypertension (Follow up )    Subjective          Kimberly Dennis presents to [facility]  History of Present Illness  Hypertension- patient is now back on higher dose of toprol per cardiology. Patient reports also still taking higher dose of losartan. Patient denies headaches, dizziness, chest pain.   Anxiety- patient reports she is controlling it well. Patient is not taking buspar.       Current Outpatient Medications   Medication Instructions    busPIRone (BUSPAR) 10 mg, Oral, 3 Times Daily PRN    cetirizine (ZYRTEC) 20 mg, Oral, Daily    Cholecalciferol 125 MCG (5000 UT) tablet 1 tablet, Oral, Every Evening    Eliquis 5 MG tablet tablet Take 1 tablet by mouth twice daily    famotidine (PEPCID) 40 MG tablet Take 1 tablet by mouth once daily    gabapentin (NEURONTIN) 300 mg, Oral, Nightly    hydroCHLOROthiazide 25 mg, Oral, Daily    levothyroxine (SYNTHROID, LEVOTHROID) 75 MCG tablet Take 1 tablet by mouth once daily    losartan (COZAAR) 100 mg, Oral, Daily    metoprolol succinate XL (TOPROL-XL) 50 mg, Oral, Daily    montelukast (SINGULAIR) 10 MG tablet TAKE 1 TABLET BY MOUTH ONCE DAILY AT NIGHT       The following portions of the patient's history were reviewed and updated as appropriate: allergies, current medications, past family history, past medical history, past social history, past surgical history, and problem list.    Objective   Vital Signs:   /73 (BP Location: Left arm)   Pulse 60   Temp 97.5 °F (36.4 °C) (Temporal)   Ht 167.6 cm (66\")   Wt 117 kg (258 lb 12.8 oz)   SpO2 95%   BMI 41.77 kg/m²     BP Readings from Last 3 Encounters:   02/23/24 130/73   01/30/24 140/63   01/22/24 166/60     Wt Readings from Last 3 Encounters:   02/23/24 117 kg (258 lb 12.8 oz)   01/30/24 117 kg (257 lb)   01/22/24 118 kg (259 lb 9.6 oz)         Physical Exam   Appearance: No acute distress, well-nourished  Head: normocephalic, atraumatic  Eyes: " extraocular movements intact, no scleral icterus, no conjunctival injection  Ears, Nose, and Throat: external ears normal, nares patent, moist mucous membranes  Cardiovascular: regular rate and rhythm. no murmurs, rubs, or gallops. no edema  Respiratory: breathing comfortably, symmetric chest rise, clear to auscultation bilaterally. No wheezes, rales, or rhonchi.  Neuro: alert and oriented to time, place, and person. Normal gait  Psych: normal mood and affect     Result Review :   The following data was reviewed by: Milan Coppola Jr, MD on 02/23/2024:  Common labs          12/21/2023    12:03 1/10/2024    15:51 1/11/2024    04:15   Common Labs   Glucose 100  102  102    BUN 11  12  13    Creatinine 0.73  0.78  0.57    Sodium 141  138  137    Potassium 3.5  4.1  3.8    Chloride 102  99  101    Calcium 9.4  9.6  9.3    Albumin 4.0  4.2  3.6    Total Bilirubin 0.3  0.2  0.4    Alkaline Phosphatase 79  87  78    AST (SGOT) 21  18  20    ALT (SGPT) 15  16  13    WBC 7.31  6.59  7.14    Hemoglobin 12.8  12.2  11.9    Hematocrit 38.7  37.3  36.1    Platelets 216  205  178        Lab Results   Component Value Date    SARSANTIGEN Not Detected 01/22/2024    COVID19 Detected (C) 03/29/2023    RAPFLUA Negative 05/24/2022    RAPFLUB Negative 05/24/2022    FLUAAG Not Detected 01/22/2024    FLUBAG Not Detected 01/22/2024    RAPSCRN Negative 06/25/2023    INR 1.23 (H) 05/23/2022       Last Urine Toxicity          Latest Ref Rng & Units 7/13/2023   LAST URINE TOXICITY RESULTS   Amphetamine, Urine Qual Negative Negative    Barbiturates Screen, Urine Negative Negative    Benzodiazepine Screen, Urine Negative Negative    Buprenorphine, Screen, Urine Negative Negative    Cocaine Screen, Urine Negative Negative    Methadone Screen , Urine Negative Negative    Methamphetamine, Ur Negative Negative        Assessment and Plan    Diagnoses and all orders for this visit:    1. Essential hypertension (Primary)  Comments:  inc  losartan to optimize control of BP. goal BP <130/80. f/u in 2 months. cont home monitoring.  Orders:  -     losartan (COZAAR) 100 MG tablet; Take 1 tablet by mouth Daily.  Dispense: 90 tablet; Refill: 1    2. Aftercare following left knee joint replacement surgery  Comments:  doing well. cont gabapentin as needed. using mostly at nighttime. UDS and nita reviewed.   Orders:  -     gabapentin (NEURONTIN) 300 MG capsule; Take 1 capsule by mouth Every Night.  Dispense: 90 capsule; Refill: 0    3. Anxiety  Comments:  coping well at this time. may cont buspar prn.          Medications Discontinued During This Encounter   Medication Reason    losartan (COZAAR) 50 MG tablet Reorder    gabapentin (NEURONTIN) 300 MG capsule Reorder          Follow Up   Return in about 2 months (around 4/23/2024).  Patient was given instructions and counseling regarding her condition or for health maintenance advice. Please see specific information pulled into the AVS if appropriate.       Milan Coppola Jr, MD  02/23/24  17:59 EST

## 2024-04-19 RX ORDER — LEVOTHYROXINE SODIUM 0.07 MG/1
TABLET ORAL
Qty: 90 TABLET | Refills: 0 | Status: SHIPPED | OUTPATIENT
Start: 2024-04-19

## 2024-04-28 DIAGNOSIS — I10 ESSENTIAL HYPERTENSION: ICD-10-CM

## 2024-04-29 RX ORDER — HYDROCHLOROTHIAZIDE 25 MG/1
25 TABLET ORAL DAILY
Qty: 90 TABLET | Refills: 0 | Status: SHIPPED | OUTPATIENT
Start: 2024-04-29

## 2024-05-27 DIAGNOSIS — Z96.652 AFTERCARE FOLLOWING LEFT KNEE JOINT REPLACEMENT SURGERY: ICD-10-CM

## 2024-05-27 DIAGNOSIS — Z47.1 AFTERCARE FOLLOWING LEFT KNEE JOINT REPLACEMENT SURGERY: ICD-10-CM

## 2024-05-28 RX ORDER — GABAPENTIN 300 MG/1
300 CAPSULE ORAL NIGHTLY
Qty: 90 CAPSULE | Refills: 0 | Status: SHIPPED | OUTPATIENT
Start: 2024-05-28

## 2024-05-28 NOTE — TELEPHONE ENCOUNTER
Refill request for controlled substance.      Date of request: 5/28/2024    Medication requested: Gabapentin  Last OV: 2/23/24  Last UDS: 7/13/23  Contract signed: yes    Date:7/18/23  Next office visit: 5/31/24    Sena Moy

## 2024-06-03 ENCOUNTER — OFFICE VISIT (OUTPATIENT)
Dept: ORTHOPEDIC SURGERY | Facility: CLINIC | Age: 74
End: 2024-06-03
Payer: MEDICARE

## 2024-06-03 VITALS
HEIGHT: 66 IN | HEART RATE: 59 BPM | WEIGHT: 257.94 LBS | BODY MASS INDEX: 41.45 KG/M2 | DIASTOLIC BLOOD PRESSURE: 73 MMHG | SYSTOLIC BLOOD PRESSURE: 154 MMHG | OXYGEN SATURATION: 95 %

## 2024-06-03 DIAGNOSIS — Z96.653 HISTORY OF TOTAL KNEE ARTHROPLASTY, BILATERAL: ICD-10-CM

## 2024-06-03 DIAGNOSIS — M25.561 PAIN IN BOTH KNEES, UNSPECIFIED CHRONICITY: Primary | ICD-10-CM

## 2024-06-03 DIAGNOSIS — M25.562 PAIN IN BOTH KNEES, UNSPECIFIED CHRONICITY: Primary | ICD-10-CM

## 2024-06-03 PROCEDURE — 1159F MED LIST DOCD IN RCRD: CPT | Performed by: PHYSICIAN ASSISTANT

## 2024-06-03 PROCEDURE — 3077F SYST BP >= 140 MM HG: CPT | Performed by: PHYSICIAN ASSISTANT

## 2024-06-03 PROCEDURE — 99213 OFFICE O/P EST LOW 20 MIN: CPT | Performed by: PHYSICIAN ASSISTANT

## 2024-06-03 PROCEDURE — 1160F RVW MEDS BY RX/DR IN RCRD: CPT | Performed by: PHYSICIAN ASSISTANT

## 2024-06-03 PROCEDURE — 3078F DIAST BP <80 MM HG: CPT | Performed by: PHYSICIAN ASSISTANT

## 2024-06-03 RX ORDER — TRIAMCINOLONE ACETONIDE 1 MG/G
OINTMENT TOPICAL
COMMUNITY
Start: 2024-05-23 | End: 2024-06-04

## 2024-06-03 NOTE — PROGRESS NOTES
"Chief Complaint  Follow-up of the Left Knee and Follow-up of the Right Knee    Subjective      Kimberly Dennis presents to [facility] for follow-up of bilateral knees.  She has a history of left total knee arthroplasty with computer navigation performed on 6/3/2022 by Dr. Paz and right TKA in 2014.  She presents independently ambulatory without use of assistive device.  States that her left knee is \"doing great\".  She reports that she has been redoing her flower beds over the last several weeks and that she has been using her right leg to stop down on a shovel.  She states that she has had increased swelling and tightness, describing \"bandlike sensation\" to her right knee.  She has been icing her right knee as needed.    Objective   Allergies   Allergen Reactions    Amoxicillin-Pot Clavulanate Itching    Hydrocodone-Acetaminophen Itching    Hydrocodone Itching and Rash    Iodine Rash    Latex Rash    Metal Rash     YELLOW GOLD CAUSES RASH     Shellfish Allergy Rash       Vital Signs:   /73   Pulse 59   Ht 167.6 cm (66\")   Wt 117 kg (257 lb 15 oz)   SpO2 95%   BMI 41.63 kg/m²       Physical Exam    Constitutional: Awake, alert. Well nourished appearance.    Integumentary: Warm, dry, intact. No obvious rashes.    HENT: Atraumatic, normocephalic.   Respiratory: Non labored respirations .   Cardiovascular: Intact peripheral pulses.    Psychiatric: Normal mood and affect. A&O X3    Ortho Exam  Bilateral knees: Skin is warm, dry, and intact.  Well-healed surgical scars noted bilaterally.  Mild to moderate right knee edema.  Patellas are well tracking and knees are stable to varus and valgus stress.  Full knee extension bilaterally and flexion 120-125 degrees.  Full plantarflexion and dorsiflexion ankles.  Sensation and distal neurovascular intact.  Patient is fully weightbearing with nonantalgic gait.    Imaging Results (Most Recent)       Procedure Component Value Units Date/Time "    XR Knee 3 View Right [[id number]] Resulted: 06/03/24 1406     Updated: 06/03/24 1407    Narrative:      X-Ray Report:  Study: X-rays ordered, taken in the office, and reviewed today.   Site: Right knee Xray  Indication: TKA  View: AP/Lateral, Standing, and [place] view(s)  Findings: Intact right total knee arthroplasty. No evidence of hardware   malfunction.   Prior studies available for comparison: yes     XR Knee 3 View Left [[id number]] Resulted: 06/03/24 1406     Updated: 06/03/24 1406    Narrative:      X-Ray Report:  Study: X-rays ordered, taken in the office, and reviewed today.   Site: Left knee Xray  Indication: TKA  View: AP/Lateral, Standing, and [place] view(s)  Findings: Intact left total knee arthroplasty. No evidence of hardware   malfunction.   Prior studies available for comparison: yes                       Assessment and Plan   Problem List Items Addressed This Visit    None  Visit Diagnoses       Pain in both knees, unspecified chronicity    -  Primary    Relevant Orders    XR Knee 3 View Left (Completed)    XR Knee 3 View Right (Completed)    History of total knee arthroplasty, bilateral                Follow Up   Return in about 1 year (around 6/3/2025).    Tobacco Use: Low Risk  (6/3/2024)    Patient History     Smoking Tobacco Use: Never     Smokeless Tobacco Use: Never     Passive Exposure: Never     Patient is a non-smoker.  Did not discuss tobacco cessation options.    Patient Instructions   X-rays reviewed, showing hardware intact. Continue home exercise program to progress strength and ROM.    Continue with lifelong antibiotic prophylaxis with dental procedures following total joint replacement.    Rx for voltaren gel sent to pharmacy.    Follow-up in 1 year. Call sooner or return to care, if needed with any changes or concerns. Repeat x-rays.     Patient was given instructions and counseling regarding her condition or for health maintenance advice. Please see specific information  pulled into the AVS if appropriate.

## 2024-06-03 NOTE — PATIENT INSTRUCTIONS
X-rays reviewed, showing hardware intact. Continue home exercise program to progress strength and ROM.    Continue with lifelong antibiotic prophylaxis with dental procedures following total joint replacement.    Rx for voltaren gel sent to pharmacy.    Follow-up in 1 year. Call sooner or return to care, if needed with any changes or concerns. Repeat x-rays.

## 2024-06-04 ENCOUNTER — OFFICE VISIT (OUTPATIENT)
Dept: INTERNAL MEDICINE | Facility: CLINIC | Age: 74
End: 2024-06-04
Payer: MEDICARE

## 2024-06-04 VITALS
BODY MASS INDEX: 41.22 KG/M2 | TEMPERATURE: 97.8 F | HEIGHT: 66 IN | DIASTOLIC BLOOD PRESSURE: 77 MMHG | SYSTOLIC BLOOD PRESSURE: 153 MMHG | OXYGEN SATURATION: 95 % | HEART RATE: 60 BPM | WEIGHT: 256.5 LBS

## 2024-06-04 DIAGNOSIS — E03.9 ACQUIRED HYPOTHYROIDISM: ICD-10-CM

## 2024-06-04 DIAGNOSIS — R73.01 IMPAIRED FASTING GLUCOSE: ICD-10-CM

## 2024-06-04 DIAGNOSIS — Z78.0 POSTMENOPAUSAL: ICD-10-CM

## 2024-06-04 DIAGNOSIS — E78.2 MIXED HYPERLIPIDEMIA: ICD-10-CM

## 2024-06-04 DIAGNOSIS — E55.9 VITAMIN D DEFICIENCY: ICD-10-CM

## 2024-06-04 DIAGNOSIS — Z12.31 BREAST CANCER SCREENING BY MAMMOGRAM: ICD-10-CM

## 2024-06-04 DIAGNOSIS — I10 ESSENTIAL HYPERTENSION: Primary | ICD-10-CM

## 2024-06-04 DIAGNOSIS — Z12.11 COLON CANCER SCREENING: ICD-10-CM

## 2024-06-04 DIAGNOSIS — Z23 NEED FOR COVID-19 VACCINE: ICD-10-CM

## 2024-06-04 PROCEDURE — 3077F SYST BP >= 140 MM HG: CPT | Performed by: INTERNAL MEDICINE

## 2024-06-04 PROCEDURE — 1126F AMNT PAIN NOTED NONE PRSNT: CPT | Performed by: INTERNAL MEDICINE

## 2024-06-04 PROCEDURE — G2211 COMPLEX E/M VISIT ADD ON: HCPCS | Performed by: INTERNAL MEDICINE

## 2024-06-04 PROCEDURE — 3078F DIAST BP <80 MM HG: CPT | Performed by: INTERNAL MEDICINE

## 2024-06-04 PROCEDURE — 99214 OFFICE O/P EST MOD 30 MIN: CPT | Performed by: INTERNAL MEDICINE

## 2024-06-04 RX ORDER — METOPROLOL SUCCINATE 50 MG/1
75 TABLET, EXTENDED RELEASE ORAL DAILY
Qty: 135 TABLET | Refills: 1 | Status: SHIPPED | OUTPATIENT
Start: 2024-06-04

## 2024-06-04 NOTE — PROGRESS NOTES
"Chief Complaint  Hypertension    Subjective          Kimberly Dennis presents to [facility]  History of Present Illness  Hypertension- patient reports consistent elevations in the evening.       Current Outpatient Medications   Medication Instructions    busPIRone (BUSPAR) 10 mg, Oral, 3 Times Daily PRN    cetirizine (ZYRTEC) 20 mg, Oral, Daily    Cholecalciferol 125 MCG (5000 UT) tablet 1 tablet, Oral, Every Evening    Diclofenac Sodium (VOLTAREN) 4 g, Topical, 4 Times Daily PRN    Eliquis 5 MG tablet tablet Take 1 tablet by mouth twice daily    famotidine (PEPCID) 40 MG tablet Take 1 tablet by mouth once daily    gabapentin (NEURONTIN) 300 mg, Oral, Nightly    hydroCHLOROthiazide 25 mg, Oral, Daily    levothyroxine (SYNTHROID, LEVOTHROID) 75 MCG tablet Take 1 tablet by mouth once daily    losartan (COZAAR) 100 mg, Oral, Daily    metoprolol succinate XL (TOPROL-XL) 75 mg, Oral, Daily    montelukast (SINGULAIR) 10 MG tablet TAKE 1 TABLET BY MOUTH ONCE DAILY AT NIGHT       The following portions of the patient's history were reviewed and updated as appropriate: allergies, current medications, past family history, past medical history, past social history, past surgical history, and problem list.    Objective   Vital Signs:   /77 (BP Location: Right arm, Patient Position: Sitting, Cuff Size: Adult)   Pulse 60   Temp 97.8 °F (36.6 °C) (Temporal)   Ht 167.6 cm (66\")   Wt 116 kg (256 lb 8 oz)   SpO2 95%   BMI 41.40 kg/m²     BP Readings from Last 3 Encounters:   06/04/24 153/77   06/03/24 154/73   02/23/24 130/73     Wt Readings from Last 3 Encounters:   06/04/24 116 kg (256 lb 8 oz)   06/03/24 117 kg (257 lb 15 oz)   02/23/24 117 kg (258 lb 12.8 oz)         Physical Exam   Appearance: No acute distress, well-nourished  Head: normocephalic, atraumatic  Eyes: extraocular movements intact, no scleral icterus, no conjunctival injection  Ears, Nose, and Throat: " external ears normal, nares patent, moist mucous membranes  Cardiovascular: regular rate and rhythm. no murmurs, rubs, or gallops. no edema  Respiratory: breathing comfortably, symmetric chest rise, clear to auscultation bilaterally. No wheezes, rales, or rhonchi.  Neuro: alert and oriented to time, place, and person. Normal gait  Psych: normal mood and affect     Result Review :   The following data was reviewed by: Milan Coppola Jr, MD on 06/04/2024:  Common labs          12/21/2023    12:03 1/10/2024    15:51 1/11/2024    04:15   Common Labs   Glucose 100  102  102    BUN 11  12  13    Creatinine 0.73  0.78  0.57    Sodium 141  138  137    Potassium 3.5  4.1  3.8    Chloride 102  99  101    Calcium 9.4  9.6  9.3    Albumin 4.0  4.2  3.6    Total Bilirubin 0.3  0.2  0.4    Alkaline Phosphatase 79  87  78    AST (SGOT) 21  18  20    ALT (SGPT) 15  16  13    WBC 7.31  6.59  7.14    Hemoglobin 12.8  12.2  11.9    Hematocrit 38.7  37.3  36.1    Platelets 216  205  178        Lab Results   Component Value Date    SARSANTIGEN Not Detected 01/22/2024    COVID19 Detected (C) 03/29/2023    RAPFLUA Negative 05/24/2022    RAPFLUB Negative 05/24/2022    FLUAAG Not Detected 01/22/2024    FLUBAG Not Detected 01/22/2024    RAPSCRN Negative 06/25/2023    INR 1.23 (H) 05/23/2022          Assessment and Plan    Diagnoses and all orders for this visit:    1. Essential hypertension (Primary)  Comments:  inc toprol to 75mg daily. monitor BP and HR. cont losartan and HCTZ at current doses. goal BP <130/80  Orders:  -     metoprolol succinate XL (TOPROL-XL) 50 MG 24 hr tablet; Take 1.5 tablets by mouth Daily.  Dispense: 135 tablet; Refill: 1  -     CBC & Differential; Future  -     Comprehensive Metabolic Panel; Future    2. Postmenopausal  -     DEXA Bone Density Axial; Future    3. Need for COVID-19 vaccine  -     Cancel: COVID-19 F23 (Pfizer) 12yrs+ (COMIRNATY)    4. Colon cancer screening  -     Cologuard - Stool, Per  Rectum; Future    5. Breast cancer screening by mammogram  -     Mammo Screening Digital Tomosynthesis Bilateral With CAD; Future    6. Mixed hyperlipidemia  -     Lipid Panel; Future    7. Acquired hypothyroidism  -     TSH Rfx On Abnormal To Free T4; Future    8. Impaired fasting glucose  -     Hemoglobin A1c; Future    9. Vitamin D deficiency  -     Vitamin D,25-Hydroxy; Future          Medications Discontinued During This Encounter   Medication Reason    triamcinolone (KENALOG) 0.1 % ointment *Therapy completed    metoprolol succinate XL (TOPROL-XL) 50 MG 24 hr tablet           Follow Up   Return in about 3 months (around 9/4/2024) for HTN.  Patient was given instructions and counseling regarding her condition or for health maintenance advice. Please see specific information pulled into the AVS if appropriate.       Milan Coppola Jr, MD  06/04/24  15:48 EDT

## 2024-06-13 ENCOUNTER — OFFICE VISIT (OUTPATIENT)
Dept: SLEEP MEDICINE | Facility: HOSPITAL | Age: 74
End: 2024-06-13
Payer: MEDICARE

## 2024-06-13 VITALS
BODY MASS INDEX: 42.49 KG/M2 | OXYGEN SATURATION: 98 % | WEIGHT: 255 LBS | HEART RATE: 63 BPM | SYSTOLIC BLOOD PRESSURE: 140 MMHG | HEIGHT: 65 IN | DIASTOLIC BLOOD PRESSURE: 46 MMHG

## 2024-06-13 DIAGNOSIS — G47.33 OSA (OBSTRUCTIVE SLEEP APNEA): Primary | ICD-10-CM

## 2024-06-13 PROCEDURE — G0463 HOSPITAL OUTPT CLINIC VISIT: HCPCS

## 2024-06-13 NOTE — PROGRESS NOTES
"[facility]         Dr Marce Knight  [unfilled]  Patient Care Team:  Milan Coppola Jr., MD as PCP - General (Internal Medicine)  Mae Layne MD as Consulting Physician (Obstetrics and Gynecology)  Mae Layne MD as Consulting Physician (Obstetrics and Gynecology)  Mae Layne MD as Consulting Physician (Obstetrics and Gynecology)    Chief Complaint: RUBY with underlying history of A-fib    Interval History: Patient here for annual compliance visit.  She is currently set up on CPAP 10 cm.  Compliance today is 100% average daily use 7 hours 43 minutes.  AHI leak within normal limits.  Goes to bed 1030 gets up 7 gets about 7+ hours of sleep more rested with CPAP.  No tobacco no alcohol no caffeine abuse.  Currently has a nasal pillow that fits well.  Supplies been adequate.  What Cheer 1 out of 24 within normal limits.    REVIEW OF SYSTEMS:   CARDIOVASCULAR: No chest pain, chest pressure or chest discomfort. No palpitations or edema.   RESPIRATORY: Positive for shortness of breath and nasal congestion  GASTROINTESTINAL: No anorexia, nausea, vomiting or diarrhea. No abdominal pain or blood.   HEMATOLOGIC: No bleeding or bruising.     Ventilator/Non-Invasive Ventilation Settings (From admission, onward)      None              Vital Signs  Heart Rate:  [63] 63  BP: (140)/(46) 140/46  [unfilled]  Flowsheet Rows      Flowsheet Row First Filed Value   Admission Height 165.1 cm (65\") Documented at 06/13/2024 1300   Admission Weight 116 kg (255 lb) Documented at 06/13/2024 1300            Physical Exam:  Patient is examined using the personal protective equipment as per guidelines from infection control for this particular patient as enacted.  Hand hygiene was performed before and after patient interaction.   General Appearance:    Alert, cooperative, in no acute distress.  Following simple commands  ENT Mallampati between 3 and 4 no nasal congestion  Neck midline trachea, no thyromegaly "   Lungs:     Clear to auscultation, respirations regular, even and                  unlabored    Heart:    Regular rhythm and normal rate, normal S1 and S2, no            murmur, no gallop, no rub, no click   Chest Wall:    No abnormalities observed   Abdomen:     Normal bowel sounds, no masses, no organomegaly, soft        nontender, nondistended, no guarding, no rebound                tenderness   Extremities:   Moves all extremities well, no edema, no cyanosis, no             redness  CNS no focal neurological deficits normal sensory exam  Skin no rashes no nodules  Musculoskeletal no cyanosis no clubbing normal range of motion     Results Review:                                          I reviewed the patient's new clinical results.  I personally viewed and interpreted the patient's chest x-ray.        Medication Review:       No current facility-administered medications for this visit.      ASSESSMENT:   RUBY  Atrial fibrillation  Morbid obesity BMI 41.5  Hypothyroidism   A-Fib    PLAN:  Reviewed compliance download with the patient  Compliance AHI leak look excellent  Continue current CPAP 10 cm  Sleep hygiene measures  Treatment of underlying comorbidities  Correlation between RUBY and current comorbidities was also discussed in detail  We will follow-up in 1 year      Marce Knight MD  06/13/24  13:34 EDT

## 2024-07-16 RX ORDER — LEVOTHYROXINE SODIUM 0.07 MG/1
TABLET ORAL
Qty: 90 TABLET | Refills: 0 | Status: SHIPPED | OUTPATIENT
Start: 2024-07-16

## 2024-07-25 ENCOUNTER — OFFICE VISIT (OUTPATIENT)
Dept: CARDIOLOGY | Facility: CLINIC | Age: 74
End: 2024-07-25
Payer: MEDICARE

## 2024-07-25 VITALS
WEIGHT: 254 LBS | BODY MASS INDEX: 42.32 KG/M2 | SYSTOLIC BLOOD PRESSURE: 153 MMHG | HEIGHT: 65 IN | HEART RATE: 63 BPM | DIASTOLIC BLOOD PRESSURE: 72 MMHG

## 2024-07-25 DIAGNOSIS — I48.0 PAROXYSMAL ATRIAL FIBRILLATION: Primary | ICD-10-CM

## 2024-07-25 DIAGNOSIS — I10 ESSENTIAL HYPERTENSION: ICD-10-CM

## 2024-07-25 PROCEDURE — 3078F DIAST BP <80 MM HG: CPT | Performed by: INTERNAL MEDICINE

## 2024-07-25 PROCEDURE — 1160F RVW MEDS BY RX/DR IN RCRD: CPT | Performed by: INTERNAL MEDICINE

## 2024-07-25 PROCEDURE — 99213 OFFICE O/P EST LOW 20 MIN: CPT | Performed by: INTERNAL MEDICINE

## 2024-07-25 PROCEDURE — 3077F SYST BP >= 140 MM HG: CPT | Performed by: INTERNAL MEDICINE

## 2024-07-25 PROCEDURE — 1159F MED LIST DOCD IN RCRD: CPT | Performed by: INTERNAL MEDICINE

## 2024-07-25 RX ORDER — HYDROCHLOROTHIAZIDE 25 MG/1
25 TABLET ORAL DAILY
Qty: 90 TABLET | Refills: 0 | Status: SHIPPED | OUTPATIENT
Start: 2024-07-25

## 2024-07-25 NOTE — ASSESSMENT & PLAN NOTE
Blood pressure borderline elevated in the office today.  Patient reports well-controlled blood pressure per home blood pressure log.  Of note, she has been to 3 stones before coming to the office today.  Recommend to keep home blood pressure log.  Continue hydrochlorothiazide and losartan, along with metoprolol.

## 2024-07-25 NOTE — ASSESSMENT & PLAN NOTE
She is taking in sinus rhythm, denies any palpitations.  [id number] 2 score high.  Continue Eliquis for anticoagulation.  Continue metoprolol for rate and rhythm management.

## 2024-08-17 DIAGNOSIS — I10 ESSENTIAL HYPERTENSION: ICD-10-CM

## 2024-08-19 RX ORDER — LOSARTAN POTASSIUM 100 MG/1
100 TABLET ORAL DAILY
Qty: 90 TABLET | Refills: 0 | Status: SHIPPED | OUTPATIENT
Start: 2024-08-19

## 2024-08-22 DIAGNOSIS — Z47.1 AFTERCARE FOLLOWING LEFT KNEE JOINT REPLACEMENT SURGERY: ICD-10-CM

## 2024-08-22 DIAGNOSIS — Z96.652 AFTERCARE FOLLOWING LEFT KNEE JOINT REPLACEMENT SURGERY: ICD-10-CM

## 2024-08-22 RX ORDER — GABAPENTIN 300 MG/1
300 CAPSULE ORAL NIGHTLY
Qty: 90 CAPSULE | Refills: 0 | Status: SHIPPED | OUTPATIENT
Start: 2024-08-22

## 2024-08-22 NOTE — TELEPHONE ENCOUNTER
Refill request for controlled substance.      Date of request: 8/22/2024    Medication requested: Gabapentin  Last OV: 6/4/24  Last UDS: 7/13/23  Contract signed: No   Next office visit: 10/4/24    Sena Moy

## 2024-08-27 ENCOUNTER — LAB (OUTPATIENT)
Dept: LAB | Facility: HOSPITAL | Age: 74
End: 2024-08-27
Payer: MEDICARE

## 2024-08-27 DIAGNOSIS — E03.9 ACQUIRED HYPOTHYROIDISM: ICD-10-CM

## 2024-08-27 DIAGNOSIS — R73.01 IMPAIRED FASTING GLUCOSE: ICD-10-CM

## 2024-08-27 DIAGNOSIS — E55.9 VITAMIN D DEFICIENCY: ICD-10-CM

## 2024-08-27 DIAGNOSIS — I10 ESSENTIAL HYPERTENSION: ICD-10-CM

## 2024-08-27 DIAGNOSIS — E78.2 MIXED HYPERLIPIDEMIA: ICD-10-CM

## 2024-08-27 LAB
25(OH)D3 SERPL-MCNC: 55.2 NG/ML (ref 30–100)
ALBUMIN SERPL-MCNC: 4.3 G/DL (ref 3.5–5.2)
ALBUMIN/GLOB SERPL: 1.2 G/DL
ALP SERPL-CCNC: 89 U/L (ref 39–117)
ALT SERPL W P-5'-P-CCNC: 11 U/L (ref 1–33)
ANION GAP SERPL CALCULATED.3IONS-SCNC: 10.2 MMOL/L (ref 5–15)
AST SERPL-CCNC: 17 U/L (ref 1–32)
BASOPHILS # BLD AUTO: 0.03 10*3/MM3 (ref 0–0.2)
BASOPHILS NFR BLD AUTO: 0.5 % (ref 0–1.5)
BILIRUB SERPL-MCNC: 0.5 MG/DL (ref 0–1.2)
BUN SERPL-MCNC: 14 MG/DL (ref 8–23)
BUN/CREAT SERPL: 22.6 (ref 7–25)
CALCIUM SPEC-SCNC: 10.1 MG/DL (ref 8.6–10.5)
CHLORIDE SERPL-SCNC: 99 MMOL/L (ref 98–107)
CHOLEST SERPL-MCNC: 205 MG/DL (ref 0–200)
CO2 SERPL-SCNC: 30.8 MMOL/L (ref 22–29)
CREAT SERPL-MCNC: 0.62 MG/DL (ref 0.57–1)
DEPRECATED RDW RBC AUTO: 39.9 FL (ref 37–54)
EGFRCR SERPLBLD CKD-EPI 2021: 94.8 ML/MIN/1.73
EOSINOPHIL # BLD AUTO: 0.31 10*3/MM3 (ref 0–0.4)
EOSINOPHIL NFR BLD AUTO: 4.8 % (ref 0.3–6.2)
ERYTHROCYTE [DISTWIDTH] IN BLOOD BY AUTOMATED COUNT: 13 % (ref 12.3–15.4)
GLOBULIN UR ELPH-MCNC: 3.6 GM/DL
GLUCOSE SERPL-MCNC: 107 MG/DL (ref 65–99)
HBA1C MFR BLD: 5.9 % (ref 4.8–5.6)
HCT VFR BLD AUTO: 36.2 % (ref 34–46.6)
HDLC SERPL-MCNC: 43 MG/DL (ref 40–60)
HGB BLD-MCNC: 11.9 G/DL (ref 12–15.9)
IMM GRANULOCYTES # BLD AUTO: 0.02 10*3/MM3 (ref 0–0.05)
IMM GRANULOCYTES NFR BLD AUTO: 0.3 % (ref 0–0.5)
LDLC SERPL CALC-MCNC: 130 MG/DL (ref 0–100)
LDLC/HDLC SERPL: 2.94 {RATIO}
LYMPHOCYTES # BLD AUTO: 1.93 10*3/MM3 (ref 0.7–3.1)
LYMPHOCYTES NFR BLD AUTO: 29.7 % (ref 19.6–45.3)
MCH RBC QN AUTO: 28.5 PG (ref 26.6–33)
MCHC RBC AUTO-ENTMCNC: 32.9 G/DL (ref 31.5–35.7)
MCV RBC AUTO: 86.6 FL (ref 79–97)
MONOCYTES # BLD AUTO: 0.5 10*3/MM3 (ref 0.1–0.9)
MONOCYTES NFR BLD AUTO: 7.7 % (ref 5–12)
NEUTROPHILS NFR BLD AUTO: 3.7 10*3/MM3 (ref 1.7–7)
NEUTROPHILS NFR BLD AUTO: 57 % (ref 42.7–76)
NRBC BLD AUTO-RTO: 0 /100 WBC (ref 0–0.2)
PLATELET # BLD AUTO: 213 10*3/MM3 (ref 140–450)
PMV BLD AUTO: 10 FL (ref 6–12)
POTASSIUM SERPL-SCNC: 4.9 MMOL/L (ref 3.5–5.2)
PROT SERPL-MCNC: 7.9 G/DL (ref 6–8.5)
RBC # BLD AUTO: 4.18 10*6/MM3 (ref 3.77–5.28)
SODIUM SERPL-SCNC: 140 MMOL/L (ref 136–145)
TRIGL SERPL-MCNC: 178 MG/DL (ref 0–150)
TSH SERPL DL<=0.05 MIU/L-ACNC: 1.72 UIU/ML (ref 0.27–4.2)
VLDLC SERPL-MCNC: 32 MG/DL (ref 5–40)
WBC NRBC COR # BLD AUTO: 6.49 10*3/MM3 (ref 3.4–10.8)

## 2024-08-27 PROCEDURE — 80061 LIPID PANEL: CPT

## 2024-08-27 PROCEDURE — 84443 ASSAY THYROID STIM HORMONE: CPT

## 2024-08-27 PROCEDURE — 85025 COMPLETE CBC W/AUTO DIFF WBC: CPT

## 2024-08-27 PROCEDURE — 83036 HEMOGLOBIN GLYCOSYLATED A1C: CPT

## 2024-08-27 PROCEDURE — 36415 COLL VENOUS BLD VENIPUNCTURE: CPT

## 2024-08-27 PROCEDURE — 82306 VITAMIN D 25 HYDROXY: CPT

## 2024-08-27 PROCEDURE — 80053 COMPREHEN METABOLIC PANEL: CPT

## 2024-08-29 ENCOUNTER — TELEPHONE (OUTPATIENT)
Dept: INTERNAL MEDICINE | Facility: CLINIC | Age: 74
End: 2024-08-29
Payer: MEDICARE

## 2024-08-29 NOTE — TELEPHONE ENCOUNTER
----- Message from Milan Coppola sent at 8/29/2024  7:50 AM EDT -----  Labs overall look good. LDL slightly higher this time - recommend low cholesterol foods.

## 2024-10-02 NOTE — PROGRESS NOTES
Subjective   The ABCs of the Annual Wellness Visit  Medicare Wellness Visit      Kimberly Dennis is a 74 y.o. patient who presents for a Medicare Wellness Visit.    The following portions of the patient's history were reviewed and   updated as appropriate: allergies, current medications, past family history, past medical history, past social history, past surgical history, and problem list.    Compared to one year ago, the patient's physical   health is the same.  Compared to one year ago, the patient's mental   health is better.    Recent Hospitalizations:  This patient has had a [facility] admission record on file within the last 365 days.  Current Medical Providers:  Patient Care Team:  Milan Coppola Jr., MD as PCP - General (Internal Medicine)  Mae Layne MD as Consulting Physician (Obstetrics and Gynecology)  Mae Layne MD as Consulting Physician (Obstetrics and Gynecology)  Mae Lanye MD as Consulting Physician (Obstetrics and Gynecology)    Outpatient Medications Prior to Visit   Medication Sig Dispense Refill    apixaban (Eliquis) 5 MG tablet tablet Take 1 tablet by mouth 2 (Two) Times a Day. 180 tablet 3    busPIRone (BUSPAR) 10 MG tablet Take 1 tablet by mouth 3 (Three) Times a Day As Needed (anxiety). 60 tablet 0    cetirizine (zyrTEC) 10 MG tablet Take 2 tablets by mouth Daily.      Cholecalciferol 125 MCG (5000 UT) tablet Take 1 tablet by mouth Every Evening.      famotidine (PEPCID) 40 MG tablet Take 1 tablet by mouth once daily (Patient taking differently: Take 1 tablet by mouth Daily.) 90 tablet 0    gabapentin (NEURONTIN) 300 MG capsule TAKE 1 CAPSULE BY MOUTH ONCE DAILY AT NIGHT 90 capsule 0    hydroCHLOROthiazide 25 MG tablet Take 1 tablet by mouth once daily 90 tablet 0    levothyroxine (SYNTHROID, LEVOTHROID) 75 MCG tablet Take 1 tablet by mouth once daily 90 tablet 0    losartan (COZAAR) 100 MG tablet Take 1 tablet by mouth once daily 90 tablet 0    metoprolol  "succinate XL (TOPROL-XL) 50 MG 24 hr tablet Take 1.5 tablets by mouth Daily. 135 tablet 1    montelukast (SINGULAIR) 10 MG tablet TAKE 1 TABLET BY MOUTH ONCE DAILY AT NIGHT (Patient taking differently: Take 1 tablet by mouth Every Night.) 90 tablet 3    Diclofenac Sodium (VOLTAREN) 1 % gel gel Apply 4 g topically to the appropriate area as directed 4 (Four) Times a Day As Needed (pain). (Patient not taking: Reported on 10/4/2024) 150 g 3    apixaban (ELIQUIS) 5 MG tablet tablet Take 1 tablet by mouth 2 (Two) Times a Day. 42 tablet 0     No facility-administered medications prior to visit.     No opioid medication identified on active medication list. I have reviewed chart for other potential  high risk medication/s and harmful drug interactions in the elderly.      Aspirin is not on active medication list.  Aspirin use is contraindicated for this patient due to: current use of Eliquis.  .    Patient Active Problem List   Diagnosis    Anemia    Arthritis    Paroxysmal atrial fibrillation    Essential hypertension    Mitral valve prolapse    Seasonal allergic rhinitis    Tear of medial cartilage or meniscus of knee, current    Thoracic outlet syndrome    Primary osteoarthritis of left knee    Aftercare following left knee joint replacement surgery    Vitamin D deficiency    Mixed hyperlipidemia    Acquired hypothyroidism    Valvular heart disease    History of total knee arthroplasty, left    Chest pain    Dyspnea on exertion    Impaired fasting glucose     Advance Care Planning Advance Directive is on file.      Objective   Vitals:    10/04/24 1328   BP: 145/53   BP Location: Right arm   Patient Position: Sitting   Cuff Size: Adult   Pulse: 59   Temp: 96.6 °F (35.9 °C)   TempSrc: Temporal   SpO2: 97%   Weight: 117 kg (257 lb 8 oz)   Height: 165.1 cm (65\")   PainSc: 0-No pain       Estimated body mass index is 42.85 kg/m² as calculated from the following:    Height as of this encounter: 165.1 cm (65\").    Weight as of " this encounter: 117 kg (257 lb 8 oz).         Does the patient have evidence of cognitive impairment? No  Lab Results   Component Value Date    TRIG 178 (H) 08/27/2024    HDL 43 08/27/2024     (H) 08/27/2024    VLDL 32 08/27/2024    HGBA1C 5.90 (H) 08/27/2024                                                                                                Health  Risk Assessment    Smoking Status:  Social History     Tobacco Use   Smoking Status Never    Passive exposure: Never   Smokeless Tobacco Never     Alcohol Consumption:  Social History     Substance and Sexual Activity   Alcohol Use Never       Fall Risk Screen  STEADI Fall Risk Assessment was completed, and patient is at LOW risk for falls.Assessment completed on:10/4/2024    Depression Screening:      10/4/2024     1:49 PM   PHQ-2/PHQ-9 Depression Screening   Little Interest or Pleasure in Doing Things 0-->not at all   Feeling Down, Depressed or Hopeless 0-->not at all   PHQ-9: Brief Depression Severity Measure Score 0     Health Habits and Functional and Cognitive Screening:      10/4/2024     1:48 PM   Functional & Cognitive Status   Do you have difficulty preparing food and eating? No   Do you have difficulty bathing yourself, getting dressed or grooming yourself? No   Do you have difficulty using the toilet? No   Do you have difficulty moving around from place to place? No   Do you have trouble with steps or getting out of a bed or a chair? No   Current Diet Low Carb Diet        Current Diet Comment Low Sodium and Less Sugar   Dental Exam Not up to date   Eye Exam Not up to date   Exercise (times per week) 0 times per week   Current Exercises Include No Regular Exercise   Do you need help using the phone?  No   Are you deaf or do you have serious difficulty hearing?  No   Do you need help to go to places out of walking distance? No   Do you need help shopping? No   Do you need help preparing meals?  No   Do you need help with housework?  No   Do  you need help with laundry? No   Do you need help taking your medications? No   Do you need help managing money? No   Do you ever drive or ride in a car without wearing a seat belt? No   Have you felt unusual stress, anger or loneliness in the last month? No   Who do you live with? Spouse   If you need help, do you have trouble finding someone available to you? No   Have you been bothered in the last four weeks by sexual problems? No   Do you have difficulty concentrating, remembering or making decisions? No           Age-appropriate Screening Schedule:  Refer to the list below for future screening recommendations based on patient's age, sex and/or medical conditions. Orders for these recommended tests are listed in the plan section. The patient has been provided with a written plan.    Health Maintenance List  Health Maintenance   Topic Date Due    DXA SCAN  02/02/2024    COLORECTAL CANCER SCREENING  07/04/2024    COVID-19 Vaccine (7 - 2023-24 season) 09/01/2024    ZOSTER VACCINE (2 of 2) 10/10/2024    LIPID PANEL  08/27/2025    MAMMOGRAM  08/28/2025    ANNUAL WELLNESS VISIT  10/04/2025    BMI FOLLOWUP  10/04/2025    TDAP/TD VACCINES (2 - Tdap) 01/12/2033    HEPATITIS C SCREENING  Completed    INFLUENZA VACCINE  Completed    Pneumococcal Vaccine 65+  Completed                                                                                                                                                CMS Preventative Services Quick Reference  Risk Factors Identified During Encounter  Immunizations Discussed/Encouraged: Influenza    Diagnoses and all orders for this visit:    1. Annual physical exam (Primary)    2. Need for influenza vaccination  -     Fluzone High-Dose 65+yrs    3. Mixed hyperlipidemia  Comments:  Discussed risk and benefits of statin therapy.  Will defer for now.    4. Essential hypertension  Comments:  Well-controlled.  Continue current regimen.    5. Impaired fasting glucose  Comments:  Labs  reviewed with patient.  Encouraged low-carb and high-protein food choices.    6. Acquired hypothyroidism  Comments:  Within normal on last check         The above risks/problems have been discussed with the patient.  Pertinent information has been shared with the patient in the After Visit Summary.  An After Visit Summary and PPPS were made available to the patient.    Follow Up:   Next Medicare Wellness visit to be scheduled in 1 year.

## 2024-10-04 ENCOUNTER — OFFICE VISIT (OUTPATIENT)
Dept: INTERNAL MEDICINE | Facility: CLINIC | Age: 74
End: 2024-10-04
Payer: MEDICARE

## 2024-10-04 VITALS
WEIGHT: 257.5 LBS | HEART RATE: 59 BPM | OXYGEN SATURATION: 97 % | HEIGHT: 65 IN | BODY MASS INDEX: 42.9 KG/M2 | TEMPERATURE: 96.6 F | SYSTOLIC BLOOD PRESSURE: 145 MMHG | DIASTOLIC BLOOD PRESSURE: 53 MMHG

## 2024-10-04 DIAGNOSIS — I10 ESSENTIAL HYPERTENSION: ICD-10-CM

## 2024-10-04 DIAGNOSIS — Z23 NEED FOR INFLUENZA VACCINATION: ICD-10-CM

## 2024-10-04 DIAGNOSIS — Z00.00 ANNUAL PHYSICAL EXAM: Primary | ICD-10-CM

## 2024-10-04 DIAGNOSIS — R73.01 IMPAIRED FASTING GLUCOSE: ICD-10-CM

## 2024-10-04 DIAGNOSIS — E03.9 ACQUIRED HYPOTHYROIDISM: ICD-10-CM

## 2024-10-04 DIAGNOSIS — E78.2 MIXED HYPERLIPIDEMIA: ICD-10-CM

## 2024-10-04 PROCEDURE — 1170F FXNL STATUS ASSESSED: CPT | Performed by: INTERNAL MEDICINE

## 2024-10-04 PROCEDURE — 90662 IIV NO PRSV INCREASED AG IM: CPT | Performed by: INTERNAL MEDICINE

## 2024-10-04 PROCEDURE — 1126F AMNT PAIN NOTED NONE PRSNT: CPT | Performed by: INTERNAL MEDICINE

## 2024-10-04 PROCEDURE — G0008 ADMIN INFLUENZA VIRUS VAC: HCPCS | Performed by: INTERNAL MEDICINE

## 2024-10-04 PROCEDURE — 3077F SYST BP >= 140 MM HG: CPT | Performed by: INTERNAL MEDICINE

## 2024-10-04 PROCEDURE — 3078F DIAST BP <80 MM HG: CPT | Performed by: INTERNAL MEDICINE

## 2024-10-04 PROCEDURE — G0439 PPPS, SUBSEQ VISIT: HCPCS | Performed by: INTERNAL MEDICINE

## 2024-10-11 RX ORDER — LEVOTHYROXINE SODIUM 75 UG/1
TABLET ORAL
Qty: 90 TABLET | Refills: 0 | Status: SHIPPED | OUTPATIENT
Start: 2024-10-11

## 2024-10-16 ENCOUNTER — TELEPHONE (OUTPATIENT)
Dept: CARDIOLOGY | Facility: CLINIC | Age: 74
End: 2024-10-16
Payer: MEDICARE

## 2024-10-16 NOTE — TELEPHONE ENCOUNTER
Caller: AKANKSHA MENJIVAR    Relationship:PATIENT    Callback number: [phone]   Is it ok to leave a message: [x] Yes [] No    Requested medication for samples: ELIQUIS 5 MG    How much medication does the patient currently have left: LESS THAN 2 WEEKS    Who will be picking up the samples: PATIENT    Do you need information about patient financial assistance for this medication: [] Yes [] No    Additional details provided: [place]  TOMORROW 11-17-24

## 2024-10-17 ENCOUNTER — CLINICAL SUPPORT (OUTPATIENT)
Dept: INTERNAL MEDICINE | Facility: CLINIC | Age: 74
End: 2024-10-17
Payer: MEDICARE

## 2024-10-17 DIAGNOSIS — Z23 NEED FOR VACCINATION: Primary | ICD-10-CM

## 2024-10-17 PROCEDURE — 91320 SARSCV2 VAC 30MCG TRS-SUC IM: CPT | Performed by: INTERNAL MEDICINE

## 2024-10-17 PROCEDURE — 90480 ADMN SARSCOV2 VAC 1/ONLY CMP: CPT | Performed by: INTERNAL MEDICINE

## 2024-10-19 DIAGNOSIS — I10 ESSENTIAL HYPERTENSION: ICD-10-CM

## 2024-10-21 RX ORDER — HYDROCHLOROTHIAZIDE 25 MG/1
25 TABLET ORAL DAILY
Qty: 90 TABLET | Refills: 0 | Status: SHIPPED | OUTPATIENT
Start: 2024-10-21

## 2024-11-11 ENCOUNTER — OFFICE VISIT (OUTPATIENT)
Dept: INTERNAL MEDICINE | Facility: CLINIC | Age: 74
End: 2024-11-11
Payer: MEDICARE

## 2024-11-11 VITALS
OXYGEN SATURATION: 95 % | BODY MASS INDEX: 43.05 KG/M2 | DIASTOLIC BLOOD PRESSURE: 73 MMHG | HEART RATE: 61 BPM | HEIGHT: 65 IN | WEIGHT: 258.4 LBS | TEMPERATURE: 97.4 F | SYSTOLIC BLOOD PRESSURE: 133 MMHG

## 2024-11-11 DIAGNOSIS — J40 BRONCHITIS: Primary | ICD-10-CM

## 2024-11-11 DIAGNOSIS — J02.9 SORE THROAT: ICD-10-CM

## 2024-11-11 DIAGNOSIS — H60.332 ACUTE SWIMMER'S EAR OF LEFT SIDE: ICD-10-CM

## 2024-11-11 LAB
EXPIRATION DATE: NORMAL
EXPIRATION DATE: NORMAL
FLUAV AG UPPER RESP QL IA.RAPID: NOT DETECTED
FLUBV AG UPPER RESP QL IA.RAPID: NOT DETECTED
INTERNAL CONTROL: NORMAL
INTERNAL CONTROL: NORMAL
Lab: NORMAL
Lab: NORMAL
S PYO AG THROAT QL: NEGATIVE
SARS-COV-2 AG UPPER RESP QL IA.RAPID: NOT DETECTED
SARS-COV-2 RNA RESP QL NAA+PROBE: NOT DETECTED

## 2024-11-11 PROCEDURE — 87081 CULTURE SCREEN ONLY: CPT | Performed by: STUDENT IN AN ORGANIZED HEALTH CARE EDUCATION/TRAINING PROGRAM

## 2024-11-11 PROCEDURE — 87880 STREP A ASSAY W/OPTIC: CPT | Performed by: STUDENT IN AN ORGANIZED HEALTH CARE EDUCATION/TRAINING PROGRAM

## 2024-11-11 PROCEDURE — 1126F AMNT PAIN NOTED NONE PRSNT: CPT | Performed by: STUDENT IN AN ORGANIZED HEALTH CARE EDUCATION/TRAINING PROGRAM

## 2024-11-11 PROCEDURE — 87635 SARS-COV-2 COVID-19 AMP PRB: CPT | Performed by: STUDENT IN AN ORGANIZED HEALTH CARE EDUCATION/TRAINING PROGRAM

## 2024-11-11 PROCEDURE — 99213 OFFICE O/P EST LOW 20 MIN: CPT | Performed by: STUDENT IN AN ORGANIZED HEALTH CARE EDUCATION/TRAINING PROGRAM

## 2024-11-11 PROCEDURE — 3075F SYST BP GE 130 - 139MM HG: CPT | Performed by: STUDENT IN AN ORGANIZED HEALTH CARE EDUCATION/TRAINING PROGRAM

## 2024-11-11 PROCEDURE — 3078F DIAST BP <80 MM HG: CPT | Performed by: STUDENT IN AN ORGANIZED HEALTH CARE EDUCATION/TRAINING PROGRAM

## 2024-11-11 PROCEDURE — 87428 SARSCOV & INF VIR A&B AG IA: CPT | Performed by: STUDENT IN AN ORGANIZED HEALTH CARE EDUCATION/TRAINING PROGRAM

## 2024-11-11 RX ORDER — AZITHROMYCIN 250 MG/1
TABLET, FILM COATED ORAL
Qty: 6 TABLET | Refills: 0 | Status: SHIPPED | OUTPATIENT
Start: 2024-11-11

## 2024-11-11 RX ORDER — BROMPHENIRAMINE MALEATE, PSEUDOEPHEDRINE HYDROCHLORIDE, AND DEXTROMETHORPHAN HYDROBROMIDE 2; 30; 10 MG/5ML; MG/5ML; MG/5ML
10 SYRUP ORAL 4 TIMES DAILY PRN
Qty: 118 ML | Refills: 0 | Status: SHIPPED | OUTPATIENT
Start: 2024-11-11

## 2024-11-11 RX ORDER — CIPROFLOXACIN AND DEXAMETHASONE 3; 1 MG/ML; MG/ML
4 SUSPENSION/ DROPS AURICULAR (OTIC) 2 TIMES DAILY
Qty: 7.5 ML | Refills: 0 | Status: SHIPPED | OUTPATIENT
Start: 2024-11-11 | End: 2024-11-18

## 2024-11-11 NOTE — PROGRESS NOTES
"Chief Complaint  Earache (Left ear ), Sore Throat, and Cough    Subjective          Kimberly Dennis presents to [facility]  History of Present Illness    Here with sick symptoms that started yesterday morning.  Reports cough productive of yellow sputum, congestion, sinus pressure, sore throat, left sided otalgia.  No fever, no vomiting or diarrhea.  Voice has been raspy.      Current Outpatient Medications   Medication Instructions    apixaban (ELIQUIS) 5 mg, Oral, 2 Times Daily    azithromycin (Zithromax) 250 MG tablet Take two tabs by mouth day 1 (500 mg).  Take one tab by mouth days 2-5 (250 mg)    brompheniramine-pseudoephedrine-DM 30-2-10 MG/5ML syrup 10 mL, Oral, 4 Times Daily PRN    busPIRone (BUSPAR) 10 mg, Oral, 3 Times Daily PRN    cetirizine (ZYRTEC) 20 mg, Daily    Cholecalciferol 125 MCG (5000 UT) tablet 1 tablet, Every Evening    Ciprodex 0.3-0.1 % otic suspension 4 drops, Left Ear, 2 Times Daily    Diclofenac Sodium (VOLTAREN) 4 g, Topical, 4 Times Daily PRN    famotidine (PEPCID) 40 MG tablet Take 1 tablet by mouth once daily    gabapentin (NEURONTIN) 300 mg, Oral, Nightly    hydroCHLOROthiazide 25 mg, Oral, Daily    levothyroxine (SYNTHROID, LEVOTHROID) 75 MCG tablet Take 1 tablet by mouth once daily    losartan (COZAAR) 100 mg, Oral, Daily    metoprolol succinate XL (TOPROL-XL) 75 mg, Oral, Daily    montelukast (SINGULAIR) 10 MG tablet TAKE 1 TABLET BY MOUTH ONCE DAILY AT NIGHT       The following portions of the patient's history were reviewed and updated as appropriate: allergies, current medications, past family history, past medical history, past social history, past surgical history, and problem list.    Objective   Vital Signs:   /73 (BP Location: Right arm, Patient Position: Sitting, Cuff Size: Large Adult)   Pulse 61   Temp 97.4 °F (36.3 °C) (Temporal)   Ht 165.1 cm (65\")   Wt 117 kg (258 lb 6.4 oz)   SpO2 95%   BMI 43.00 kg/m²   "   BP Readings from Last 3 Encounters:   11/11/24 133/73   10/04/24 145/53   08/01/24 145/57     Wt Readings from Last 3 Encounters:   11/11/24 117 kg (258 lb 6.4 oz)   10/04/24 117 kg (257 lb 8 oz)   08/01/24 115 kg (254 lb)           Physical Exam  Vitals reviewed.   Constitutional:       General: She is not in acute distress.     Appearance: Normal appearance. She is not ill-appearing, toxic-appearing or diaphoretic.   HENT:      Head: Normocephalic and atraumatic.      Right Ear: Tympanic membrane, ear canal and external ear normal.      Left Ear: External ear normal.      Ears:      Comments: Left ear canal erythematous.  Left TM with cloudy fluid noted underneath  Eyes:      Conjunctiva/sclera: Conjunctivae normal.   Cardiovascular:      Rate and Rhythm: Normal rate and regular rhythm.      Pulses: Normal pulses.      Heart sounds: Normal heart sounds. No murmur heard.     No friction rub. No gallop.   Pulmonary:      Effort: Pulmonary effort is normal. No respiratory distress.      Breath sounds: Normal breath sounds. No stridor. No wheezing, rhonchi or rales.   Chest:      Chest wall: No tenderness.   Abdominal:      General: Abdomen is flat.      Palpations: Abdomen is soft. There is no mass.      Tenderness: There is no abdominal tenderness.   Musculoskeletal:      Right lower leg: No edema.      Left lower leg: No edema.   Skin:     General: Skin is warm and dry.   Neurological:      General: No focal deficit present.      Mental Status: She is alert. Mental status is at baseline.   Psychiatric:         Mood and Affect: Mood normal.         Behavior: Behavior normal.         Thought Content: Thought content normal.         Judgment: Judgment normal.        Result Review :   The following data was reviewed by: Ian Rabago MD on 11/11/2024:  Common labs          1/10/2024    15:51 1/11/2024    04:15 8/27/2024    10:52   Common Labs   Glucose 102  102  107    BUN 12  13  14    Creatinine 0.78  0.57  0.62     Sodium 138  137  140    Potassium 4.1  3.8  4.9    Chloride 99  101  99    Calcium 9.6  9.3  10.1    Albumin 4.2  3.6  4.3    Total Bilirubin 0.2  0.4  0.5    Alkaline Phosphatase 87  78  89    AST (SGOT) 18  20  17    ALT (SGPT) 16  13  11    WBC 6.59  7.14  6.49    Hemoglobin 12.2  11.9  11.9    Hematocrit 37.3  36.1  36.2    Platelets 205  178  213    Total Cholesterol   205    Triglycerides   178    HDL Cholesterol   43    LDL Cholesterol    130    Hemoglobin A1C   5.90             Lab Results   Component Value Date    SARSANTIGEN Not Detected 11/11/2024    COVID19 Detected (C) 03/29/2023    RAPFLUA Negative 05/24/2022    RAPFLUB Negative 05/24/2022    FLUAAG Not Detected 11/11/2024    FLUBAG Not Detected 11/11/2024    RAPSCRN Negative 11/11/2024    INR 1.23 (H) 05/23/2022            Assessment and Plan    Diagnoses and all orders for this visit:    1. Bronchitis (Primary)  -     azithromycin (Zithromax) 250 MG tablet; Take two tabs by mouth day 1 (500 mg).  Take one tab by mouth days 2-5 (250 mg)  Dispense: 6 tablet; Refill: 0    2. Sore throat  -     Beta Strep Culture, Throat - Swab, Throat  -     COVID-19,CEPHEID/RIYA,COR/DAGOBERTO/PAD/PRISCA/LAG/ZAIDA IN-HOUSE,NP SWAB IN TRANSPORT MEDIA 1 HR TAT, RT-PCR - Swab, Nasopharynx  -     POC Rapid Strep A  -     POCT SARS-CoV-2 + Flu Antigen NARCISO  -     brompheniramine-pseudoephedrine-DM 30-2-10 MG/5ML syrup; Take 10 mL by mouth 4 (Four) Times a Day As Needed for Allergies, Congestion or Cough.  Dispense: 118 mL; Refill: 0    3. Acute swimmer's ear of left side  -     Ciprodex 0.3-0.1 % otic suspension; Administer 4 drops into the left ear 2 (Two) Times a Day for 7 days.  Dispense: 7.5 mL; Refill: 0          There are no discontinued medications.       Follow Up   Return if symptoms worsen or fail to improve.  Patient was given instructions and counseling regarding her condition or for health maintenance advice. Please see specific information pulled into the AVS if  appropriate.       Ian Rabago MD  11/11/24  12:46 EST

## 2024-11-13 LAB — BACTERIA SPEC AEROBE CULT: NORMAL

## 2024-11-19 DIAGNOSIS — I10 ESSENTIAL HYPERTENSION: ICD-10-CM

## 2024-11-19 RX ORDER — LOSARTAN POTASSIUM 100 MG/1
100 TABLET ORAL DAILY
Qty: 90 TABLET | Refills: 0 | Status: SHIPPED | OUTPATIENT
Start: 2024-11-19

## 2024-11-20 ENCOUNTER — HOSPITAL ENCOUNTER (OUTPATIENT)
Dept: BONE DENSITY | Facility: HOSPITAL | Age: 74
Discharge: HOME | End: 2024-11-20
Admitting: INTERNAL MEDICINE
Payer: MEDICARE

## 2024-11-20 DIAGNOSIS — Z78.0 POSTMENOPAUSAL: ICD-10-CM

## 2024-11-20 PROCEDURE — 77080 DXA BONE DENSITY AXIAL: CPT

## 2024-11-26 DIAGNOSIS — Z47.1 AFTERCARE FOLLOWING LEFT KNEE JOINT REPLACEMENT SURGERY: ICD-10-CM

## 2024-11-26 DIAGNOSIS — Z96.652 AFTERCARE FOLLOWING LEFT KNEE JOINT REPLACEMENT SURGERY: ICD-10-CM

## 2024-11-27 RX ORDER — GABAPENTIN 300 MG/1
300 CAPSULE ORAL NIGHTLY
Qty: 90 CAPSULE | Refills: 0 | Status: SHIPPED | OUTPATIENT
Start: 2024-11-27

## 2024-11-27 NOTE — TELEPHONE ENCOUNTER
Request for Controlled Substance      Date of Request: 11/27/2024  Medication: Gabapentin  Last OV: 11/11/24  Next OV: 2/27/25  Last UDS: 7/13/23  Last Narcotic Consent: none

## 2024-11-29 DIAGNOSIS — I10 ESSENTIAL HYPERTENSION: ICD-10-CM

## 2024-12-02 RX ORDER — METOPROLOL SUCCINATE 50 MG/1
75 TABLET, EXTENDED RELEASE ORAL DAILY
Qty: 135 TABLET | Refills: 0 | Status: SHIPPED | OUTPATIENT
Start: 2024-12-02

## 2024-12-17 DIAGNOSIS — J30.2 SEASONAL ALLERGIC RHINITIS, UNSPECIFIED TRIGGER: ICD-10-CM

## 2024-12-18 RX ORDER — MONTELUKAST SODIUM 10 MG/1
TABLET ORAL
Qty: 90 TABLET | Refills: 0 | Status: SHIPPED | OUTPATIENT
Start: 2024-12-18

## 2024-12-24 ENCOUNTER — HOSPITAL ENCOUNTER (OUTPATIENT)
Dept: MAMMOGRAPHY | Facility: HOSPITAL | Age: 74
Discharge: HOME | End: 2024-12-24
Admitting: INTERNAL MEDICINE
Payer: MEDICARE

## 2024-12-24 DIAGNOSIS — Z12.31 BREAST CANCER SCREENING BY MAMMOGRAM: ICD-10-CM

## 2024-12-24 PROCEDURE — 77067 SCR MAMMO BI INCL CAD: CPT

## 2024-12-24 PROCEDURE — 77063 BREAST TOMOSYNTHESIS BI: CPT

## 2025-01-09 RX ORDER — LEVOTHYROXINE SODIUM 75 UG/1
TABLET ORAL
Qty: 90 TABLET | Refills: 0 | Status: SHIPPED | OUTPATIENT
Start: 2025-01-09

## 2025-01-15 DIAGNOSIS — I10 ESSENTIAL HYPERTENSION: ICD-10-CM

## 2025-01-15 RX ORDER — HYDROCHLOROTHIAZIDE 25 MG/1
25 TABLET ORAL DAILY
Qty: 90 TABLET | Refills: 0 | Status: SHIPPED | OUTPATIENT
Start: 2025-01-15

## 2025-01-27 ENCOUNTER — OFFICE VISIT (OUTPATIENT)
Dept: CARDIOLOGY | Facility: CLINIC | Age: 75
End: 2025-01-27
Payer: MEDICARE

## 2025-01-27 DIAGNOSIS — I48.0 PAROXYSMAL ATRIAL FIBRILLATION: Primary | ICD-10-CM

## 2025-01-27 DIAGNOSIS — I10 ESSENTIAL HYPERTENSION: ICD-10-CM

## 2025-01-27 PROCEDURE — 3077F SYST BP >= 140 MM HG: CPT | Performed by: FAMILY MEDICINE

## 2025-01-27 PROCEDURE — 99213 OFFICE O/P EST LOW 20 MIN: CPT | Performed by: FAMILY MEDICINE

## 2025-01-27 PROCEDURE — 3078F DIAST BP <80 MM HG: CPT | Performed by: FAMILY MEDICINE

## 2025-01-27 NOTE — PROGRESS NOTES
Chief Complaint  Atrial Fibrillation and Follow-up    Subjective        History of Present Illness  Kimberly Dennis presents to [facility]   Ms. Dennis is a 74-year-old female coming in for routine cardiac follow-up.  She is doing well, and has no new symptoms visit today.  Still he has no complaints of chest pains, shortness of breath or palpitations.  Tolerating all medications including anticoagulation without any issues.    Past History:     (1) Paroxysmal atrial fibrillation, s/o cardioversion in ER in 2019.  Recurrence of A-fib noted on ER visit on 12/21/2023, underwent successful cardioversion in the ER.  (2) Hypertension. (3) Hypothyroidism.     Past Medical History:   Diagnosis Date    Acid reflux     Acute deep vein thrombosis (DVT) of calf muscle vein of left lower extremity 06/09/2022    Anemia     Anesthesia complication     Arthritis     Arthritis of back     Atrial fibrillation     Bladder disorder     Colon polyp     GI bleed     History of total knee arthroplasty, right 09/08/2016    HTN (hypertension)     Limb swelling     Low back strain     Medial meniscus tear 09/12/2017    Mitral valve prolapse     Primary osteoarthritis of left knee 09/12/2017    Seasonal allergies     Sleep apnea     SOB (shortness of breath)     Tear of medial cartilage or meniscus of knee, current 09/12/2017    Thoracic outlet syndrome     Thyroid disorder        Allergies   Allergen Reactions    Amoxicillin-Pot Clavulanate Itching    Hydrocodone-Acetaminophen Itching    Hydrocodone Itching and Rash    Iodine Rash    Latex Rash    Metal Rash     YELLOW GOLD CAUSES RASH     Shellfish Allergy Rash        Past Surgical History:   Procedure Laterality Date    BLADDER REPAIR      CHOLECYSTECTOMY      COLONOSCOPY  2014    CAUSEY    COLONOSCOPY      DR. EDGE    ELECTRICAL CARDIOVERSION  12/21/2023    HYSTERECTOMY      INTRAOCULAR LENS INSERTION      BOTH    JOINT REPLACEMENT Right     TKR    KNEE  ARTHROSCOPY Left     TOTAL KNEE ARTHROPLASTY Left 06/03/2022    Procedure: LEFT TOTAL KNEE ARTHROPLASTY WITH COMPUTER NAVIGATION;  Surgeon: Abdoulaye Paz MD;  Location: [facility];  Service: Orthopedics;  Laterality: Left;        Social History  She  reports that she has never smoked. She has never been exposed to tobacco smoke. She has never used smokeless tobacco. She reports that she does not drink alcohol and does not use drugs.    Family History  Her family history includes Anesthesia problems in her mother; Arthritis in her mother and sister; Diabetes in her brother; Heart disease in her brother and mother.       Current Outpatient Medications on File Prior to Visit   Medication Sig    apixaban (Eliquis) 5 MG tablet tablet Take 1 tablet by mouth 2 (Two) Times a Day.    azithromycin (Zithromax) 250 MG tablet Take two tabs by mouth day 1 (500 mg).  Take one tab by mouth days 2-5 (250 mg)    brompheniramine-pseudoephedrine-DM 30-2-10 MG/5ML syrup Take 10 mL by mouth 4 (Four) Times a Day As Needed for Allergies, Congestion or Cough.    busPIRone (BUSPAR) 10 MG tablet Take 1 tablet by mouth 3 (Three) Times a Day As Needed (anxiety).    cetirizine (zyrTEC) 10 MG tablet Take 2 tablets by mouth Daily.    Cholecalciferol 125 MCG (5000 UT) tablet Take 1 tablet by mouth Every Evening.    Diclofenac Sodium (VOLTAREN) 1 % gel gel Apply 4 g topically to the appropriate area as directed 4 (Four) Times a Day As Needed (pain).    famotidine (PEPCID) 40 MG tablet Take 1 tablet by mouth once daily (Patient taking differently: Take 1 tablet by mouth Daily.)    gabapentin (NEURONTIN) 300 MG capsule TAKE 1 CAPSULE BY MOUTH ONCE DAILY AT NIGHT    hydroCHLOROthiazide 25 MG tablet Take 1 tablet by mouth once daily    levothyroxine (SYNTHROID, LEVOTHROID) 75 MCG tablet Take 1 tablet by mouth once daily    metoprolol succinate XL (TOPROL-XL) 50 MG 24 hr tablet TAKE 1 & 1/2 (ONE & ONE-HALF) TABLETS BY MOUTH ONCE DAILY     "montelukast (SINGULAIR) 10 MG tablet TAKE 1 TABLET BY MOUTH ONCE DAILY AT NIGHT     No current facility-administered medications on file prior to visit.         Review of Systems   Constitutional:  Negative for fatigue.   Respiratory:  Negative for cough, chest tightness and shortness of breath.    Cardiovascular:  Negative for chest pain, palpitations and leg swelling.   Gastrointestinal:  Negative for nausea and vomiting.   Neurological:  Negative for dizziness and syncope.        Objective   Vitals:    01/27/25 1437 01/27/25 1445   BP: 152/79 144/76   Pulse: 61    Weight: 116 kg (255 lb)    Height: 165.1 cm (65\")          Physical Exam  General : Alert, awake, no acute distress  Neck : Supple, no carotid bruit, no jugular venous distention  CVS : Regular rate and rhythm, no murmur, no rubs or gallops  Lungs: Clear to auscultation bilaterally, no crackles or rhonchi  Abdomen: Soft, nontender, bowel sounds active  Extremities: Warm, well-perfused, no pedal edema      Result Review     The following data was reviewed by Sandra Bradshaw, [place]  No results found for: \"PROBNP\"  CMP          8/27/2024    10:52   CMP   Glucose 107    BUN 14    Creatinine 0.62    EGFR 94.8    Sodium 140    Potassium 4.9    Chloride 99    Calcium 10.1    Total Protein 7.9    Albumin 4.3    Globulin 3.6    Total Bilirubin 0.5    Alkaline Phosphatase 89    AST (SGOT) 17    ALT (SGPT) 11    Albumin/Globulin Ratio 1.2    BUN/Creatinine Ratio 22.6    Anion Gap 10.2      CBC w/diff          8/27/2024    10:52   CBC w/Diff   WBC 6.49    RBC 4.18    Hemoglobin 11.9    Hematocrit 36.2    MCV 86.6    MCH 28.5    MCHC 32.9    RDW 13.0    Platelets 213    Neutrophil Rel % 57.0    Immature Granulocyte Rel % 0.3    Lymphocyte Rel % 29.7    Monocyte Rel % 7.7    Eosinophil Rel % 4.8    Basophil Rel % 0.5       Lab Results   Component Value Date    TSH 1.720 08/27/2024      Lab Results   Component Value Date    FREET4 1.4 12/20/2019      No results found " "for: \"DDIMERQUANT\"  Magnesium   Date Value Ref Range Status   01/11/2024 1.8 1.6 - 2.4 mg/dL Final      No results found for: \"DIGOXIN\"   Lab Results   Component Value Date    TROPONINT 10 01/11/2024           Lipid Panel          8/27/2024    10:52   Lipid Panel   Total Cholesterol 205    Triglycerides 178    HDL Cholesterol 43    VLDL Cholesterol 32    LDL Cholesterol  130    LDL/HDL Ratio 2.94          Results for orders placed in visit on 03/02/23    Adult Transthoracic Echo Complete W/ Cont if Necessary Per Protocol    Interpretation Summary    Left ventricular systolic function is normal. Left ventricular ejection fraction appears to be 61 - 65%.    There is diastolic dysfunction of the left ventricle.    There is mild mitral regurgitation    There is mild tricuspid regurgitation.    Results for orders placed during the hospital encounter of 01/10/24    Stress Test With Myocardial Perfusion One Day    Interpretation Summary    Myocardial perfusion imaging indicates a normal myocardial perfusion study with no evidence of ischemia. Impressions are consistent with a low risk study.    Left ventricular ejection fraction is normal (Calculated EF = 67%).    GI artifact is present.    There was no chest pain with regadenoson infusion.    Findings consistent with a normal ECG stress test.           Assessment and Plan   Diagnoses and all orders for this visit:    1. Paroxysmal atrial fibrillation (Primary)  Assessment & Plan:  She is in normal rhythm on exam, she has no complaints of palpitations.  Continue metoprolol for rate and rhythm control, and chronic anticoagulation for CVA protection with Eliquis.      2. Essential hypertension  Assessment & Plan:  Blood pressure in office is borderline, but generally well-controlled at home.  We will continue current regiment with losartan, metoprolol, and hydrochlorothiazide.                  Follow Up   Return in about 6 months (around 7/27/2025) for with " Kelly.    Patient was given instructions and counseling regarding her condition or for health maintenance advice. Please see specific information pulled into the AVS if appropriate.     Signed,  Sandra Bradshaw, APRN  01/27/2025     Dictated Utilizing Dragon Dictation: Please note that portions of this note were completed with a voice recognition program.  Part of this note may be an electronic transcription/translation of spoken language to printed text using the Dragon Dictation System.

## 2025-02-17 DIAGNOSIS — I10 ESSENTIAL HYPERTENSION: ICD-10-CM

## 2025-02-17 RX ORDER — LOSARTAN POTASSIUM 100 MG/1
100 TABLET ORAL DAILY
Qty: 90 TABLET | Refills: 0 | Status: SHIPPED | OUTPATIENT
Start: 2025-02-17

## 2025-02-23 VITALS
WEIGHT: 255 LBS | BODY MASS INDEX: 42.49 KG/M2 | DIASTOLIC BLOOD PRESSURE: 76 MMHG | SYSTOLIC BLOOD PRESSURE: 144 MMHG | HEIGHT: 65 IN | HEART RATE: 61 BPM

## 2025-02-24 NOTE — ASSESSMENT & PLAN NOTE
She is in normal rhythm on exam, she has no complaints of palpitations.  Continue metoprolol for rate and rhythm control, and chronic anticoagulation for CVA protection with Eliquis.

## 2025-02-24 NOTE — ASSESSMENT & PLAN NOTE
Blood pressure in office is borderline, but generally well-controlled at home.  We will continue current regiment with losartan, metoprolol, and hydrochlorothiazide.

## 2025-02-26 NOTE — PROGRESS NOTES
"Chief Complaint  Hypertension (Follow up )    Subjective          Kimberly Dennis presents to [facility]  History of Present Illness  Impaired glucose tolerance- concerned with HGbA1c given recent dietary noncomplaince. Weight has remained stable   Patient reports gabapentin continue to work well to help control her pain. She would like to continue dosage regimen. It allows her to stay more active.   Hypertension- patient denies headaches, dizziness, chest pain.   Hypothyroid- due for recheck.     Current Outpatient Medications   Medication Instructions    apixaban (ELIQUIS) 5 mg, Oral, 2 Times Daily    cetirizine (ZYRTEC) 20 mg, Daily    Cholecalciferol 125 MCG (5000 UT) tablet 1 tablet, Every Evening    famotidine (PEPCID) 40 MG tablet Take 1 tablet by mouth once daily    gabapentin (NEURONTIN) 300 mg, Oral, Nightly    hydroCHLOROthiazide 25 mg, Oral, Daily    levothyroxine (SYNTHROID, LEVOTHROID) 75 mcg, Oral, Daily    losartan (COZAAR) 100 mg, Oral, Daily    metoprolol succinate XL (TOPROL-XL) 75 mg, Oral, Daily    montelukast (SINGULAIR) 10 mg, Oral, Nightly       The following portions of the patient's history were reviewed and updated as appropriate: allergies, current medications, past family history, past medical history, past social history, past surgical history, and problem list.    Objective   Vital Signs:   /70 (BP Location: Right arm, Patient Position: Sitting, Cuff Size: Large Adult)   Pulse 57   Temp 96.9 °F (36.1 °C) (Temporal)   Ht 165.1 cm (65\")   Wt 116 kg (256 lb 9.6 oz)   SpO2 95%   BMI 42.70 kg/m²     BP Readings from Last 3 Encounters:   03/24/25 155/72   02/27/25 132/70   01/27/25 144/76     Wt Readings from Last 3 Encounters:   03/24/25 117 kg (257 lb 1.6 oz)   02/27/25 116 kg (256 lb 9.6 oz)   01/27/25 116 kg (255 lb)     Physical Exam     Appearance: No acute distress, well-nourished  Head: normocephalic, atraumatic  Eyes: " extraocular movements intact, no scleral icterus, no conjunctival injection  Ears, Nose, and Throat: external ears normal, nares patent, moist mucous membranes  Cardiovascular: regular rate and rhythm. no murmurs, rubs, or gallops. no edema  Respiratory: breathing comfortably, symmetric chest rise, clear to auscultation bilaterally. No wheezes, rales, or rhonchi.  Neuro: alert and oriented to time, place, and person. Normal gait  Psych: normal mood and affect     Result Review :   The following data was reviewed by: Milan Coppola Jr, MD on 02/27/2025:  Common labs          8/27/2024    10:52 2/27/2025    11:47   Common Labs   Glucose 107  84    BUN 14  14    Creatinine 0.62  0.56    Sodium 140  136    Potassium 4.9  3.8    Chloride 99  99    Calcium 10.1  9.4    Albumin 4.3  4.1    Total Bilirubin 0.5  0.3    Alkaline Phosphatase 89  77    AST (SGOT) 17  22    ALT (SGPT) 11  9    WBC 6.49  6.41    Hemoglobin 11.9  11.5    Hematocrit 36.2  36.2    Platelets 213  191    Total Cholesterol 205  188    Triglycerides 178  206    HDL Cholesterol 43  43    LDL Cholesterol  130  109    Hemoglobin A1C 5.90  5.90        Lab Results   Component Value Date    SARSANTIGEN Not Detected 11/11/2024    COVID19 Not Detected 11/11/2024    RAPFLUA Negative 05/24/2022    RAPFLUB Negative 05/24/2022    FLUAAG Not Detected 11/11/2024    FLUBAG Not Detected 11/11/2024    RAPSCRN Negative 03/24/2025    INR 1.23 (H) 05/23/2022       Last Urine Toxicity  More data may exist         Latest Ref Rng & Units 2/27/2025 7/13/2023   LAST URINE TOXICITY RESULTS   Amphetamine, Urine Qual Negative Negative  Negative    Barbiturates Screen, Urine Negative Negative  Negative    Benzodiazepine Screen, Urine Negative Negative  Negative    Buprenorphine, Screen, Urine Negative Negative  Negative    Cocaine Screen, Urine Negative Negative  Negative    Methadone Screen , Urine Negative Negative  Negative    Methamphetamine, Ur Negative Negative   Negative        Assessment and Plan    Diagnoses and all orders for this visit:    1. Essential hypertension (Primary)  Comments:  goal BP <130/80. cont current med regimen  Orders:  -     CBC & Differential  -     Comprehensive Metabolic Panel    2. Encounter for long-term (current) use of medications  -     POC Medline 12 Panel Urine Drug Screen    3. Mixed hyperlipidemia  Comments:  goal LDL <55.  Orders:  -     Lipid Panel    4. Paroxysmal atrial fibrillation  Overview:  Paroxysmal atrial fibrillation, s/o cardioversion in ER in 2019.  Recurrence of A-fib noted on ER visit on 12/21/2023, underwent successful cardioversion in the ER.       5. Acquired hypothyroidism  -     TSH Rfx On Abnormal To Free T4    6. Impaired fasting glucose  -     Hemoglobin A1c    7. Vitamin D deficiency  -     Vitamin D,25-Hydroxy        Medications Discontinued During This Encounter   Medication Reason    busPIRone (BUSPAR) 10 MG tablet *Therapy completed    Diclofenac Sodium (VOLTAREN) 1 % gel gel *Therapy completed    azithromycin (Zithromax) 250 MG tablet *Therapy completed    brompheniramine-pseudoephedrine-DM 30-2-10 MG/5ML syrup *Therapy completed        Follow Up   No follow-ups on file.  Patient was given instructions and counseling regarding her condition or for health maintenance advice. Please see specific information pulled into the AVS if appropriate.       Milan Coppola Jr, MD  04/09/25  10:48 EDT

## 2025-02-27 ENCOUNTER — OFFICE VISIT (OUTPATIENT)
Dept: INTERNAL MEDICINE | Facility: CLINIC | Age: 75
End: 2025-02-27
Payer: MEDICARE

## 2025-02-27 ENCOUNTER — TELEPHONE (OUTPATIENT)
Dept: INTERNAL MEDICINE | Facility: CLINIC | Age: 75
End: 2025-02-27

## 2025-02-27 VITALS
HEIGHT: 65 IN | TEMPERATURE: 96.9 F | WEIGHT: 256.6 LBS | DIASTOLIC BLOOD PRESSURE: 70 MMHG | HEART RATE: 57 BPM | SYSTOLIC BLOOD PRESSURE: 132 MMHG | BODY MASS INDEX: 42.75 KG/M2 | OXYGEN SATURATION: 95 %

## 2025-02-27 DIAGNOSIS — I10 ESSENTIAL HYPERTENSION: Primary | ICD-10-CM

## 2025-02-27 DIAGNOSIS — E78.2 MIXED HYPERLIPIDEMIA: ICD-10-CM

## 2025-02-27 DIAGNOSIS — Z79.899 ENCOUNTER FOR LONG-TERM (CURRENT) USE OF MEDICATIONS: ICD-10-CM

## 2025-02-27 DIAGNOSIS — E55.9 VITAMIN D DEFICIENCY: ICD-10-CM

## 2025-02-27 DIAGNOSIS — E03.9 ACQUIRED HYPOTHYROIDISM: ICD-10-CM

## 2025-02-27 DIAGNOSIS — R73.01 IMPAIRED FASTING GLUCOSE: ICD-10-CM

## 2025-02-27 DIAGNOSIS — I48.0 PAROXYSMAL ATRIAL FIBRILLATION: ICD-10-CM

## 2025-02-27 LAB
25(OH)D3 SERPL-MCNC: 59.8 NG/ML (ref 30–100)
ALBUMIN SERPL-MCNC: 4.1 G/DL (ref 3.5–5.2)
ALBUMIN/GLOB SERPL: 1.3 G/DL
ALP SERPL-CCNC: 77 U/L (ref 39–117)
ALT SERPL W P-5'-P-CCNC: 9 U/L (ref 1–33)
AMPHET+METHAMPHET UR QL: NEGATIVE
AMPHETAMINE INTERNAL CONTROL: NORMAL
AMPHETAMINES UR QL: NEGATIVE
ANION GAP SERPL CALCULATED.3IONS-SCNC: 9.4 MMOL/L (ref 5–15)
AST SERPL-CCNC: 22 U/L (ref 1–32)
BARBITURATE INTERNAL CONTROL: NORMAL
BARBITURATES UR QL SCN: NEGATIVE
BASOPHILS # BLD AUTO: 0.03 10*3/MM3 (ref 0–0.2)
BASOPHILS NFR BLD AUTO: 0.5 % (ref 0–1.5)
BENZODIAZ UR QL SCN: NEGATIVE
BENZODIAZEPINE INTERNAL CONTROL: NORMAL
BILIRUB SERPL-MCNC: 0.3 MG/DL (ref 0–1.2)
BUN SERPL-MCNC: 14 MG/DL (ref 8–23)
BUN/CREAT SERPL: 25 (ref 7–25)
BUPRENORPHINE INTERNAL CONTROL: NORMAL
BUPRENORPHINE SERPL-MCNC: NEGATIVE NG/ML
CALCIUM SPEC-SCNC: 9.4 MG/DL (ref 8.6–10.5)
CANNABINOIDS SERPL QL: NEGATIVE
CHLORIDE SERPL-SCNC: 99 MMOL/L (ref 98–107)
CHOLEST SERPL-MCNC: 188 MG/DL (ref 0–200)
CO2 SERPL-SCNC: 27.6 MMOL/L (ref 22–29)
COCAINE INTERNAL CONTROL: NORMAL
COCAINE UR QL: NEGATIVE
CREAT SERPL-MCNC: 0.56 MG/DL (ref 0.57–1)
DEPRECATED RDW RBC AUTO: 44.5 FL (ref 37–54)
EGFRCR SERPLBLD CKD-EPI 2021: 97.1 ML/MIN/1.73
EOSINOPHIL # BLD AUTO: 0.2 10*3/MM3 (ref 0–0.4)
EOSINOPHIL NFR BLD AUTO: 3.1 % (ref 0.3–6.2)
ERYTHROCYTE [DISTWIDTH] IN BLOOD BY AUTOMATED COUNT: 13.4 % (ref 12.3–15.4)
EXPIRATION DATE: 0
GLOBULIN UR ELPH-MCNC: 3.2 GM/DL
GLUCOSE SERPL-MCNC: 84 MG/DL (ref 65–99)
HBA1C MFR BLD: 5.9 % (ref 4.8–5.6)
HCT VFR BLD AUTO: 36.2 % (ref 34–46.6)
HDLC SERPL-MCNC: 43 MG/DL (ref 40–60)
HGB BLD-MCNC: 11.5 G/DL (ref 12–15.9)
IMM GRANULOCYTES # BLD AUTO: 0.02 10*3/MM3 (ref 0–0.05)
IMM GRANULOCYTES NFR BLD AUTO: 0.3 % (ref 0–0.5)
LDLC SERPL CALC-MCNC: 109 MG/DL (ref 0–100)
LDLC/HDLC SERPL: 2.41 {RATIO}
LYMPHOCYTES # BLD AUTO: 2.26 10*3/MM3 (ref 0.7–3.1)
LYMPHOCYTES NFR BLD AUTO: 35.3 % (ref 19.6–45.3)
Lab: 0
MCH RBC QN AUTO: 28.6 PG (ref 26.6–33)
MCHC RBC AUTO-ENTMCNC: 31.8 G/DL (ref 31.5–35.7)
MCV RBC AUTO: 90 FL (ref 79–97)
MDMA (ECSTASY) INTERNAL CONTROL: NORMAL
MDMA UR QL SCN: NEGATIVE
METHADONE INTERNAL CONTROL: NORMAL
METHADONE UR QL SCN: NEGATIVE
METHAMPHETAMINE INTERNAL CONTROL: NORMAL
MONOCYTES # BLD AUTO: 0.57 10*3/MM3 (ref 0.1–0.9)
MONOCYTES NFR BLD AUTO: 8.9 % (ref 5–12)
MORPHINE INTERNAL CONTROL: NORMAL
MORPHINE/OPIATES SCREEN, URINE: NEGATIVE
NEUTROPHILS NFR BLD AUTO: 3.33 10*3/MM3 (ref 1.7–7)
NEUTROPHILS NFR BLD AUTO: 51.9 % (ref 42.7–76)
NRBC BLD AUTO-RTO: 0 /100 WBC (ref 0–0.2)
OXYCODONE INTERNAL CONTROL: NORMAL
OXYCODONE UR QL SCN: NEGATIVE
PCP UR QL SCN: NEGATIVE
PHENCYCLIDINE INTERNAL CONTROL: NORMAL
PLATELET # BLD AUTO: 191 10*3/MM3 (ref 140–450)
PMV BLD AUTO: 10.4 FL (ref 6–12)
POTASSIUM SERPL-SCNC: 3.8 MMOL/L (ref 3.5–5.2)
PROT SERPL-MCNC: 7.3 G/DL (ref 6–8.5)
RBC # BLD AUTO: 4.02 10*6/MM3 (ref 3.77–5.28)
SODIUM SERPL-SCNC: 136 MMOL/L (ref 136–145)
THC INTERNAL CONTROL: NORMAL
TRIGL SERPL-MCNC: 206 MG/DL (ref 0–150)
TSH SERPL DL<=0.05 MIU/L-ACNC: 2.2 UIU/ML (ref 0.27–4.2)
VLDLC SERPL-MCNC: 36 MG/DL (ref 5–40)
WBC NRBC COR # BLD AUTO: 6.41 10*3/MM3 (ref 3.4–10.8)

## 2025-02-27 PROCEDURE — 85025 COMPLETE CBC W/AUTO DIFF WBC: CPT | Performed by: INTERNAL MEDICINE

## 2025-02-27 PROCEDURE — 82306 VITAMIN D 25 HYDROXY: CPT | Performed by: INTERNAL MEDICINE

## 2025-02-27 PROCEDURE — 80053 COMPREHEN METABOLIC PANEL: CPT | Performed by: INTERNAL MEDICINE

## 2025-02-27 PROCEDURE — 80061 LIPID PANEL: CPT | Performed by: INTERNAL MEDICINE

## 2025-02-27 PROCEDURE — 83036 HEMOGLOBIN GLYCOSYLATED A1C: CPT | Performed by: INTERNAL MEDICINE

## 2025-02-27 PROCEDURE — 84443 ASSAY THYROID STIM HORMONE: CPT | Performed by: INTERNAL MEDICINE

## 2025-02-27 NOTE — TELEPHONE ENCOUNTER
Pt  was just in the office and stated that she would like to get off the Gabapentin 300MG.   She was wondering if she could just stop it or does she need to be weaned off.

## 2025-02-28 NOTE — TELEPHONE ENCOUNTER
I would recommend weaning off.  I would take gabapentin every other day for approximately 2 weeks before stopping.

## 2025-02-28 NOTE — TELEPHONE ENCOUNTER
Tried to call patient no answer- couldn't leave Vm     OKAY FOR HUB TO READ/ADVISE     I would recommend weaning off.  I would take gabapentin every other day for approximately 2 weeks before stopping.

## 2025-03-04 ENCOUNTER — TELEPHONE (OUTPATIENT)
Dept: INTERNAL MEDICINE | Facility: CLINIC | Age: 75
End: 2025-03-04
Payer: MEDICARE

## 2025-03-04 NOTE — TELEPHONE ENCOUNTER
----- Message from Milan Coppola sent at 2/28/2025  5:02 PM EST -----  Labs overall look good.     Elevated triglycerides, which are the storage form of sugar - recommend lower carbohydrate/sugar intake.     LDL is elevated - this has been associated with increased risk of cardiovascular disease including heart attacks and strokes. Would recommend low cholesterol diet to reduce.

## 2025-03-05 DIAGNOSIS — Z47.1 AFTERCARE FOLLOWING LEFT KNEE JOINT REPLACEMENT SURGERY: ICD-10-CM

## 2025-03-05 DIAGNOSIS — Z96.652 AFTERCARE FOLLOWING LEFT KNEE JOINT REPLACEMENT SURGERY: ICD-10-CM

## 2025-03-05 RX ORDER — GABAPENTIN 300 MG/1
300 CAPSULE ORAL NIGHTLY
Qty: 90 CAPSULE | Refills: 0 | Status: SHIPPED | OUTPATIENT
Start: 2025-03-05

## 2025-03-05 NOTE — TELEPHONE ENCOUNTER
Refill Request for Controlled Substance    Date of Request: 3/5/2025  Medication Requested: Gabapentin   Last UDS: 02/27/2025  Last Consent: 07/18/2023  Last OV: 02/27/2025  Next OV: 09/02/2025

## 2025-03-13 DIAGNOSIS — J30.2 SEASONAL ALLERGIC RHINITIS, UNSPECIFIED TRIGGER: ICD-10-CM

## 2025-03-13 RX ORDER — MONTELUKAST SODIUM 10 MG/1
10 TABLET ORAL NIGHTLY
Qty: 90 TABLET | Refills: 0 | Status: SHIPPED | OUTPATIENT
Start: 2025-03-13

## 2025-03-21 DIAGNOSIS — I10 ESSENTIAL HYPERTENSION: ICD-10-CM

## 2025-03-21 RX ORDER — METOPROLOL SUCCINATE 50 MG/1
75 TABLET, EXTENDED RELEASE ORAL DAILY
Qty: 135 TABLET | Refills: 0 | Status: SHIPPED | OUTPATIENT
Start: 2025-03-21

## 2025-03-25 ENCOUNTER — PATIENT MESSAGE (OUTPATIENT)
Dept: INTERNAL MEDICINE | Facility: CLINIC | Age: 75
End: 2025-03-25
Payer: MEDICARE

## 2025-04-07 RX ORDER — LEVOTHYROXINE SODIUM 75 UG/1
75 TABLET ORAL DAILY
Qty: 90 TABLET | Refills: 0 | Status: SHIPPED | OUTPATIENT
Start: 2025-04-07

## 2025-04-11 DIAGNOSIS — I10 ESSENTIAL HYPERTENSION: ICD-10-CM

## 2025-04-11 RX ORDER — HYDROCHLOROTHIAZIDE 25 MG/1
25 TABLET ORAL DAILY
Qty: 90 TABLET | Refills: 0 | Status: SHIPPED | OUTPATIENT
Start: 2025-04-11

## 2025-04-30 ENCOUNTER — APPOINTMENT (OUTPATIENT)
Dept: GENERAL RADIOLOGY | Facility: HOSPITAL | Age: 75
End: 2025-04-30
Payer: MEDICARE

## 2025-04-30 ENCOUNTER — HOSPITAL ENCOUNTER (INPATIENT)
Facility: HOSPITAL | Age: 75
LOS: 1 days | Discharge: HOME | End: 2025-05-02
Attending: EMERGENCY MEDICINE | Admitting: FAMILY MEDICINE
Payer: MEDICARE

## 2025-04-30 DIAGNOSIS — I49.9 CARDIAC ARRHYTHMIA, UNSPECIFIED: ICD-10-CM

## 2025-04-30 DIAGNOSIS — I48.91 ATRIAL FIBRILLATION WITH RVR: Primary | ICD-10-CM

## 2025-04-30 LAB
ALBUMIN SERPL-MCNC: 4.2 G/DL (ref 3.5–5.2)
ALBUMIN/GLOB SERPL: 1.1 G/DL
ALP SERPL-CCNC: 97 U/L (ref 39–117)
ALT SERPL W P-5'-P-CCNC: 15 U/L (ref 1–33)
ANION GAP SERPL CALCULATED.3IONS-SCNC: 12.6 MMOL/L (ref 5–15)
AST SERPL-CCNC: 19 U/L (ref 1–32)
BASOPHILS # BLD AUTO: 0.03 10*3/MM3 (ref 0–0.2)
BASOPHILS NFR BLD AUTO: 0.4 % (ref 0–1.5)
BILIRUB SERPL-MCNC: 0.2 MG/DL (ref 0–1.2)
BUN SERPL-MCNC: 16 MG/DL (ref 8–23)
BUN/CREAT SERPL: 18.8 (ref 7–25)
CALCIUM SPEC-SCNC: 9.8 MG/DL (ref 8.6–10.5)
CHLORIDE SERPL-SCNC: 100 MMOL/L (ref 98–107)
CO2 SERPL-SCNC: 28.4 MMOL/L (ref 22–29)
CREAT SERPL-MCNC: 0.85 MG/DL (ref 0.57–1)
D-LACTATE SERPL-SCNC: 1.2 MMOL/L (ref 0.5–2)
DEPRECATED RDW RBC AUTO: 44.3 FL (ref 37–54)
EGFRCR SERPLBLD CKD-EPI 2021: 72.4 ML/MIN/1.73
EOSINOPHIL # BLD AUTO: 0.64 10*3/MM3 (ref 0–0.4)
EOSINOPHIL NFR BLD AUTO: 7.9 % (ref 0.3–6.2)
ERYTHROCYTE [DISTWIDTH] IN BLOOD BY AUTOMATED COUNT: 13.5 % (ref 12.3–15.4)
GEN 5 1HR TROPONIN T REFLEX: 12 NG/L
GLOBULIN UR ELPH-MCNC: 3.8 GM/DL
GLUCOSE SERPL-MCNC: 106 MG/DL (ref 65–99)
HCT VFR BLD AUTO: 38.7 % (ref 34–46.6)
HGB BLD-MCNC: 12.8 G/DL (ref 12–15.9)
HOLD SPECIMEN: NORMAL
HOLD SPECIMEN: NORMAL
IMM GRANULOCYTES # BLD AUTO: 0.02 10*3/MM3 (ref 0–0.05)
IMM GRANULOCYTES NFR BLD AUTO: 0.2 % (ref 0–0.5)
LYMPHOCYTES # BLD AUTO: 2.24 10*3/MM3 (ref 0.7–3.1)
LYMPHOCYTES NFR BLD AUTO: 27.6 % (ref 19.6–45.3)
MCH RBC QN AUTO: 29.6 PG (ref 26.6–33)
MCHC RBC AUTO-ENTMCNC: 33.1 G/DL (ref 31.5–35.7)
MCV RBC AUTO: 89.6 FL (ref 79–97)
MONOCYTES # BLD AUTO: 0.56 10*3/MM3 (ref 0.1–0.9)
MONOCYTES NFR BLD AUTO: 6.9 % (ref 5–12)
NEUTROPHILS NFR BLD AUTO: 4.64 10*3/MM3 (ref 1.7–7)
NEUTROPHILS NFR BLD AUTO: 57 % (ref 42.7–76)
NRBC BLD AUTO-RTO: 0 /100 WBC (ref 0–0.2)
NT-PROBNP SERPL-MCNC: 193.8 PG/ML (ref 0–900)
PLATELET # BLD AUTO: 213 10*3/MM3 (ref 140–450)
PMV BLD AUTO: 9.8 FL (ref 6–12)
POTASSIUM SERPL-SCNC: 4 MMOL/L (ref 3.5–5.2)
PROT SERPL-MCNC: 8 G/DL (ref 6–8.5)
RBC # BLD AUTO: 4.32 10*6/MM3 (ref 3.77–5.28)
SODIUM SERPL-SCNC: 141 MMOL/L (ref 136–145)
TROPONIN T NUMERIC DELTA: 3 NG/L
TROPONIN T SERPL HS-MCNC: 9 NG/L
WBC NRBC COR # BLD AUTO: 8.13 10*3/MM3 (ref 3.4–10.8)
WHOLE BLOOD HOLD COAG: NORMAL
WHOLE BLOOD HOLD SPECIMEN: NORMAL

## 2025-04-30 PROCEDURE — 83880 ASSAY OF NATRIURETIC PEPTIDE: CPT

## 2025-04-30 PROCEDURE — 71045 X-RAY EXAM CHEST 1 VIEW: CPT

## 2025-04-30 PROCEDURE — 80053 COMPREHEN METABOLIC PANEL: CPT

## 2025-04-30 PROCEDURE — 93005 ELECTROCARDIOGRAM TRACING: CPT | Performed by: EMERGENCY MEDICINE

## 2025-04-30 PROCEDURE — 36415 COLL VENOUS BLD VENIPUNCTURE: CPT

## 2025-04-30 PROCEDURE — 83605 ASSAY OF LACTIC ACID: CPT

## 2025-04-30 PROCEDURE — 93005 ELECTROCARDIOGRAM TRACING: CPT

## 2025-04-30 PROCEDURE — 84484 ASSAY OF TROPONIN QUANT: CPT

## 2025-04-30 PROCEDURE — 93010 ELECTROCARDIOGRAM REPORT: CPT | Performed by: INTERNAL MEDICINE

## 2025-04-30 PROCEDURE — 87040 BLOOD CULTURE FOR BACTERIA: CPT

## 2025-04-30 PROCEDURE — 25810000003 SODIUM CHLORIDE 0.9 % SOLUTION: Performed by: EMERGENCY MEDICINE

## 2025-04-30 PROCEDURE — 85025 COMPLETE CBC W/AUTO DIFF WBC: CPT

## 2025-04-30 PROCEDURE — 84484 ASSAY OF TROPONIN QUANT: CPT | Performed by: EMERGENCY MEDICINE

## 2025-04-30 PROCEDURE — 99291 CRITICAL CARE FIRST HOUR: CPT

## 2025-04-30 RX ORDER — DILTIAZEM HYDROCHLORIDE 5 MG/ML
10 INJECTION INTRAVENOUS ONCE
Status: COMPLETED | OUTPATIENT
Start: 2025-04-30 | End: 2025-04-30

## 2025-04-30 RX ORDER — SODIUM CHLORIDE 0.9 % (FLUSH) 0.9 %
10 SYRINGE (ML) INJECTION AS NEEDED
Status: DISCONTINUED | OUTPATIENT
Start: 2025-04-30 | End: 2025-05-02 | Stop reason: HOSPADM

## 2025-04-30 RX ORDER — DILTIAZEM HCL IN NACL,ISO-OSM 125 MG/125
5-15 PLASTIC BAG, INJECTION (ML) INTRAVENOUS
Status: DISCONTINUED | OUTPATIENT
Start: 2025-04-30 | End: 2025-05-01

## 2025-04-30 RX ADMIN — DILTIAZEM HYDROCHLORIDE 10 MG: 5 INJECTION, SOLUTION INTRAVENOUS at 22:50

## 2025-04-30 RX ADMIN — DILTIAZEM HYDROCHLORIDE 10 MG: 5 INJECTION, SOLUTION INTRAVENOUS at 22:37

## 2025-04-30 RX ADMIN — DILTIAZEM HYDROCHLORIDE 5 MG/HR: 5 INJECTION, SOLUTION INTRAVENOUS at 23:42

## 2025-04-30 RX ADMIN — SODIUM CHLORIDE 500 ML: 9 INJECTION, SOLUTION INTRAVENOUS at 22:37

## 2025-05-01 ENCOUNTER — APPOINTMENT (OUTPATIENT)
Dept: CARDIOLOGY | Facility: HOSPITAL | Age: 75
End: 2025-05-01
Payer: MEDICARE

## 2025-05-01 PROBLEM — I48.91 ATRIAL FIBRILLATION WITH RVR: Status: ACTIVE | Noted: 2025-05-01

## 2025-05-01 LAB
MAGNESIUM SERPL-MCNC: 2 MG/DL (ref 1.6–2.4)
QT INTERVAL: 309 MS
QT INTERVAL: 408 MS
QTC INTERVAL: 447 MS
QTC INTERVAL: 464 MS
TROPONIN T SERPL HS-MCNC: 12 NG/L

## 2025-05-01 PROCEDURE — 25810000003 DEXTROSE 5 % AND SODIUM CHLORIDE 0.9 % 5-0.9 % SOLUTION: Performed by: FAMILY MEDICINE

## 2025-05-01 PROCEDURE — 25010000002 MORPHINE PER 10 MG: Performed by: INTERNAL MEDICINE

## 2025-05-01 PROCEDURE — 92960 CARDIOVERSION ELECTRIC EXT: CPT | Performed by: INTERNAL MEDICINE

## 2025-05-01 PROCEDURE — 99222 1ST HOSP IP/OBS MODERATE 55: CPT | Performed by: INTERNAL MEDICINE

## 2025-05-01 PROCEDURE — 93005 ELECTROCARDIOGRAM TRACING: CPT | Performed by: INTERNAL MEDICINE

## 2025-05-01 PROCEDURE — 83735 ASSAY OF MAGNESIUM: CPT | Performed by: INTERNAL MEDICINE

## 2025-05-01 PROCEDURE — 25010000002 MIDAZOLAM PER 1MG: Performed by: INTERNAL MEDICINE

## 2025-05-01 PROCEDURE — 99153 MOD SED SAME PHYS/QHP EA: CPT

## 2025-05-01 PROCEDURE — 93005 ELECTROCARDIOGRAM TRACING: CPT | Performed by: FAMILY MEDICINE

## 2025-05-01 PROCEDURE — 99152 MOD SED SAME PHYS/QHP 5/>YRS: CPT

## 2025-05-01 PROCEDURE — 93010 ELECTROCARDIOGRAM REPORT: CPT | Performed by: INTERNAL MEDICINE

## 2025-05-01 PROCEDURE — 84484 ASSAY OF TROPONIN QUANT: CPT | Performed by: FAMILY MEDICINE

## 2025-05-01 PROCEDURE — 92960 CARDIOVERSION ELECTRIC EXT: CPT

## 2025-05-01 PROCEDURE — 25010000002 ATROPINE SULFATE 1 MG/10ML SOLUTION PREFILLED SYRINGE: Performed by: INTERNAL MEDICINE

## 2025-05-01 PROCEDURE — 99223 1ST HOSP IP/OBS HIGH 75: CPT | Performed by: FAMILY MEDICINE

## 2025-05-01 RX ORDER — MONTELUKAST SODIUM 10 MG/1
10 TABLET ORAL NIGHTLY
Status: DISCONTINUED | OUTPATIENT
Start: 2025-05-01 | End: 2025-05-02 | Stop reason: HOSPADM

## 2025-05-01 RX ORDER — FLUMAZENIL 0.1 MG/ML
INJECTION INTRAVENOUS
Status: COMPLETED | OUTPATIENT
Start: 2025-05-01 | End: 2025-05-01

## 2025-05-01 RX ORDER — LEVOTHYROXINE SODIUM 75 UG/1
75 TABLET ORAL
Status: DISCONTINUED | OUTPATIENT
Start: 2025-05-01 | End: 2025-05-02 | Stop reason: HOSPADM

## 2025-05-01 RX ORDER — ALPRAZOLAM 0.25 MG
0.25 TABLET ORAL EVERY 8 HOURS PRN
Status: DISCONTINUED | OUTPATIENT
Start: 2025-05-01 | End: 2025-05-02 | Stop reason: HOSPADM

## 2025-05-01 RX ORDER — ALUMINA, MAGNESIA, AND SIMETHICONE 2400; 2400; 240 MG/30ML; MG/30ML; MG/30ML
15 SUSPENSION ORAL EVERY 6 HOURS PRN
Status: DISCONTINUED | OUTPATIENT
Start: 2025-05-01 | End: 2025-05-02 | Stop reason: HOSPADM

## 2025-05-01 RX ORDER — POLYETHYLENE GLYCOL 3350 17 G/17G
17 POWDER, FOR SOLUTION ORAL DAILY PRN
Status: DISCONTINUED | OUTPATIENT
Start: 2025-05-01 | End: 2025-05-02 | Stop reason: HOSPADM

## 2025-05-01 RX ORDER — ATROPINE SULFATE 0.1 MG/ML
INJECTION INTRAVENOUS
Status: COMPLETED | OUTPATIENT
Start: 2025-05-01 | End: 2025-05-01

## 2025-05-01 RX ORDER — OXYCODONE HYDROCHLORIDE 5 MG/1
5 TABLET ORAL EVERY 6 HOURS PRN
Refills: 0 | Status: DISCONTINUED | OUTPATIENT
Start: 2025-05-01 | End: 2025-05-02 | Stop reason: HOSPADM

## 2025-05-01 RX ORDER — CETIRIZINE HYDROCHLORIDE 10 MG/1
10 TABLET ORAL DAILY
Status: DISCONTINUED | OUTPATIENT
Start: 2025-05-01 | End: 2025-05-02 | Stop reason: HOSPADM

## 2025-05-01 RX ORDER — BISACODYL 10 MG
10 SUPPOSITORY, RECTAL RECTAL DAILY PRN
Status: DISCONTINUED | OUTPATIENT
Start: 2025-05-01 | End: 2025-05-02 | Stop reason: HOSPADM

## 2025-05-01 RX ORDER — FLUMAZENIL 0.1 MG/ML
INJECTION INTRAVENOUS
Status: DISCONTINUED | OUTPATIENT
Start: 2025-05-01 | End: 2025-05-01

## 2025-05-01 RX ORDER — DIPHENHYDRAMINE HYDROCHLORIDE 50 MG/ML
INJECTION, SOLUTION INTRAMUSCULAR; INTRAVENOUS
Status: DISCONTINUED
Start: 2025-05-01 | End: 2025-05-01 | Stop reason: WASHOUT

## 2025-05-01 RX ORDER — DEXTROSE MONOHYDRATE 25 G/50ML
25 INJECTION, SOLUTION INTRAVENOUS
Status: DISCONTINUED | OUTPATIENT
Start: 2025-05-01 | End: 2025-05-02 | Stop reason: HOSPADM

## 2025-05-01 RX ORDER — DEXTROSE MONOHYDRATE AND SODIUM CHLORIDE 5; .9 G/100ML; G/100ML
50 INJECTION, SOLUTION INTRAVENOUS CONTINUOUS
Status: ACTIVE | OUTPATIENT
Start: 2025-05-01 | End: 2025-05-01

## 2025-05-01 RX ORDER — MIDAZOLAM HYDROCHLORIDE 2 MG/2ML
INJECTION, SOLUTION INTRAMUSCULAR; INTRAVENOUS
Status: DISCONTINUED
Start: 2025-05-01 | End: 2025-05-01 | Stop reason: WASHOUT

## 2025-05-01 RX ORDER — NALOXONE HCL 0.4 MG/ML
0.4 VIAL (ML) INJECTION
Status: DISCONTINUED | OUTPATIENT
Start: 2025-05-01 | End: 2025-05-02 | Stop reason: HOSPADM

## 2025-05-01 RX ORDER — SODIUM CHLORIDE 9 MG/ML
40 INJECTION, SOLUTION INTRAVENOUS AS NEEDED
Status: DISCONTINUED | OUTPATIENT
Start: 2025-05-01 | End: 2025-05-02 | Stop reason: HOSPADM

## 2025-05-01 RX ORDER — BISACODYL 5 MG/1
5 TABLET, DELAYED RELEASE ORAL DAILY PRN
Status: DISCONTINUED | OUTPATIENT
Start: 2025-05-01 | End: 2025-05-02 | Stop reason: HOSPADM

## 2025-05-01 RX ORDER — HYDROCHLOROTHIAZIDE 25 MG/1
25 TABLET ORAL DAILY
Status: DISCONTINUED | OUTPATIENT
Start: 2025-05-01 | End: 2025-05-02 | Stop reason: HOSPADM

## 2025-05-01 RX ORDER — FLUMAZENIL 0.1 MG/ML
INJECTION INTRAVENOUS
Status: DISPENSED
Start: 2025-05-01 | End: 2025-05-02

## 2025-05-01 RX ORDER — SODIUM CHLORIDE 0.9 % (FLUSH) 0.9 %
10 SYRINGE (ML) INJECTION AS NEEDED
Status: DISCONTINUED | OUTPATIENT
Start: 2025-05-01 | End: 2025-05-02 | Stop reason: HOSPADM

## 2025-05-01 RX ORDER — SODIUM CHLORIDE 0.9 % (FLUSH) 0.9 %
10 SYRINGE (ML) INJECTION EVERY 12 HOURS SCHEDULED
Status: DISCONTINUED | OUTPATIENT
Start: 2025-05-01 | End: 2025-05-02 | Stop reason: HOSPADM

## 2025-05-01 RX ORDER — TRAMADOL HYDROCHLORIDE 50 MG/1
25 TABLET ORAL EVERY 6 HOURS PRN
Status: DISCONTINUED | OUTPATIENT
Start: 2025-05-01 | End: 2025-05-02 | Stop reason: HOSPADM

## 2025-05-01 RX ORDER — ONDANSETRON 2 MG/ML
4 INJECTION INTRAMUSCULAR; INTRAVENOUS EVERY 6 HOURS PRN
Status: DISCONTINUED | OUTPATIENT
Start: 2025-05-01 | End: 2025-05-02 | Stop reason: HOSPADM

## 2025-05-01 RX ORDER — MORPHINE SULFATE 2 MG/ML
INJECTION, SOLUTION INTRAMUSCULAR; INTRAVENOUS
Status: DISPENSED
Start: 2025-05-01 | End: 2025-05-02

## 2025-05-01 RX ORDER — MORPHINE SULFATE 2 MG/ML
1 INJECTION, SOLUTION INTRAMUSCULAR; INTRAVENOUS EVERY 4 HOURS PRN
Status: DISCONTINUED | OUTPATIENT
Start: 2025-05-01 | End: 2025-05-02 | Stop reason: HOSPADM

## 2025-05-01 RX ORDER — MORPHINE SULFATE 2 MG/ML
INJECTION, SOLUTION INTRAMUSCULAR; INTRAVENOUS
Status: COMPLETED | OUTPATIENT
Start: 2025-05-01 | End: 2025-05-01

## 2025-05-01 RX ORDER — MIDAZOLAM HYDROCHLORIDE 2 MG/2ML
INJECTION, SOLUTION INTRAMUSCULAR; INTRAVENOUS
Status: DISPENSED
Start: 2025-05-01 | End: 2025-05-02

## 2025-05-01 RX ORDER — NICOTINE POLACRILEX 4 MG
15 LOZENGE BUCCAL
Status: DISCONTINUED | OUTPATIENT
Start: 2025-05-01 | End: 2025-05-02 | Stop reason: HOSPADM

## 2025-05-01 RX ORDER — FAMOTIDINE 20 MG/1
20 TABLET, FILM COATED ORAL NIGHTLY
Status: DISCONTINUED | OUTPATIENT
Start: 2025-05-01 | End: 2025-05-02 | Stop reason: HOSPADM

## 2025-05-01 RX ORDER — IBUPROFEN 600 MG/1
1 TABLET ORAL
Status: DISCONTINUED | OUTPATIENT
Start: 2025-05-01 | End: 2025-05-02 | Stop reason: HOSPADM

## 2025-05-01 RX ORDER — GABAPENTIN 300 MG/1
300 CAPSULE ORAL
Status: DISCONTINUED | OUTPATIENT
Start: 2025-05-01 | End: 2025-05-02 | Stop reason: HOSPADM

## 2025-05-01 RX ORDER — LOSARTAN POTASSIUM 50 MG/1
100 TABLET ORAL DAILY
Status: DISCONTINUED | OUTPATIENT
Start: 2025-05-01 | End: 2025-05-02 | Stop reason: HOSPADM

## 2025-05-01 RX ORDER — NITROGLYCERIN 0.4 MG/1
0.4 TABLET SUBLINGUAL
Status: DISCONTINUED | OUTPATIENT
Start: 2025-05-01 | End: 2025-05-02 | Stop reason: HOSPADM

## 2025-05-01 RX ORDER — MORPHINE SULFATE 2 MG/ML
INJECTION, SOLUTION INTRAMUSCULAR; INTRAVENOUS
Status: DISCONTINUED
Start: 2025-05-01 | End: 2025-05-01 | Stop reason: WASHOUT

## 2025-05-01 RX ORDER — MIDAZOLAM HYDROCHLORIDE 2 MG/2ML
INJECTION, SOLUTION INTRAMUSCULAR; INTRAVENOUS
Status: COMPLETED | OUTPATIENT
Start: 2025-05-01 | End: 2025-05-01

## 2025-05-01 RX ORDER — AMOXICILLIN 250 MG
2 CAPSULE ORAL 2 TIMES DAILY PRN
Status: DISCONTINUED | OUTPATIENT
Start: 2025-05-01 | End: 2025-05-02 | Stop reason: HOSPADM

## 2025-05-01 RX ADMIN — ATROPINE SULFATE INJECTION 0.5 MG: 0.1 INJECTION, SOLUTION INTRAVENOUS at 14:17

## 2025-05-01 RX ADMIN — Medication 10 ML: at 20:31

## 2025-05-01 RX ADMIN — FLUMAZENIL 0.25 MG: 0.1 INJECTION, SOLUTION INTRAVENOUS at 14:28

## 2025-05-01 RX ADMIN — MONTELUKAST 10 MG: 10 TABLET, FILM COATED ORAL at 20:31

## 2025-05-01 RX ADMIN — LEVOTHYROXINE SODIUM 75 MCG: 0.07 TABLET ORAL at 05:29

## 2025-05-01 RX ADMIN — METOPROLOL SUCCINATE 75 MG: 50 TABLET, EXTENDED RELEASE ORAL at 08:21

## 2025-05-01 RX ADMIN — FLUMAZENIL 0.2 MG: 0.1 INJECTION, SOLUTION INTRAVENOUS at 14:28

## 2025-05-01 RX ADMIN — MONTELUKAST 10 MG: 10 TABLET, FILM COATED ORAL at 05:29

## 2025-05-01 RX ADMIN — GABAPENTIN 300 MG: 300 CAPSULE ORAL at 20:31

## 2025-05-01 RX ADMIN — DEXTROSE MONOHYDRATE AND SODIUM CHLORIDE 50 ML/HR: 5; .9 INJECTION, SOLUTION INTRAVENOUS at 05:29

## 2025-05-01 RX ADMIN — DILTIAZEM HYDROCHLORIDE 15 MG/HR: 5 INJECTION, SOLUTION INTRAVENOUS at 06:23

## 2025-05-01 RX ADMIN — LOSARTAN POTASSIUM 100 MG: 50 TABLET, FILM COATED ORAL at 08:21

## 2025-05-01 RX ADMIN — Medication 10 ML: at 08:22

## 2025-05-01 RX ADMIN — MORPHINE SULFATE 2 MG: 2 INJECTION, SOLUTION INTRAMUSCULAR; INTRAVENOUS at 14:00

## 2025-05-01 RX ADMIN — MIDAZOLAM HYDROCHLORIDE 4 MG: 1 INJECTION, SOLUTION INTRAMUSCULAR; INTRAVENOUS at 13:58

## 2025-05-01 RX ADMIN — MIDAZOLAM HYDROCHLORIDE 1 MG: 1 INJECTION, SOLUTION INTRAMUSCULAR; INTRAVENOUS at 14:02

## 2025-05-01 RX ADMIN — HYDROCHLOROTHIAZIDE 25 MG: 25 TABLET ORAL at 08:56

## 2025-05-01 RX ADMIN — APIXABAN 5 MG: 5 TABLET, FILM COATED ORAL at 20:31

## 2025-05-01 RX ADMIN — FAMOTIDINE 20 MG: 20 TABLET, FILM COATED ORAL at 20:31

## 2025-05-01 RX ADMIN — CETIRIZINE HYDROCHLORIDE 10 MG: 10 TABLET, FILM COATED ORAL at 08:21

## 2025-05-01 RX ADMIN — APIXABAN 5 MG: 5 TABLET, FILM COATED ORAL at 08:21

## 2025-05-01 RX ADMIN — FAMOTIDINE 20 MG: 20 TABLET, FILM COATED ORAL at 08:21

## 2025-05-01 RX ADMIN — ATROPINE SULFATE INJECTION 0.5 MG: 0.1 INJECTION, SOLUTION INTRAVENOUS at 14:22

## 2025-05-01 RX ADMIN — Medication 5000 UNITS: at 08:56

## 2025-05-01 NOTE — H&P
[facility]   HISTORY AND PHYSICAL    Patient Name: Kimberly Dennis  : 1950  MRN: [id number]  Primary Care Physician:  Milan Coppola Jr., MD  Date of admission: 2025  Cardiologist:  Kelly Christianson   Subjective     Chief Complaint: Dizziness    Per ED: 76 y/o with known pAfib ( on Eliquis) - required cardioversion in ED 2023, MVP, Hx DVTHTN, Hyperlipidemia,  Hypothyroidism, Vit D Deficiency who states she began noticing palpitation this pm. Admission requested for mgmt Afib RVR    ED initial vitals:    2120 153/95 139 Important  97.9 °F (36.6 °C) 26   ED work up: labs  ED intervention:  Dilt 10mg X2, Dilt gtt    Review of Systems   Constitutional: Negative.    HENT: Negative.     Eyes: Negative.    Cardiovascular:  Positive for palpitations.   Gastrointestinal: Negative.    Genitourinary: Negative.    Musculoskeletal: Negative.    Skin: Negative.    Neurological:  Positive for dizziness.   Psychiatric/Behavioral: Negative.          Personal History     Past Medical History:   Diagnosis Date    Acid reflux     Acute deep vein thrombosis (DVT) of calf muscle vein of left lower extremity 2022    Anemia     Anesthesia complication     Arthritis     Arthritis of back     Atrial fibrillation     PAROXYSMAL    Bladder disorder     Colon polyp     GI bleed     History of total knee arthroplasty, right 2016    HTN (hypertension)     Limb swelling     Low back strain     Medial meniscus tear 2017    LEFT    Mitral valve prolapse     Primary osteoarthritis of left knee 2017    Seasonal allergies     Sleep apnea     SOB (shortness of breath)     Tear of medial cartilage or meniscus of knee, current 2017    Thoracic outlet syndrome     Thyroid disorder        Past Surgical History:   Procedure Laterality Date    BLADDER REPAIR      CHOLECYSTECTOMY      COLONOSCOPY      PADILLA    COLONOSCOPY      DR. EDGE    ELECTRICAL CARDIOVERSION  2023     HYSTERECTOMY      INTRAOCULAR LENS INSERTION      BOTH    JOINT REPLACEMENT Right     TKR    KNEE ARTHROSCOPY Left     TOTAL KNEE ARTHROPLASTY Left 06/03/2022    Procedure: LEFT TOTAL KNEE ARTHROPLASTY WITH COMPUTER NAVIGATION;  Surgeon: Abdoulaye Paz MD;  Location: [facility] OR;  Service: Orthopedics;  Laterality: Left;       Family History: family history includes Anesthesia problems in her mother; Arthritis in her mother and sister; Diabetes in her brother; Heart disease in her brother and mother. Otherwise pertinent FHx was reviewed and not pertinent to current issue.    Social History:  reports that she has never smoked. She has never been exposed to tobacco smoke. She has never used smokeless tobacco. She reports that she does not drink alcohol and does not use drugs.    Home Medications:  Cholecalciferol, apixaban, cetirizine, famotidine, gabapentin, hydroCHLOROthiazide, levothyroxine, losartan, metoprolol succinate XL, and montelukast    Allergies:  Allergies   Allergen Reactions    Amoxicillin-Pot Clavulanate Itching    Hydrocodone-Acetaminophen Itching    Hydrocodone Itching and Rash    Iodine Rash    Latex Rash    Metal Rash     YELLOW GOLD CAUSES RASH     Shellfish Allergy Rash       Objective    Objective     Vitals:   Temp:  [97.9 °F (36.6 °C)] 97.9 °F (36.6 °C)  Heart Rate:  [130-139] 130  Resp:  [26] 26  BP: (125-153)/(78-96) 125/78    Physical Exam  Vitals reviewed.   HENT:      Head: Normocephalic.   Eyes:      Extraocular Movements: Extraocular movements intact.      Pupils: Pupils are equal, round, and reactive to light.   Cardiovascular:      Rate and Rhythm: Tachycardia present. Rhythm irregular.   Pulmonary:      Effort: Pulmonary effort is normal.      Breath sounds: Normal breath sounds.   Abdominal:      General: Abdomen is flat. Bowel sounds are normal.      Palpations: Abdomen is soft.   Musculoskeletal:         General: Normal range of motion.   Skin:     General: Skin is warm and  dry.   Neurological:      General: No focal deficit present.      Mental Status: She is alert.       Result Review    Result Review:  I have personally reviewed the results from the time of this admission to 5/1/2025 00:26 EDT and agree with these findings:  [x]  Laboratory list / accordion  []  Microbiology  []  Radiology  [x]  EKG/Telemetry : Atrial Fibrillation with borderline ST depression  [x]  Cardiology/Vascular   []  Pathology  []  Old records  [x]  Other: ECHO (3/2023):     Left ventricular systolic function is normal. Left ventricular ejection fraction appears to be 61 - 65%.    There is diastolic dysfunction of the left ventricle.    There is mild mitral regurgitation    There is mild tricuspid regurgitation.  Most notable findings include: EKG      Assessment & Plan   Assessment / Plan     Brief Patient Summary:  Kimberly Dennis is a 75 y.o. female who presents with Afib RVR    Active Hospital Problems:  There are no active hospital problems to display for this patient.    Plan:   AfibRVR - [id number] (3)   - Trop 9-->12-->   - pBNP ( 193.8)   - cont Dilt gtt   - cont Eliquis   - Cont Metoprolol 75mg PTA   - Consult to cardiology to be placed in am - for consideration of Cardioversion (oupt Card/EP: Dr. Mendoza) added to consult list   - npo, hypoglycemic protocol    Chronic conditions:  Hypertension :  Cont Metoprolol, Losartan 100mg, HCTZ - 25mg  Hyperlipidemia:  cont   Hypothyroidism:  TSH (2.20).  Cont Levothyroxine 75 mch  Hx DVT (perioperatively -knee replacement): on Eliquis as above  Hx Anemia:  Hgb (12.8) on admission    VTE Prophylaxis: on Eliquis  CODE STATUS:  Full  Emergency contact:   Manju Dennis (Daughter)  [phone] (Mobile)      Admission Status:  I believe this patient meets inpatient status.    Dorothy Jaeger MD

## 2025-05-01 NOTE — PLAN OF CARE
Goal Outcome Evaluation:  Plan of Care Reviewed With: patient        Progress: improving  Outcome Evaluation: Patient is AO x4, able to make needs known. Patient is currently on room air, no signs of respiratory distress noted. Cardioversion done today, sinus nando - NSR upon arrival back to 4MTU. No complaints of pain at this time. No acute changes throughout shift. VSS. Continue with current plan of care.

## 2025-05-01 NOTE — PROGRESS NOTES
CARDIOLOGY  INPATIENT PROGRESS NOTE         [facility] 4TH FLOOR MEDICAL TELEMETRY UNIT    5/1/2025      PATIENT IDENTIFICATION:   Name:  Kimberly Dennis      MRN:  [id number]     75 y.o.  female             I discussed the risk benefits and alternatives of cardioversion with the patient.  Please refer to Dr. Mendoza's consultation note for further details.  The patient is agreeable to proceed.  We will get this scheduled for today            Qasim Kaufman MD  5/1/2025    12:21 EDT

## 2025-05-01 NOTE — ED PROVIDER NOTES
Time: 10:34 PM EDT  Date of encounter:  4/30/2025  Independent Historian/Clinical History and Information was obtained by:   Patient    History is limited by: N/A    Chief Complaint: palpitations      History of Present Illness:  Patient is a 75 y.o. year old female who presents to the emergency department for evaluation of Palpitations.  Patient says she does have a history of atrial fibrillation but between episodes of atrial fibrillation she is usually goes back to sinus rhythm.  She states she started with palpitations this evening.  She does report some shortness of breath with ambulation.  She denies chest pain.  She denies any other complaints.  She is currently on anticoagulation.      Patient Care Team  Primary Care Provider: Milan Coppola Jr., MD    Past Medical History:     Allergies   Allergen Reactions    Amoxicillin-Pot Clavulanate Itching    Hydrocodone-Acetaminophen Itching    Hydrocodone Itching and Rash    Iodine Rash    Latex Rash    Metal Rash     YELLOW GOLD CAUSES RASH     Shellfish Allergy Rash     Past Medical History:   Diagnosis Date    Acid reflux     Acute deep vein thrombosis (DVT) of calf muscle vein of left lower extremity 06/09/2022    Anemia     Anesthesia complication     Arthritis     Arthritis of back     Atrial fibrillation     PAROXYSMAL    Bladder disorder     Colon polyp     GI bleed     History of total knee arthroplasty, right 09/08/2016    HTN (hypertension)     Limb swelling     Low back strain     Medial meniscus tear 09/12/2017    LEFT    Mitral valve prolapse     Primary osteoarthritis of left knee 09/12/2017    Seasonal allergies     Sleep apnea     SOB (shortness of breath)     Tear of medial cartilage or meniscus of knee, current 09/12/2017    Thoracic outlet syndrome     Thyroid disorder      Past Surgical History:   Procedure Laterality Date    BLADDER REPAIR      CHOLECYSTECTOMY      COLONOSCOPY  2014    PADILLA    COLONOSCOPY      DR. EDGE     ELECTRICAL CARDIOVERSION  12/21/2023    HYSTERECTOMY      INTRAOCULAR LENS INSERTION      BOTH    JOINT REPLACEMENT Right     TKR    KNEE ARTHROSCOPY Left     TOTAL KNEE ARTHROPLASTY Left 06/03/2022    Procedure: LEFT TOTAL KNEE ARTHROPLASTY WITH COMPUTER NAVIGATION;  Surgeon: Abdoulaye Paz MD;  Location: [facility] MAIN OR;  Service: Orthopedics;  Laterality: Left;     Family History   Problem Relation Age of Onset    Heart disease Mother     Arthritis Mother     Anesthesia problems Mother         slow to wake up    Arthritis Sister     Heart disease Brother     Diabetes Brother     Malig Hyperthermia Neg Hx        Home Medications:  Prior to Admission medications    Medication Sig Start Date End Date Taking? Authorizing Provider   apixaban (Eliquis) 5 MG tablet tablet Take 1 tablet by mouth 2 (Two) Times a Day. 7/25/24  Yes Chip Mendoza MD   cetirizine (zyrTEC) 10 MG tablet Take 2 tablets by mouth Daily.   Yes ProviderKathleen MD   famotidine (PEPCID) 40 MG tablet Take 1 tablet by mouth once daily  Patient taking differently: Take 1 tablet by mouth Daily. 6/19/23  Yes Ian Rabaog MD   gabapentin (NEURONTIN) 300 MG capsule Take 1 capsule by mouth Every Night. 3/5/25  Yes Milan Coppola Jr., MD   hydroCHLOROthiazide 25 MG tablet Take 1 tablet by mouth once daily 4/11/25  Yes Milan Coppola Jr., MD   levothyroxine (SYNTHROID, LEVOTHROID) 75 MCG tablet Take 1 tablet by mouth once daily 4/7/25  Yes Milan Coppola Jr., MD   losartan (COZAAR) 100 MG tablet Take 1 tablet by mouth once daily 2/17/25  Yes Milan Coppola Jr., MD   metoprolol succinate XL (TOPROL-XL) 50 MG 24 hr tablet TAKE 1 & 1/2 (ONE & ONE-HALF) TABLETS BY MOUTH ONCE DAILY 3/21/25  Yes Milan Coppola Jr., MD   montelukast (SINGULAIR) 10 MG tablet TAKE 1 TABLET BY MOUTH ONCE DAILY AT NIGHT 3/13/25  Yes Milan Coppola Jr., MD   Cholecalciferol 125 MCG (5000 UT) tablet Take 1 tablet by  "mouth Every Evening.    Provider, Historical, MD        Social History:   Social History     Tobacco Use    Smoking status: Never     Passive exposure: Never    Smokeless tobacco: Never   Vaping Use    Vaping status: Never Used   Substance Use Topics    Alcohol use: Never    Drug use: Never         Review of Systems:  Review of Systems   Constitutional:  Negative for chills and fever.   HENT:  Negative for congestion, ear pain and sore throat.    Eyes:  Negative for pain.   Respiratory:  Positive for shortness of breath. Negative for cough and chest tightness.    Cardiovascular:  Positive for palpitations. Negative for chest pain.   Gastrointestinal:  Negative for abdominal pain, diarrhea, nausea and vomiting.   Genitourinary:  Negative for flank pain and hematuria.   Musculoskeletal:  Negative for joint swelling.   Skin:  Negative for pallor.   Neurological:  Negative for seizures and headaches.   All other systems reviewed and are negative.       Physical Exam:  /60 (Patient Position: Lying)   Pulse 116   Temp 97.9 °F (36.6 °C) (Oral)   Resp 15   Ht 167.6 cm (66\")   Wt 116 kg (255 lb 11.7 oz)   SpO2 96%   BMI 41.28 kg/m²     Physical Exam  Constitutional:       Appearance: Normal appearance.   HENT:      Head: Normocephalic and atraumatic.      Nose: Nose normal.      Mouth/Throat:      Mouth: Mucous membranes are moist.   Eyes:      Extraocular Movements: Extraocular movements intact.      Conjunctiva/sclera: Conjunctivae normal.      Pupils: Pupils are equal, round, and reactive to light.   Cardiovascular:      Rate and Rhythm: Tachycardia present. Rhythm irregular.      Pulses: Normal pulses.      Heart sounds: Normal heart sounds.   Pulmonary:      Effort: Pulmonary effort is normal.      Breath sounds: Normal breath sounds.   Abdominal:      General: There is no distension.      Palpations: Abdomen is soft.      Tenderness: There is no abdominal tenderness.   Musculoskeletal:         General: " Normal range of motion.      Cervical back: Normal range of motion.   Skin:     General: Skin is warm and dry.      Capillary Refill: Capillary refill takes less than 2 seconds.   Neurological:      General: No focal deficit present.      Mental Status: She is alert and oriented to person, place, and time. Mental status is at baseline.   Psychiatric:         Mood and Affect: Mood normal.         Behavior: Behavior normal.        \            Medical Decision Making:      Comorbidities that affect care:    Atrial Fibrillation, Hypertension, Thyroid Disease    External Notes reviewed:    Previous Clinic Note: Patient seen by his PCP on 2/27/2025 for essential hypertension, hyperlipidemia, atrial fibrillation, acquired hypothyroidism, and.  Fasting glucose, vitamin D deficiency.      The following orders were placed and all results were independently analyzed by me:  Orders Placed This Encounter   Procedures    Blood Culture - Blood,    Blood Culture - Blood,    XR Chest 1 View    [place] Draw    Comprehensive Metabolic Panel    BNP    High Sensitivity Troponin T    Lactic Acid, Plasma    CBC Auto Differential    High Sensitivity Troponin T 1Hr    High Sensitivity Troponin T    NPO Diet NPO Type: Strict NPO    Undress & Gown    Vital Signs    Undress & Gown    Vital Signs    Vital Signs Per Hospital Policy    Continuous Pulse Oximetry    Telemetry - Place Orders & Notify Provider of Results When Patient Experiences Acute Chest Pain, Dysrhythmia or Respiratory Distress    Intake & Output    Weigh Patient    Saline Lock & Maintain IV Access    Notify Provider (With Parameters)    Inpatient Hospitalist Consult    Oxygen Therapy- Nasal Cannula; Titrate 1-6 LPM Per SpO2; 90 - 95%    Oxygen Therapy- Nasal Cannula; Titrate 1-6 LPM Per SpO2; 90 - 95%    Oxygen Therapy- Nasal Cannula; Titrate 1-6 LPM Per SpO2; 90 - 95%    ECG 12 Lead ED Triage Standing Order; SOA    ECG 12 Lead Tachycardia    Insert Peripheral IV    Insert  Peripheral IV    Insert Peripheral IV    Inpatient Admission    CBC & Differential    Green Top (Gel)    Lavender Top    Gold Top - SST    Light Blue Top       Medications Given in the Emergency Department:  Medications   sodium chloride 0.9 % flush 10 mL (has no administration in time range)   sodium chloride 0.9 % flush 10 mL (has no administration in time range)   dilTIAZem (CARDIZEM) 125 mg in 125 mL sodium chloride  infusion (15 mg/hr Intravenous Rate/Dose Change 5/1/25 0103)   nitroglycerin (NITROSTAT) SL tablet 0.4 mg (has no administration in time range)   sodium chloride 0.9 % flush 10 mL (has no administration in time range)   sodium chloride 0.9 % flush 10 mL (has no administration in time range)   sodium chloride 0.9 % infusion 40 mL (has no administration in time range)   Potassium Replacement - Follow Nurse / BPA Driven Protocol (has no administration in time range)   Magnesium Cardiology Dose Replacement - Follow Nurse / BPA Driven Protocol (has no administration in time range)   dilTIAZem (CARDIZEM) injection 10 mg (10 mg Intravenous Given 4/30/25 2237)   sodium chloride 0.9 % bolus 500 mL (0 mL Intravenous Stopped 4/30/25 2252)   dilTIAZem (CARDIZEM) injection 10 mg (10 mg Intravenous Given 4/30/25 2250)        ED Course:    ED Course as of 05/01/25 0138 Wed Apr 30, 2025 2247 ECG 12 Lead ED Triage Standing Order; SOA  Atrial fibrillation with a rate of 135.  No definite ST elevation.  Normal QTc.  EKG interpreted by me [LD]      ED Course User Index  [LD] Juaquin Hogan MD       Labs:    Lab Results (last 24 hours)       Procedure Component Value Units Date/Time    CBC & Differential [[id number]]  (Abnormal) Collected: 04/30/25 2132    Specimen: Blood from Arm, Right Updated: 04/30/25 2143    Narrative:      The following orders were created for panel order CBC & Differential.  Procedure                               Abnormality         Status                     ---------                                -----------         ------                     CBC Auto Differential[[id number]]        Abnormal            Final result                 Please view results for these tests on the individual orders.    Comprehensive Metabolic Panel [[id number]]  (Abnormal) Collected: 04/30/25 2132    Specimen: Blood from Arm, Right Updated: 04/30/25 2204     Glucose 106 mg/dL      BUN 16 mg/dL      Creatinine 0.85 mg/dL      Sodium 141 mmol/L      Potassium 4.0 mmol/L      Chloride 100 mmol/L      CO2 28.4 mmol/L      Calcium 9.8 mg/dL      Total Protein 8.0 g/dL      Albumin 4.2 g/dL      ALT (SGPT) 15 U/L      AST (SGOT) 19 U/L      Alkaline Phosphatase 97 U/L      Total Bilirubin 0.2 mg/dL      Globulin 3.8 gm/dL      A/G Ratio 1.1 g/dL      BUN/Creatinine Ratio 18.8     Anion Gap 12.6 mmol/L      eGFR 72.4 mL/min/1.73     Narrative:      GFR Categories in Chronic Kidney Disease (CKD)              GFR Category          GFR (mL/min/1.73)    Interpretation  G1                    90 or greater        Normal or high (1)  G2                    60-89                Mild decrease (1)  G3a                   45-59                Mild to moderate decrease  G3b                   30-44                Moderate to severe decrease  G4                    15-29                Severe decrease  G5                    14 or less           Kidney failure    (1)In the absence of evidence of kidney disease, neither GFR category G1 or G2 fulfill the criteria for CKD.    eGFR calculation 2021 CKD-EPI creatinine equation, which does not include race as a factor    BNP [[id number]]  (Normal) Collected: 04/30/25 2132    Specimen: Blood from Arm, Right Updated: 04/30/25 2200     proBNP 193.8 pg/mL     Narrative:      This assay is used as an aid in the diagnosis of individuals suspected of having heart failure. It can be used as an aid in the diagnosis of acute decompensated heart failure (ADHF) in patients presenting with signs and symptoms of ADHF  to the emergency department (ED). In addition, NT-proBNP of <300 pg/mL indicates ADHF is not likely.    Age Range Result Interpretation  NT-proBNP Concentration (pg/mL:      <50             Positive            >450                   Gray                 300-450                    Negative             <300    50-75           Positive            >900                  Gray                300-900                  Negative            <300      >75             Positive            >1800                  Gray                300-1800                  Negative            <300    High Sensitivity Troponin T [[id number]]  (Normal) Collected: 04/30/25 2132    Specimen: Blood from Arm, Right Updated: 04/30/25 2204     HS Troponin T 9 ng/L     Narrative:      High Sensitive Troponin T Reference Range:  <14.0 ng/L- Negative Female for AMI  <22.0 ng/L- Negative Male for AMI  >=14 - Abnormal Female indicating possible myocardial injury.  >=22 - Abnormal Male indicating possible myocardial injury.   Clinicians would have to utilize clinical acumen, EKG, Troponin, and serial changes to determine if it is an Acute Myocardial Infarction or myocardial injury due to an underlying chronic condition.         Lactic Acid, Plasma [[id number]]  (Normal) Collected: 04/30/25 2132    Specimen: Blood from Arm, Right Updated: 04/30/25 2200     Lactate 1.2 mmol/L     Blood Culture - Blood, Arm, Left [[id number]] Collected: 04/30/25 2132    Specimen: Blood from Arm, Left Updated: 04/30/25 2141    Blood Culture - Blood, Arm, Right [[id number]] Collected: 04/30/25 2132    Specimen: Blood from Arm, Right Updated: 04/30/25 2141    CBC Auto Differential [[id number]]  (Abnormal) Collected: 04/30/25 2132    Specimen: Blood from Arm, Right Updated: 04/30/25 2143     WBC 8.13 10*3/mm3      RBC 4.32 10*6/mm3      Hemoglobin 12.8 g/dL      Hematocrit 38.7 %      MCV 89.6 fL      MCH 29.6 pg      MCHC 33.1 g/dL      RDW 13.5 %      RDW-SD 44.3 fl      MPV 9.8 fL       Platelets 213 10*3/mm3      Neutrophil % 57.0 %      Lymphocyte % 27.6 %      Monocyte % 6.9 %      Eosinophil % 7.9 %      Basophil % 0.4 %      Immature Grans % 0.2 %      Neutrophils, Absolute 4.64 10*3/mm3      Lymphocytes, Absolute 2.24 10*3/mm3      Monocytes, Absolute 0.56 10*3/mm3      Eosinophils, Absolute 0.64 10*3/mm3      Basophils, Absolute 0.03 10*3/mm3      Immature Grans, Absolute 0.02 10*3/mm3      nRBC 0.0 /100 WBC     High Sensitivity Troponin T 1Hr [[id number]]  (Abnormal) Collected: 04/30/25 2232    Specimen: Blood from Arm, Right Updated: 04/30/25 2327     HS Troponin T 12 ng/L      Troponin T Numeric Delta 3 ng/L     Narrative:      High Sensitive Troponin T Reference Range:  <14.0 ng/L- Negative Female for AMI  <22.0 ng/L- Negative Male for AMI  >=14 - Abnormal Female indicating possible myocardial injury.  >=22 - Abnormal Male indicating possible myocardial injury.   Clinicians would have to utilize clinical acumen, EKG, Troponin, and serial changes to determine if it is an Acute Myocardial Infarction or myocardial injury due to an underlying chronic condition.         High Sensitivity Troponin T [[id number]] Collected: 05/01/25 0127    Specimen: Blood from Arm, Right Updated: 05/01/25 0133             Imaging:    XR Chest 1 View  Result Date: 4/30/2025  XR CHEST 1 VW Date of Exam: 4/30/2025 9:35 PM EDT Indication: SOA Triage Protocol Comparison: CXR 1/10/2024 Findings: The heart remains slightly enlarged. The pulmonary vascularity does not appear congested. The lungs are well-expanded and free of infiltrates. Bony structures appear intact.     Impression: Mild cardiomegaly, unchanged. No active pulmonary disease is seen. Electronically Signed: Peter Hui MD  4/30/2025 9:44 PM EDT  Workstation ID: [id number]        Differential Diagnosis and Discussion:    Abdominal Pain: Based on the patient's signs and symptoms, I considered abdominal aortic aneurysm, small bowel obstruction, pancreatitis,  acute cholecystitis, acute appendecitis, peptic ulcer disease, gastritis, colitis, endocrine disorders, irritable bowel syndrome and other differential diagnosis an etiology of the patient's abdominal pain.  Diarrhea: Differential diagnosis includes but is not limited to malabsorption syndrome, bacterial infection, carcinoid syndrome, pancreatic hypersecretion, viral infection, celiac sprue, Crohn's disease, ulcerative colitis, ischemic colitis, colitis, hypermotility, and irritable bowel syndrome.    PROCEDURES:    Labs were collected in the emergency department and all labs were reviewed and interpreted by me.  X-ray were performed in the emergency department and all X-ray impressions were independently interpreted by me.  An EKG was performed and the EKG was interpreted by me.    ECG 12 Lead ED Triage Standing Order; SOA   Preliminary Result   HEART PSMW=334  bpm   RR Uwhgfaay=046  ms   IA Interval=  ms   P Horizontal Axis=  deg   P Front Axis=  deg   QRSD Interval=80  ms   QT Uczbctqs=795  ms   JFxA=515  ms   QRS Axis=41  deg   T Wave Axis=64  deg   - ABNORMAL ECG -   Atrial fibrillation   Borderline ST depression, diffuse leads   Date and Time of Study:2025-04-30 21:25:44      ECG 12 Lead Tachycardia    (Results Pending)       Procedures    MDM  Number of Diagnoses or Management Options  Atrial fibrillation with RVR  Diagnosis management comments: Patient presented to the emergency department with atrial fibrillation with RVR.  Patient given diltiazem with mild improvement in right.  Labs were obtained that showed no significant abnormality.  Patient was started on diltiazem infusion.  Discussed patient with hospitalist and will admit for further care.       Amount and/or Complexity of Data Reviewed  Clinical lab tests: reviewed  Tests in the radiology section of CPT®: reviewed  Review and summarize past medical records: yes  Independent visualization of images, tracings, or specimens: yes    Risk of  Complications, Morbidity, and/or Mortality  Presenting problems: moderate  Management options: moderate             Total Critical Care time of 45 minutes. Total critical care time documented does not include time spent on separately billed procedures for services of nurses or physician assistants. I personally saw and examined the patient. I have reviewed all diagnostic interpretations and treatment plans as written. I was present for the key portions of any procedures performed and the inclusive time noted in any critical care statement. Critical care time includes patient management by me, time spent at the patients bedside,  time to review lab and imaging results, discussing patient care, documentation in the medical record, and time spent with family or caregiver.          Patient Care Considerations:    SEPSIS was considered but is NOT present in the emergency department as SIRS criteria is not present.      Consultants/Shared Management Plan:    Hospitalist: I have discussed the case with Dr Calhoun who agrees to accept the patient for admission.    Social Determinants of Health:    Patient is independent, reliable, and has access to care.       Disposition and Care Coordination:    Admit:   Through independent evaluation of the patient's history, physical, and imperical data, the patient meets criteria for inpatient admission to the hospital.        Final diagnoses:   Atrial fibrillation with RVR        ED Disposition       ED Disposition   Decision to Admit    Condition   --    Comment   Level of Care: Telemetry [5]   Diagnosis: Atrial fibrillation with RVR [[id number]]   Admitting Physician: MIKAELA CHAMPION [[id number]]   Certification: I Certify That Inpatient Hospital Services Are Medically Necessary For Greater Than 2 Midnights                 This medical record created using voice recognition software.             Juaquin Hogan MD  05/01/25 0138

## 2025-05-01 NOTE — CONSULTS
[facility]   Cardiology Consult Note    Patient Name: Kimberly Dennis  : 1950  MRN: [id number]  Primary Care Physician:  Milan Coppola Jr., MD  Referring Physician: Dorothy Jeager MD    Date of admission: 2025    Subjective   Subjective     Reason for Consultation : Atrial fibrillation with rapid ventricular rates    Chief Complaint : Palpitations    HPI:  Kimberly Dennis is a 75 y.o. female with paroxysmal atrial fibrillation, on anticoagulation, history of cardioversion in  and , hypertension and hypothyroidism.  She presents emergency room because of sudden onset of palpitations, which started around 8 PM.  Heart rate was documented above 130 at home.  She came to the ER and was noted to be in atrial fibrillation with RVR.  She was started on Cardizem infusion which eventually controlled the heart rate.  Other symptoms include some shortness of breath and fatigue.  There was no chest pain or dizziness.     Ms. Dennis underwent cardioversion atrial fibrillation in  and again in .  For the past 2 years she really has not had any symptoms except for occasional feeling of skipped beats.  Fairly active at baseline.  No recent fever chills cough.  No nausea or vomiting.    Review of Systems   All systems were reviewed and negative except for: Per HPI    Personal History     Past Medical History:   Diagnosis Date    Acid reflux     Acute deep vein thrombosis (DVT) of calf muscle vein of left lower extremity 2022    Anemia     Anesthesia complication     Arthritis     Arthritis of back     Atrial fibrillation     PAROXYSMAL    Bladder disorder     Colon polyp     GI bleed     History of total knee arthroplasty, right 2016    HTN (hypertension)     Limb swelling     Low back strain     Medial meniscus tear 2017    LEFT    Mitral valve prolapse     Primary osteoarthritis of left knee 2017    Seasonal allergies     Sleep apnea     SOB (shortness of  breath)     Tear of medial cartilage or meniscus of knee, current 09/12/2017    Thoracic outlet syndrome     Thyroid disorder         Family History: family history includes Anesthesia problems in her mother; Arthritis in her mother and sister; Diabetes in her brother; Heart disease in her brother and mother. Otherwise pertinent FHx was reviewed and not pertinent to current issue.    Social History:  reports that she has never smoked. She has never been exposed to tobacco smoke. She has never used smokeless tobacco. She reports that she does not drink alcohol and does not use drugs.    Home Medications:  Cholecalciferol, apixaban, cetirizine, famotidine, gabapentin, hydroCHLOROthiazide, levothyroxine, losartan, metoprolol succinate XL, and montelukast    Allergies:  Allergies   Allergen Reactions    Amoxicillin-Pot Clavulanate Itching    Hydrocodone-Acetaminophen Itching    Hydrocodone Itching and Rash    Iodine Rash    Latex Rash    Metal Rash     YELLOW GOLD CAUSES RASH     Shellfish Allergy Rash       Objective    Objective     Vitals:   Temp:  [97.8 °F (36.6 °C)-97.9 °F (36.6 °C)] 97.8 °F (36.6 °C)  Heart Rate:  [] 75  Resp:  [15-26] 20  BP: (105-153)/(60-96) 121/64      Physical Exam:   Constitutional: Awake, alert, No acute distress    Eyes: PERRLA, sclerae anicteric, no conjunctival injection   HENT: NCAT, mucous membranes moist   Neck: Supple, no thyromegaly, no lymphadenopathy, trachea midline   Respiratory: Clear to auscultation bilaterally, nonlabored respirations    Cardiovascular: Irregular, no murmurs, rubs, or gallops, palpable pedal pulses bilaterally   Gastrointestinal: Positive bowel sounds, soft, nontender, nondistended   Musculoskeletal: No bilateral ankle edema, no clubbing or cyanosis to extremities   Psychiatric: Appropriate affect, cooperative   Neurologic: Oriented x 3, speech clear   Skin: No rashes     Result Review    Result Review:  I have personally reviewed the results from the  time of this admission to 5/1/2025 08:13 EDT and agree with these findings:  [x]  Laboratory  []  Microbiology  [x]  Radiology  [x]  EKG/Telemetry   [x]  Cardiology/Vascular   []  Pathology  [x]  Old records  []  Other:  Most notable findings include:     CMP          8/27/2024    10:52 2/27/2025    11:47 4/30/2025    21:32   CMP   Glucose 107  84  106    BUN 14  14  16    Creatinine 0.62  0.56  0.85    EGFR 94.8  97.1  72.4    Sodium 140  136  141    Potassium 4.9  3.8  4.0    Chloride 99  99  100    Calcium 10.1  9.4  9.8    Total Protein 7.9  7.3  8.0    Albumin 4.3  4.1  4.2    Globulin 3.6  3.2  3.8    Total Bilirubin 0.5  0.3  0.2    Alkaline Phosphatase 89  77  97    AST (SGOT) 17  22  19    ALT (SGPT) 11  9  15    Albumin/Globulin Ratio 1.2  1.3  1.1    BUN/Creatinine Ratio 22.6  25.0  18.8    Anion Gap 10.2  9.4  12.6       CBC          8/27/2024    10:52 2/27/2025    11:47 4/30/2025    21:32   CBC   WBC 6.49  6.41  8.13    RBC 4.18  4.02  4.32    Hemoglobin 11.9  11.5  12.8    Hematocrit 36.2  36.2  38.7    MCV 86.6  90.0  89.6    MCH 28.5  28.6  29.6    MCHC 32.9  31.8  33.1    RDW 13.0  13.4  13.5    Platelets 213  191  213        Latest Reference Range & Units 04/30/25 21:32 04/30/25 22:32 05/01/25 01:27   HS Troponin T <14 ng/L 9 12 12   Troponin T Numeric Delta Abnormal if >/=3 ng/L  3 (C)    proBNP 0.0 - 900.0 pg/mL 193.8           Results for orders placed in visit on 03/02/23    Adult Transthoracic Echo Complete W/ Cont if Necessary Per Protocol    Interpretation Summary    Left ventricular systolic function is normal. Left ventricular ejection fraction appears to be 61 - 65%.    There is diastolic dysfunction of the left ventricle.    There is mild mitral regurgitation    There is mild tricuspid regurgitation.       Assessment & Plan   Assessment / Plan     Brief Patient Summary:  Kimberly Dennis is a 75 y.o. female with paroxysmal atrial fibrillation, on anticoagulation, history of cardioversion  in 2019 and 2023, hypertension and hypothyroidism.  Admitted with atrial fibrillation with RVR    Active Hospital Problems:  Active Hospital Problems    Diagnosis     **Atrial fibrillation with RVR      Paroxysmal atrial fibrillation with RVR ; on chronic anticoagulation at home.  Ventricular rates well-controlled on Cardizem infusion.  Still symptomatic.  Of note, she underwent cardioversion in 2023 and has been asymptomatic until yesterday.    Essential hypertension : Blood pressure well-controlled    Plan:     Continue Eliquis for anticoagulation  Continue Cardizem infusion, dose to be titrated down as tolerated    The definitive management options were discussed in detail with the patient.  Because of symptomatic rapid atrial fibrillation, we will proceed with synchronized electrical cardioversion today.  She is already on anticoagulation long-term.  The risks, benefits and alternatives of the procedure explained detail with the patient and she agreed to proceed.  Dr. Kaufman to do the procedure today    Echocardiogram will be done to evaluate LV function and valvular function  If cardioversion is successful, patient may be discharged home later today on current medications.    We will refer the patient to cardiac electrophysiologist as outpatient to consider ablation procedure for long-term management        Electronically signed by Chip Mendoza MD, 05/01/25, 8:13 AM EDT.

## 2025-05-01 NOTE — NURSING NOTE
Patient Kimberly Dennis admitted to 4MTU from the ED. Patient is alert and oriented to person, place, time, and situation. Patient oriented to unit, call light system and bed alarm use, teaching effective. Patient agreed to bed alarm use. Candida Auris screening revealed patient will NOT need tested, contact isolation and bleach cleaning due to no risk factors. Patient currently is not a concern for an active CDIFF infection.    4 eyes skin assessment completed with Renu Marroquin RN. Per policy, the following skin interventions were put in place as a result of the skin assessment below: None - Pepe is greater than 18 and no skin issues noted    Skin Assessments (most recent)      Flowsheet Row Most Recent Value   Skin WDL WDL   Sensory Perception 3-->slightly limited   Moisture 4-->rarely moist   Activity 4-->walks frequently   Mobility 4-->no limitation   Nutrition 3-->adequate   Friction and Shear 3-->no apparent problem   Pepe Score 21            Fall assessment completed. Per policy, the patient was determined be a Low fall risk due to Recinos Asif score 14 or less (only used within the first 24 hours of admission). Patient assessed to need the following mobility assistance: Independent with the following assistive devices: None, and will be using a bedside commode for toileting. Per policy, the following fall interventions were put in place: Standard Fall Precautions - oriented to room and call light, bed low and locked, clutter free environment, adequate lighting, directed to call for assistance, non-skid footwear, hourly rounding and personal belongings within reach..     Patient's belongings that were sent with family: None  Patient's belongings that were sent to security: None  Patient's belongings locked in safe box in room: None  Patient's belongings that were sent to pharmacy: None  Patient's belongings kept at bedside per patient: Purse/Wallet, Glasses, Clothing, and Other:

## 2025-05-01 NOTE — PLAN OF CARE
Goal Outcome Evaluation:  Plan of Care Reviewed With: patient        Progress: no change  Outcome Evaluation: Patient admitted from ED with afib RVR, patient remains on cardizem drip at 15ml/hour. Patient on room air with no complaints of pain, family remains at bedside. No new issues or concerns per nursing. Vital signs within normal limits. Will continue to monitor per MD's orders.  Miriam Muniz RN

## 2025-05-02 ENCOUNTER — APPOINTMENT (OUTPATIENT)
Dept: CARDIOLOGY | Facility: HOSPITAL | Age: 75
End: 2025-05-02
Payer: MEDICARE

## 2025-05-02 ENCOUNTER — READMISSION MANAGEMENT (OUTPATIENT)
Dept: CALL CENTER | Facility: HOSPITAL | Age: 75
End: 2025-05-02
Payer: MEDICARE

## 2025-05-02 VITALS
SYSTOLIC BLOOD PRESSURE: 142 MMHG | HEART RATE: 63 BPM | RESPIRATION RATE: 16 BRPM | BODY MASS INDEX: 41.1 KG/M2 | OXYGEN SATURATION: 97 % | HEIGHT: 66 IN | WEIGHT: 255.73 LBS | DIASTOLIC BLOOD PRESSURE: 64 MMHG | TEMPERATURE: 97.5 F

## 2025-05-02 LAB
AORTIC DIMENSIONLESS INDEX: 0.69 (DI)
AV MEAN PRESS GRAD SYS DOP V1V2: 6 MMHG
AV VMAX SYS DOP: 179 CM/SEC
BH CV ECHO MEAS - AI P1/2T: 1381 MSEC
BH CV ECHO MEAS - AO MAX PG: 12.8 MMHG
BH CV ECHO MEAS - AO ROOT DIAM: 3.6 CM
BH CV ECHO MEAS - AO V2 VTI: 37.9 CM
BH CV ECHO MEAS - AVA(I,D): 2.17 CM2
BH CV ECHO MEAS - EDV(CUBED): 103.8 ML
BH CV ECHO MEAS - EDV(MOD-SP2): 65 ML
BH CV ECHO MEAS - EDV(MOD-SP4): 86.1 ML
BH CV ECHO MEAS - EF(MOD-SP2): 63.1 %
BH CV ECHO MEAS - EF(MOD-SP4): 68.2 %
BH CV ECHO MEAS - ESV(CUBED): 24.4 ML
BH CV ECHO MEAS - ESV(MOD-SP2): 24 ML
BH CV ECHO MEAS - ESV(MOD-SP4): 27.4 ML
BH CV ECHO MEAS - FS: 38.3 %
BH CV ECHO MEAS - IVS/LVPW: 1 CM
BH CV ECHO MEAS - IVSD: 0.9 CM
BH CV ECHO MEAS - LA DIMENSION: 4.4 CM
BH CV ECHO MEAS - LAT PEAK E' VEL: 12.7 CM/SEC
BH CV ECHO MEAS - LV DIASTOLIC VOL/BSA (35-75): 38.8 CM2
BH CV ECHO MEAS - LV MASS(C)D: 142.7 GRAMS
BH CV ECHO MEAS - LV MAX PG: 4.7 MMHG
BH CV ECHO MEAS - LV MEAN PG: 3 MMHG
BH CV ECHO MEAS - LV SYSTOLIC VOL/BSA (12-30): 12.4 CM2
BH CV ECHO MEAS - LV V1 MAX: 108 CM/SEC
BH CV ECHO MEAS - LV V1 VTI: 26.2 CM
BH CV ECHO MEAS - LVIDD: 4.7 CM
BH CV ECHO MEAS - LVIDS: 2.9 CM
BH CV ECHO MEAS - LVOT AREA: 3.1 CM2
BH CV ECHO MEAS - LVOT DIAM: 2 CM
BH CV ECHO MEAS - LVPWD: 0.9 CM
BH CV ECHO MEAS - MED PEAK E' VEL: 7.2 CM/SEC
BH CV ECHO MEAS - MV A MAX VEL: 69.2 CM/SEC
BH CV ECHO MEAS - MV DEC SLOPE: 661 CM/SEC2
BH CV ECHO MEAS - MV DEC TIME: 0.23 SEC
BH CV ECHO MEAS - MV E MAX VEL: 126 CM/SEC
BH CV ECHO MEAS - MV E/A: 1.82
BH CV ECHO MEAS - MV MEAN PG: 2 MMHG
BH CV ECHO MEAS - MV P1/2T: 52.7 MSEC
BH CV ECHO MEAS - MV V2 VTI: 40.2 CM
BH CV ECHO MEAS - MVA(P1/2T): 4.2 CM2
BH CV ECHO MEAS - MVA(VTI): 2.05 CM2
BH CV ECHO MEAS - RVDD: 2.7 CM
BH CV ECHO MEAS - RVSP: 35 MMHG
BH CV ECHO MEAS - SV(LVOT): 82.3 ML
BH CV ECHO MEAS - SV(MOD-SP2): 41 ML
BH CV ECHO MEAS - SV(MOD-SP4): 58.7 ML
BH CV ECHO MEAS - SVI(LVOT): 37.1 ML/M2
BH CV ECHO MEAS - SVI(MOD-SP2): 18.5 ML/M2
BH CV ECHO MEAS - SVI(MOD-SP4): 26.5 ML/M2
BH CV ECHO MEAS - TAPSE (>1.6): 3.4 CM
BH CV ECHO MEAS - TR MAX PG: 31.6 MMHG
BH CV ECHO MEAS - TR MAX VEL: 281 CM/SEC
BH CV ECHO MEASUREMENTS AVERAGE E/E' RATIO: 12.66
IVRT: 85 MS
LEFT ATRIUM VOLUME INDEX: 31.1 ML/M2
LV EF BIPLANE MOD: 66 %
QT INTERVAL: 436 MS
QTC INTERVAL: 434 MS

## 2025-05-02 PROCEDURE — 93306 TTE W/DOPPLER COMPLETE: CPT

## 2025-05-02 PROCEDURE — 93306 TTE W/DOPPLER COMPLETE: CPT | Performed by: INTERNAL MEDICINE

## 2025-05-02 PROCEDURE — 99232 SBSQ HOSP IP/OBS MODERATE 35: CPT | Performed by: INTERNAL MEDICINE

## 2025-05-02 PROCEDURE — 93246 EXT ECG>7D<15D RECORDING: CPT

## 2025-05-02 PROCEDURE — 99238 HOSP IP/OBS DSCHRG MGMT 30/<: CPT | Performed by: INTERNAL MEDICINE

## 2025-05-02 RX ORDER — METOPROLOL SUCCINATE 25 MG/1
25 TABLET, EXTENDED RELEASE ORAL DAILY
Qty: 30 TABLET | Refills: 0 | Status: SHIPPED | OUTPATIENT
Start: 2025-05-03 | End: 2025-06-02

## 2025-05-02 RX ORDER — METOPROLOL SUCCINATE 25 MG/1
25 TABLET, EXTENDED RELEASE ORAL DAILY
Status: DISCONTINUED | OUTPATIENT
Start: 2025-05-02 | End: 2025-05-02 | Stop reason: HOSPADM

## 2025-05-02 RX ORDER — METOPROLOL SUCCINATE 25 MG/1
25 TABLET, EXTENDED RELEASE ORAL DAILY
Qty: 30 TABLET | Refills: 0 | Status: SHIPPED | OUTPATIENT
Start: 2025-05-03 | End: 2025-05-02

## 2025-05-02 RX ADMIN — LEVOTHYROXINE SODIUM 75 MCG: 0.07 TABLET ORAL at 05:39

## 2025-05-02 RX ADMIN — LOSARTAN POTASSIUM 100 MG: 50 TABLET, FILM COATED ORAL at 08:48

## 2025-05-02 RX ADMIN — APIXABAN 5 MG: 5 TABLET, FILM COATED ORAL at 08:48

## 2025-05-02 RX ADMIN — CETIRIZINE HYDROCHLORIDE 10 MG: 10 TABLET, FILM COATED ORAL at 08:48

## 2025-05-02 RX ADMIN — Medication 10 ML: at 08:48

## 2025-05-02 RX ADMIN — HYDROCHLOROTHIAZIDE 25 MG: 25 TABLET ORAL at 08:48

## 2025-05-02 RX ADMIN — METOPROLOL SUCCINATE 25 MG: 25 TABLET, EXTENDED RELEASE ORAL at 08:46

## 2025-05-02 RX ADMIN — Medication 5000 UNITS: at 08:48

## 2025-05-02 NOTE — PROGRESS NOTES
[facility]     Cardiology Progress Note    Patient Name: Kimberly Dennis  : 1950  MRN: [id number]  Primary Care Physician:  Milan Coppola Jr., MD  Date of admission: 2025    Subjective   Subjective     Chief Complaint: Follow-up visit, atrial fibrillation    Interval HPI:    Patient remains in normal sinus rhythm.  Blood pressure and heart rate stable.  She denies any palpitations.  No chest pain or shortness of breath.  No dizziness.  Slept well overnight.    Review of Systems   All systems were reviewed and negative except for: Per HPI    Objective   Objective     Vitals:   Temp:  [97.7 °F (36.5 °C)-99.1 °F (37.3 °C)] 97.9 °F (36.6 °C)  Heart Rate:  [] 57  Resp:  [12-17] 16  BP: (105-138)/(40-73) 138/52  Physical Exam      General : Alert, awake, no acute distress  CVS : Regular rate and rhythm, no murmur, rubs or gallops  Lungs: Clear to auscultation bilaterally, no crackles or rhonchi  Abdomen: Soft, nontender, bowel sounds heard in all 4 quadrants  Extremities: Warm, well-perfused, no pedal edema    Scheduled Meds:apixaban, 5 mg, Oral, BID  cetirizine, 10 mg, Oral, Daily  famotidine, 20 mg, Oral, Nightly  gabapentin, 300 mg, Oral, Q48H  hydroCHLOROthiazide, 25 mg, Oral, Daily  levothyroxine, 75 mcg, Oral, Q AM  losartan, 100 mg, Oral, Daily  metoprolol succinate XL, 25 mg, Oral, Daily  montelukast, 10 mg, Oral, Nightly  sodium chloride, 10 mL, Intravenous, Q12H  vitamin D3, 5,000 Units, Oral, Daily           Result Review    Result Review:  I have personally reviewed the results from the time of this admission to 2025 08:31 EDT and agree with these findings:  [x]  Laboratory  []  Microbiology  [x]  Radiology  [x]  EKG/Telemetry   [x]  Cardiology/Vascular   []  Pathology  []  Old records  []  Other:  Most notable findings include:     CBC          2024    10:52 2025    11:47 2025    21:32   CBC   WBC 6.49  6.41  8.13    RBC 4.18  4.02  4.32    Hemoglobin 11.9   11.5  12.8    Hematocrit 36.2  36.2  38.7    MCV 86.6  90.0  89.6    MCH 28.5  28.6  29.6    MCHC 32.9  31.8  33.1    RDW 13.0  13.4  13.5    Platelets 213  191  213      CMP          8/27/2024    10:52 2/27/2025    11:47 4/30/2025    21:32   CMP   Glucose 107  84  106    BUN 14  14  16    Creatinine 0.62  0.56  0.85    EGFR 94.8  97.1  72.4    Sodium 140  136  141    Potassium 4.9  3.8  4.0    Chloride 99  99  100    Calcium 10.1  9.4  9.8    Total Protein 7.9  7.3  8.0    Albumin 4.3  4.1  4.2    Globulin 3.6  3.2  3.8    Total Bilirubin 0.5  0.3  0.2    Alkaline Phosphatase 89  77  97    AST (SGOT) 17  22  19    ALT (SGPT) 11  9  15    Albumin/Globulin Ratio 1.2  1.3  1.1    BUN/Creatinine Ratio 22.6  25.0  18.8    Anion Gap 10.2  9.4  12.6       CARDIAC LABS:      Lab 05/01/25  0127 04/30/25  2232 04/30/25  2132   PROBNP  --   --  193.8   HSTROP T 12 12 9        Assessment & Plan   Assessment / Plan     Brief Patient Summary:  Kimberly Dennis is a 75 y.o. female with paroxysmal atrial fibrillation, on anticoagulation, history of cardioversion in 2019 and 2023, hypertension and hypothyroidism.  Admitted with atrial fibrillation with RVR.      Active Hospital Problems:  Active Hospital Problems    Diagnosis     **Atrial fibrillation with RVR      Paroxysmal atrial fibrillation with RVR : Status post synchronized cardioversion yesterday, in sinus rhythm now.  Had postconversion pause, hold by junctional rhythm for some time after cardioversion.    Essential hypertension    Plan:     Decreasing home metoprolol to 25 mg daily  Continue Eliquis 5 mg twice daily  Echocardiogram to evaluate LV function and valvular function.    Will do 2-week Zio patch monitor for additional monitoring after discharge    May be discharged later today after echocardiogram   Cardiology clinic follow-up in 3 to 4 weeks after discharge  We will make referral to cardiac electrophysiologist as outpatient to consider ablation.       CODE  STATUS:   Code Status (Patient has no pulse and is not breathing): CPR (Attempt to Resuscitate)  Medical Interventions (Patient has pulse or is breathing): Full Support      Electronically signed by Chip Mendoza MD, 05/02/25, 8:31 AM EDT.

## 2025-05-02 NOTE — CASE MANAGEMENT/SOCIAL WORK
First Provider Eval of Pt:  4/30/25 @ 2216*  IP:    5/1/25 (Met OBS criteria)  DC:    5/2/25  LOS:    < 2MN - [id number]  Exception:   N/A

## 2025-05-02 NOTE — DISCHARGE SUMMARY
[facility]         HOSPITALIST  DISCHARGE SUMMARY    Patient Name: Kimberly Dennis  : 1950  MRN: [id number]    Date of Admission: 2025  Date of Discharge:  2025    Primary Care Physician: Milan Coppola Jr., MD    Consults       Date and Time Order Name Status Description    2025  7:20 AM Inpatient Cardiology Consult Completed     2025 12:12 AM Inpatient Hospitalist Consult              Active and Resolved Hospital Problems:  Active Hospital Problems    Diagnosis POA    **Atrial fibrillation with RVR [I48.91] Yes      Resolved Hospital Problems   No resolved problems to display.       Hospital Course     Hospital Course:  Kimberly Dennis is a 75 y.o. female with history of paroxysmal atrial fibrillation on anticoagulation with history of cardioversion in  and , hypertension and hypothyroidism who presented to the ER because of sudden onset of palpitations which started that evening.  Patient was noted to be in atrial fibrillation with rapid rate.  Patient was started on a Cardizem infusion which did control her heart rate.  Patient was asked for admission.  Patient was admitted to the hospital she was seen by cardiology.  Patient underwent cardioversion and is currently in sinus rhythm.  Cardiology has decreased patient's metoprolol dose down to 25 mg daily.  Patient is to continue with Eliquis.  Patient had an echocardiogram to evaluate LV function which was done today.  Patient also has been set up with a Zio patch.  At this time cardiology states patient can discharge home today.  Patient will be discharged home today in stable condition with close outpatient follow-up      DISCHARGE Follow Up Recommendations for labs and diagnostics:   Follow-up with primary care in 1 week  Follow-up with cardiology as directed      Day of Discarge     Vital Signs:  Temp:  [97.7 °F (36.5 °C)-99.1 °F (37.3 °C)] 97.9 °F (36.6 °C)  Heart Rate:  [] 57  Resp:  [12-16]  16  BP: (105-149)/(40-73) 149/59  Physical Exam:   General: No acute distress sitting on the side of the bed eating lunch  CV regular rate and rhythm no murmurs  Respiratory: Clear no wheezing  Abdomen: Soft nontender nondistended  Musculoskeletal: Full range of motion  Psych: Normal mood and affect      Discharge Details        Discharge Medications        Changes to Medications        Instructions Start Date   metoprolol succinate XL 25 MG 24 hr tablet  Commonly known as: TOPROL-XL  What changed:   medication strength  how much to take   25 mg, Oral, Daily   Start Date: May 3, 2025            Continue These Medications        Instructions Start Date   apixaban 5 MG tablet tablet  Commonly known as: Eliquis   5 mg, Oral, 2 Times Daily      cetirizine 10 MG tablet  Commonly known as: zyrTEC   20 mg, Daily      Cholecalciferol 125 MCG (5000 UT) tablet   1 tablet, Every Evening      famotidine 40 MG tablet  Commonly known as: PEPCID   Take 1 tablet by mouth once daily      gabapentin 300 MG capsule  Commonly known as: NEURONTIN   300 mg, Oral, Nightly      hydroCHLOROthiazide 25 MG tablet   25 mg, Oral, Daily      levothyroxine 75 MCG tablet  Commonly known as: SYNTHROID, LEVOTHROID   75 mcg, Oral, Daily      losartan 100 MG tablet  Commonly known as: COZAAR   100 mg, Oral, Daily      montelukast 10 MG tablet  Commonly known as: SINGULAIR   10 mg, Oral, Nightly               Allergies   Allergen Reactions    Amoxicillin-Pot Clavulanate Itching    Hydrocodone-Acetaminophen Itching    Hydrocodone Itching and Rash    Iodine Rash    Latex Rash    Metal Rash     YELLOW GOLD CAUSES RASH     Shellfish Allergy Rash       Discharge Disposition:  Home or Self Care    Diet:  Hospital:  Diet Order   Procedures    Diet: Cardiac; Healthy Heart (2-3 Na+); Fluid Consistency: Thin (IDDSI 0)       Discharge Activity: As tolerated      CODE STATUS:  Code Status and Medical Interventions: CPR (Attempt to Resuscitate); Full Support    Ordered at: 05/01/25 0717     Code Status (Patient has no pulse and is not breathing):    CPR (Attempt to Resuscitate)     Medical Interventions (Patient has pulse or is breathing):    Full Support         Future Appointments   Date Time Provider Department Center   6/2/2025  1:00 PM Kaye Jerez APRN [facility] [place]S [place] [facility]   7/31/2025  1:15 PM Chip Mendoza MD [facility] CD [place] [facility]   9/2/2025 11:45 AM Milan Coppola Jr., MD [facility] IMP [place] [facility]       Additional Instructions for the Follow-ups that You Need to Schedule       Discharge Follow-up with PCP   As directed       Currently Documented PCP:    Milan Coppola Jr., MD    PCP Phone Number:    [phone]     Follow Up Details: in 1 week                Pertinent  and/or Most Recent Results     PROCEDURES:   Cardioversion    LAB RESULTS:      Lab 04/30/25 2132   WBC 8.13   HEMOGLOBIN 12.8   HEMATOCRIT 38.7   PLATELETS 213   NEUTROS ABS 4.64   IMMATURE GRANS (ABS) 0.02   LYMPHS ABS 2.24   MONOS ABS 0.56   EOS ABS 0.64*   MCV 89.6   LACTATE 1.2         Lab 05/01/25 0127 04/30/25 2132   SODIUM  --  141   POTASSIUM  --  4.0   CHLORIDE  --  100   CO2  --  28.4   ANION GAP  --  12.6   BUN  --  16   CREATININE  --  0.85   EGFR  --  72.4   GLUCOSE  --  106*   CALCIUM  --  9.8   MAGNESIUM 2.0  --          Lab 04/30/25 2132   TOTAL PROTEIN 8.0   ALBUMIN 4.2   GLOBULIN 3.8   ALT (SGPT) 15   AST (SGOT) 19   BILIRUBIN 0.2   ALK PHOS 97         Lab 05/01/25  0127 04/30/25 2232 04/30/25 2132   PROBNP  --   --  193.8   HSTROP T 12 12 9                 Brief Urine Lab Results       None          Microbiology Results (last 10 days)       Procedure Component Value - Date/Time    Blood Culture - Blood, Arm, Left [[id number]]  (Normal) Collected: 04/30/25 2132    Lab Status: Preliminary result Specimen: Blood from Arm, Left Updated: 05/01/25 2146     Blood Culture No growth at 24 hours    Narrative:      Less than seven (7) mL's of blood was collected.   Insufficient quantity may yield false negative results.    Blood Culture - Blood, Arm, Right [[id number]]  (Normal) Collected: 04/30/25 2132    Lab Status: Preliminary result Specimen: Blood from Arm, Right Updated: 05/01/25 2146     Blood Culture No growth at 24 hours    Narrative:      Less than seven (7) mL's of blood was collected.  Insufficient quantity may yield false negative results.            XR Chest 1 View  Result Date: 4/30/2025  Impression: Mild cardiomegaly, unchanged. No active pulmonary disease is seen. Electronically Signed: Peter Hui MD  4/30/2025 9:44 PM EDT  Workstation ID: [id number]               Results for orders placed during the hospital encounter of 04/30/25    Adult Transthoracic Echo Complete w/ Color, Spectral and Contrast if necessary per protocol    Interpretation Summary    Left ventricular systolic function is normal. Left ventricular ejection fraction appears to be 61 - 65%.    Left ventricular diastolic dysfunction is noted.    There is trace aortic insufficiency.    Estimated right ventricular systolic pressure from tricuspid regurgitation is normal (<35 mmHg).      Labs Pending at Discharge:  Pending Labs       Order Current Status    Blood Culture - Blood, Arm, Left Preliminary result    Blood Culture - Blood, Arm, Right Preliminary result              Time spent on Discharge including face to face service:  25 minutes    Electronically signed by Mike Blakely DO, 05/02/25, 11:55 AM EDT.

## 2025-05-02 NOTE — PLAN OF CARE
Goal Outcome Evaluation:  Plan of Care Reviewed With: patient        Progress: improving  Outcome Evaluation: Patient is AO x4, able to make needs known. Patient is currently on room air, no signs of respiratory distress noted. Echo completed today. Holter monitor placed at bedside. Sinus nando - NSR on tele. No complaints of pain or discomfort at this time. No acute changes throughout shift. VSS. Continue with current plan of care. Plan to discharge patient home today.

## 2025-05-02 NOTE — OUTREACH NOTE
Prep Survey      Flowsheet Row Responses   [facility] patient discharged from? [facility]   Is LACE score < 7 ? Yes   Eligibility [facility]   Date of Admission 04/30/25   Date of Discharge 05/02/25   Discharge Disposition Home or Self Care   Discharge diagnosis *Atrial fibrillation with RVR (   Does the patient have one of the following disease processes/diagnoses(primary or secondary)? Other   Does the patient have Home health ordered? No   Is there a DME ordered? No   Prep survey completed? Yes            ZAY NARANJO - Registered Nurse

## 2025-05-04 ENCOUNTER — TRANSITIONAL CARE MANAGEMENT TELEPHONE ENCOUNTER (OUTPATIENT)
Dept: CALL CENTER | Facility: HOSPITAL | Age: 75
End: 2025-05-04
Payer: MEDICARE

## 2025-05-04 NOTE — OUTREACH NOTE
Call Center TCM Note      Flowsheet Row Responses   [facility] patient discharged from? [facility]   Does the patient have one of the following disease processes/diagnoses(primary or secondary)? Other   TCM attempt successful? No   Unsuccessful attempts Attempt 1   Call Status Left message            ZAY Albert Registered Nurse    5/4/2025, 18:44 EDT

## 2025-05-05 ENCOUNTER — TELEPHONE (OUTPATIENT)
Dept: CASE MANAGEMENT | Facility: OTHER | Age: 75
End: 2025-05-05
Payer: MEDICARE

## 2025-05-05 ENCOUNTER — TRANSITIONAL CARE MANAGEMENT TELEPHONE ENCOUNTER (OUTPATIENT)
Dept: CALL CENTER | Facility: HOSPITAL | Age: 75
End: 2025-05-05
Payer: MEDICARE

## 2025-05-05 LAB
BACTERIA SPEC AEROBE CULT: NORMAL
BACTERIA SPEC AEROBE CULT: NORMAL

## 2025-05-05 NOTE — TELEPHONE ENCOUNTER
Patient identified as potential candidate for Chronic care management from ER list, she had recent hospitalization.   Attempted to contact patient, UTR and voicemail not set up to leave a message.   Jannie HOWARD, RN, [facility]  Ambulatory Case Management  5/5/2025, 11:38 EDT

## 2025-05-06 ENCOUNTER — PATIENT OUTREACH (OUTPATIENT)
Dept: CASE MANAGEMENT | Facility: OTHER | Age: 75
End: 2025-05-06
Payer: MEDICARE

## 2025-05-06 DIAGNOSIS — I48.91 ATRIAL FIBRILLATION WITH RVR: Primary | ICD-10-CM

## 2025-05-06 NOTE — OUTREACH NOTE
AMBULATORY CASE MANAGEMENT NOTE    Names and Relationships of Patient/Support Persons: Contact: Kimberly Dennis; Relationship: Self -     Patient Outreach  Spoke with patient regarding Chronic care management Program, she had recent hospitalization with A-Fib, but doesn't think its something she needs, A-Fib isn't new to her. She feels stress and anxiety has caused her exacerbations. Talked about discussing this with , she has nerve medicine but states she doesn't take it often, however there may be something she could take daily. She monitors her BP and will bring that log to her appointment, talked about things to do to get a more accurate BP reading.     Education Documentation  No documentation found.      Jannie SCHMITZ  Ambulatory Case Management  5/6/2025, 10:30 EDT

## 2025-05-07 NOTE — PROGRESS NOTES
Transitional Care Follow Up Visit  Subjective     Kimberly Dennis is a 75 y.o. female who presents for a transitional care management visit.    Within 48 business hours after discharge our office contacted her via telephone to coordinate her care and needs.      I reviewed and discussed the details of that call along with the discharge summary, hospital problems, inpatient lab results, inpatient diagnostic studies, and consultation reports with Kimberly.     Current outpatient and discharge medications have been reconciled for the patient.  Reviewed by: Milan Coppola Jr., MD          5/2/2025     1:41 PM   Date of TCM Phone Call   [facility]   Date of Admission 4/30/2025   Date of Discharge 5/2/2025   Discharge Disposition Home or Self Care     Risk for Readmission (LACE) Score: 5 (5/2/2025  6:00 AM)      History of Present Illness     Chief Complaint   Patient presents with    Hospital Follow Up Visit     Course During Hospital Stay:    Patient admitted for afib with RVR. Patient found to have labile Blood Pressure. Patient had cardioversion. Patient had junctional rhythm after cardioversion. Patient toprol dose was lowered. Patient had toprol dose lowered to 25mg daily. Patient reports sleeping more than usual with poor memory. Patient currently wearing zio patch to monitor HR.  Home Blood Pressure readings typically 140s/70s. Patient has restarted eliquis and doing well. Patient does report having increase fatigue recently. Patient is going to see electrophysiologist to consider ablation.      The following portions of the patient's history were reviewed and updated as appropriate: allergies, current medications, past family history, past medical history, past social history, past surgical history, and problem list.      Objective   Vitals:    05/08/25 1233   BP: 138/73   Pulse: 64   Temp: 96.8 °F (36 °C)   SpO2: 96%       Appearance: No acute distress, well-nourished  Head:  normocephalic, atraumatic  Eyes: extraocular movements intact, no scleral icterus, no conjunctival injection  Ears, Nose, and Throat: external ears normal, nares patent, moist mucous membranes  Cardiovascular: regular rate and rhythm. no murmurs, rales, or rhonchi. no edema  Respiratory: breathing comfortably, symmetric chest rise, clear to auscultation bilaterally. No wheezes, rales, or rhonchi.  Neuro: alert and oriented to time, place, and person. Normal gait  Psych: normal mood and affect     Result Review :   The following data was reviewed by: Milan Coppola Jr, MD on 05/08/2025:  Common labs          8/27/2024    10:52 2/27/2025    11:47 4/30/2025    21:32   Common Labs   Glucose 107  84  106    BUN 14  14  16    Creatinine 0.62  0.56  0.85    Sodium 140  136  141    Potassium 4.9  3.8  4.0    Chloride 99  99  100    Calcium 10.1  9.4  9.8    Albumin 4.3  4.1  4.2    Total Bilirubin 0.5  0.3  0.2    Alkaline Phosphatase 89  77  97    AST (SGOT) 17  22  19    ALT (SGPT) 11  9  15    WBC 6.49  6.41  8.13    Hemoglobin 11.9  11.5  12.8    Hematocrit 36.2  36.2  38.7    Platelets 213  191  213    Total Cholesterol 205  188     Triglycerides 178  206     HDL Cholesterol 43  43     LDL Cholesterol  130  109     Hemoglobin A1C 5.90  5.90         No orders to display     Lab Results   Component Value Date    SARSANTIGEN Not Detected 11/11/2024    COVID19 Not Detected 11/11/2024    RAPFLUA Negative 05/24/2022    RAPFLUB Negative 05/24/2022    FLUAAG Not Detected 11/11/2024    FLUBAG Not Detected 11/11/2024    RAPSCRN Negative 03/24/2025    INR 1.23 (H) 05/23/2022     Last Urine Toxicity  More data may exist         Latest Ref Rng & Units 2/27/2025 7/13/2023   LAST URINE TOXICITY RESULTS   Amphetamine, Urine Qual Negative Negative  Negative    Barbiturates Screen, Urine Negative Negative  Negative    Benzodiazepine Screen, Urine Negative Negative  Negative    Buprenorphine, Screen, Urine Negative Negative   Negative    Cocaine Screen, Urine Negative Negative  Negative    Methadone Screen , Urine Negative Negative  Negative    Methamphetamine, Ur Negative Negative  Negative        Assessment & Plan     Diagnoses and all orders for this visit:    1. Atrial fibrillation with RVR (Primary)  Comments:  improved at this time. now on lower toprol dose. f/u with cardiology in approx 6 weeks.    2. Encounter for long-term (current) use of medications  -     POC Medline 12 Panel Urine Drug Screen    3. Essential hypertension  Comments:  well controlled. goal BP <130/80    4. Acquired hypothyroidism  Comments:  last TSH reviewed and wnl.    5. Anxiety  Comments:  start buspar daily and prn.  Orders:  -     busPIRone (BUSPAR) 5 MG tablet; Take 1 tablet by mouth 3 (Three) Times a Day As Needed (anxiety).  Dispense: 90 tablet; Refill: 1        There are no discontinued medications.    Return for scheduled follow-up.           Milan Coppola Jr, MD  05/08/25  13:41 EDT

## 2025-05-08 ENCOUNTER — OFFICE VISIT (OUTPATIENT)
Dept: INTERNAL MEDICINE | Facility: CLINIC | Age: 75
End: 2025-05-08
Payer: MEDICARE

## 2025-05-08 VITALS
OXYGEN SATURATION: 96 % | BODY MASS INDEX: 40.66 KG/M2 | TEMPERATURE: 96.8 F | HEIGHT: 66 IN | HEART RATE: 64 BPM | SYSTOLIC BLOOD PRESSURE: 138 MMHG | WEIGHT: 253 LBS | DIASTOLIC BLOOD PRESSURE: 73 MMHG

## 2025-05-08 DIAGNOSIS — Z79.899 ENCOUNTER FOR LONG-TERM (CURRENT) USE OF MEDICATIONS: ICD-10-CM

## 2025-05-08 DIAGNOSIS — I10 ESSENTIAL HYPERTENSION: ICD-10-CM

## 2025-05-08 DIAGNOSIS — F41.9 ANXIETY: ICD-10-CM

## 2025-05-08 DIAGNOSIS — I48.91 ATRIAL FIBRILLATION WITH RVR: Primary | ICD-10-CM

## 2025-05-08 DIAGNOSIS — E03.9 ACQUIRED HYPOTHYROIDISM: ICD-10-CM

## 2025-05-08 RX ORDER — BUSPIRONE HYDROCHLORIDE 5 MG/1
5 TABLET ORAL 3 TIMES DAILY PRN
Qty: 90 TABLET | Refills: 1 | Status: SHIPPED | OUTPATIENT
Start: 2025-05-08

## 2025-05-12 DIAGNOSIS — I10 ESSENTIAL HYPERTENSION: ICD-10-CM

## 2025-05-12 RX ORDER — LOSARTAN POTASSIUM 100 MG/1
100 TABLET ORAL DAILY
Qty: 90 TABLET | Refills: 0 | Status: SHIPPED | OUTPATIENT
Start: 2025-05-12

## 2025-05-13 NOTE — PROGRESS NOTES
Enter Query Response Below      Query Response:   Morbid obesity     Electronically signed by Mike Blakely DO, 05/13/25, 10:15 AM EDT.               If applicable, please update the problem list.

## 2025-05-27 LAB
CV ZIO BASELINE AVG BPM: 68 BPM
CV ZIO BASELINE BPM HIGH: 185 BPM
CV ZIO BASELINE BPM LOW: 47 BPM
CV ZIO DEVICE ANALYSIS TIME: NORMAL
CV ZIO ECT SVE COUNT: 1550 EPISODES
CV ZIO ECT SVE CPLT COUNT: 91 EPISODES
CV ZIO ECT SVE CPLT FREQ: NORMAL
CV ZIO ECT SVE FREQ: NORMAL
CV ZIO ECT SVE TPLT COUNT: 32 EPISODES
CV ZIO ECT SVE TPLT FREQ: NORMAL
CV ZIO ECT VE COUNT: NORMAL EPISODES
CV ZIO ECT VE CPLT COUNT: 110 EPISODES
CV ZIO ECT VE CPLT FREQ: NORMAL
CV ZIO ECT VE FREQ: NORMAL
CV ZIO ECT VE TPLT COUNT: 0 EPISODES
CV ZIO ECT VE TPLT FREQ: 0
CV ZIO ECTOPIC SVE COUPLET RAW PERCENT: 0.01 %
CV ZIO ECTOPIC SVE ISOLATED PERCENT: 0.11 %
CV ZIO ECTOPIC SVE TRIPLET RAW PERCENT: 0.01 %
CV ZIO ECTOPIC VE COUPLET RAW PERCENT: 0.02 %
CV ZIO ECTOPIC VE ISOLATED PERCENT: 2.62 %
CV ZIO ECTOPIC VE TRIPLET RAW PERCENT: 0 %
CV ZIO ENROLLMENT END: NORMAL
CV ZIO ENROLLMENT START: NORMAL
CV ZIO L BIGEMINY DUR: 457 SEC
CV ZIO L BIGEMINY END: NORMAL
CV ZIO L BIGEMINY START: NORMAL
CV ZIO L TRIGEMINY DUR: 138.2 SEC
CV ZIO L TRIGEMINY END: NORMAL
CV ZIO L TRIGEMINY START: NORMAL
CV ZIO PATIENT EVENTS DIARIES: 6
CV ZIO PATIENT EVENTS TRIGGERS: 6
CV ZIO PAUSE COUNT: 0
CV ZIO PRESCRIPTION STATUS: NORMAL
CV ZIO SVT AVG BPM: 130 BPM
CV ZIO SVT BPM HIGH: 185 BPM
CV ZIO SVT BPM LOW: 81 BPM
CV ZIO SVT COUNT: 45
CV ZIO SVT F EPI AVG BPM: 165 BPM
CV ZIO SVT F EPI BEATS: 5 BEATS
CV ZIO SVT F EPI BPM HIGH: 185 BPM
CV ZIO SVT F EPI BPM LOW: 148 BPM
CV ZIO SVT F EPI DUR: 1.8 SEC
CV ZIO SVT F EPI END: NORMAL
CV ZIO SVT F EPI START: NORMAL
CV ZIO SVT L EPI AVG BPM: 117 BPM
CV ZIO SVT L EPI BEATS: 32 BEATS
CV ZIO SVT L EPI BPM HIGH: 129 BPM
CV ZIO SVT L EPI BPM LOW: 96 BPM
CV ZIO SVT L EPI DUR: 16.5 SEC
CV ZIO SVT L EPI END: NORMAL
CV ZIO SVT L EPI START: NORMAL
CV ZIO SVT SYMPT IN PT: NORMAL
CV ZIO TOTAL  ENROLLMENT PERIOD: NORMAL
CV ZIO VT COUNT: 0

## 2025-06-02 ENCOUNTER — OFFICE VISIT (OUTPATIENT)
Dept: ORTHOPEDIC SURGERY | Facility: CLINIC | Age: 75
End: 2025-06-02
Payer: MEDICARE

## 2025-06-02 VITALS
BODY MASS INDEX: 40.66 KG/M2 | OXYGEN SATURATION: 96 % | DIASTOLIC BLOOD PRESSURE: 71 MMHG | WEIGHT: 253 LBS | HEIGHT: 66 IN | HEART RATE: 60 BPM | SYSTOLIC BLOOD PRESSURE: 156 MMHG

## 2025-06-02 DIAGNOSIS — Z96.653 HISTORY OF TOTAL KNEE ARTHROPLASTY, BILATERAL: Primary | ICD-10-CM

## 2025-06-02 NOTE — PROGRESS NOTES
"Chief Complaint  Follow-up of the Right Knee and Follow-up of the Left Knee    Subjective      Kimberly Dennis presents to [facility]     History of Present Illness  The patient is here today following up on her bilateral knees. She has a history of a left total knee arthroplasty performed by Dr. Pena on 06/03/2022 and a right total knee arthroplasty in 2014.    She reports that the injection administered during her last visit provided some relief. However, she continues to experience pain in various locations within her knee. No recent falls or injuries have occurred, and she is very careful to avoid falls. Occasional swelling is noted in both legs, with the right leg being more affected than the left. Additionally, intermittent tingling sensations are experienced in her legs. She recalls having complications with the sutures on one of her knees, which required restapling while she was bedridden.  Overall she denies any concerns.    Past Surgical History: The patient has a left total knee arthroplasty on 06/03/2022 and a right total knee arthroplasty in 2014.      Allergies   Allergen Reactions    Amoxicillin-Pot Clavulanate Itching    Hydrocodone-Acetaminophen Itching    Hydrocodone Itching and Rash    Iodine Rash    Latex Rash    Metal Rash     YELLOW GOLD CAUSES RASH     Shellfish Allergy Rash       Objective     Vital Signs:   Vitals:    06/02/25 1303   BP: 156/71   Pulse: 60   SpO2: 96%   Weight: 115 kg (253 lb)   Height: 167.6 cm (66\")     Body mass index is 40.84 kg/m².    I reviewed the patient's chief complaint, history of present illness, review of systems, past medical history, surgical history, family history, social history, medications, and allergy list.     Ortho Exam    General: Alert, no acute distress.  Independently ambulatory without the use of any assistive devices.  Bilateral Knee: No pain with passive hip ROM. Knee stable to varus/valgus stress.  Knee extensor " mechanism intact.  0 degrees knee extension. Flexion to 125 degrees. Calf soft, non-tender.  Sensation and neurovascularly intact.  Demonstrates active ankle dorsiflexion and plantarflexion.  Palpable pedal pulses.       Imaging Results (Most Recent)       Procedure Component Value Units Date/Time    XR Knee 3 View Left - Preliminary [[id number]] Resulted: 06/02/25 1319     Updated: 06/02/25 1319    This result has not been signed. Information might be incomplete.      Narrative:      X-Ray Report:  Study: X-rays ordered, taken in the office, and reviewed today.   Site: Left knee Xray  Indication: Left total knee arthroplasty follow up.   View: AP/Lateral, Standing, and [place] view(s)  Findings: Intact left total knee arthroplasty. No evidence of hardware   malfunction, complication or loosening. No periprosthetic fractures   visualized. Patella is midline tracking on sunrise view.   Prior studies available for comparison: yes        XR Knee 3 View Right - Preliminary [[id number]] Resulted: 06/02/25 1319     Updated: 06/02/25 1319    This result has not been signed. Information might be incomplete.      Narrative:      X-Ray Report:  Study: X-rays ordered, taken in the office, and reviewed today.   Site: Right knee Xray  Indication: Right total knee arthroplasty follow up.   View: AP/Lateral, Standing, and [place] view(s)  Findings: Intact right total knee arthroplasty. No evidence of hardware   malfunction, complication or loosening. No periprosthetic fractures   visualized. Patella is midline tracking on sunrise view.   Prior studies available for comparison: yes                 Results  Imaging   - X-rays of the bilateral knees: 06/02/2025, Stable and intact left total knee and right total knee arthroplasties.         Assessment and Plan   Diagnoses and all orders for this visit:    1. History of total knee arthroplasty, bilateral (Primary)  -     XR Knee 3 View Right  -     XR Knee 3 View Left       X-rays  reviewed, showing hardware intact and no complications.  Patient is doing well. Continue home exercise program to progress strength and ROM.     Discussed the importance of lifelong antibiotic prophylaxis with dental procedures following total joint replacement. In the event of a dental procedure, patient is welcome to contact our office to obtain antibiotics prior to the procedure if their dentist does not provide the prescription.     We also discussed that if a fall/injury were to occur to the affected extremity was advised for patient to obtain x-rays for clarification that no hardware has been compromised or if showing signs of loosening.    Patient verbalized understanding.     Follow-up in 2-3 year. We will repeat xray's of the prosthetic joint at that time.   Call sooner or return to care, if needed with any changes or concerns.        Tobacco Use: Low Risk  (6/2/2025)    Patient History     Smoking Tobacco Use: Never     Smokeless Tobacco Use: Never     Passive Exposure: Never     Patient reports that they are a nonsmoker; cessation education not applicable.            Follow Up   Return in about 2 years (around 6/2/2027).  There are no Patient Instructions on file for this visit.    Patient was given instructions and counseling regarding her condition or for health maintenance advice. Please see specific information pulled into the AVS if appropriate.     Patient or patient representative verbalized consent for the use of Ambient Listening during the visit with  ROME Rutledge for chart documentation. 6/3/2025  07:41 EDT    Dictated Utilizing Dragon Dictation. Please note that portions of this note were completed with a voice recognition program. Part of this note may be an electronic transcription/translation of spoken language to printed text using the Dragon Dictation System.

## 2025-06-09 DIAGNOSIS — Z96.652 AFTERCARE FOLLOWING LEFT KNEE JOINT REPLACEMENT SURGERY: ICD-10-CM

## 2025-06-09 DIAGNOSIS — Z47.1 AFTERCARE FOLLOWING LEFT KNEE JOINT REPLACEMENT SURGERY: ICD-10-CM

## 2025-06-09 RX ORDER — GABAPENTIN 300 MG/1
300 CAPSULE ORAL NIGHTLY
Qty: 90 CAPSULE | Refills: 0 | OUTPATIENT
Start: 2025-06-09

## 2025-06-09 RX ORDER — GABAPENTIN 300 MG/1
300 CAPSULE ORAL NIGHTLY
Qty: 90 CAPSULE | Refills: 0 | Status: SHIPPED | OUTPATIENT
Start: 2025-06-09

## 2025-06-09 RX ORDER — METOPROLOL SUCCINATE 25 MG/1
25 TABLET, EXTENDED RELEASE ORAL DAILY
Qty: 30 TABLET | Refills: 0 | Status: SHIPPED | OUTPATIENT
Start: 2025-06-09 | End: 2025-07-09

## 2025-06-09 NOTE — TELEPHONE ENCOUNTER
Refill Request for Controlled Substance    Date of Request: 6/9/2025  Medication Requested: Gabapentin   Last UDS: 02/27/2025  Last Consent: None on file   Last OV: 05/08/2025  Next OV: 09/02/2025

## 2025-06-09 NOTE — TELEPHONE ENCOUNTER
Caller: Kimberly Dennis    Relationship: Self    Best call back number: [phone]    Requested Prescriptions:   Requested Prescriptions     Pending Prescriptions Disp Refills    metoprolol succinate XL (TOPROL-XL) 25 MG 24 hr tablet 30 tablet 0     Sig: Take 1 tablet by mouth Daily for 30 days.        Pharmacy where request should be sent: [address] - [phone]  - [phone] FX     Last office visit with prescribing clinician: 7/25/2024   Last telemedicine visit with prescribing clinician: Visit date not found   Next office visit with prescribing clinician: 7/31/2025     Additional details provided by patient: PT IS COMPLETELY OUT, WAS PRESCRIBED THIS BY THE HOSPITAL    Does the patient have less than a 3 day supply:  [x] Yes  [] No    Would you like a call back once the refill request has been completed: [] Yes [x] No    If the office needs to give you a call back, can they leave a voicemail: [] Yes [] No    William Prescott Rep   06/09/25 08:21 EDT          Parent is calling about Robbin Falcon, : 2018, 2 month old   What time of day should Robbin Falcon take her omeprazole.

## 2025-06-12 DIAGNOSIS — J30.2 SEASONAL ALLERGIC RHINITIS, UNSPECIFIED TRIGGER: ICD-10-CM

## 2025-06-12 RX ORDER — MONTELUKAST SODIUM 10 MG/1
10 TABLET ORAL NIGHTLY
Qty: 90 TABLET | Refills: 0 | Status: SHIPPED | OUTPATIENT
Start: 2025-06-12

## 2025-06-20 ENCOUNTER — RESULTS FOLLOW-UP (OUTPATIENT)
Dept: TELEMETRY | Facility: HOSPITAL | Age: 75
End: 2025-06-20
Payer: MEDICARE

## 2025-06-20 NOTE — PROGRESS NOTES
The 2-week cardiac monitor study did not show any major findings.  There were no episodes of atrial fibrillation.  There were no long pauses as well.  Heart rate was well-controlled.    Occasional ectopic/extra heartbeats noted and some of them are symptomatic per patient's diary.    No medication changes needed at this time.  Follow-up next month as scheduled earlier.  Please call with any new concerns or problems in between.      Electronically signed by Chip Mendoza MD, 06/20/25, 10:22 AM EDT.

## 2025-06-30 RX ORDER — LEVOTHYROXINE SODIUM 75 UG/1
75 TABLET ORAL DAILY
Qty: 90 TABLET | Refills: 0 | Status: SHIPPED | OUTPATIENT
Start: 2025-06-30

## 2025-07-05 DIAGNOSIS — I10 ESSENTIAL HYPERTENSION: ICD-10-CM

## 2025-07-07 RX ORDER — HYDROCHLOROTHIAZIDE 25 MG/1
25 TABLET ORAL DAILY
Qty: 90 TABLET | Refills: 0 | Status: SHIPPED | OUTPATIENT
Start: 2025-07-07

## 2025-07-10 ENCOUNTER — OFFICE VISIT (OUTPATIENT)
Dept: SLEEP MEDICINE | Facility: HOSPITAL | Age: 75
End: 2025-07-10
Payer: MEDICARE

## 2025-07-10 VITALS
HEART RATE: 61 BPM | OXYGEN SATURATION: 97 % | DIASTOLIC BLOOD PRESSURE: 55 MMHG | SYSTOLIC BLOOD PRESSURE: 130 MMHG | WEIGHT: 250 LBS | BODY MASS INDEX: 41.65 KG/M2 | HEIGHT: 65 IN

## 2025-07-10 DIAGNOSIS — G47.33 OSA (OBSTRUCTIVE SLEEP APNEA): Primary | ICD-10-CM

## 2025-07-10 PROCEDURE — G0463 HOSPITAL OUTPT CLINIC VISIT: HCPCS

## 2025-07-10 RX ORDER — METOPROLOL SUCCINATE 25 MG/1
25 TABLET, EXTENDED RELEASE ORAL DAILY
Qty: 90 TABLET | Refills: 1 | Status: SHIPPED | OUTPATIENT
Start: 2025-07-10

## 2025-07-10 NOTE — PROGRESS NOTES
"[facility]         Dr Marce Knight  [unfilled]  Patient Care Team:  Milan Coppola Jr., MD as PCP - General (Internal Medicine)  Mae Layne MD as Consulting Physician (Obstetrics and Gynecology)  Chip Mendoza MD as Consulting Physician (Cardiology)  Marce Knight MD as Consulting Physician (Sleep Medicine)    Chief Complaint:RUBY with underlying history of A-fib     Interval History: Patient here for annual compliance visit.  She is currently set up on CPAP 10 cm.  Compliance 100% average daily use 7 hours 39 minutes.  AHI and leak within normal limits.  She goes to bed 1030 gets up at 8 AM gets about 7+ hours of sleep more rested with CPAP.  No tobacco alcohol or caffeine abuse.  Currently has a nasal pillow that fits well.  Supplies been adequate.  [place] 2 out of 24 within normal limits    REVIEW OF SYSTEMS:   CARDIOVASCULAR: No chest pain, chest pressure or chest discomfort. No palpitations or edema.   RESPIRATORY: No cough   GI no anorexia, nausea, vomiting or diarrhea. No abdominal pain or blood.   HEMATOLOGIC: No bleeding or bruising.  Positive for nasal congestion shortness of breath and anxiety    Ventilator/Non-Invasive Ventilation Settings (From admission, onward)      None              Vital Signs  Heart Rate:  [61] 61  BP: (130)/(55) 130/55  [unfilled]  Flowsheet Rows      Flowsheet Row First Filed Value   Admission Height 165.1 cm (65\") Documented at 07/10/2025 1100   Admission Weight 113 kg (250 lb) Documented at 07/10/2025 1100            Physical Exam:  Patient is examined using the personal protective equipment as per guidelines from infection control for this particular patient as enacted.  Hand hygiene was performed before and after patient interaction.   General Appearance:    Alert, cooperative, in no acute distress.  Following simple commands  ENT Mallampati between 3 and 4 no nasal congestion  Neck midline trachea, no thyromegaly   Lungs:     Clear to " auscultation, respirations regular, even and                  unlabored    Heart:    Regular rhythm and normal rate, normal S1 and S2, no            murmur, no gallop, no rub, no click   Chest Wall:    No abnormalities observed   Abdomen:     Normal bowel sounds, no masses, no organomegaly, soft        nontender, nondistended, no guarding, no rebound                tenderness   Extremities:   Moves all extremities well, no edema, no cyanosis, no             redness  CNS no focal neurological deficits normal sensory exam  Skin no rashes no nodules  Musculoskeletal no cyanosis no clubbing normal range of motion     Results Review:                                          I reviewed the patient's new clinical results.  I personally viewed and interpreted the patient's chest x-ray.        Medication Review:       No current facility-administered medications for this visit.      ASSESSMENT:   RUBY  Atrial fibrillation  Morbid obesity BMI 41.5  Hypothyroidism   A-Fib    PLAN:  Reviewed compliance download with the patient  Compliance AHI leak look excellent  Continue current CPAP 10 cm  Sleep hygiene measures  Treatment of underlying comorbidities  Weight loss encouraged  Supplies be renewed for 1 more year  Follow-up in 1 year      Marce Knight MD  07/10/25  11:37 EDT

## 2025-07-31 ENCOUNTER — OFFICE VISIT (OUTPATIENT)
Dept: CARDIOLOGY | Facility: CLINIC | Age: 75
End: 2025-07-31
Payer: MEDICARE

## 2025-07-31 VITALS
HEIGHT: 65 IN | DIASTOLIC BLOOD PRESSURE: 76 MMHG | SYSTOLIC BLOOD PRESSURE: 152 MMHG | HEART RATE: 61 BPM | BODY MASS INDEX: 41.82 KG/M2 | WEIGHT: 251 LBS

## 2025-07-31 DIAGNOSIS — I48.0 PAROXYSMAL ATRIAL FIBRILLATION: Primary | ICD-10-CM

## 2025-07-31 DIAGNOSIS — I10 ESSENTIAL HYPERTENSION: ICD-10-CM

## 2025-07-31 PROCEDURE — 1159F MED LIST DOCD IN RCRD: CPT | Performed by: INTERNAL MEDICINE

## 2025-07-31 PROCEDURE — 99213 OFFICE O/P EST LOW 20 MIN: CPT | Performed by: INTERNAL MEDICINE

## 2025-07-31 PROCEDURE — 1160F RVW MEDS BY RX/DR IN RCRD: CPT | Performed by: INTERNAL MEDICINE

## 2025-07-31 PROCEDURE — 3077F SYST BP >= 140 MM HG: CPT | Performed by: INTERNAL MEDICINE

## 2025-07-31 PROCEDURE — 3078F DIAST BP <80 MM HG: CPT | Performed by: INTERNAL MEDICINE

## 2025-08-03 NOTE — PROGRESS NOTES
CARDIOLOGY FOLLOW-UP PROGRESS NOTE        Chief Complaint  Follow-up and Atrial Fibrillation    Subjective            Kimberly Dennis presents to [facility] CARDIOLOGY  History of Present Illness    Ms. Dennis is here for follow-up visit.  She had admission to the hospital in early May with palpitations and shortness of breath.  She was again noted to be in atrial fibrillation with RVR.  She eventually underwent direct-current cardioversion which was successful.  However she had an 8 seconds long pause immediately following cardioversion.  She remained bradycardic for the next several hours.  She was eventually discharged home on lower dose of metoprolol.  Since discharge, she had a 2-week      Past History:    (1) Paroxysmal atrial fibrillation, s/o cardioversion in ER in 2019.  Recurrence of A-fib noted on ER visit on 12/21/2023, underwent successful cardioversion in the ER. 3rd episode  on 5/1/2025, underwent successful cardioversion.  8 seconds long sinus pause post cardioversion     (2) Hypertension. (3) Hypothyroidism.     Medical History:  Past Medical History:   Diagnosis Date    Acid reflux     Acute deep vein thrombosis (DVT) of calf muscle vein of left lower extremity 06/09/2022    Allergic     Anemia     Anesthesia complication     Arthritis     Arthritis of back     Atrial fibrillation     PAROXYSMAL    Bladder disorder     Cataract 2014    Colon polyp     GI bleed     History of total knee arthroplasty, right 09/08/2016    HTN (hypertension)     Limb swelling     Low back strain     Medial meniscus tear 09/12/2017    LEFT    Mitral valve prolapse     Primary osteoarthritis of left knee 09/12/2017    Seasonal allergies     Sleep apnea     SOB (shortness of breath)     Tear of medial cartilage or meniscus of knee, current 09/12/2017    Thoracic outlet syndrome     Thyroid disorder        Family History: family history includes Anesthesia problems in her mother; Arthritis in her mother,  "sister, and sister; Diabetes in her brother; Heart disease in her brother and mother; Scoliosis in her sister; Vision loss in her maternal grandfather.     Social History: reports that she has never smoked. She has never been exposed to tobacco smoke. She has never used smokeless tobacco. She reports that she does not drink alcohol and does not use drugs.    Allergies: Amoxicillin-pot clavulanate, Hydrocodone-acetaminophen, Hydrocodone, Iodine, Latex, Metal, and Shellfish allergy    Current Outpatient Medications on File Prior to Visit   Medication Sig    apixaban (Eliquis) 5 MG tablet tablet Take 1 tablet by mouth 2 (Two) Times a Day.    busPIRone (BUSPAR) 5 MG tablet Take 1 tablet by mouth 3 (Three) Times a Day As Needed (anxiety).    cetirizine (zyrTEC) 10 MG tablet Take 2 tablets by mouth Daily.    Cholecalciferol 125 MCG (5000 UT) tablet Take 1 tablet by mouth Every Evening.    famotidine (PEPCID) 40 MG tablet Take 1 tablet by mouth once daily (Patient taking differently: Take 1 tablet by mouth Daily.)    gabapentin (NEURONTIN) 300 MG capsule Take 1 capsule by mouth Every Night.    hydroCHLOROthiazide 25 MG tablet Take 1 tablet by mouth once daily    levothyroxine (SYNTHROID, LEVOTHROID) 75 MCG tablet Take 1 tablet by mouth once daily    losartan (COZAAR) 100 MG tablet Take 1 tablet by mouth once daily    metoprolol succinate XL (TOPROL-XL) 25 MG 24 hr tablet Take 1 tablet by mouth Daily.    montelukast (SINGULAIR) 10 MG tablet TAKE 1 TABLET BY MOUTH ONCE DAILY AT NIGHT     No current facility-administered medications on file prior to visit.          Review of Systems     Objective     /76   Pulse 61   Ht 165.1 cm (65\")   Wt 114 kg (251 lb)   BMI 41.77 kg/m²       Physical Exam    General : Alert, awake, no acute distress  Neck : Supple, no carotid bruit, no jugular venous distention  CVS : Regular rate and rhythm, no murmur, rubs or gallops  Lungs: Clear to auscultation bilaterally, no crackles or " rhonchi  Abdomen: Soft, nontender, bowel sounds heard in all 4 quadrants  Extremities: Warm, well-perfused, no pedal edema    Result Review :     The following data was reviewed by: Chip Mendoza MD on 07/31/2025:    CMP          8/27/2024    10:52 2/27/2025    11:47 4/30/2025    21:32   CMP   Glucose 107  84  106    BUN 14  14  16    Creatinine 0.62  0.56  0.85    EGFR 94.8  97.1  72.4    Sodium 140  136  141    Potassium 4.9  3.8  4.0    Chloride 99  99  100    Calcium 10.1  9.4  9.8    Total Protein 7.9  7.3  8.0    Albumin 4.3  4.1  4.2    Globulin 3.6  3.2  3.8    Total Bilirubin 0.5  0.3  0.2    Alkaline Phosphatase 89  77  97    AST (SGOT) 17  22  19    ALT (SGPT) 11  9  15    Albumin/Globulin Ratio 1.2  1.3  1.1    BUN/Creatinine Ratio 22.6  25.0  18.8    Anion Gap 10.2  9.4  12.6      CBC          8/27/2024    10:52 2/27/2025    11:47 4/30/2025    21:32   CBC   WBC 6.49  6.41  8.13    RBC 4.18  4.02  4.32    Hemoglobin 11.9  11.5  12.8    Hematocrit 36.2  36.2  38.7    MCV 86.6  90.0  89.6    MCH 28.5  28.6  29.6    MCHC 32.9  31.8  33.1    RDW 13.0  13.4  13.5    Platelets 213  191  213      TSH          8/27/2024    10:52 2/27/2025    11:47   TSH   TSH 1.720  2.200      Lipid Panel          8/27/2024    10:52 2/27/2025    11:47   Lipid Panel   Total Cholesterol 205  188    Triglycerides 178  206    HDL Cholesterol 43  43    VLDL Cholesterol 32  36    LDL Cholesterol  130  109    LDL/HDL Ratio 2.94  2.41           Data reviewed: Cardiology studies        Results for orders placed during the hospital encounter of 04/30/25    Adult Transthoracic Echo Complete w/ Color, Spectral and Contrast if necessary per protocol    Interpretation Summary    Left ventricular systolic function is normal. Left ventricular ejection fraction appears to be 61 - 65%.    Left ventricular diastolic dysfunction is noted.    There is trace aortic insufficiency.    Estimated right ventricular systolic pressure from tricuspid  regurgitation is normal (<35 mmHg).      Results for orders placed during the hospital encounter of 01/10/24    Stress Test With Myocardial Perfusion One Day 01/11/2024  4:09 PM    Interpretation Summary    Myocardial perfusion imaging indicates a normal myocardial perfusion study with no evidence of ischemia. Impressions are consistent with a low risk study.    Left ventricular ejection fraction is normal (Calculated EF = 67%).    GI artifact is present.    There was no chest pain with regadenoson infusion.    Findings consistent with a normal ECG stress test.    2-week extended cardiac monitor study done on 5/2/2025 showed      A relatively benign 2-week extended cardiac monitor study ([employer])    Underlying rhythm is normal sinus rhythm with an average heart rate of 68 bpm.    There were no long pauses or episodes of high-grade AV block.  There were no episodes of atrial fibrillation during the study.    Occasional isolated premature ventricular complexes noted.  Total PVC burden was 2.3%.    There were rare isolated PACs.  There were few short runs of supraventricular ectopy.  Longest run was of 17 seconds duration.    There were 6 patient triggered events during study.  Some of them correlated with ectopic beats and also short runs of supraventricular ectopy.             Assessment and Plan        Diagnoses and all orders for this visit:    1. Paroxysmal atrial fibrillation (Primary)  Assessment & Plan:  She recently had another recurrence and underwent cardioversion.  However there was 8-second sinus pause following cardioversion and extended beats of sinus bradycardia.  She eventually recovered.  All her episodes of A-fib are highly symptomatic.  Today, she is in regular rhythm on auscultation, denies any major palpitations.  Will continue low-dose metoprolol, along with Eliquis for anticoagulation.  Will also make a referral to cardiac electrophysiologist for second opinion and possible ablation, since  cardioversion is an unlikely option in the future.      Orders:  -     Ambulatory Referral to Cardiac Electrophysiology    2. Essential hypertension  Assessment & Plan:  Blood pressure reasonably well-controlled in the office today, better controlled during recent hospital admissions.  Will continue hydrochlorothiazide, metoprolol and losartan without changes.                Follow Up     Return in about 6 months (around 1/31/2026) for Next scheduled follow up, with Sandra PETER.    Patient was given instructions and counseling regarding her condition or for health maintenance advice. Please see specific information pulled into the AVS if appropriate.

## 2025-08-03 NOTE — ASSESSMENT & PLAN NOTE
Blood pressure reasonably well-controlled in the office today, better controlled during recent hospital admissions.  Will continue hydrochlorothiazide, metoprolol and losartan without changes.

## 2025-08-03 NOTE — ASSESSMENT & PLAN NOTE
She recently had another recurrence and underwent cardioversion.  However there was 8-second sinus pause following cardioversion and extended beats of sinus bradycardia.  She eventually recovered.  All her episodes of A-fib are highly symptomatic.  Today, she is in regular rhythm on auscultation, denies any major palpitations.  Will continue low-dose metoprolol, along with Eliquis for anticoagulation.  Will also make a referral to cardiac electrophysiologist for second opinion and possible ablation, since cardioversion is an unlikely option in the future.

## 2025-08-07 DIAGNOSIS — I10 ESSENTIAL HYPERTENSION: ICD-10-CM

## 2025-08-07 RX ORDER — LOSARTAN POTASSIUM 100 MG/1
100 TABLET ORAL DAILY
Qty: 90 TABLET | Refills: 0 | Status: SHIPPED | OUTPATIENT
Start: 2025-08-07

## 2025-08-13 DIAGNOSIS — I48.0 PAROXYSMAL ATRIAL FIBRILLATION: ICD-10-CM

## 2025-08-14 RX ORDER — APIXABAN 5 MG/1
5 TABLET, FILM COATED ORAL 2 TIMES DAILY
Qty: 60 TABLET | Refills: 0 | Status: SHIPPED | OUTPATIENT
Start: 2025-08-14

## 2025-08-20 ENCOUNTER — APPOINTMENT (OUTPATIENT)
Dept: GENERAL RADIOLOGY | Facility: HOSPITAL | Age: 75
End: 2025-08-20
Payer: MEDICARE

## 2025-08-20 ENCOUNTER — HOSPITAL ENCOUNTER (OUTPATIENT)
Facility: HOSPITAL | Age: 75
Setting detail: OBSERVATION
Discharge: HOME | End: 2025-08-22
Attending: EMERGENCY MEDICINE | Admitting: EMERGENCY MEDICINE
Payer: MEDICARE

## 2025-08-21 ENCOUNTER — APPOINTMENT (OUTPATIENT)
Dept: CARDIOLOGY | Facility: HOSPITAL | Age: 75
End: 2025-08-21
Payer: MEDICARE

## 2025-08-21 PROBLEM — I48.91 ATRIAL FIBRILLATION WITH RAPID VENTRICULAR RESPONSE: Status: ACTIVE | Noted: 2025-08-21

## 2025-08-22 ENCOUNTER — READMISSION MANAGEMENT (OUTPATIENT)
Dept: CALL CENTER | Facility: HOSPITAL | Age: 75
End: 2025-08-22
Payer: MEDICARE

## 2025-08-22 DIAGNOSIS — I48.0 PAROXYSMAL ATRIAL FIBRILLATION: Primary | ICD-10-CM

## 2025-08-25 ENCOUNTER — TELEPHONE (OUTPATIENT)
Age: 75
End: 2025-08-25
Payer: MEDICARE

## 2025-08-25 ENCOUNTER — TRANSITIONAL CARE MANAGEMENT TELEPHONE ENCOUNTER (OUTPATIENT)
Dept: CALL CENTER | Facility: HOSPITAL | Age: 75
End: 2025-08-25
Payer: MEDICARE

## (undated) DEVICE — ELECTRD BLD EDGE COAT 3IN

## (undated) DEVICE — PROXIMATE RH ROTATING HEAD SKIN STAPLERS (35 WIDE) CONTAINS 35 STAINLESS STEEL STAPLES: Brand: PROXIMATE

## (undated) DEVICE — TOTAL KNEE-LF: Brand: MEDLINE INDUSTRIES, INC.

## (undated) DEVICE — ENCORE® LATEX ORTHO SIZE 8, STERILE LATEX POWDER-FREE SURGICAL GLOVE: Brand: ENCORE

## (undated) DEVICE — GLV SURG SENSICARE PI PF LF 7 GRN STRL

## (undated) DEVICE — APPL CHLORAPREP HI/LITE 26ML ORNG

## (undated) DEVICE — FAN SPRAY KIT: Brand: PULSAVAC®

## (undated) DEVICE — NO-SCRATCH ™ SMALL WHITNEY CURETTE ™ IS A SINGLE-USE, PLASTIC CURETTE FOR QUICKLY APPLYING, MANIPULATING AND REMOVING BONE CEMENT DURING HIP AND KNEE REPLACEMENT SURGERY. THE PLASTIC IS SOFTER THAN STEEL INSTRUMENTS, REDUCING THE RISK OF DAMAGING THE PROSTHESIS WITH METAL INSTRUMENTS.  THE CURETTE’S 6MM TIP REMOVES EXCESS CEMENT FROM REPLACEMENT HIPS AND KNEES. EASY-TO-MANEUVER, THE SMALL BLUE CURETTE LETS YOU REMOVE CEMENT FROM ALL EDGES OF THE PROSTHESIS.NO-SCRATCH WHITNEY SMALL CURETTE FEATURES:SAFER THAN STEEL- MADE OF PLASTIC - STURDY YET SOFTER THAN SURGICAL STEEL.HANDIER- EACH TOOL HAS A MOLDED-IN THUMB INDENTATION INSTANTLY ORIENTING THE TOOL.- EASIER TO MANEUVER IN HARD TO SEE PLACES.- COLOR-CODED FOR EASY IDENTIFICATION.FASTER- COMES INDIVIDUALLY PACKAGED IN STERILE, PEEL OPEN POUCH, READY TO GO.- APPLIES, MANIPULATES, OR REMOVES CEMENT WITH FINGERTIP PRECISION.ECONOMICAL- THE COST OF A SINGLE REVISION DWARFS THE COST OF A SINGLE-USE CURETTE. - DISPOSABLE – THERE’S NO NEED TO WASTE TIME REMOVING HARDENED CEMENT OR RE-STERILIZING TOOLS.- LESS EXPENSIVE TO BUY AND INVENTORY - ORDER ONLY THE TOOL YOU USE.- PACKAGED 25 INDIVIDUALLY WRAPPED TOOLS TO A CARTON FOR CONVENIENT SHELF STORAGE.: Brand: WHITNEY NO-SCRATCH CURETTE (SMALL)

## (undated) DEVICE — PENCL E/S SMOKEEVAC W/TELESCP CANN

## (undated) DEVICE — MAT FLR ABS W/BLU/LINER 56X72IN WHT

## (undated) DEVICE — STRYKER PERFORMANCE SERIES SAGITTAL BLADE: Brand: STRYKER PERFORMANCE SERIES

## (undated) DEVICE — CVR LEG BOOTLEG F/R NOSKID 33IN

## (undated) DEVICE — DISPOSABLE TOURNIQUET CUFF SINGLE BLADDER, SINGLE PORT AND QUICK CONNECT CONNECTOR: Brand: COLOR CUFF

## (undated) DEVICE — ZIPPERED TOGA, PEEL-AWAY 2X LARGE: Brand: FLYTE, SURGICOOL

## (undated) DEVICE — INSTRUMENT BATTERY

## (undated) DEVICE — NDL HYPO ECLPS SFTY 18G 1 1/2IN

## (undated) DEVICE — PULLOVER TOGA, 2X LARGE: Brand: FLYTE, SURGICOOL

## (undated) DEVICE — 450 ML BOTTLE OF 0.05% CHLORHEXIDINE GLUCONATE IN 99.95% STERILE WATER FOR IRRIGATION, USP AND APPLICATOR.: Brand: IRRISEPT ANTIMICROBIAL WOUND LAVAGE

## (undated) DEVICE — TOWEL,OR,DSP,ST,BLUE,STD,4/PK,20PK/CS: Brand: MEDLINE

## (undated) DEVICE — GLV SURG SENSICARE PI ORTHO PF SZ7 LF STRL

## (undated) DEVICE — DRSNG PAD ABD 8X10IN STRL

## (undated) DEVICE — SUT VIC 0 CT1 36IN J946H

## (undated) DEVICE — 3 BONE CEMENT MIXER: Brand: MIXEVAC

## (undated) DEVICE — BASIC SINGLE BASIN-LF: Brand: MEDLINE INDUSTRIES, INC.

## (undated) DEVICE — UNDYED BRAIDED (POLYGLACTIN 910), SYNTHETIC ABSORBABLE SUTURE: Brand: COATED VICRYL

## (undated) DEVICE — ANCHORING PIN 20MM/4MM

## (undated) DEVICE — STERILE POLYISOPRENE POWDER-FREE SURGICAL GLOVES: Brand: PROTEXIS

## (undated) DEVICE — GAUZE,SPONGE,4"X4",16PLY,STRL,LF,10/TRAY: Brand: MEDLINE

## (undated) DEVICE — 3M™ STERI-DRAPE™ U-DRAPE 1015: Brand: STERI-DRAPE™

## (undated) DEVICE — MEDI-VAC NON-CONDUCTIVE SUCTION TUBING: Brand: CARDINAL HEALTH

## (undated) DEVICE — SUT VIC UD BR COAT 0 CP2 27IN

## (undated) DEVICE — SOL IRR NACL 0.9PCT 3000ML

## (undated) DEVICE — GLV SURG SENSICARE SLT PF LF 7 STRL

## (undated) DEVICE — SUT ETHIB 1 X538H ETX538H

## (undated) DEVICE — GLV SURG ULTRAFREE MAX LTX PF 8

## (undated) DEVICE — INTENDED FOR TISSUE SEPARATION, AND OTHER PROCEDURES THAT REQUIRE A SHARP SURGICAL BLADE TO PUNCTURE OR CUT.: Brand: BARD-PARKER ® CARBON RIB-BACK BLADES

## (undated) DEVICE — GLV SURG SENSICARE SLT PF LF 8 STRL

## (undated) DEVICE — SLV SCD KN/LEN ADJ EXPRSS BLENDED MD 1P/U

## (undated) DEVICE — SYR LUERLOK 30CC